# Patient Record
Sex: MALE | Race: WHITE | NOT HISPANIC OR LATINO | Employment: OTHER | URBAN - METROPOLITAN AREA
[De-identification: names, ages, dates, MRNs, and addresses within clinical notes are randomized per-mention and may not be internally consistent; named-entity substitution may affect disease eponyms.]

---

## 2018-01-10 ENCOUNTER — TELEPHONE (OUTPATIENT)
Dept: ORTHOPEDICS | Facility: CLINIC | Age: 63
End: 2018-01-10

## 2018-05-03 ENCOUNTER — TELEPHONE (OUTPATIENT)
Dept: ORTHOPEDICS | Facility: CLINIC | Age: 63
End: 2018-05-03

## 2018-05-03 NOTE — TELEPHONE ENCOUNTER
----- Message from Tanja Su sent at 5/3/2018 11:28 AM CDT -----  Contact: Pt  Name of Who is Calling: Pt      What is the request in detail: Pt states he's going to need an MRI on right shoulder before upcoming appointment.      Can the clinic reply by [facility]: no      What [facility] Back [facility] .[phone] (home) [phone] (work)

## 2018-05-03 NOTE — TELEPHONE ENCOUNTER
Called and spoke with patient and advised him that we can not order an MRI of his shoulder until he is seen an evaluated first. Offered to move his appointment from 5/17 to Monday 5/7/18. Patient agreed. Appointment made at 1pm 5/7/18.

## 2018-05-04 DIAGNOSIS — M25.511 RIGHT SHOULDER PAIN, UNSPECIFIED CHRONICITY: Primary | ICD-10-CM

## 2018-05-07 ENCOUNTER — HOSPITAL ENCOUNTER (OUTPATIENT)
Dept: RADIOLOGY | Facility: HOSPITAL | Age: 63
Discharge: HOME | End: 2018-05-07
Attending: ORTHOPAEDIC SURGERY
Payer: COMMERCIAL

## 2018-05-07 ENCOUNTER — OFFICE VISIT (OUTPATIENT)
Dept: ORTHOPEDICS | Facility: CLINIC | Age: 63
End: 2018-05-07
Payer: COMMERCIAL

## 2018-05-07 VITALS
SYSTOLIC BLOOD PRESSURE: 143 MMHG | BODY MASS INDEX: 36.88 KG/M2 | HEART RATE: 67 BPM | HEIGHT: 67 IN | DIASTOLIC BLOOD PRESSURE: 68 MMHG | WEIGHT: 235 LBS

## 2018-05-07 DIAGNOSIS — M75.121 COMPLETE TEAR OF RIGHT ROTATOR CUFF: Primary | ICD-10-CM

## 2018-05-07 DIAGNOSIS — M25.511 RIGHT SHOULDER PAIN, UNSPECIFIED CHRONICITY: ICD-10-CM

## 2018-05-07 PROCEDURE — 73030 X-RAY EXAM OF SHOULDER: CPT | Mod: TC,PN,RT

## 2018-05-07 PROCEDURE — 99214 OFFICE O/P EST MOD 30 MIN: CPT | Mod: 57,S$GLB,, | Performed by: ORTHOPAEDIC SURGERY

## 2018-05-07 PROCEDURE — 73030 X-RAY EXAM OF SHOULDER: CPT | Mod: 26,RT,, | Performed by: RADIOLOGY

## 2018-05-07 PROCEDURE — 99999 PR PBB SHADOW E&M-EST. PATIENT-LVL III: CPT | Mod: PBBFAC,,, | Performed by: ORTHOPAEDIC SURGERY

## 2018-05-07 NOTE — PROGRESS NOTES
Past Medical History:   Diagnosis Date    Anxiety     Anxiety     Arthritis     Back problem     bulging disc    GERD (gastroesophageal reflux disease)     Hypercholesterolemia 11/2/2015    2009: Began statin.    Hyperlipidemia     Hypertension     Hypertension, benign 11/2/2015 2008: Diagnosed.    Sleep apnea        Past Surgical History:   Procedure Laterality Date    APPENDECTOMY      magdalene      NOSE SURGERY      UVULECTOMY         Current Outpatient Prescriptions   Medication Sig    acetaminophen (TYLENOL) 325 MG tablet Take 2 tablets (650 mg total) by mouth every 6 (six) hours as needed for Temperature greater than (or equal to 101 degree F).    amlodipine (NORVASC) 10 MG tablet TAKE 1 TABLET ONCE DAILY    atorvastatin (LIPITOR) 10 MG tablet TAKE 1 TABLET ONCE DAILY    benazepril (LOTENSIN) 20 MG tablet TAKE 1 TABLET ONCE DAILY    buPROPion (WELLBUTRIN) 100 MG tablet Take 100 mg by mouth 2 (two) times daily.    cyclobenzaprine (FLEXERIL) 10 MG tablet Take 10 mg by mouth every evening.    diclofenac (VOLTAREN) 75 MG EC tablet Take 75 mg by mouth 2 (two) times daily.    fluticasone (FLONASE) 50 mcg/actuation nasal spray 1 spray by Each Nare route once daily.    gabapentin (NEURONTIN) 600 MG tablet Take 600 mg by mouth 2 (two) times daily.    pantoprazole (PROTONIX) 40 MG tablet Take 40 mg by mouth once daily.    paroxetine HCl 37.5 mg 24 hr tablet     tramadol (ULTRAM) 50 mg tablet Take 50 mg by mouth every 6 (six) hours as needed for Pain.    aspirin (ECOTRIN) 81 MG EC tablet Take 1 tablet (81 mg total) by mouth once daily.     No current facility-administered medications for this visit.        Review of patient's allergies indicates:   Allergen Reactions    Adhesive Rash       Family History   Problem Relation Age of Onset    Glaucoma Father        Social History     Social History    Marital status:      Spouse name: N/A    Number of children: N/A    Years of education:  N/A     Occupational History    Not on file.     Social History Main Topics    Smoking status: Never Smoker    Smokeless tobacco: Never Used    Alcohol use Yes      Comment: social    Drug use: No    Sexual activity: Yes     Other Topics Concern    Not on file     Social History Narrative    No narrative on file       Chief Complaint:   Chief Complaint   Patient presents with    Right Shoulder - Pain       History: This is a 62-year-old right-hand-dominant male seen for chronic neck and bilateral shoulder pain.  The right shoulder is the worst.  Patient is seen in consultation for Dr. Oates.  Patient's had worsening symptoms.  Pain is moderate to severe.  Pain when reaching or driving.  He rates the pain as a 4 out of 10.  Patient has occasional numbness and tingling down into the arm.  Patient previously saw another doctor where x-rays were taken of his neck and he was told he had arthritis.The cortisone injection for his right shoulder helped for a few weeks.  He also went to physical therapy.  MRI did show a small rotator cuff tear of the anterior supraspinatus.      Present:   His pain is slowly returning and worsening.  Pain is back up to a 5 out of 10.    Review of Systems:    Constitution: Negative for chills, fever, and sweats.  Negative for unexplained weight loss.    HENT:  Positive for headaches and blurry vision.    Cardiovascular:Negative for chest pain or irregular heart beat. Negative for hypertension.    Respiratory:  Negative for cough and shortness of breath.    Gastrointestinal: Negative for abdominal pain, heartburn, melena, nausea, and vomitting.    Genitourinary:  Negative bladder incontinence and dysuria.    Musculoskeletal:  See HPI    Neurologic: Positive for numbness.    Psychiatric/Behavioral: Negative for depression.  The patient is  nervous/anxious.      Endocrine: Negative for polyuria    Hematologic/Lymphatic: Negative for bleeding problem.  Does not bruise/bleed  easily.    Skin: Negative for poor would healing and rash      Physical Examination:    Vital Signs:    Vitals:    05/07/18 1301   BP: (!) 143/68   Pulse: 67       Body mass index is 36.81 kg/m².    This a well-developed, well nourished patient in no acute distress.  They are alert and oriented and cooperative to examination.  Pt. walks without an antalgic gait.      Examination of the right shoulder shows no rashes or erythema. There are no masses, ecchymosis, or atrophy. The patient has full range of motion in forward flexion, external rotation, and internal rotation to the mid T-spine. The patient has moderately positive impingement signs. 2+ radial pulse. Intact axillary, radial, median and ulnar sensation. 5 out of 5 resisted forward flexion, external rotation, and negative lift off test.    Heart is regular rate without obvious murmurs   Normal respiratory effort without audible wheezing  Abdomen is soft and nontender       X-rays: X-rays of the right shoulder  reviewed which show some mild cystic change of the greater tuberosity    MRI of the right shoulder:Complete tear with short distance retraction of the anterior edge of the supraspinatus tendon.  Tendinitis also noted.  Hypertrophic changes at the a.c. joint with both superior and inferior spur formation.     Assessment:: Right rotator cuff tear      Plan:  I reviewed the MRI of the right shoulder.  We discussed treatment options including continued observation versus arthroscopic rotator cuff repair.  Patient is having worsening night pain.  He would like to go and have this set up.  Plan is for arthroscopic rotator cuff repair. Risks, benefits, and alternatives to the procedure were explained to the patient including but not limited to damage to nerves, arteries, blood vessels, bones, tendons, ligaments, stiffness, instability, infection, DVT, PE, as well as general anesthetic complications including seizure, stroke, heart attack and even death. The  patient understood these risks and wished to proceed and signed the informed consent.

## 2018-05-14 ENCOUNTER — ANESTHESIA EVENT (OUTPATIENT)
Dept: SURGERY | Facility: HOSPITAL | Age: 63
End: 2018-05-14
Payer: COMMERCIAL

## 2018-05-15 ENCOUNTER — HOSPITAL ENCOUNTER (OUTPATIENT)
Dept: RADIOLOGY | Facility: HOSPITAL | Age: 63
Discharge: HOME | End: 2018-05-15
Attending: ORTHOPAEDIC SURGERY
Payer: COMMERCIAL

## 2018-05-15 ENCOUNTER — HOSPITAL ENCOUNTER (OUTPATIENT)
Dept: PREADMISSION TESTING | Facility: HOSPITAL | Age: 63
Discharge: HOME | End: 2018-05-15
Attending: ORTHOPAEDIC SURGERY
Payer: COMMERCIAL

## 2018-05-15 VITALS — BODY MASS INDEX: 31.84 KG/M2 | WEIGHT: 215 LBS | HEIGHT: 69 IN

## 2018-05-15 DIAGNOSIS — M75.121 COMPLETE TEAR OF RIGHT ROTATOR CUFF: ICD-10-CM

## 2018-05-15 LAB
ANION GAP SERPL CALC-SCNC: 10 MMOL/L
BASOPHILS # BLD AUTO: 0.1 K/UL
BASOPHILS NFR BLD: 1.4 %
BUN SERPL-MCNC: 15 MG/DL
CALCIUM SERPL-MCNC: 9.5 MG/DL
CHLORIDE SERPL-SCNC: 108 MMOL/L
CO2 SERPL-SCNC: 25 MMOL/L
CREAT SERPL-MCNC: 0.9 MG/DL
DIFFERENTIAL METHOD: ABNORMAL
EOSINOPHIL # BLD AUTO: 0 K/UL
EOSINOPHIL NFR BLD: 0.8 %
ERYTHROCYTE [DISTWIDTH] IN BLOOD BY AUTOMATED COUNT: 13.2 %
EST. GFR  (AFRICAN AMERICAN): >60 ML/MIN/1.73 M^2
EST. GFR  (NON AFRICAN AMERICAN): >60 ML/MIN/1.73 M^2
GLUCOSE SERPL-MCNC: 100 MG/DL
HCT VFR BLD AUTO: 38.4 %
HGB BLD-MCNC: 13 G/DL
LYMPHOCYTES # BLD AUTO: 1.6 K/UL
LYMPHOCYTES NFR BLD: 33.5 %
MCH RBC QN AUTO: 30.9 PG
MCHC RBC AUTO-ENTMCNC: 34 G/DL
MCV RBC AUTO: 91 FL
MONOCYTES # BLD AUTO: 0.3 K/UL
MONOCYTES NFR BLD: 5.3 %
NEUTROPHILS # BLD AUTO: 2.9 K/UL
NEUTROPHILS NFR BLD: 59 %
PLATELET # BLD AUTO: 204 K/UL
PMV BLD AUTO: 8.8 FL
POTASSIUM SERPL-SCNC: 4 MMOL/L
RBC # BLD AUTO: 4.22 M/UL
SODIUM SERPL-SCNC: 143 MMOL/L
WBC # BLD AUTO: 4.9 K/UL

## 2018-05-15 PROCEDURE — 93010 ELECTROCARDIOGRAM REPORT: CPT | Mod: ,,, | Performed by: INTERNAL MEDICINE

## 2018-05-15 PROCEDURE — 99900103 DSU ONLY-NO CHARGE-INITIAL HR (STAT)

## 2018-05-15 PROCEDURE — 36415 COLL VENOUS BLD VENIPUNCTURE: CPT

## 2018-05-15 PROCEDURE — 71046 X-RAY EXAM CHEST 2 VIEWS: CPT | Mod: TC,FY

## 2018-05-15 PROCEDURE — 71046 X-RAY EXAM CHEST 2 VIEWS: CPT | Mod: 26,,, | Performed by: RADIOLOGY

## 2018-05-15 PROCEDURE — 85025 COMPLETE CBC W/AUTO DIFF WBC: CPT

## 2018-05-15 PROCEDURE — 93005 ELECTROCARDIOGRAM TRACING: CPT

## 2018-05-15 PROCEDURE — 80048 BASIC METABOLIC PNL TOTAL CA: CPT

## 2018-05-15 PROCEDURE — 99900104 DSU ONLY-NO CHARGE-EA ADD'L HR (STAT)

## 2018-05-15 RX ORDER — LORATADINE 10 MG/1
10 TABLET ORAL DAILY
COMMUNITY
End: 2018-12-17

## 2018-05-15 RX ORDER — ESOMEPRAZOLE MAGNESIUM 40 MG/1
40 CAPSULE, DELAYED RELEASE ORAL
COMMUNITY
End: 2018-12-17

## 2018-05-15 RX ORDER — SERTRALINE HYDROCHLORIDE 50 MG/1
50 TABLET, FILM COATED ORAL DAILY
COMMUNITY
End: 2019-10-03 | Stop reason: SDUPTHER

## 2018-05-18 ENCOUNTER — ANESTHESIA (OUTPATIENT)
Dept: SURGERY | Facility: HOSPITAL | Age: 63
End: 2018-05-18
Payer: COMMERCIAL

## 2018-05-18 ENCOUNTER — SURGERY (OUTPATIENT)
Age: 63
End: 2018-05-18

## 2018-05-18 ENCOUNTER — HOSPITAL ENCOUNTER (OUTPATIENT)
Facility: HOSPITAL | Age: 63
Discharge: HOME | End: 2018-05-18
Attending: ORTHOPAEDIC SURGERY | Admitting: ORTHOPAEDIC SURGERY
Payer: COMMERCIAL

## 2018-05-18 DIAGNOSIS — M75.121 COMPLETE TEAR OF RIGHT ROTATOR CUFF: ICD-10-CM

## 2018-05-18 PROCEDURE — 36000711: Performed by: ORTHOPAEDIC SURGERY

## 2018-05-18 PROCEDURE — 99900104 DSU ONLY-NO CHARGE-EA ADD'L HR (STAT): Performed by: ORTHOPAEDIC SURGERY

## 2018-05-18 PROCEDURE — D9220A PRA ANESTHESIA: Mod: CRNA,,, | Performed by: NURSE ANESTHETIST, CERTIFIED REGISTERED

## 2018-05-18 PROCEDURE — 64416 NJX AA&/STRD BRCH PL NFS IMG: CPT | Performed by: ANESTHESIOLOGY

## 2018-05-18 PROCEDURE — 36000710: Performed by: ORTHOPAEDIC SURGERY

## 2018-05-18 PROCEDURE — 63600175 PHARM REV CODE 636 W HCPCS

## 2018-05-18 PROCEDURE — 25000003 PHARM REV CODE 250: Performed by: ANESTHESIOLOGY

## 2018-05-18 PROCEDURE — 29827 SHO ARTHRS SRG RT8TR CUF RPR: CPT | Mod: RT,,, | Performed by: ORTHOPAEDIC SURGERY

## 2018-05-18 PROCEDURE — C1713 ANCHOR/SCREW BN/BN,TIS/BN: HCPCS | Performed by: ORTHOPAEDIC SURGERY

## 2018-05-18 PROCEDURE — 64415 NJX AA&/STRD BRCH PLXS IMG: CPT | Mod: 59,RT,, | Performed by: ANESTHESIOLOGY

## 2018-05-18 PROCEDURE — 29824 SHO ARTHRS SRG DSTL CLAVICLC: CPT | Mod: 51,RT,, | Performed by: ORTHOPAEDIC SURGERY

## 2018-05-18 PROCEDURE — 76942 ECHO GUIDE FOR BIOPSY: CPT | Mod: 26,,, | Performed by: ANESTHESIOLOGY

## 2018-05-18 PROCEDURE — 27201423 OPTIME MED/SURG SUP & DEVICES STERILE SUPPLY: Performed by: ORTHOPAEDIC SURGERY

## 2018-05-18 PROCEDURE — 63600175 PHARM REV CODE 636 W HCPCS: Performed by: ANESTHESIOLOGY

## 2018-05-18 PROCEDURE — 25000003 PHARM REV CODE 250: Performed by: NURSE ANESTHETIST, CERTIFIED REGISTERED

## 2018-05-18 PROCEDURE — D9220A PRA ANESTHESIA: Mod: ANES,,, | Performed by: ANESTHESIOLOGY

## 2018-05-18 PROCEDURE — C2626 INFUSION PUMP, NON-PROG,TEMP: HCPCS | Performed by: ANESTHESIOLOGY

## 2018-05-18 PROCEDURE — 71000015 HC POSTOP RECOV 1ST HR: Performed by: ORTHOPAEDIC SURGERY

## 2018-05-18 PROCEDURE — 71000039 HC RECOVERY, EACH ADD'L HOUR: Performed by: ORTHOPAEDIC SURGERY

## 2018-05-18 PROCEDURE — 76942 ECHO GUIDE FOR BIOPSY: CPT | Performed by: ANESTHESIOLOGY

## 2018-05-18 PROCEDURE — 63600175 PHARM REV CODE 636 W HCPCS: Performed by: ORTHOPAEDIC SURGERY

## 2018-05-18 PROCEDURE — 29826 SHO ARTHRS SRG DECOMPRESSION: CPT | Mod: RT,,, | Performed by: ORTHOPAEDIC SURGERY

## 2018-05-18 PROCEDURE — 64416 NJX AA&/STRD BRCH PL NFS IMG: CPT | Mod: 59,RT,, | Performed by: ANESTHESIOLOGY

## 2018-05-18 PROCEDURE — 63600175 PHARM REV CODE 636 W HCPCS: Performed by: NURSE ANESTHETIST, CERTIFIED REGISTERED

## 2018-05-18 PROCEDURE — 71000033 HC RECOVERY, INTIAL HOUR: Performed by: ORTHOPAEDIC SURGERY

## 2018-05-18 PROCEDURE — 37000009 HC ANESTHESIA EA ADD 15 MINS: Performed by: ORTHOPAEDIC SURGERY

## 2018-05-18 PROCEDURE — 37000008 HC ANESTHESIA 1ST 15 MINUTES: Performed by: ORTHOPAEDIC SURGERY

## 2018-05-18 PROCEDURE — 71000016 HC POSTOP RECOV ADDL HR: Performed by: ORTHOPAEDIC SURGERY

## 2018-05-18 PROCEDURE — 99900103 DSU ONLY-NO CHARGE-INITIAL HR (STAT): Performed by: ORTHOPAEDIC SURGERY

## 2018-05-18 DEVICE — ANCHOR SWIVELOCK C BLU FIBERTP: Type: IMPLANTABLE DEVICE | Site: SHOULDER | Status: FUNCTIONAL

## 2018-05-18 RX ORDER — OXYCODONE HYDROCHLORIDE 5 MG/1
5 TABLET ORAL ONCE AS NEEDED
Status: DISCONTINUED | OUTPATIENT
Start: 2018-05-19 | End: 2018-05-18

## 2018-05-18 RX ORDER — SUCCINYLCHOLINE CHLORIDE 20 MG/ML
INJECTION INTRAMUSCULAR; INTRAVENOUS
Status: DISCONTINUED | OUTPATIENT
Start: 2018-05-18 | End: 2018-05-18

## 2018-05-18 RX ORDER — DEXAMETHASONE SODIUM PHOSPHATE 4 MG/ML
INJECTION, SOLUTION INTRA-ARTICULAR; INTRALESIONAL; INTRAMUSCULAR; INTRAVENOUS; SOFT TISSUE
Status: DISCONTINUED | OUTPATIENT
Start: 2018-05-18 | End: 2018-05-18

## 2018-05-18 RX ORDER — ONDANSETRON 2 MG/ML
4 INJECTION INTRAMUSCULAR; INTRAVENOUS ONCE
Status: DISCONTINUED | OUTPATIENT
Start: 2018-05-18 | End: 2018-05-18 | Stop reason: HOSPADM

## 2018-05-18 RX ORDER — ROCURONIUM BROMIDE 10 MG/ML
INJECTION, SOLUTION INTRAVENOUS
Status: DISCONTINUED | OUTPATIENT
Start: 2018-05-18 | End: 2018-05-18

## 2018-05-18 RX ORDER — DIPHENHYDRAMINE HYDROCHLORIDE 50 MG/ML
25 INJECTION INTRAMUSCULAR; INTRAVENOUS EVERY 6 HOURS PRN
Status: DISCONTINUED | OUTPATIENT
Start: 2018-05-18 | End: 2018-05-18 | Stop reason: HOSPADM

## 2018-05-18 RX ORDER — ACETAMINOPHEN 10 MG/ML
INJECTION, SOLUTION INTRAVENOUS
Status: DISCONTINUED | OUTPATIENT
Start: 2018-05-18 | End: 2018-05-18

## 2018-05-18 RX ORDER — SODIUM CHLORIDE, SODIUM LACTATE, POTASSIUM CHLORIDE, CALCIUM CHLORIDE 600; 310; 30; 20 MG/100ML; MG/100ML; MG/100ML; MG/100ML
75 INJECTION, SOLUTION INTRAVENOUS CONTINUOUS
Status: DISCONTINUED | OUTPATIENT
Start: 2018-05-18 | End: 2018-05-18 | Stop reason: HOSPADM

## 2018-05-18 RX ORDER — FENTANYL CITRATE 50 UG/ML
INJECTION, SOLUTION INTRAMUSCULAR; INTRAVENOUS
Status: DISCONTINUED | OUTPATIENT
Start: 2018-05-18 | End: 2018-05-18

## 2018-05-18 RX ORDER — LIDOCAINE HYDROCHLORIDE 10 MG/ML
INJECTION, SOLUTION EPIDURAL; INFILTRATION; INTRACAUDAL; PERINEURAL
Status: DISCONTINUED
Start: 2018-05-18 | End: 2018-05-18 | Stop reason: WASHOUT

## 2018-05-18 RX ORDER — ONDANSETRON 2 MG/ML
INJECTION INTRAMUSCULAR; INTRAVENOUS
Status: DISCONTINUED | OUTPATIENT
Start: 2018-05-18 | End: 2018-05-18

## 2018-05-18 RX ORDER — MIDAZOLAM HYDROCHLORIDE 1 MG/ML
INJECTION, SOLUTION INTRAMUSCULAR; INTRAVENOUS
Status: DISCONTINUED | OUTPATIENT
Start: 2018-05-18 | End: 2018-05-18

## 2018-05-18 RX ORDER — PROPOFOL 10 MG/ML
VIAL (ML) INTRAVENOUS
Status: DISCONTINUED | OUTPATIENT
Start: 2018-05-18 | End: 2018-05-18

## 2018-05-18 RX ORDER — FENTANYL CITRATE 50 UG/ML
25 INJECTION, SOLUTION INTRAMUSCULAR; INTRAVENOUS EVERY 5 MIN PRN
Status: DISCONTINUED | OUTPATIENT
Start: 2018-05-18 | End: 2018-05-18 | Stop reason: HOSPADM

## 2018-05-18 RX ORDER — GLYCOPYRROLATE 0.2 MG/ML
INJECTION INTRAMUSCULAR; INTRAVENOUS
Status: DISCONTINUED | OUTPATIENT
Start: 2018-05-18 | End: 2018-05-18

## 2018-05-18 RX ORDER — SODIUM CHLORIDE, SODIUM LACTATE, POTASSIUM CHLORIDE, CALCIUM CHLORIDE 600; 310; 30; 20 MG/100ML; MG/100ML; MG/100ML; MG/100ML
INJECTION, SOLUTION INTRAVENOUS CONTINUOUS
Status: DISCONTINUED | OUTPATIENT
Start: 2018-05-18 | End: 2018-05-18 | Stop reason: HOSPADM

## 2018-05-18 RX ORDER — MEPERIDINE HYDROCHLORIDE 25 MG/ML
12.5 INJECTION INTRAMUSCULAR; INTRAVENOUS; SUBCUTANEOUS ONCE AS NEEDED
Status: DISCONTINUED | OUTPATIENT
Start: 2018-05-18 | End: 2018-05-18 | Stop reason: HOSPADM

## 2018-05-18 RX ORDER — LIDOCAINE HYDROCHLORIDE 20 MG/ML
JELLY TOPICAL
Status: DISCONTINUED | OUTPATIENT
Start: 2018-05-18 | End: 2018-05-18

## 2018-05-18 RX ORDER — KETOROLAC TROMETHAMINE 30 MG/ML
INJECTION, SOLUTION INTRAMUSCULAR; INTRAVENOUS
Status: DISCONTINUED | OUTPATIENT
Start: 2018-05-18 | End: 2018-05-18

## 2018-05-18 RX ORDER — CEFAZOLIN SODIUM 2 G/50ML
2 SOLUTION INTRAVENOUS
Status: COMPLETED | OUTPATIENT
Start: 2018-05-18 | End: 2018-05-18

## 2018-05-18 RX ORDER — LIDOCAINE HYDROCHLORIDE 10 MG/ML
0.5 INJECTION, SOLUTION EPIDURAL; INFILTRATION; INTRACAUDAL; PERINEURAL ONCE
Status: DISCONTINUED | OUTPATIENT
Start: 2018-05-18 | End: 2018-05-18 | Stop reason: HOSPADM

## 2018-05-18 RX ORDER — SODIUM CHLORIDE 0.9 % (FLUSH) 0.9 %
5 SYRINGE (ML) INJECTION
Status: DISCONTINUED | OUTPATIENT
Start: 2018-05-18 | End: 2018-05-18 | Stop reason: HOSPADM

## 2018-05-18 RX ORDER — HYDROCODONE BITARTRATE AND ACETAMINOPHEN 5; 325 MG/1; MG/1
1 TABLET ORAL EVERY 4 HOURS PRN
Status: DISCONTINUED | OUTPATIENT
Start: 2018-05-18 | End: 2018-05-18 | Stop reason: HOSPADM

## 2018-05-18 RX ORDER — LIDOCAINE HCL/PF 100 MG/5ML
SYRINGE (ML) INTRAVENOUS
Status: DISCONTINUED | OUTPATIENT
Start: 2018-05-18 | End: 2018-05-18

## 2018-05-18 RX ORDER — PROMETHAZINE HYDROCHLORIDE 25 MG/1
25 TABLET ORAL EVERY 8 HOURS PRN
Qty: 30 TABLET | Refills: 0 | Status: SHIPPED | OUTPATIENT
Start: 2018-05-18 | End: 2019-02-08

## 2018-05-18 RX ORDER — OXYCODONE HYDROCHLORIDE 5 MG/1
5 TABLET ORAL ONCE AS NEEDED
Status: COMPLETED | OUTPATIENT
Start: 2018-05-18 | End: 2018-05-18

## 2018-05-18 RX ORDER — OXYCODONE AND ACETAMINOPHEN 5; 325 MG/1; MG/1
1 TABLET ORAL EVERY 6 HOURS PRN
Qty: 40 TABLET | Refills: 0 | Status: SHIPPED | OUTPATIENT
Start: 2018-05-18 | End: 2018-05-30 | Stop reason: SDUPTHER

## 2018-05-18 RX ORDER — SODIUM CHLORIDE 0.9 % (FLUSH) 0.9 %
3 SYRINGE (ML) INJECTION
Status: DISCONTINUED | OUTPATIENT
Start: 2018-05-18 | End: 2018-05-18 | Stop reason: HOSPADM

## 2018-05-18 RX ADMIN — ACETAMINOPHEN 1000 MG: 10 INJECTION, SOLUTION INTRAVENOUS at 08:05

## 2018-05-18 RX ADMIN — DEXAMETHASONE SODIUM PHOSPHATE 4 MG: 4 INJECTION, SOLUTION INTRAMUSCULAR; INTRAVENOUS at 08:05

## 2018-05-18 RX ADMIN — FENTANYL CITRATE 50 MCG: 50 INJECTION, SOLUTION INTRAMUSCULAR; INTRAVENOUS at 08:05

## 2018-05-18 RX ADMIN — FENTANYL CITRATE 25 MCG: 50 INJECTION, SOLUTION INTRAMUSCULAR; INTRAVENOUS at 10:05

## 2018-05-18 RX ADMIN — FENTANYL CITRATE 100 MCG: 50 INJECTION, SOLUTION INTRAMUSCULAR; INTRAVENOUS at 08:05

## 2018-05-18 RX ADMIN — ROPIVACAINE HYDROCHLORIDE: 2 INJECTION, SOLUTION EPIDURAL; INFILTRATION at 11:05

## 2018-05-18 RX ADMIN — CEFAZOLIN SODIUM 2 G: 2 SOLUTION INTRAVENOUS at 08:05

## 2018-05-18 RX ADMIN — PROPOFOL 200 MG: 10 INJECTION, EMULSION INTRAVENOUS at 08:05

## 2018-05-18 RX ADMIN — ROCURONIUM BROMIDE 5 MG: 10 INJECTION, SOLUTION INTRAVENOUS at 08:05

## 2018-05-18 RX ADMIN — ONDANSETRON 4 MG: 2 INJECTION, SOLUTION INTRAMUSCULAR; INTRAVENOUS at 08:05

## 2018-05-18 RX ADMIN — OXYCODONE HYDROCHLORIDE 5 MG: 5 TABLET ORAL at 10:05

## 2018-05-18 RX ADMIN — LIDOCAINE HYDROCHLORIDE 100 MG: 20 INJECTION, SOLUTION INTRAVENOUS at 08:05

## 2018-05-18 RX ADMIN — KETOROLAC TROMETHAMINE 30 MG: 30 INJECTION, SOLUTION INTRAMUSCULAR; INTRAVENOUS at 09:05

## 2018-05-18 RX ADMIN — SUCCINYLCHOLINE CHLORIDE 160 MG: 20 INJECTION, SOLUTION INTRAMUSCULAR; INTRAVENOUS at 08:05

## 2018-05-18 RX ADMIN — SODIUM CHLORIDE, SODIUM LACTATE, POTASSIUM CHLORIDE, AND CALCIUM CHLORIDE: .6; .31; .03; .02 INJECTION, SOLUTION INTRAVENOUS at 07:05

## 2018-05-18 RX ADMIN — MIDAZOLAM 2 MG: 1 INJECTION INTRAMUSCULAR; INTRAVENOUS at 08:05

## 2018-05-18 RX ADMIN — GLYCOPYRROLATE 0.2 MG: 0.2 INJECTION, SOLUTION INTRAMUSCULAR; INTRAVENOUS at 08:05

## 2018-05-18 RX ADMIN — LIDOCAINE HYDROCHLORIDE 1 EACH: 20 JELLY TOPICAL at 08:05

## 2018-05-18 NOTE — ANESTHESIA PROCEDURE NOTES
Peripheral    Patient location during procedure: pre-op   Block not for primary anesthetic.  Reason for block: at surgeon's request and post-op pain management   Post-op Pain Location: TORN ROTATOR CUFF  Start time: 5/18/2018 8:15 AM  Timeout: 5/18/2018 8:15 AM   End time: 5/18/2018 8:22 AM  Surgery related to: REPAIR OF TORN ROTATOR CUFF  Staffing  Anesthesiologist: SAMUEL ESCOBAR  Performed: anesthesiologist   Preanesthetic Checklist  Completed: patient identified, site marked, surgical consent, pre-op evaluation, timeout performed, IV checked, risks and benefits discussed and monitors and equipment checked  Peripheral Block  Patient position: sitting  Prep: ChloraPrep  Patient monitoring: heart rate, cardiac monitor, continuous pulse ox, continuous capnometry and frequent blood pressure checks  Block type: interscalene  Laterality: right  Injection technique: single shot  Needle  Needle type: Stimuplex   Needle gauge: 22 G  Needle length: 2 in  Needle localization: anatomical landmarks and ultrasound guidance   -ultrasound image captured on disc.  Assessment  Injection assessment: negative aspiration, negative parasthesia and local visualized surrounding nerve  Paresthesia pain: none  Heart rate change: no  Slow fractionated injection: yes  Medications:  Bolus administered: 5 mL of 0.5 ropivacaine and bupivacaine  Epinephrine added: none  Additional Notes  VSS.  [facility] RN monitoring vitals throughout procedure.  Patient tolerated procedure well.    10 ML EXPAREL COMBINED WITH 5 ML BUPIVICAINE AND 4 MG OF DECADRON

## 2018-05-18 NOTE — DISCHARGE INSTRUCTIONS
1.Diet: Ice chips, clear liquids, and then diet as tolerated. Drink plenty of liquids.  2.Ice the area at least three times a day (20 minutes per session).  3.Elevate the extremity above the level of the heart to help reduce swelling.  4.Pain medication can be taken every four to six hours as needed. It is helpful to take pain medication prior to physical therapy.  5.Any activity that requires precise thinking or accuracy should be avoided for a minimum of 72 hours after surgery and while on narcotic pain medication. This includes operating machinery and/or driving a vehicle.  6.All sutures/staples will be removed approximately 14 days from the time of surgery. Leave steri-strips (skin tapes) in place until sutures are removed.  7. If skin glue is used instead of stitches, do not apply ointments or solutions to the incision. Keep the incision dry. The skin glue will peel off in 3-4 weeks.  8. Change dressing on the 2nd post-op day. Use gauze for the first 3 days, then start using Band-Aids over the incision sites.   9. All casts, splints, braces, slings, crutches, abduction pillows, etc... Are to be worn as instructed. Use sling at all times except for showering and exercises.  10. Keep the incision dry for 10-14 days. A waterproof dressing (purchase at [employer], etc) can be used to shower. No bath, pool, hot tub until instructed.  11. Start PT in 14 days. Call office to help with scheduling.  12. Call [phone] with any questions or concerns.      Discharge Instructions for Shoulder Arthroscopy  You had a shoulder arthroscopy. It is a surgical procedure that helps the healthcare provider diagnose and treat shoulder problems. These include instability, arthritis, and rotator cuff problems. Below are instructions to help you care for your shoulder when you are at home.  What to expect  After surgery, your joint may be swollen, painful, and stiff. The joint will heal with time. But, recovery times vary depending  on what was done. For example, with a shaved rotator cuff, you may be told to move your arm soon after surgery to prevent stiffness. But if the rotator cuff is repaired or treatment is for instability or arthritis, your healthcare provider may want you to limit movement of your arm for a period of time. Follow your healthcare providers instructions regarding arm movement.  Activity     You may be told to do daily pendulum swings to improve your joints flexibility. Use your torso to move your arm in a Jena, first in one direction, then the other.   · Dont drive until your healthcare provider says its OK. And never drive while taking opioid pain medicine.  · Ask your healthcare provider when it is safe to do Pendulum exercises:    ¨ Hold on to the back of a chair, or lean on a tabletop with your healthy arm.  ¨ Do not actively move your arm with your shoulder muscles during this process. Instead, allow your arm to sway gently.    ¨ Use your torso to move your affected arm in a Jena. First do 20 circles in one direction. Then do 20 circles in the other direction.  ¨ Repeat the pendulum exercise every 2 hour(s). When you feel ready, increase the number of circles to 50 in each direction, every 2 hour(s).  · Bend your wrist and wiggle your fingers often to help blood flow.  Incision care  · Check your incision daily for redness, tenderness, or drainage.  · Wait 3 day(s) after your surgery to begin showering. Then shower as needed. Carefully wash your incision with soap and water. Gently pat it dry. Dont rub the incision, or apply creams or lotions to it.  · Dont soak in a bathtub, hot tub, or pool until your healthcare provider says its OK.  Other home care  · Take your temperature daily for 7 days after your surgery. Report a fever above 100.4º F (38º C) to your healthcare provider. Fever may be a sign of infection.  · Wear your sling or immobilizer as directed by your healthcare provider.  · Use pain  medicine, as needed and as directed. Don't wait until the pain is severe to start using the medicine.   · Use an ice pack or a bag of frozen peas--or something similar--wrapped in a thin towel on your shoulder to reduce swelling for the first 48 hours after surgery. Hold the ice pack. Leave the ice pack on for 20 minutes; then take it off for 20 minutes. Repeat as needed.  Follow-up  Make a follow-up appointment as directed by your healthcare provider.  When to seek medical attention  Call 911 right away if you have any of the following:  · Chest pain  · Shortness of breath  Otherwise, call your healthcare provider immediately if you have any of the following:  · Increasing shoulder pain or pain not relieved by medicine  · Pain or swelling in the arm on the side of your surgery  · Numbness, tingling, or blue-gray color of your arm or fingers on the side of your surgery  · Drainage or oozing, redness, or warmth at the incision  · Fever above 100.4º F (38º C) or shaking chills  · Nausea or vomiting   Date Last Reviewed: 11/16/2015 © 2000-2017 [employer]. [address] [place] [address]. All rights reserved. This information is not intended as a substitute for professional medical care. Always follow your healthcare professional's instructions.        General Information:    1.  Do not drink alcoholic beverages including beer for 24 hours or as long as you are on pain medication..  2.  Do not drive a motor vehicle, operate machinery or power tools, or signs legal papers for 24 hours or as long as you are on pain medication.   3.  You may experience light-headedness, dizziness, and sleepiness following surgery. Please do not stay alone. A responsible adult should be with you for this 24 hour period.  4.  Go home and rest.    5. Progress slowly to a normal diet unless instructed.  Otherwise, begin with liquids such as soft drinks, then soup and crackers working up to solid foods. Drink plenty of  "nonalcoholic fluids.  6.  Certain anesthetics and pain medications produce nausea and vomiting in certain       individuals. If nausea becomes a problem at home, call you doctor.    7. A nurse will be calling you sometime after surgery. Do not be alarmed. This is our way of finding out how you are doing.    8. Several times every hour while you are awake, take 2-3 deep breaths and cough. If you had stomach surgery hold a pillow or rolled towel firmly against your stomach before you cough. This will help with any pain the cough might cause.  9. Several times every hour while you are awake, pump and flex your feet 5-6 times and do foot circles. This will help prevent blood clots.    10.Call your doctor for severe pain, bleeding, fever, or signs or symptoms of infection (pain, swelling, redness, foul odor, drainage).      Discharge Instructions: After Your Surgery/Procedure  Youve just had surgery. During surgery you were given medicine called anesthesia to keep you relaxed and free of pain. After surgery you may have some pain or nausea. This is common. Here are some tips for feeling better and getting well after surgery.     Stay on schedule with your medication.   Going home  Your doctor or nurse will show you how to take care of yourself when you go home. He or she will also answer your questions. Have an adult family member or friend drive you home.      For your safety we recommend these precaution for the first 24 hours after your procedure:  · Do not drive or use heavy equipment.  · Do not make important decisions or sign legal papers.  · Do not drink alcohol.  · Have someone stay with you, if needed. He or she can watch for problems and help keep you safe.  · Your concentration, balance, coordination, and judgement may be impaired for many hours after anesthesia.  Use caution when ambulating or standing up.     · You may feel weak and "washed out" after anesthesia and surgery.      Subtle residual effects of " general anesthesia or sedation with regional / local anesthesia can last more than 24 hours.  Rest for the remainder of the day or longer if your Doctor/Surgeon has advised you to do so.  Although you may feel normal within the first 24 hours, your reflexes and mental ability may be impaired without you realizing it.  You may feel dizzy, lightheaded or sleepy for 24 hours or longer.      Be sure to go to all follow-up visits with your doctor. And rest after your surgery for as long as your doctor tells you to.  Coping with pain  If you have pain after surgery, pain medicine will help you feel better. Take it as told, before pain becomes severe. Also, ask your doctor or pharmacist about other ways to control pain. This might be with heat, ice, or relaxation. And follow any other instructions your surgeon or nurse gives you.  Tips for taking pain medicine  To get the best relief possible, remember these points:  · Pain medicines can upset your stomach. Taking them with a little food may help.  · Most pain relievers taken by mouth need at least 20 to 30 minutes to start to work.  · Taking medicine on a schedule can help you remember to take it. Try to time your medicine so that you can take it before starting an activity. This might be before you get dressed, go for a walk, or sit down for dinner.  · Constipation is a common side effect of pain medicines. Call your doctor before taking any medicines such as laxatives or stool softeners to help ease constipation. Also ask if you should skip any foods. Drinking lots of fluids and eating foods such as fruits and vegetables that are high in fiber can also help. Remember, do not take laxatives unless your surgeon has prescribed them.  · Drinking alcohol and taking pain medicine can cause dizziness and slow your breathing. It can even be deadly. Do not drink alcohol while taking pain medicine.  · Pain medicine can make you react more slowly to things. Do not drive or run  machinery while taking pain medicine.  Your health care provider may tell you to take acetaminophen to help ease your pain. Ask him or her how much you are supposed to take each day. Acetaminophen or other pain relievers may interact with your prescription medicines or other over-the-counter (OTC) drugs. Some prescription medicines have acetaminophen and other ingredients. Using both prescription and OTC acetaminophen for pain can cause you to overdose. Read the labels on your OTC medicines with care. This will help you to clearly know the list of ingredients, how much to take, and any warnings. It may also help you not take too much acetaminophen. If you have questions or do not understand the information, ask your pharmacist or health care provider to explain it to you before you take the OTC medicine.  Managing nausea  Some people have an upset stomach after surgery. This is often because of anesthesia, pain, or pain medicine, or the stress of surgery. These tips will help you handle nausea and eat healthy foods as you get better. If you were on a special food plan before surgery, ask your doctor if you should follow it while you get better. These tips may help:  · Do not push yourself to eat. Your body will tell you when to eat and how much.  · Start off with clear liquids and soup. They are easier to digest.  · Next try semi-solid foods, such as mashed potatoes, applesauce, and gelatin, as you feel ready.  · Slowly move to solid foods. Dont eat fatty, rich, or spicy foods at first.  · Do not force yourself to have 3 large meals a day. Instead eat smaller amounts more often.  · Take pain medicines with a small amount of solid food, such as crackers or toast, to avoid nausea.     Call your surgeon if  · You still have pain an hour after taking medicine. The medicine may not be strong enough.  · You feel too sleepy, dizzy, or groggy. The medicine may be too strong.  · You have side effects like nausea, vomiting,  or skin changes, such as rash, itching, or hives.       If you have obstructive sleep apnea  You were given anesthesia medicine during surgery to keep you comfortable and free of pain. After surgery, you may have more apnea spells because of this medicine and other medicines you were given. The spells may last longer than usual.   At home:  · Keep using the continuous positive airway pressure (CPAP) device when you sleep. Unless your health care provider tells you not to, use it when you sleep, day or night. CPAP is a common device used to treat obstructive sleep apnea.  · Talk with your provider before taking any pain medicine, muscle relaxants, or sedatives. Your provider will tell you about the possible dangers of taking these medicines.  © 9645-0209 [employer]. [address] [place] [address], [place] [address]. All rights reserved. This information is not intended as a substitute for professional medical care. Always follow your healthcare professional's instructions.      Post op instructions for prevention of DVT  What is deep vein thrombosis?  Deep vein thrombosis (DVT) is the medical term for blood clots in the deep veins of the leg.  These blood clots can be dangerous.  A DVT can block a blood vessel and keep blood from getting where it needs to go.  Another problem is that the clot can travel to other parts of the body such as the lungs.  A clot that travels to the lungs is called a pulmonary embolus (PE) and can cause serious problems with breathing which can lead to death.  Am I at risk for DVT/PE?  If you are not very active, you are at risk of DVT.  Anyone confined to bed, sitting for long periods of time, recovering from surgery, etc. increases the risk of DVT.  Other risk factors are cancer diagnosis, certain medications, estrogen replacement in any form,older age, obesity, pregnancy, smoking, history of clotting disorders, and dehydration.  How will I know if I have a DVT?   Swelling in the  lower leg   Pain   Warmth, redness, hardness or bulging of the vein  If you have any of these symptoms, call your doctors office right away.  Some people will not have any symptoms until the clot moves to the lungs.  What are the symptoms of a PE?   Panting, shortness of breath, or trouble breathing   Sharp, knife-like chest pain when you breathe   Coughing or coughing up blood   Rapid heartbeat  If you have any of these symptoms or get worse quickly, call 911 for emergency treatment.  How can I prevent a DVT?   Avoid long periods of inactivity and dont cross your legs--get up and walk around every hour or so.   Stay active--walking after surgery is highly encouraged.  This means you should get out of the house and walk in the neighborhood.  Going up and down stairs will not impair healing (unless advised against such activity by your doctor).     Drink plenty of noncaffeinated, nonalcoholic fluids each day to prevent dehydration.   Wear special support stockings, if they have been advised by your doctor.   If you travel, stop at least once an hour and walk around.   Avoid smoking (assistance with stopping is available through your healthcare provider)  Always notify your doctor if you are not able to follow the post operative instructions that are given to you at the time of discharge.  It may be necessary to prescribe one of the medications available to prevent DVT.    Using Opioids for Pain Management     Your doctor has given instructions for you to take an opioid.  This is a drug for bad pain.  It helps control pain without causing bleeding and kidney problems.  Common opioid names are morphine, hydromorphone, oxycodone, and methadone. These drugs are called narcotics.    There are several safety concerns you need to know.     · It is against the law to give or sell this drug to another person.  You must keep this medicine safely locked.    · You may have side effects from taking this medication.   These include nausea, itching, sweating, sleepiness, a change in your ability to breathe, and depression.  · Do not take alcohol or sleeping pills opioids.    · Long-term opoid use may no longer giver you relief from pain.  It can cause you stomach pain, mental anxiety, and headaches.  Long-term opoid use can potentially lead to unlawful street drug abuse and reduce your ability to stay employed.    · Your body may become opioid tolerant if you need to take more to get relief.    · You must stop taking opioids if you begin having more pain as a result of the medicine.    · Opioid withdrawal occurs when you have to stop taking the drug.  It can cause you to have nausea, vomiting, diarrhea, stomach pain, anxiety, and dilated pupils in your eyes. This condition means you are opioid dependent.    · Addiction is a drug induced brain disease. It means there are changes in how your brain is working.  Children, teens, and young adults under 25 years old are more likely to get addicted to opioids.      · Addiction can happen with repeated opioid use.  It does not happen with short-term use of two weeks or less.       For more information, please speak with your doctor or pharmacist.        We hope your stay was comfortable as you heal now, mend and rest.    For we have enjoyed taking care of you by giving your our best.    And as you get better, by regaining your health and strength;   We count it as a privilege to have served you and hope your time at [facility] was well spent.      Thank  You!!!

## 2018-05-18 NOTE — TRANSFER OF CARE
"Anesthesia Transfer of Care Note    Patient: Moris Calderón    Procedure(s) Performed: Procedure(s) (LRB):  REPAIR ROTATOR CUFF ARTHROSCOPIC (Right)  ARTHROSCOPY-SHOULDER WITH SUBACROMIAL DECOMPRESSION (Right)  ARTHROSCOPY-SHOULDER EXCISION DISTAL CLAVICLE (Right)    Patient location: PACU    Anesthesia Type: general and regional    Transport from OR: Transported from OR on 2-3 L/min O2 by NC with adequate spontaneous ventilation    Post pain: adequate analgesia    Post assessment: no apparent anesthetic complications and tolerated procedure well    Post vital signs: stable    Level of consciousness: sedated and responds to stimulation    Nausea/Vomiting: no nausea/vomiting    Complications: none    Transfer of care protocol was followed      Last vitals:   Visit Vitals  /66 (BP Location: Left arm, Patient Position: Lying)   Pulse (!) 56   Temp 36.8 °C (98.2 °F) (Temporal)   Resp 16   Ht 5' 9" (1.753 m)   Wt 97.5 kg (215 lb)   SpO2 99%   BMI 31.75 kg/m²     "

## 2018-05-18 NOTE — PLAN OF CARE
Pt calm  Personal belongs in bag and pt stats that his wife wants to hold them for him  IV started IVF infusing

## 2018-05-18 NOTE — ANESTHESIA PREPROCEDURE EVALUATION
05/18/2018  Moris Calderón is a 62 y.o., male.    Anesthesia Evaluation    I have reviewed the Patient Summary Reports.    I have reviewed the Nursing Notes.   I have reviewed the Medications.     Review of Systems  Anesthesia Hx:  No problems with previous Anesthesia    Social:  Non-Smoker Smoking Status: Never Smoker  Smokeless Tobacco Status: Never Used  Alcohol use: Yes, unspecified volume  Drug use: No       Cardiovascular:   Hypertension    Pulmonary:   Sleep Apnea    Hepatic/GI:   GERD, poorly controlled           Anesthesia Plan  Type of Anesthesia, risks & benefits discussed:  Anesthesia Type:  general, regional  Patient's Preference:   Intra-op Monitoring Plan: standard ASA monitors  Intra-op Monitoring Plan Comments:   Post Op Pain Control Plan:   Post Op Pain Control Plan Comments:   Induction:   IV  Beta Blocker:  Patient is not currently on a Beta-Blocker (No further documentation required).       Informed Consent: Patient understands risks and agrees with Anesthesia plan.  Questions answered. Anesthesia consent signed with patient.  ASA Score: 2     Day of Surgery Review of History & Physical: I have interviewed and examined the patient. I have reviewed the patient's H&P dated:  There are no significant changes.  H&P update referred to the surgeon.         Ready For Surgery From Anesthesia Perspective.

## 2018-05-18 NOTE — DISCHARGE SUMMARY
[facility]  Discharge Note  Short Stay    Admit Date: 5/18/2018    Discharge Date and Time: 5/18/2018    Attending Physician: Colby Valenzuela MD     Discharge Provider: Colby Valenzuela    Diagnoses:  Active Hospital Problems    Diagnosis  POA    *Complete tear of right rotator cuff [M75.121]  Yes      Resolved Hospital Problems    Diagnosis Date Resolved POA   No resolved problems to display.       Discharged Condition: good    Hospital Course: Patient was admitted for an outpatient procedure and tolerated the procedure well with no complications.    Final Diagnoses: Same as principal problem.    Disposition: Home or Self Care    Follow up/Patient Instructions:    Medications:  Reconciled Home Medications:      Medication List      START taking these medications    oxyCODONE-acetaminophen 5-325 mg per tablet  Commonly known as:  PERCOCET  Take 1 tablet by mouth every 6 (six) hours as needed for Pain.     promethazine 25 MG tablet  Commonly known as:  PHENERGAN  Take 1 tablet (25 mg total) by mouth every 8 (eight) hours as needed for Nausea.        CONTINUE taking these medications    acetaminophen 325 MG tablet  Commonly known as:  TYLENOL  Take 2 tablets (650 mg total) by mouth every 6 (six) hours as needed for Temperature greater than (or equal to 101 degree F).     amLODIPine 10 MG tablet  Commonly known as:  NORVASC  TAKE 1 TABLET ONCE DAILY     aspirin 81 MG EC tablet  Commonly known as:  ECOTRIN  Take 1 tablet (81 mg total) by mouth once daily.     atorvastatin 10 MG tablet  Commonly known as:  LIPITOR  TAKE 1 TABLET ONCE DAILY     benazepril 20 MG tablet  Commonly known as:  LOTENSIN  TAKE 1 TABLET ONCE DAILY     buPROPion 100 MG tablet  Commonly known as:  WELLBUTRIN  Take 100 mg by mouth 2 (two) times daily.     diclofenac 75 MG EC tablet  Commonly known as:  VOLTAREN  Take 75 mg by mouth 2 (two) times daily.     esomeprazole 40 MG capsule  Commonly known as:  NEXIUM  Take 40  mg by mouth before breakfast.     fluticasone 50 mcg/actuation nasal spray  Commonly known as:  FLONASE  1 spray by Each Nare route once daily.     loratadine 10 mg tablet  Commonly known as:  CLARITIN  Take 10 mg by mouth once daily.     sertraline 50 MG tablet  Commonly known as:  ZOLOFT  Take 50 mg by mouth once daily.            Discharge Procedure Orders  Diet general     Ice to affected area     Lifting restrictions     No driving, operating heavy equipment or signing legal documents while taking pain medication     Call MD for:  temperature >100.4     Call MD for:  persistent nausea and vomiting     Call MD for:  severe uncontrolled pain     Call MD for:  difficulty breathing, headache or visual disturbances     Call MD for:  redness, tenderness, or signs of infection (pain, swelling, redness, odor or green/yellow discharge around incision site)     Call MD for:  hives     Call MD for:  persistent dizziness or light-headedness     Call MD for:  extreme fatigue     Remove dressing in 48 hours     Change dressing (specify)   Order Comments: Dressing change: 1 times per day using waterproof bandaids.       Follow-up Information     Colby Valenzuela MD In 2 weeks.    Specialties:  Sports Medicine, Orthopedic Surgery  Contact information:  [address] [place] [address] DR Kehinde EMANUEL [id number] [phone]                   Discharge Procedure Orders (must include Diet, Follow-up, Activity):    Discharge Procedure Orders (must include Diet, Follow-up, Activity)  Diet general     Ice to affected area     Lifting restrictions     No driving, operating heavy equipment or signing legal documents while taking pain medication     Call MD for:  temperature >100.4     Call MD for:  persistent nausea and vomiting     Call MD for:  severe uncontrolled pain     Call MD for:  difficulty breathing, headache or visual disturbances     Call MD for:  redness, tenderness, or signs of infection (pain, swelling, redness, odor or green/yellow  discharge around incision site)     Call MD for:  hives     Call MD for:  persistent dizziness or light-headedness     Call MD for:  extreme fatigue     Remove dressing in 48 hours     Change dressing (specify)   Order Comments: Dressing change: 1 times per day using waterproof bandaids.

## 2018-05-18 NOTE — ADDENDUM NOTE
Addendum  created 05/18/18 1104 by Abdiel Pollard MD    Anesthesia Intra Blocks edited, Child order released for a procedure order, LDA created via procedure documentation, Sign clinical note

## 2018-05-18 NOTE — ANESTHESIA PROCEDURE NOTES
Peripheral    Patient location during procedure: pre-op   Block not for primary anesthetic.  Reason for block: at surgeon's request and post-op pain management   Post-op Pain Location:  TORN ROTATOR CUFF RIGHT  Start time: 5/18/2018 10:45 AM  Timeout: 5/18/2018 10:45 AM   End time: 5/18/2018 11:01 AM  Surgery related to: REPAIR OF TORN ROTATOR CUFF RIGHT  Staffing  Anesthesiologist: SAMULE ESCOBAR  Performed: anesthesiologist   Preanesthetic Checklist  Completed: patient identified, site marked, surgical consent, pre-op evaluation, timeout performed, IV checked, risks and benefits discussed and monitors and equipment checked  Peripheral Block  Patient position: left lateral decubitus  Prep: ChloraPrep and site prepped and draped  Patient monitoring: heart rate, cardiac monitor, continuous pulse ox, continuous capnometry and frequent blood pressure checks  Block type: interscalene  Laterality: right  Injection technique: continuous  Needle  Needle type: Tuohy   Needle gauge: 18 G  Needle length: 2 in  Needle localization: anatomical landmarks and ultrasound guidance  Needle insertion depth: 2 cm  Catheter type: non-stimulating  Catheter size: 20 G  Catheter at skin depth: 12 cm  Test dose: lidocaine 1.5% with Epi 1-to-200,000 and negative   -ultrasound image captured on disc.  Assessment  Injection assessment: negative aspiration, negative parasthesia and local visualized surrounding nerve  Paresthesia pain: none  Heart rate change: no  Slow fractionated injection: yes  Medications:  Bolus administered: 20 mL of 0.25 ropivacaine  Epinephrine added: none  Additional Notes  VSS.  [facility] RN monitoring vitals throughout procedure.  Patient tolerated procedure well.  Previous block was not working.

## 2018-05-18 NOTE — PLAN OF CARE
Pt lying in bed sleeping/snoring. No signs/symptoms of pain or distress. Family updated per MD upon pt arrival to recovery room. Family updated also per [facility] text system.

## 2018-05-18 NOTE — OP NOTE
[facility]  Orthopedic Surgery  Operative Note    SUMMARY     Date of Procedure: 5/18/2018     Procedure: Procedure(s) (LRB):  REPAIR ROTATOR CUFF ARTHROSCOPIC (Right)  ARTHROSCOPY-SHOULDER WITH SUBACROMIAL DECOMPRESSION (Right)   R ascope DCE    Surgeon(s) and Role:     * Colby Valenzuela MD - Primary    Assistant: Ghulam Santos    Pre-Operative Diagnosis: Complete tear of right rotator cuff [M75.121]    Post-Operative Diagnosis: Post-Op Diagnosis Codes:     * Complete tear of right rotator cuff [M75.121]    Anesthesia: General      Description of the Findings of the Procedure: Diagnostic arthroscopy findings on the patient's Right shoulder showed some degenerative tearing of the superior labrum, healthy biceps tendon and subscapularis. The patient had near full tearing of the supraspinatus with about 20% of bursal tissue remaining. Rest of the  articular appearance of the rotator cuff was normal. Articular surface was normal. In the subacromial space, the patient had bursitis in the subacromial space with a type 2 acromion. Ac joint showed severe arthritic changes with spurring. Bursal surface of the rotator cuff was intact.      Complications: No    Estimated Blood Loss (EBL): 5 mL           Implants:   Implant Name Type Inv. Item Serial No.  Lot No. LRB No. Used   SUT ANCHR 4.75X19.1 FIBERTAPE - [id number]  SUT ANCHR 4.75X19.1 FIBERTAPE  [employer] [id number] Right 1   SUT ANCHR 4.75X19.1 FIBERTAPE - [id number]  SUT ANCHR 4.75X19.1 FIBERTAPE  [employer] [id number] Right 1   SUT ANCHR 4.75X19.1 FIBERTAPE - [id number]   SUT ANCHR 4.75X19.1 FIBERTAPE   ARTHREX [id number] Right 1       Specimens:   Specimen (12h ago through future)    None                  Condition: Good    Disposition: PACU - hemodynamically stable.    Attestation: I was present and scrubbed for the entire procedure.      Indications for the procedure:A 62 year old male with a history ofRight shoulder pain that failed  to resolve with physical therapy, activity modification, injections, and rest.  Patient desired the procedure listed above after MRI was obtained.    PROCEDURE IN DETAIL: Risks, benefits and alternatives of the procedure were   explained to the patient including, but not limited to damage to nerves,   arteries and blood vessels. Also explained risk of re-rupture, nonhealing, infection, DVT,   PE as well as anesthetic complications including seizure, stroke, heart attack   and death. They understood this, signed informed consent. The patient's Right  shoulder was marked prior to coming to the Operating Room. Once there a formal   timeout was done in which correct patient, procedure and operative site were all   correctly identified and confirmed by the entire operating team. Ancef 2 g was   given prior to surgical incision. General anesthesia was induced. The   patient was placed in lateral decubitus position on a bean bag with his Right upper extremity   hanging in traction.The arm was suspended with 10 lbs of weight for balanced traction. The extremity was prepped and draped in normal   sterile fashion.A standard posterior portal was then made. A spinal   needle was used to localize an anterior portal within the rotator interval and   this was made as well. We then performed a diagnostic arthroscopy of the   glenohumeral joint through both the anterior and posterior viewing portals,with the findings listed above.Shaver was used to debride the labrum and cuff tissue. Tear was tagged with a PDS.     After this was done, we then proceeded up in the subacromial space   where a spinal needle was used to localize a lateral portal and then this was   made as well. A 4.0 shaver was used to perform a subtotal bursectomy. We then   used the ablator to remove the soft tissue off the undersurface of the acromion   and then the 4.0 bur was used to turn a type 2 acromion into a type 1 acromion,   smooth off the undersurface.The  coracoacromial ligament was identified and disected free of the anterior acromion with the electrocautery device.We then thoroughly inspected the cuff on the rotator cuff side. We shaved away any additional adhesions in the anterior, lateral on posterior gutters.  We were unable to find any evidence for a bursal sided cuff tear but the tissue where the stitch was passed where was thinned. Anterior portal was redirected toward the AC joint. Emmet was placed through the anterior portal. 5mm of distal clavicle was excised and no further articulation between the clavicle and the acromion was left. Passport cannulas were placed in the lateral portal and an accessory lateral portal was created right off the acromial edge. Cannula placed here also. Plastic shuttling cannula placed anteriorly. Cuff tear was completed using 11 blade and the shaver. Footprint was prepared using the shaver and amelia. 2 x 4.75 Biocomposite Swivellock anchors loaded with Fibertape were punched and placed for the medial row right off the articular margin. The tapes were passed through the cuff tissue using a Fast Pass Scorpion. The tapes were then secured in the lateral row to 2 x 4.75 Biocomposite Swivellock anchors in a SpeedBridge pattern with one blue and one white in each anchor.          All excess fluid was removed from the shoulder. The portals were closed using nylon. Sterile dressing applied.  They were then placed in a cryotherapy cuff with   UltraSling, extubated, awakened and transferred from the Operating Room to   Recovery Room in stable condition.     Postoperative rehab course will be for RCR

## 2018-05-18 NOTE — ANESTHESIA POSTPROCEDURE EVALUATION
"Anesthesia Post Evaluation    Patient: Moris Calderón    Procedure(s) Performed: Procedure(s) (LRB):  REPAIR ROTATOR CUFF ARTHROSCOPIC (Right)  ARTHROSCOPY-SHOULDER WITH SUBACROMIAL DECOMPRESSION (Right)  ARTHROSCOPY-SHOULDER EXCISION DISTAL CLAVICLE (Right)    Final Anesthesia Type: general  Patient location during evaluation: PACU  Patient participation: Yes- Able to Participate  Level of consciousness: awake and alert and oriented  Post-procedure vital signs: reviewed and stable  Pain management: adequate  Airway patency: patent  PONV status at discharge: No PONV  Anesthetic complications: no      Cardiovascular status: blood pressure returned to baseline  Respiratory status: unassisted, spontaneous ventilation and room air  Hydration status: euvolemic  Follow-up not needed.        Visit Vitals  /70   Pulse (!) 58   Temp 36.6 °C (97.9 °F) (Temporal)   Resp 16   Ht 5' 9" (1.753 m)   Wt 97.5 kg (215 lb)   SpO2 95%   BMI 31.75 kg/m²       Pain/Jesus Score: Pain Assessment Performed: Yes (5/18/2018  9:55 AM)  Presence of Pain: complains of pain/discomfort (5/18/2018 10:05 AM)  Pain Rating Prior to Med Admin: 7 (5/18/2018 10:21 AM)  Jesus Score: 10 (5/18/2018 10:05 AM)      "

## 2018-05-19 NOTE — ANESTHESIA POST-OP PAIN MANAGEMENT
Acute Pain Service Progress Note    Moris Calderón is a 62 y.o., male, [id number].    Surgery:  Shoulder arthroscopy    Post Op Day #: 1    Catheter type: perineural  interscalene    Infusion type: Ropivacaine 0.2%  8 basal    Problem List:    Active Hospital Problems    Diagnosis  POA    *Complete tear of right rotator cuff [M75.121]  Yes      Resolved Hospital Problems    Diagnosis Date Resolved POA   No resolved problems to display.       Subjective:     General appearance of alert, oriented, no complaints   Pain with rest: 1    Numbers   Pain with movement: 1    Numbers     Assessment:     Pain control adequate    Plan:     Patient doing well, continue present treatment.    Evaluator Aida Sheffield

## 2018-05-21 VITALS
RESPIRATION RATE: 18 BRPM | DIASTOLIC BLOOD PRESSURE: 63 MMHG | BODY MASS INDEX: 31.84 KG/M2 | OXYGEN SATURATION: 93 % | SYSTOLIC BLOOD PRESSURE: 109 MMHG | HEIGHT: 69 IN | HEART RATE: 56 BPM | TEMPERATURE: 98 F | WEIGHT: 215 LBS

## 2018-05-21 NOTE — ADDENDUM NOTE
Addendum  created 05/21/18 0853 by Abdiel Pollard MD    Anesthesia Event edited, Sign clinical note

## 2018-05-21 NOTE — ANESTHESIA POST-OP PAIN MANAGEMENT
Anesthesia Acute Pain Service Follow up Note    Moris Calderón : 1955 MRN: [id number]      Date of Procedure: 2018    Procedure(s) (LRB):  REPAIR ROTATOR CUFF ARTHROSCOPIC (Right)  ARTHROSCOPY-SHOULDER WITH SUBACROMIAL DECOMPRESSION (Right)  ARTHROSCOPY-SHOULDER EXCISION DISTAL CLAVICLE (Right)    Post-op: 3 Days Post-Op     Catheter type: perineural  interscalene          Pt contacted by phone at number provided during surgical visit.  Pt reports good pain with perineural catheter in place and functional. Pt denies signs and symptoms  of local anesthetic toxicity, denies erythema or induration at catheter insertion site, states that dressing remains intact.    All questions were answered, no complications or issues were identified at this time.  Catheter to be removed today, instructed patient on removal.      AMADO ESCOBAR MD  Department of Anesthesiology   [facility]

## 2018-05-23 ENCOUNTER — NURSE TRIAGE (OUTPATIENT)
Dept: ADMINISTRATIVE | Facility: CLINIC | Age: 63
End: 2018-05-23

## 2018-05-23 NOTE — TELEPHONE ENCOUNTER
Reason for Disposition   Nursing judgment    Protocols used: ST NO PROTOCOL CALL: INFORMATION ONLY-A-[place]    Moris BENTLEY is calling with post operative questions:    1.  Is waterproof bandage to be kept on all the time or removed daily?  2.  Should PT be arranged prior to post op appointment?    Please contact Moris BENTLEY to advise.  He can be reached at [phone].

## 2018-05-30 RX ORDER — OXYCODONE AND ACETAMINOPHEN 5; 325 MG/1; MG/1
1 TABLET ORAL EVERY 6 HOURS PRN
Qty: 40 TABLET | Refills: 0 | Status: SHIPPED | OUTPATIENT
Start: 2018-05-30 | End: 2018-06-08 | Stop reason: SDUPTHER

## 2018-05-30 NOTE — TELEPHONE ENCOUNTER
----- Message from Annie Keys sent at 5/30/2018 11:40 AM CDT -----  Contact: self  Type:  RX Refill Request    Who Called:  self  Refill or New Rx:  Post op refill  RX Name and Strength:  oxyCODONE-acetaminophen (PERCOCET) 5-325 mg per tablet  How is the patient currently taking it? (ex. 1XDay):  Every 6 hours as needed for pain  Is this a 30 day or 90 day RX:  30  Preferred Pharmacy with phone number:  [facility]  Local or Mail Order:  local  Ordering Provider:  Dr Nicolas Hills Call Back Number:  [phone]  Additional Information:  Post op to rotator cuff surgery.     [facility] [address] [place] [id number]  Phone: [phone] Fax: [phone]

## 2018-06-04 ENCOUNTER — OFFICE VISIT (OUTPATIENT)
Dept: ORTHOPEDICS | Facility: CLINIC | Age: 63
End: 2018-06-04
Payer: COMMERCIAL

## 2018-06-04 VITALS
SYSTOLIC BLOOD PRESSURE: 139 MMHG | HEART RATE: 67 BPM | DIASTOLIC BLOOD PRESSURE: 69 MMHG | HEIGHT: 69 IN | WEIGHT: 215 LBS | BODY MASS INDEX: 31.84 KG/M2

## 2018-06-04 DIAGNOSIS — M75.121 COMPLETE TEAR OF RIGHT ROTATOR CUFF: Primary | ICD-10-CM

## 2018-06-04 PROCEDURE — 99999 PR PBB SHADOW E&M-EST. PATIENT-LVL III: CPT | Mod: PBBFAC,,, | Performed by: ORTHOPAEDIC SURGERY

## 2018-06-04 PROCEDURE — 99024 POSTOP FOLLOW-UP VISIT: CPT | Mod: S$GLB,,, | Performed by: ORTHOPAEDIC SURGERY

## 2018-06-04 NOTE — PROGRESS NOTES
Past Medical History:   Diagnosis Date    Anxiety     Anxiety     Arthritis     Back problem     bulging disc    GERD (gastroesophageal reflux disease)     Hypercholesterolemia 11/2/2015    2009: Began statin.    Hyperlipidemia     Hypertension     Hypertension, benign 11/2/2015 2008: Diagnosed.    Sleep apnea     uses c-pap       Past Surgical History:   Procedure Laterality Date    APPENDECTOMY      magdalene      KNEE ARTHROSCOPY W/ MENISCAL REPAIR Left 2015    MMT      Left knee A-scope    NOSE SURGERY      UVULECTOMY         Current Outpatient Prescriptions   Medication Sig    acetaminophen (TYLENOL) 325 MG tablet Take 2 tablets (650 mg total) by mouth every 6 (six) hours as needed for Temperature greater than (or equal to 101 degree F).    amlodipine (NORVASC) 10 MG tablet TAKE 1 TABLET ONCE DAILY    atorvastatin (LIPITOR) 10 MG tablet TAKE 1 TABLET ONCE DAILY    benazepril (LOTENSIN) 20 MG tablet TAKE 1 TABLET ONCE DAILY    buPROPion (WELLBUTRIN) 100 MG tablet Take 100 mg by mouth 2 (two) times daily.    diclofenac (VOLTAREN) 75 MG EC tablet Take 75 mg by mouth 2 (two) times daily.    esomeprazole (NEXIUM) 40 MG capsule Take 40 mg by mouth before breakfast.    fluticasone (FLONASE) 50 mcg/actuation nasal spray 1 spray by Each Nare route once daily.    loratadine (CLARITIN) 10 mg tablet Take 10 mg by mouth once daily.    oxyCODONE-acetaminophen (PERCOCET) 5-325 mg per tablet Take 1 tablet by mouth every 6 (six) hours as needed for Pain.    promethazine (PHENERGAN) 25 MG tablet Take 1 tablet (25 mg total) by mouth every 8 (eight) hours as needed for Nausea.    sertraline (ZOLOFT) 50 MG tablet Take 50 mg by mouth once daily.    aspirin (ECOTRIN) 81 MG EC tablet Take 1 tablet (81 mg total) by mouth once daily.     No current facility-administered medications for this visit.        Review of patient's allergies indicates:   Allergen Reactions    Adhesive Rash       Family History    Problem Relation Age of Onset    Glaucoma Father        Social History     Social History    Marital status:      Spouse name: N/A    Number of children: N/A    Years of education: N/A     Occupational History    Not on file.     Social History Main Topics    Smoking status: Never Smoker    Smokeless tobacco: Never Used    Alcohol use Yes      Comment: social    Drug use: No    Sexual activity: Yes     Other Topics Concern    Not on file     Social History Narrative    No narrative on file       Chief Complaint:   Chief Complaint   Patient presents with    Shoulder Pain     2 week post op rotator cuff repair right shoulder on 5/18/18       Date of surgery:  May 18, 2018    History of present illness:  62-year-old male who underwent right arthroscopic rotator cuff repair.  Patient is doing well. Compliant with the sling. Pain is a 4/10.  No wound issues.      Review of Systems:    Musculoskeletal:  See HPI        Physical Examination:    Vital Signs:    Vitals:    06/04/18 1033   BP: 139/69   Pulse: 67       Body mass index is 31.75 kg/m².    This a well-developed, well nourished patient in no acute distress.  They are alert and oriented and cooperative to examination.  Pt. walks without an antalgic gait.      Examination of the right shoulder shows well-healing surgical portals.  No erythema or drainage. Wearing the sling. Neurovascularly intact.    X-rays:  None     Assessment::  Status post right arthroscopic rotator cuff repair    Plan:  I reviewed the arthroscopic photos with him today.  Took out his stitches.  We will get him set up for physical therapy.  Follow up in 4 weeks.    This note was created using DraftDay voice recognition software that occasionally misinterpreted phrases or words.

## 2018-06-06 ENCOUNTER — TELEPHONE (OUTPATIENT)
Dept: ORTHOPEDICS | Facility: CLINIC | Age: 63
End: 2018-06-06

## 2018-06-06 DIAGNOSIS — M75.101 TEAR OF RIGHT ROTATOR CUFF, UNSPECIFIED TEAR EXTENT: Primary | ICD-10-CM

## 2018-06-06 NOTE — TELEPHONE ENCOUNTER
Called and spoke with patient and he stated that the PT place in [place] is out of network for him and would like to come to [facility] for PT. Orders placed in the system for PT per patient request. Number given to patient to call for an appointment. He verbalized understanding.

## 2018-06-06 NOTE — TELEPHONE ENCOUNTER
----- Message from Laurie Booker sent at 6/6/2018  9:20 AM CDT -----  Contact: Patient  Moris, patient [phone] calling to speak with nurse in office about the rotator cuff surgery he had done, has questions about his stitches and also the physical therapy office that was recommended is not in network with his insurance company. Please advise. Thanks.

## 2018-06-08 RX ORDER — OXYCODONE AND ACETAMINOPHEN 5; 325 MG/1; MG/1
1 TABLET ORAL EVERY 6 HOURS PRN
Qty: 40 TABLET | Refills: 0 | Status: SHIPPED | OUTPATIENT
Start: 2018-06-08 | End: 2018-12-06 | Stop reason: SDUPTHER

## 2018-06-08 NOTE — TELEPHONE ENCOUNTER
----- Message from Sincere Brumfield sent at 6/8/2018  1:05 PM CDT -----  Contact: Patient  Moris, [phone]. Requesting refill for oxyCODONE-acetaminophen (PERCOCET) 5-325 mg per tablet. Only has one left. Please advise. Thanks.    [facility]  [address] [place] [id number]  Phone: [phone] Fax: [phone]

## 2018-06-11 ENCOUNTER — CLINICAL SUPPORT (OUTPATIENT)
Dept: REHABILITATION | Facility: HOSPITAL | Age: 63
End: 2018-06-11
Attending: ORTHOPAEDIC SURGERY
Payer: COMMERCIAL

## 2018-06-11 DIAGNOSIS — M25.611 SHOULDER STIFFNESS, RIGHT: ICD-10-CM

## 2018-06-11 DIAGNOSIS — R29.898 SHOULDER WEAKNESS: ICD-10-CM

## 2018-06-11 DIAGNOSIS — M25.511 RIGHT SHOULDER PAIN, UNSPECIFIED CHRONICITY: Primary | ICD-10-CM

## 2018-06-11 PROCEDURE — 97165 OT EVAL LOW COMPLEX 30 MIN: CPT | Mod: PN

## 2018-06-11 PROCEDURE — G8987 SELF CARE CURRENT STATUS: HCPCS | Mod: CM,PN

## 2018-06-11 PROCEDURE — G8988 SELF CARE GOAL STATUS: HCPCS | Mod: CH,PN

## 2018-06-11 NOTE — PLAN OF CARE
Date: 6-11-18    Time in: 1  Time out: 130    Procedures: eval  Start: 1  Stop: 130    Total untimed minutes: 30  Charges billed # of units: 1    Onset date: about 2 years ago  Primary dx: r almost full thickness supraspinatus repair, subacromial bursectomy, distal clavicle resection, acromioplasty  Tx dx: r shoulder pain, stiffness, weakness    Pmhx relevant to primary or tx dx: n/a    Precautions: universal, protocol  Prior therapy: none  Medications relevant to primary or tx dx: n/a  Nutrition: wnl  Hx of present illness: insidious onset, had above surgery 3 w 3 d ago  PLOF: Decreased rom, use, and strength; pre symptom onset had no deficits with functional use  Social hx: pain meds prn; denies nicotine, drinks 2 cups of coffee daily  Fxnl deficits leading to referral: decreased rom, use, and strength with ADL  Patient therapy goals: full painless use    Subjective    Patient states: understanding of HEP importance and general anticipated progression of therapy    Pain: C5 ache  Rest up to 5/10, use n/a, sleep up to 5/10    Objective    Posture: in sling with abd pillow  Sensation: wnl  ROM: prom er 0 abd r 40 l 80  ADL: not using for ADL currently except for hand use when in sling  Duties:Normal self and home care tasks  Tx: issued proper ice use for pain, told to use pain meds if pain prevents sleep, told to keep r ue in sling except for leaning over and letting arm hand for hygiene under shoulder and therapy exercises; issued pendulums and er 0 abd stretch    Assessment    Initial assessment (pertinent findings, problem list and factors affecting outcome): Needs skilled OT to properly promote rom, use, and strength given type, nature, and extent of diagnosis  Rehab potential: good    Goals:   stg 1. Pt. Will be I with HEP 2. Pt. Will have 2/10 pain with light use 3. Pt. Will have = prom of bilateral shoulders To enhance affected arm use with ADL      ltg 1. Pt. Will be I with d/c HEP 2. Pt. Will have 1/10 pain  with all use 3. Pt. Will have roughly 3+/5 MMT for elevation, er to improve use with ADL                                                                          4. Pt. Will be I with ADL      Plan    Certification period: 6-11-18 to 11-26-18  Recommended tx plan: 3 times a week for 24 weeks; eval and tx  Other recommendations: above visit frequency and duration in above dates may be adjusted based on pt. progress and need for therapy    Therapist: [facility]    FIM  Current g code CM max a % impairment  Goal g code     CH independent 0% impairment    eval code grid  Profile and History Assessment of Occupational Performance Level of Clinical Decision Making Complexity Score   Occupational Profile:   Moris Calderón is a 62 y.o. male who lives with their family and is currently retired . Moris Calderón has difficulty with  feeding, bathing, grooming and dressing  and all other required tasks.  affecting his/her daily functional abilities. His/her main goal for therapy is full painless use.     Comorbidities:   n/a    Medical and Therapy History Review:   Brief               Performance Deficits    Physical:  Joint Mobility  Muscle Power/Strength  Pain    Cognitive:  No Deficits    Psychosocial:    No Deficits     Clinical Decision Making:  low    Assessment Process:  Problem-Focused Assessments    Modification/Need for Assistance:  Not Necessary    Intervention Selection:  Limited Treatment Options       low  Based on PMHX, co morbidities , data from assessments and functional level of assistance required with task and clinical presentation directly impacting function.

## 2018-06-19 ENCOUNTER — CLINICAL SUPPORT (OUTPATIENT)
Dept: REHABILITATION | Facility: HOSPITAL | Age: 63
End: 2018-06-19
Attending: ORTHOPAEDIC SURGERY
Payer: COMMERCIAL

## 2018-06-19 DIAGNOSIS — M25.611 SHOULDER STIFFNESS, RIGHT: ICD-10-CM

## 2018-06-19 DIAGNOSIS — M25.511 RIGHT SHOULDER PAIN, UNSPECIFIED CHRONICITY: Primary | ICD-10-CM

## 2018-06-19 DIAGNOSIS — R29.898 SHOULDER WEAKNESS: ICD-10-CM

## 2018-06-19 PROCEDURE — 97110 THERAPEUTIC EXERCISES: CPT | Mod: PN

## 2018-06-19 NOTE — PROGRESS NOTES
Time in 1  Time out 2      Timed units  4 therex                              Time:1-2      S: no new issues  Pain:continues to decrease in intensity and frequency    O:  r prom er at 0 abd 60   at 45 abd 60     all capsular  l prom er at 0 abd 80   at 45 abd 90        HEP: d/cd abd pillow, added er 45 abd stretch    manual tx: n/a    prom as tolerated-pain free: n/a    stretches as tolerated-pain free: er 0, 45 abd    theraband 2 x 20:  n/a    isometrics 2 x 20: n/a    education: likely tx progression, overview of gh ligaments and coracohumeral ligament    ube: n/a    pendulums: all 4 directions 10 each    s/p 4 w 4 d      A: gh capsular pattern and overall reactivity decreasing, slow progression to er 90 abd stretch indicated at this point            P:d/c sling at 6 weeks and start posterior gh capsular stretches

## 2018-06-21 ENCOUNTER — CLINICAL SUPPORT (OUTPATIENT)
Dept: REHABILITATION | Facility: HOSPITAL | Age: 63
End: 2018-06-21
Attending: ORTHOPAEDIC SURGERY
Payer: COMMERCIAL

## 2018-06-21 DIAGNOSIS — R29.898 SHOULDER WEAKNESS: ICD-10-CM

## 2018-06-21 DIAGNOSIS — M25.611 SHOULDER STIFFNESS, RIGHT: ICD-10-CM

## 2018-06-21 DIAGNOSIS — M25.511 RIGHT SHOULDER PAIN, UNSPECIFIED CHRONICITY: Primary | ICD-10-CM

## 2018-06-21 PROCEDURE — 97110 THERAPEUTIC EXERCISES: CPT | Mod: PN

## 2018-06-21 PROCEDURE — 97140 MANUAL THERAPY 1/> REGIONS: CPT | Mod: PN

## 2018-06-21 NOTE — PROGRESS NOTES
Time in 2  Time out 3      Timed units  2 manual                              Time:2-230  2 therex                              Time:230-3      S:no new issues  Pain:continues to decrease in intensity and frequency    O:  HEP: reviewed    manual tx:     gh resting position traction, prom circumduction, bursal like massage abd 1 and 2, axial traction 90 abd  abd pain relief gh resting position traction, slide; scapulothoracic tx and subscapularis stretch not done; warm up rhythmic inf slides, abd with inf slides    HEP: n/a    manual tx: n/a    prom as tolerated-pain free: n/a    stretches as tolerated-pain free: er 0, 45 abd    theraband 2 x 20:  n/a    isometrics 2 x 20: n/a    education: gh referred pain pattern, glenohumeral ligament anatomy    ube: n/a    pendulums: n/a          A: looks consistent with HEP, proper resolution of shoulder complex tightness essential to promote strong and painless mechanics long term            P: fix gh hypomobility bearing in mind repair type and date of surgery

## 2018-06-26 ENCOUNTER — CLINICAL SUPPORT (OUTPATIENT)
Dept: REHABILITATION | Facility: HOSPITAL | Age: 63
End: 2018-06-26
Attending: ORTHOPAEDIC SURGERY
Payer: COMMERCIAL

## 2018-06-26 DIAGNOSIS — R29.898 SHOULDER WEAKNESS: ICD-10-CM

## 2018-06-26 DIAGNOSIS — M25.611 SHOULDER STIFFNESS, RIGHT: ICD-10-CM

## 2018-06-26 DIAGNOSIS — M25.511 RIGHT SHOULDER PAIN, UNSPECIFIED CHRONICITY: Primary | ICD-10-CM

## 2018-06-26 PROCEDURE — 97140 MANUAL THERAPY 1/> REGIONS: CPT | Mod: PN

## 2018-06-26 PROCEDURE — 97110 THERAPEUTIC EXERCISES: CPT | Mod: PN

## 2018-06-26 NOTE — PROGRESS NOTES
Time in 1  Time out 2      Timed units  2 manual                              Time:1-130  2 therex                              Time:130-2      S:no new issues  Pain:continues to decrease in intensity and frequency    O:  r prom er at 0 abd 70    at 45 abd 80      l prom er at 0 abd 80    at 45 abd 90       HEP: reviewed    manual tx:     gh resting position traction, prom circumduction, bursal like massage abd 1 and 2, axial traction 90 abd  abd pain relief gh resting position traction, slide; scapulothoracic tx and subscapularis stretch not done; warm up rhythmic inf slides, abd with inf slides    prom as tolerated-pain free: supine abd    stretches as tolerated-pain free: er 0, 45 abd    theraband 2 x 20:  n/a    isometrics 2 x 20: n/a    education: importance of resolving all shoulder stiffness for full and painless use long term    ube: n/a      A:gh joint with continued reduction in hypomobility, properly fixing gh stiffness imperative to prevent repair overuse            P:d/c sling at 6 weeks s/p and transition to remaining gh capsular stretches

## 2018-06-28 ENCOUNTER — CLINICAL SUPPORT (OUTPATIENT)
Dept: REHABILITATION | Facility: HOSPITAL | Age: 63
End: 2018-06-28
Attending: ORTHOPAEDIC SURGERY
Payer: COMMERCIAL

## 2018-06-28 DIAGNOSIS — R29.898 SHOULDER WEAKNESS: ICD-10-CM

## 2018-06-28 DIAGNOSIS — M25.611 SHOULDER STIFFNESS, RIGHT: ICD-10-CM

## 2018-06-28 DIAGNOSIS — M25.511 RIGHT SHOULDER PAIN, UNSPECIFIED CHRONICITY: Primary | ICD-10-CM

## 2018-06-28 PROCEDURE — 97140 MANUAL THERAPY 1/> REGIONS: CPT | Mod: PN

## 2018-06-28 PROCEDURE — 97110 THERAPEUTIC EXERCISES: CPT | Mod: PN

## 2018-06-28 NOTE — PROGRESS NOTES
"Time in 1  Time out 2      Timed units  2 manual                              Time:1-130  2 therex                              Time:130-2      S: understands likely tx progression and rationale of current rehab  Pain:continues to decrease in intensity and frequency    O:  r prom er at 0 abd 70   at 45 abd 80    at 90 abd 60;       sidelying ir 50         ir behind back belt    all capsular  l prom er at 0 abd  80  at 45 abd 90    at  90 abd 90;      sidelying ir 80         ir behind back 14" lift off    HEP: added er 90 abd and sleeper stretch, d/c sling, use r ue for very light painless nonrepetitive use only    manual tx:     gh resting position traction, prom circumduction, bursal like massage abd 1 and 2, axial traction 90 abd    prom as tolerated-pain free: n/a    stretches as tolerated-pain free: er 0, 45, 90 abd; sleeper    theraband 2 x 20:  n/a    isometrics 2 x 20: n/a    education: need to fix all shoulder complex stiffness    ube: n/a    arom: n/a    s/p 5 w 6 d    A: correct progression of tx critical to facilitate strong, painless, and full use long term            P: issue functional ir stretch soon, consider manual tx progression to reinforce benefit of home stretches  "

## 2018-07-06 ENCOUNTER — TELEPHONE (OUTPATIENT)
Dept: REHABILITATION | Facility: HOSPITAL | Age: 63
End: 2018-07-06

## 2018-07-10 ENCOUNTER — CLINICAL SUPPORT (OUTPATIENT)
Dept: REHABILITATION | Facility: HOSPITAL | Age: 63
End: 2018-07-10
Attending: ORTHOPAEDIC SURGERY
Payer: COMMERCIAL

## 2018-07-10 DIAGNOSIS — M25.611 SHOULDER STIFFNESS, RIGHT: ICD-10-CM

## 2018-07-10 DIAGNOSIS — M25.511 RIGHT SHOULDER PAIN, UNSPECIFIED CHRONICITY: Primary | ICD-10-CM

## 2018-07-10 DIAGNOSIS — R29.898 SHOULDER WEAKNESS: ICD-10-CM

## 2018-07-10 PROCEDURE — 97110 THERAPEUTIC EXERCISES: CPT | Mod: PN

## 2018-07-10 PROCEDURE — 97140 MANUAL THERAPY 1/> REGIONS: CPT | Mod: PN

## 2018-07-10 NOTE — PROGRESS NOTES
"Time in 1  Time out 2      Timed units  2 manual                              Time:1-130  2 therex                              Time:130-2      S:no new issues  Pain:continues to decrease in intensity and frequency    O:  r prom er at 0 abd 80   at 45 abd 90    at 90 abd 90;       sidelying ir 64         ir behind back 14" lift off    all capsular  l prom er at 0 abd 80   at 45 abd 90   at  90 abd 90;       sidelying ir 80         ir behind back 14" lift off    HEP: decreased er x 3 stretches to 5 reps 1 x day    manual tx:     prom circumduction, bursal like massage abd 1 and 2, coronal traction 90 abd    prom as tolerated-pain free: supine abd    stretches as tolerated-pain free: sleeper    theraband 2 x 20:  n/a    isometrics 2 x 20: n/a    education: likely tx progression, basics of scapula humeral ratio    ube: n/a    arom: n/a            A:  see below            P: fix gh stiffness      OCCUPATIONAL THERAPY PROGRESS UPDATE      Onset Date:  Se eval  SOC Date:  6-11-18  Primary Diagnosis:  r supraspinatus repair (full thickness), subacromial bursectomy, distal clavicle resection, acromiplasty  Treatment Diagnosis:  r shoulder pain, stiffness, weakness  Precautions:  universal, protocol  Visits from SOC:  6  Functional Level Prior to SOC:  Decreased rom, use, and strength; pre symptom onset had no deficits with functional use    Updated Assessment:  progress in rehab is satisfactory considering date of symptom onset, surgery, and first day of OT     Goals: ongoing    Reasons for Continuation of Therapy:  Continued skilled and structured formal rehab imperative to properly restore strong, painless, and balanced mechanics and full use long term    Recommended Treatment Plan: eval and tx  "

## 2018-07-12 ENCOUNTER — OFFICE VISIT (OUTPATIENT)
Dept: ORTHOPEDICS | Facility: CLINIC | Age: 63
End: 2018-07-12
Payer: COMMERCIAL

## 2018-07-12 VITALS
HEART RATE: 59 BPM | WEIGHT: 215 LBS | HEIGHT: 69 IN | BODY MASS INDEX: 31.84 KG/M2 | SYSTOLIC BLOOD PRESSURE: 123 MMHG | DIASTOLIC BLOOD PRESSURE: 70 MMHG

## 2018-07-12 DIAGNOSIS — M75.121 COMPLETE TEAR OF RIGHT ROTATOR CUFF: Primary | ICD-10-CM

## 2018-07-12 DIAGNOSIS — M70.21 OLECRANON BURSITIS, RIGHT ELBOW: ICD-10-CM

## 2018-07-12 PROCEDURE — 20605 DRAIN/INJ JOINT/BURSA W/O US: CPT | Mod: 79,RT,S$GLB, | Performed by: ORTHOPAEDIC SURGERY

## 2018-07-12 PROCEDURE — 99999 PR PBB SHADOW E&M-EST. PATIENT-LVL III: CPT | Mod: PBBFAC,,, | Performed by: ORTHOPAEDIC SURGERY

## 2018-07-12 PROCEDURE — 99213 OFFICE O/P EST LOW 20 MIN: CPT | Mod: 24,25,S$GLB, | Performed by: ORTHOPAEDIC SURGERY

## 2018-07-12 NOTE — PROCEDURES
Intermediate Joint Aspiration/Injection  Date/Time: 7/12/2018 11:10 AM  Performed by: MAIRA AYOUB  Authorized by: MAIRA AYOUB     Consent Done?:  Yes (Verbal)  Indications:  Joint swelling  Site marked: The procedure site was marked    Timeout: Prior to procedure the correct patient, procedure, and site was verified      Location:  Elbow  Site:  R olecranon bursa  Prep: Patient was prepped and draped in usual sterile fashion    Needle size:  18 G  Approach:  Posterior  Aspirate amount (ml):  12  Aspirate:  Blood-tinged and serous  Patient tolerance:  Patient tolerated the procedure well with no immediate complications

## 2018-07-12 NOTE — PROGRESS NOTES
Past Medical History:   Diagnosis Date    Anxiety     Anxiety     Arthritis     Back problem     bulging disc    GERD (gastroesophageal reflux disease)     Hypercholesterolemia 11/2/2015    2009: Began statin.    Hyperlipidemia     Hypertension     Hypertension, benign 11/2/2015 2008: Diagnosed.    Sleep apnea     uses c-pap       Past Surgical History:   Procedure Laterality Date    APPENDECTOMY      magdalene      KNEE ARTHROSCOPY W/ MENISCAL REPAIR Left 2015    MMT      Left knee A-scope    NOSE SURGERY      UVULECTOMY         Current Outpatient Prescriptions   Medication Sig    acetaminophen (TYLENOL) 325 MG tablet Take 2 tablets (650 mg total) by mouth every 6 (six) hours as needed for Temperature greater than (or equal to 101 degree F).    amlodipine (NORVASC) 10 MG tablet TAKE 1 TABLET ONCE DAILY    amLODIPine (NORVASC) 10 MG tablet TAKE 1 TABLET ONCE DAILY    atorvastatin (LIPITOR) 10 MG tablet TAKE 1 TABLET ONCE DAILY    benazepril (LOTENSIN) 20 MG tablet TAKE 1 TABLET ONCE DAILY    buPROPion (WELLBUTRIN) 100 MG tablet Take 100 mg by mouth 2 (two) times daily.    diclofenac (VOLTAREN) 75 MG EC tablet Take 75 mg by mouth 2 (two) times daily.    esomeprazole (NEXIUM) 40 MG capsule Take 40 mg by mouth before breakfast.    fluticasone (FLONASE) 50 mcg/actuation nasal spray 1 spray by Each Nare route once daily.    loratadine (CLARITIN) 10 mg tablet Take 10 mg by mouth once daily.    oxyCODONE-acetaminophen (PERCOCET) 5-325 mg per tablet Take 1 tablet by mouth every 6 (six) hours as needed for Pain.    promethazine (PHENERGAN) 25 MG tablet Take 1 tablet (25 mg total) by mouth every 8 (eight) hours as needed for Nausea.    sertraline (ZOLOFT) 50 MG tablet Take 50 mg by mouth once daily.    aspirin (ECOTRIN) 81 MG EC tablet Take 1 tablet (81 mg total) by mouth once daily.     No current facility-administered medications for this visit.        Review of patient's allergies indicates:    Allergen Reactions    Adhesive Rash       Family History   Problem Relation Age of Onset    Glaucoma Father        Social History     Social History    Marital status:      Spouse name: N/A    Number of children: N/A    Years of education: N/A     Occupational History    Not on file.     Social History Main Topics    Smoking status: Never Smoker    Smokeless tobacco: Never Used    Alcohol use Yes      Comment: social    Drug use: No    Sexual activity: Yes     Other Topics Concern    Not on file     Social History Narrative    No narrative on file       Chief Complaint:   Chief Complaint   Patient presents with    Shoulder Pain     4 week followup status post right shoulder scope RCR 5/18/18       Date of surgery:  May 18, 2018    History of present illness:  62-year-old male who underwent right arthroscopic rotator cuff repair.  Patient is doing well. Has full active and passive range of motion already.  Also complains of right elbow swelling today.  Bumped it about 3 weeks ago.  It filled with fluid and never went down.  Pain is a 2/10.      Review of Systems:    Musculoskeletal:  See HPI        Physical Examination:    Vital Signs:    Vitals:    07/12/18 1045   BP: 123/70   Pulse: (!) 59       Body mass index is 31.75 kg/m².    This a well-developed, well nourished patient in no acute distress.  They are alert and oriented and cooperative to examination.  Pt. walks without an antalgic gait.      Examination of the right shoulder shows healed surgical portals.  No erythema or drainage.  Full active and passive range of motion. Moderate right olecranon bursitis.    X-rays:  None     Assessment::  Status post right arthroscopic rotator cuff repair  Right olecranon bursitis    Plan:  Continue with physical therapy.  I offered aspiration of the right olecranon bursitis and he agreed.  Start working on strengthening.  Follow up in 6 weeks.    This note was created using [employer] voice recognition  software that occasionally misinterpreted phrases or words.

## 2018-07-17 ENCOUNTER — CLINICAL SUPPORT (OUTPATIENT)
Dept: REHABILITATION | Facility: HOSPITAL | Age: 63
End: 2018-07-17
Attending: ORTHOPAEDIC SURGERY
Payer: COMMERCIAL

## 2018-07-17 DIAGNOSIS — M25.611 SHOULDER STIFFNESS, RIGHT: ICD-10-CM

## 2018-07-17 DIAGNOSIS — M25.511 RIGHT SHOULDER PAIN, UNSPECIFIED CHRONICITY: Primary | ICD-10-CM

## 2018-07-17 DIAGNOSIS — R29.898 SHOULDER WEAKNESS: ICD-10-CM

## 2018-07-17 PROCEDURE — 97110 THERAPEUTIC EXERCISES: CPT | Mod: PN

## 2018-07-17 PROCEDURE — 97140 MANUAL THERAPY 1/> REGIONS: CPT | Mod: PN

## 2018-07-17 NOTE — PROGRESS NOTES
"Time in 1  Time out 2      Timed units  1 manual                              Time:1-115  3 therex                              Time:115-2      S:no new issues, understands likely tx progression and rationale of current rehab  Pain:continues to decrease in intensity and frequency    O:  r prom er at 0 abd 80   at 45 abd 90    at 90 abd 90;      sidelying ir 72         ir behind back 14" lift off       all capsular  l prom er at 0 abd 80   at 45 abd 90    at  90 abd 90;     sidelying ir 80         ir behind back 14" lift off    HEP: reviewed    manual tx:     gh resting position traction, prom circumduction, bursal like massage abd 1 and 2, coronal traction 90 abd    prom as tolerated-pain free: n/a    stretches as tolerated-pain free: sleeper    theraband 2 x 20:  gray row, add, ir 0 abd    isometrics 2 x 20: n/a    education: likely tx progression    ube: n/a    arom: n/a        A: mild inferior gh hypomobility noted          P:transition to d2 stretch prn  "

## 2018-07-19 ENCOUNTER — CLINICAL SUPPORT (OUTPATIENT)
Dept: REHABILITATION | Facility: HOSPITAL | Age: 63
End: 2018-07-19
Attending: ORTHOPAEDIC SURGERY
Payer: COMMERCIAL

## 2018-07-19 DIAGNOSIS — M25.611 SHOULDER STIFFNESS, RIGHT: ICD-10-CM

## 2018-07-19 DIAGNOSIS — M25.511 RIGHT SHOULDER PAIN, UNSPECIFIED CHRONICITY: Primary | ICD-10-CM

## 2018-07-19 DIAGNOSIS — R29.898 SHOULDER WEAKNESS: ICD-10-CM

## 2018-07-19 PROCEDURE — 97110 THERAPEUTIC EXERCISES: CPT | Mod: PN

## 2018-07-19 NOTE — PROGRESS NOTES
Time in 1  Time out 2    Timed units  4 therex                              Time:1-2        S: pleased with progress in rehab so far  Pain:mild, intermittent, C5 ache    O:  HEP: n/a    manual tx: n/a    prom as tolerated-pain free: n/a    stretches as tolerated-pain free: n/a    theraband 2 x 20:  gray row, add, ir 0 abd, depression    isometrics 2 x 20: er 0 abd    education: reviewed fixing all shoulder complex stiffness and learning all scapula and cuff PRE imperative to get strong and painless mechanics in future    ube: level 1 x 10'    arom: supine protraction 4 x 10    no inferior posterior gh hypomobility noted          A: good effort in clinic, looks faithful with HEP, light functional use continues to improve            P: test prom er x 3 and ir x 2 and elevation x 3

## 2018-07-24 ENCOUNTER — CLINICAL SUPPORT (OUTPATIENT)
Dept: REHABILITATION | Facility: HOSPITAL | Age: 63
End: 2018-07-24
Attending: ORTHOPAEDIC SURGERY
Payer: COMMERCIAL

## 2018-07-24 DIAGNOSIS — M25.611 SHOULDER STIFFNESS, RIGHT: ICD-10-CM

## 2018-07-24 DIAGNOSIS — R29.898 SHOULDER WEAKNESS: ICD-10-CM

## 2018-07-24 DIAGNOSIS — M25.511 RIGHT SHOULDER PAIN, UNSPECIFIED CHRONICITY: Primary | ICD-10-CM

## 2018-07-24 PROCEDURE — 97110 THERAPEUTIC EXERCISES: CPT | Mod: PN

## 2018-07-24 NOTE — PROGRESS NOTES
"Time in 1  Time out 2      Timed units  4 therex                              Time:1-2      S:no new issues  Pain:continues to decrease in intensity and frequency    O:  full prom er x 3 and ir x 2    prom elevation         r                                  l  flex                         180                              170  d2                     0" olecranon to surface    0"  abd                         180                               180    HEP: told decreasing stretching to 5-10 reps 1 x day fine    manual tx: n/a    prom as tolerated-pain free: n/a    stretches as tolerated-pain free: n/a    theraband 2 x 20:  gray row, add, ir 0 abd, depression    isometrics 2 x 20: er 0 abd    education: n/a    ube: level 1 x 10'    arom: supine protraction 2 x 20            A: very mild flex end range tightness should resolve with time, scapula and cuff PRE indicated to improve functional use and promote correct mechanics            P:transition to d/c HEP  "

## 2018-08-02 ENCOUNTER — HOSPITAL ENCOUNTER (OUTPATIENT)
Dept: RADIOLOGY | Facility: HOSPITAL | Age: 63
Discharge: HOME | End: 2018-08-02
Attending: ORTHOPAEDIC SURGERY
Payer: COMMERCIAL

## 2018-08-02 ENCOUNTER — TELEPHONE (OUTPATIENT)
Dept: ORTHOPEDICS | Facility: CLINIC | Age: 63
End: 2018-08-02

## 2018-08-02 ENCOUNTER — CLINICAL SUPPORT (OUTPATIENT)
Dept: REHABILITATION | Facility: HOSPITAL | Age: 63
End: 2018-08-02
Payer: COMMERCIAL

## 2018-08-02 ENCOUNTER — OFFICE VISIT (OUTPATIENT)
Dept: ORTHOPEDICS | Facility: CLINIC | Age: 63
End: 2018-08-02
Payer: COMMERCIAL

## 2018-08-02 VITALS
WEIGHT: 215 LBS | HEART RATE: 62 BPM | DIASTOLIC BLOOD PRESSURE: 69 MMHG | HEIGHT: 69 IN | BODY MASS INDEX: 31.84 KG/M2 | SYSTOLIC BLOOD PRESSURE: 111 MMHG

## 2018-08-02 DIAGNOSIS — M25.521 RIGHT ELBOW PAIN: Primary | ICD-10-CM

## 2018-08-02 DIAGNOSIS — M25.511 RIGHT SHOULDER PAIN, UNSPECIFIED CHRONICITY: Primary | ICD-10-CM

## 2018-08-02 DIAGNOSIS — M70.21 OLECRANON BURSITIS, RIGHT ELBOW: Primary | ICD-10-CM

## 2018-08-02 DIAGNOSIS — R29.898 SHOULDER WEAKNESS: ICD-10-CM

## 2018-08-02 DIAGNOSIS — M25.611 SHOULDER STIFFNESS, RIGHT: ICD-10-CM

## 2018-08-02 DIAGNOSIS — M25.521 RIGHT ELBOW PAIN: ICD-10-CM

## 2018-08-02 PROCEDURE — 99999 PR PBB SHADOW E&M-EST. PATIENT-LVL III: CPT | Mod: PBBFAC,,, | Performed by: ORTHOPAEDIC SURGERY

## 2018-08-02 PROCEDURE — 73080 X-RAY EXAM OF ELBOW: CPT | Mod: TC,PN,RT

## 2018-08-02 PROCEDURE — 97110 THERAPEUTIC EXERCISES: CPT | Mod: PN

## 2018-08-02 PROCEDURE — G8988 SELF CARE GOAL STATUS: HCPCS | Mod: CH,PN

## 2018-08-02 PROCEDURE — 73080 X-RAY EXAM OF ELBOW: CPT | Mod: 26,RT,, | Performed by: RADIOLOGY

## 2018-08-02 PROCEDURE — G8987 SELF CARE CURRENT STATUS: HCPCS | Mod: CK,PN

## 2018-08-02 PROCEDURE — 99213 OFFICE O/P EST LOW 20 MIN: CPT | Mod: 24,25,S$GLB, | Performed by: ORTHOPAEDIC SURGERY

## 2018-08-02 PROCEDURE — 20605 DRAIN/INJ JOINT/BURSA W/O US: CPT | Mod: 79,RT,S$GLB, | Performed by: ORTHOPAEDIC SURGERY

## 2018-08-02 RX ORDER — TRIAMCINOLONE ACETONIDE 40 MG/ML
40 INJECTION, SUSPENSION INTRA-ARTICULAR; INTRAMUSCULAR
Status: DISCONTINUED | OUTPATIENT
Start: 2018-08-02 | End: 2018-08-02 | Stop reason: HOSPADM

## 2018-08-02 RX ADMIN — TRIAMCINOLONE ACETONIDE 40 MG: 40 INJECTION, SUSPENSION INTRA-ARTICULAR; INTRAMUSCULAR at 02:08

## 2018-08-02 NOTE — PROGRESS NOTES
Past Medical History:   Diagnosis Date    Anxiety     Anxiety     Arthritis     Back problem     bulging disc    GERD (gastroesophageal reflux disease)     Hypercholesterolemia 11/2/2015    2009: Began statin.    Hyperlipidemia     Hypertension     Hypertension, benign 11/2/2015 2008: Diagnosed.    Sleep apnea     uses c-pap       Past Surgical History:   Procedure Laterality Date    APPENDECTOMY      magdalene      KNEE ARTHROSCOPY W/ MENISCAL REPAIR Left 2015    MMT      Left knee A-scope    NOSE SURGERY      UVULECTOMY         Current Outpatient Prescriptions   Medication Sig    acetaminophen (TYLENOL) 325 MG tablet Take 2 tablets (650 mg total) by mouth every 6 (six) hours as needed for Temperature greater than (or equal to 101 degree F).    amlodipine (NORVASC) 10 MG tablet TAKE 1 TABLET ONCE DAILY    amLODIPine (NORVASC) 10 MG tablet TAKE 1 TABLET ONCE DAILY    atorvastatin (LIPITOR) 10 MG tablet TAKE 1 TABLET ONCE DAILY    benazepril (LOTENSIN) 20 MG tablet TAKE 1 TABLET ONCE DAILY    buPROPion (WELLBUTRIN) 100 MG tablet Take 100 mg by mouth 2 (two) times daily.    diclofenac (VOLTAREN) 75 MG EC tablet Take 75 mg by mouth 2 (two) times daily.    esomeprazole (NEXIUM) 40 MG capsule Take 40 mg by mouth before breakfast.    fluticasone (FLONASE) 50 mcg/actuation nasal spray 1 spray by Each Nare route once daily.    loratadine (CLARITIN) 10 mg tablet Take 10 mg by mouth once daily.    oxyCODONE-acetaminophen (PERCOCET) 5-325 mg per tablet Take 1 tablet by mouth every 6 (six) hours as needed for Pain.    promethazine (PHENERGAN) 25 MG tablet Take 1 tablet (25 mg total) by mouth every 8 (eight) hours as needed for Nausea.    sertraline (ZOLOFT) 50 MG tablet Take 50 mg by mouth once daily.    aspirin (ECOTRIN) 81 MG EC tablet Take 1 tablet (81 mg total) by mouth once daily.     No current facility-administered medications for this visit.        Review of patient's allergies indicates:    Allergen Reactions    Adhesive Rash       Family History   Problem Relation Age of Onset    Glaucoma Father        Social History     Social History    Marital status:      Spouse name: N/A    Number of children: N/A    Years of education: N/A     Occupational History    Not on file.     Social History Main Topics    Smoking status: Never Smoker    Smokeless tobacco: Never Used    Alcohol use Yes      Comment: social    Drug use: No    Sexual activity: Yes     Other Topics Concern    Not on file     Social History Narrative    No narrative on file       Chief Complaint:   Chief Complaint   Patient presents with    Right Elbow - Pain       Date of surgery:  May 18, 2018    History of present illness:  62-year-old male who underwent right arthroscopic rotator cuff repair.  Patient is doing well. Has full active and passive range of motion already.  Also complains of right elbow swelling today.  We aspirated it about 3 weeks ago.  It filled up a week later.  Pain is a 2/10 but was as bad as a 6/10 a week ago.      Review of Systems:    Musculoskeletal:  See HPI        Physical Examination:    Vital Signs:    Vitals:    08/02/18 1405   BP: 111/69   Pulse: 62       Body mass index is 31.75 kg/m².    This a well-developed, well nourished patient in no acute distress.  They are alert and oriented and cooperative to examination.  Pt. walks without an antalgic gait.      Examination of the right shoulder shows healed surgical portals.  No erythema or drainage.  Full active and passive range of motion. Moderate right olecranon bursitis.    X-rays:  X-rays of the right elbow were ordered and reviewed which show some posterior soft tissue swelling in a very small olecranon osteophyte     Assessment::  Status post right arthroscopic rotator cuff repair  Right olecranon bursitis    Plan:  I recommended aspiration and injection of his elbow 1 more time.  If it fails back up he might need an elbow bursectomy.   Continue with physical therapy for shoulder.    This note was created using [employer] voice recognition software that occasionally misinterpreted phrases or words.

## 2018-08-02 NOTE — PROGRESS NOTES
Time in 1  Time out 2      Timed units  4 therex                              Time:1-2      S:doing HEP and understands d/c with a stretch weaning and scapula and cuff PRE HEP should happen soon, doing most tasks however overhead use with some decreased use and strength  Pain:continues to decrease in intensity and frequency    O:  HEP: reviewed    manual tx: n/a    prom as tolerated-pain free: n/a    stretches as tolerated-pain free: n/a    theraband 2 x 20:  purple row, add, ir 0 abd, depression, supine protraction    theraband 4 x 10: yellow er 0 abd    isometrics 2 x 20: n/a    education: n/a    ube: level 1 x 10'    arom: n/a            A:continued PRE should promote correct scapula humeral ratio long term as well as encourage strong and painless use with all required tasks          P:test prom er x 2 and ir x 2 and elevation x 3    FIM  Current g code CK min a 40-60% impairment  Goal g code CH independent 0% impairment

## 2018-08-02 NOTE — TELEPHONE ENCOUNTER
----- Message from Venecia Aguilera sent at 8/2/2018  8:48 AM CDT -----  Contact: self  Pt has physical therapy today in the area and wants to know if he can be seen to have his elbow drained today while he is there.    Pt can be reached at [phone]

## 2018-08-02 NOTE — PROCEDURES
Intermediate Joint Aspiration/Injection  Date/Time: 8/2/2018 2:14 PM  Performed by: MAIRA AYOUB  Authorized by: MAIRA AYOUB     Consent Done?:  Yes (Written)  Indications:  Joint swelling  Site marked: The procedure site was marked    Timeout: Prior to procedure the correct patient, procedure, and site was verified      Location:  Elbow  Site:  R olecranon bursa  Prep: Patient was prepped and draped in usual sterile fashion    Needle size:  20 G  Approach:  Posterior  Medications:  40 mg triamcinolone acetonide 40 mg/mL  Aspirate amount (ml):  12  Aspirate:  Serous and blood-tinged  Patient tolerance:  Patient tolerated the procedure well with no immediate complications

## 2018-08-07 ENCOUNTER — CLINICAL SUPPORT (OUTPATIENT)
Dept: REHABILITATION | Facility: HOSPITAL | Age: 63
End: 2018-08-07
Payer: COMMERCIAL

## 2018-08-07 DIAGNOSIS — R29.898 SHOULDER WEAKNESS: ICD-10-CM

## 2018-08-07 DIAGNOSIS — M25.511 RIGHT SHOULDER PAIN, UNSPECIFIED CHRONICITY: Primary | ICD-10-CM

## 2018-08-07 DIAGNOSIS — M25.611 SHOULDER STIFFNESS, RIGHT: ICD-10-CM

## 2018-08-07 PROCEDURE — 97110 THERAPEUTIC EXERCISES: CPT | Mod: PN

## 2018-08-07 NOTE — PROGRESS NOTES
"Time in 1  Time out 2      Timed units  4 therex                              Time:1-2      S:no new issues  Pain:continues to decrease in intensity and frequency    O:  full prom er x 3 and ir x 2 and abd    prom d2 1" olecranon to surface vs 0" on l; flex 180 on l vs 174 on r    HEP: added d2 stretch    manual tx: n/a    prom as tolerated-pain free: n/a    stretches as tolerated-pain free: d2    theraband 2 x 20: purple row, add, ir 0 abd, depression, protraction; yellow er 0 abd    isometrics 2 x 20: n/a    education: pec major clavicular head shortening and d2 stretch    ube: n/a    arom: n/a            A:fixing last bit of terminal elevation stiffness and transitioning to stretch weaning and shoulder complex PRE should be focus of tx            P:d/c next visit likely    OCCUPATIONAL THERAPY PROGRESS UPDATE      Onset Date:  See eval  SOC Date:  6-11-18  Primary Diagnosis:  r full thickness supraspinatus repair, subacromial bursectomy, distal clavicle resection, acromioplasty  Treatment Diagnosis:  r shoulder pain, stiffness, weakness  Precautions:  universal  Visits from SOC:  11  Functional Level Prior to SOC:  Decreased rom, use, and strength; pre symptom onset had no deficits with functional use    Updated Assessment:  progress in rehab is satisfactory considering date of symptom onset, surgery, and first day of OT     Goals: ongoing    Reasons for Continuation of Therapy:  Continued skilled and structured formal rehab imperative to properly restore strong, painless, and balanced mechanics and full use long term    Recommended Treatment Plan: eval and tx  "

## 2018-08-09 ENCOUNTER — CLINICAL SUPPORT (OUTPATIENT)
Dept: REHABILITATION | Facility: HOSPITAL | Age: 63
End: 2018-08-09
Payer: COMMERCIAL

## 2018-08-09 DIAGNOSIS — R29.898 SHOULDER WEAKNESS: ICD-10-CM

## 2018-08-09 DIAGNOSIS — M25.611 SHOULDER STIFFNESS, RIGHT: ICD-10-CM

## 2018-08-09 DIAGNOSIS — M25.511 RIGHT SHOULDER PAIN, UNSPECIFIED CHRONICITY: Primary | ICD-10-CM

## 2018-08-09 PROCEDURE — 97110 THERAPEUTIC EXERCISES: CPT | Mod: PN

## 2018-08-09 NOTE — PROGRESS NOTES
Time in 1  Time out 2      Timed units  4 therex                    Time:1-2      S:understands d/c HEP  Pain:0/10 at rest, with sleep, with light use    O:  HEP: issued theraband HEP per below exercises to be done 2 x week for 3 months at least, told to wean 1st 4 stretches over next 3 weeks or so, told to wean d2 stretch over roughly a 3 week period once elbow touches surface easily on first rep of set, told to slowly progress use as tolerated-pain free per md advice     manual tx: n/a    prom as tolerated-pain free: n/a    stretches as tolerated-pain free: d2    theraband 2 x 20:  purple row, add, ir 0 abd, depression, protraction; yellow er 0 abd    isometrics 2 x 20: n/a    education: n/a    ube: n/a    arom: n/a            A:all goals met            P:d/c    FIM  Current, goal, d/c g code CH independent 0% impairment

## 2018-08-23 ENCOUNTER — OFFICE VISIT (OUTPATIENT)
Dept: ORTHOPEDICS | Facility: CLINIC | Age: 63
End: 2018-08-23
Payer: COMMERCIAL

## 2018-08-23 VITALS
WEIGHT: 215 LBS | HEART RATE: 54 BPM | DIASTOLIC BLOOD PRESSURE: 81 MMHG | HEIGHT: 69 IN | SYSTOLIC BLOOD PRESSURE: 117 MMHG | BODY MASS INDEX: 31.84 KG/M2

## 2018-08-23 DIAGNOSIS — M75.121 COMPLETE TEAR OF RIGHT ROTATOR CUFF: Primary | ICD-10-CM

## 2018-08-23 PROCEDURE — 99999 PR PBB SHADOW E&M-EST. PATIENT-LVL III: CPT | Mod: PBBFAC,,, | Performed by: ORTHOPAEDIC SURGERY

## 2018-08-23 PROCEDURE — 99212 OFFICE O/P EST SF 10 MIN: CPT | Mod: S$GLB,,, | Performed by: ORTHOPAEDIC SURGERY

## 2018-08-23 NOTE — PROGRESS NOTES
Past Medical History:   Diagnosis Date    Anxiety     Anxiety     Arthritis     Back problem     bulging disc    GERD (gastroesophageal reflux disease)     Hypercholesterolemia 11/2/2015    2009: Began statin.    Hyperlipidemia     Hypertension     Hypertension, benign 11/2/2015 2008: Diagnosed.    Sleep apnea     uses c-pap       Past Surgical History:   Procedure Laterality Date    APPENDECTOMY      magdalene      KNEE ARTHROSCOPY W/ MENISCAL REPAIR Left 2015    MMT      Left knee A-scope    NOSE SURGERY      UVULECTOMY         Current Outpatient Medications   Medication Sig    acetaminophen (TYLENOL) 325 MG tablet Take 2 tablets (650 mg total) by mouth every 6 (six) hours as needed for Temperature greater than (or equal to 101 degree F).    amlodipine (NORVASC) 10 MG tablet TAKE 1 TABLET ONCE DAILY    amLODIPine (NORVASC) 10 MG tablet TAKE 1 TABLET ONCE DAILY    atorvastatin (LIPITOR) 10 MG tablet TAKE 1 TABLET ONCE DAILY    benazepril (LOTENSIN) 20 MG tablet TAKE 1 TABLET ONCE DAILY    buPROPion (WELLBUTRIN) 100 MG tablet Take 100 mg by mouth 2 (two) times daily.    diclofenac (VOLTAREN) 75 MG EC tablet Take 75 mg by mouth 2 (two) times daily.    esomeprazole (NEXIUM) 40 MG capsule Take 40 mg by mouth before breakfast.    fluticasone (FLONASE) 50 mcg/actuation nasal spray 1 spray by Each Nare route once daily.    loratadine (CLARITIN) 10 mg tablet Take 10 mg by mouth once daily.    oxyCODONE-acetaminophen (PERCOCET) 5-325 mg per tablet Take 1 tablet by mouth every 6 (six) hours as needed for Pain.    promethazine (PHENERGAN) 25 MG tablet Take 1 tablet (25 mg total) by mouth every 8 (eight) hours as needed for Nausea.    sertraline (ZOLOFT) 50 MG tablet Take 50 mg by mouth once daily.    aspirin (ECOTRIN) 81 MG EC tablet Take 1 tablet (81 mg total) by mouth once daily.     No current facility-administered medications for this visit.        Review of patient's allergies indicates:    Allergen Reactions    Adhesive Rash       Family History   Problem Relation Age of Onset    Glaucoma Father        Social History     Socioeconomic History    Marital status:      Spouse name: Not on file    Number of children: Not on file    Years of education: Not on file    Highest education level: Not on file   Social Needs    Financial resource strain: Not on file    Food insecurity - worry: Not on file    Food insecurity - inability: Not on file    Transportation needs - medical: Not on file    Transportation needs - non-medical: Not on file   Occupational History    Not on file   Tobacco Use    Smoking status: Never Smoker    Smokeless tobacco: Never Used   Substance and Sexual Activity    Alcohol use: Yes     Comment: social    Drug use: No    Sexual activity: Yes   Other Topics Concern    Not on file   Social History Narrative    Not on file       Chief Complaint:   Chief Complaint   Patient presents with    Shoulder Pain     R shoulder 6wk f/u       Date of surgery:  May 18, 2018    History of present illness:  62-year-old male who underwent right arthroscopic rotator cuff repair.  Patient is doing well. Has full active and passive range of motion already.  He is done with physical therapy.  Working on it on his own.  Has no pain.      Review of Systems:    Musculoskeletal:  See HPI        Physical Examination:    Vital Signs:    Vitals:    08/23/18 0902   BP: 117/81   Pulse: (!) 54       Body mass index is 31.75 kg/m².    This a well-developed, well nourished patient in no acute distress.  They are alert and oriented and cooperative to examination.  Pt. walks without an antalgic gait.      Examination of the right shoulder shows healed surgical portals.  No erythema or drainage.  Full active and passive range of motion.  Has 4+ strength already.    X-rays:  None     Assessment::  Status post right arthroscopic rotator cuff repair      Plan:  He is doing very well.  Finish  physical therapy.  Working on his own.  Has good strength the ready.  Follow-up as needed.    This note was created using [employer] voice recognition software that occasionally misinterpreted phrases or words.

## 2018-11-26 ENCOUNTER — TELEPHONE (OUTPATIENT)
Dept: ORTHOPEDICS | Facility: CLINIC | Age: 63
End: 2018-11-26

## 2018-11-26 NOTE — TELEPHONE ENCOUNTER
----- Message from Jamaal Tovar sent at 11/26/2018  8:35 AM CST -----  Type:  Sooner Apoointment Request    Caller is requesting a sooner appointment.  Caller declined first available appointment listed below.  Caller will not accept being placed on the waitlist and is requesting a message be sent to doctor.    Name of Caller:  Patient  When is the first available appointment?  12/06  Symptoms:  Re-injured right shoulder, rotator cuff that Dr. Ko operated on  Best Call Back Number:  [phone]  Additional Information:

## 2018-11-27 DIAGNOSIS — M25.511 RIGHT SHOULDER PAIN, UNSPECIFIED CHRONICITY: Primary | ICD-10-CM

## 2018-11-29 ENCOUNTER — HOSPITAL ENCOUNTER (OUTPATIENT)
Dept: RADIOLOGY | Facility: HOSPITAL | Age: 63
Discharge: HOME | End: 2018-11-29
Attending: ORTHOPAEDIC SURGERY
Payer: COMMERCIAL

## 2018-11-29 ENCOUNTER — OFFICE VISIT (OUTPATIENT)
Dept: ORTHOPEDICS | Facility: CLINIC | Age: 63
End: 2018-11-29
Payer: COMMERCIAL

## 2018-11-29 VITALS
SYSTOLIC BLOOD PRESSURE: 153 MMHG | HEIGHT: 69 IN | WEIGHT: 215 LBS | DIASTOLIC BLOOD PRESSURE: 90 MMHG | BODY MASS INDEX: 31.84 KG/M2 | HEART RATE: 61 BPM

## 2018-11-29 DIAGNOSIS — M25.511 RIGHT SHOULDER PAIN, UNSPECIFIED CHRONICITY: Primary | ICD-10-CM

## 2018-11-29 DIAGNOSIS — M25.511 RIGHT SHOULDER PAIN, UNSPECIFIED CHRONICITY: ICD-10-CM

## 2018-11-29 DIAGNOSIS — M75.121 COMPLETE TEAR OF RIGHT ROTATOR CUFF: Primary | ICD-10-CM

## 2018-11-29 PROCEDURE — 73030 X-RAY EXAM OF SHOULDER: CPT | Mod: 26,RT,, | Performed by: RADIOLOGY

## 2018-11-29 PROCEDURE — 99213 OFFICE O/P EST LOW 20 MIN: CPT | Mod: S$GLB,,, | Performed by: ORTHOPAEDIC SURGERY

## 2018-11-29 PROCEDURE — 99999 PR PBB SHADOW E&M-EST. PATIENT-LVL III: CPT | Mod: PBBFAC,,, | Performed by: ORTHOPAEDIC SURGERY

## 2018-11-29 PROCEDURE — 73030 X-RAY EXAM OF SHOULDER: CPT | Mod: TC,PN,RT

## 2018-11-29 NOTE — PROGRESS NOTES
Past Medical History:   Diagnosis Date    Anxiety     Anxiety     Arthritis     Back problem     bulging disc    GERD (gastroesophageal reflux disease)     Hypercholesterolemia 11/2/2015    2009: Began statin.    Hyperlipidemia     Hypertension     Hypertension, benign 11/2/2015 2008: Diagnosed.    Sleep apnea     uses c-pap       Past Surgical History:   Procedure Laterality Date    APPENDECTOMY      ARTHROSCOPY-SHOULDER EXCISION DISTAL CLAVICLE Right 5/18/2018    Per[facility] OR    ARTHROSCOPY-SHOULDER WITH SUBACROMIAL DECOMPRESSION Right 5/18/2018    [facility] OR    magdalene      HEART CATH-LEFT Left 12/10/2015    Performed by Maine Pozo [facility] at [facility] [place] CATH LAB    KNEE ARTHROSCOPY W/ MENISCAL REPAIR Left 2015    MMT      Left knee A-scope    NOSE SURGERY      REPAIR ROTATOR CUFF ARTHROSCOPIC Right 5/18/2018    [facility] OR    UVULECTOMY         Current Outpatient Medications   Medication Sig    acetaminophen (TYLENOL) 325 MG tablet Take 2 tablets (650 mg total) by mouth every 6 (six) hours as needed for Temperature greater than (or equal to 101 degree F).    amlodipine (NORVASC) 10 MG tablet TAKE 1 TABLET ONCE DAILY    amLODIPine (NORVASC) 10 MG tablet TAKE 1 TABLET ONCE DAILY    atorvastatin (LIPITOR) 10 MG tablet TAKE 1 TABLET ONCE DAILY    benazepril (LOTENSIN) 20 MG tablet TAKE 1 TABLET ONCE DAILY    buPROPion (WELLBUTRIN) 100 MG tablet Take 100 mg by mouth 2 (two) times daily.    diclofenac (VOLTAREN) 75 MG EC tablet Take 75 mg by mouth 2 (two) times daily.    esomeprazole (NEXIUM) 40 MG capsule Take 40 mg by mouth before breakfast.    fluticasone (FLONASE) 50 mcg/actuation nasal spray 1 spray by Each Nare route once daily.    loratadine (CLARITIN) 10 mg tablet Take 10 mg by mouth once daily.    oxyCODONE-acetaminophen (PERCOCET) 5-325 mg per tablet Take 1 tablet by  mouth every 6 (six) hours as needed for Pain.    promethazine (PHENERGAN) 25 MG tablet Take 1 tablet (25 mg total) by mouth every 8 (eight) hours as needed for Nausea.    sertraline (ZOLOFT) 50 MG tablet Take 50 mg by mouth once daily.    aspirin (ECOTRIN) 81 MG EC tablet Take 1 tablet (81 mg total) by mouth once daily.     No current facility-administered medications for this visit.        Review of patient's allergies indicates:   Allergen Reactions    Adhesive Rash       Family History   Problem Relation Age of Onset    Glaucoma Father        Social History     Socioeconomic History    Marital status:      Spouse name: Not on file    Number of children: Not on file    Years of education: Not on file    Highest education level: Not on file   Social Needs    Financial resource strain: Not on file    Food insecurity - worry: Not on file    Food insecurity - inability: Not on file    Transportation needs - medical: Not on file    Transportation needs - non-medical: Not on file   Occupational History    Not on file   Tobacco Use    Smoking status: Never Smoker    Smokeless tobacco: Never Used   Substance and Sexual Activity    Alcohol use: Yes     Comment: social    Drug use: No    Sexual activity: Yes   Other Topics Concern    Not on file   Social History Narrative    Not on file       Chief Complaint:   Chief Complaint   Patient presents with    Right Shoulder - Pain       Date of surgery:  May 18, 2018    History of present illness:  63-year-old male who underwent right arthroscopic rotator cuff repair.  Patient was doing great until he fell on November 24, 2018.  Patient landed onto an outstretched right shoulder.  Suffered a shoulder dislocation that required closed reduction under conscious sedation at the emergency room.  Patient cannot raise his arm up now.  Pain is a 3/10 at rest but up to a 10/10 when trying to move it.      Review of Systems:    Musculoskeletal:  See  HPI        Physical Examination:    Vital Signs:    Vitals:    11/29/18 0911   BP: (!) 153/90   Pulse: 61       Body mass index is 31.75 kg/m².    This a well-developed, well nourished patient in no acute distress.  They are alert and oriented and cooperative to examination.  Pt. walks without an antalgic gait.      Examination of the right shoulder shows healed surgical portals.  No erythema or drainage. Patient has significant weakness in both forward flexion and external rotation. Pain with range of motion. 2+ radial pulse.  Intact median, radial, ulnar sensory distribution.  Intact axillary sensation over the deltoid but difficulty to detect whether there is active deltoid function.    X-rays:  None     Assessment::  Right rotator cuff tear status post dislocation      Plan:  He needs a new MRI to evaluate the integrity of his rotator cuff after his new fall.  Follow up after the MRI is completed.    This note was created using M Modal voice recognition software that occasionally misinterpreted phrases or words.

## 2018-11-30 RX ORDER — LORAZEPAM 1 MG/1
1 TABLET ORAL
Qty: 2 TABLET | Refills: 0 | Status: SHIPPED | OUTPATIENT
Start: 2018-11-30 | End: 2018-12-17

## 2018-12-04 ENCOUNTER — HOSPITAL ENCOUNTER (OUTPATIENT)
Dept: RADIOLOGY | Facility: HOSPITAL | Age: 63
Discharge: HOME | End: 2018-12-04
Attending: ORTHOPAEDIC SURGERY
Payer: COMMERCIAL

## 2018-12-04 DIAGNOSIS — M25.511 RIGHT SHOULDER PAIN, UNSPECIFIED CHRONICITY: ICD-10-CM

## 2018-12-04 PROCEDURE — 73221 MRI JOINT UPR EXTREM W/O DYE: CPT | Mod: TC,RT

## 2018-12-04 PROCEDURE — 73221 MRI JOINT UPR EXTREM W/O DYE: CPT | Mod: 26,RT,, | Performed by: RADIOLOGY

## 2018-12-05 ENCOUNTER — TELEPHONE (OUTPATIENT)
Dept: ORTHOPEDICS | Facility: CLINIC | Age: 63
End: 2018-12-05

## 2018-12-05 NOTE — TELEPHONE ENCOUNTER
----- Message from Pamela Mascorro sent at 12/5/2018 12:01 PM CST -----  Contact: [phone]  Pt is calling to speak with the nurse concerning his MRI results and to see if he needs surgery.      Thank you!

## 2018-12-06 ENCOUNTER — OFFICE VISIT (OUTPATIENT)
Dept: ORTHOPEDICS | Facility: CLINIC | Age: 63
End: 2018-12-06
Payer: COMMERCIAL

## 2018-12-06 VITALS
HEIGHT: 69 IN | WEIGHT: 215 LBS | SYSTOLIC BLOOD PRESSURE: 177 MMHG | HEART RATE: 54 BPM | BODY MASS INDEX: 31.84 KG/M2 | DIASTOLIC BLOOD PRESSURE: 84 MMHG

## 2018-12-06 DIAGNOSIS — M75.121 COMPLETE TEAR OF RIGHT ROTATOR CUFF: Primary | ICD-10-CM

## 2018-12-06 PROCEDURE — 99999 PR PBB SHADOW E&M-EST. PATIENT-LVL III: CPT | Mod: PBBFAC,,, | Performed by: ORTHOPAEDIC SURGERY

## 2018-12-06 PROCEDURE — 99214 OFFICE O/P EST MOD 30 MIN: CPT | Mod: 57,S$GLB,, | Performed by: ORTHOPAEDIC SURGERY

## 2018-12-06 RX ORDER — OXYCODONE AND ACETAMINOPHEN 5; 325 MG/1; MG/1
1 TABLET ORAL EVERY 4 HOURS PRN
Qty: 40 TABLET | Refills: 0 | Status: SHIPPED | OUTPATIENT
Start: 2018-12-06 | End: 2018-12-06 | Stop reason: SDUPTHER

## 2018-12-06 RX ORDER — OXYCODONE AND ACETAMINOPHEN 5; 325 MG/1; MG/1
1 TABLET ORAL EVERY 4 HOURS PRN
Qty: 40 TABLET | Refills: 0 | Status: SHIPPED | OUTPATIENT
Start: 2018-12-06 | End: 2019-10-03

## 2018-12-06 NOTE — TELEPHONE ENCOUNTER
----- Message from Tete Redd sent at 12/6/2018  2:48 PM CST -----  Contact: Self [phone]  Calling to speak with the nurse about his prescription.  It was sent to [facility] on [address] by mistake, The correct pharmacy is [facility] in [place] on [address].  Please call patient.

## 2018-12-06 NOTE — H&P (VIEW-ONLY)
Past Medical History:   Diagnosis Date    Anxiety     Anxiety     Arthritis     Back problem     bulging disc    GERD (gastroesophageal reflux disease)     Hypercholesterolemia 11/2/2015    2009: Began statin.    Hyperlipidemia     Hypertension     Hypertension, benign 11/2/2015 2008: Diagnosed.    Sleep apnea     uses c-pap       Past Surgical History:   Procedure Laterality Date    APPENDECTOMY      ARTHROSCOPY-SHOULDER EXCISION DISTAL CLAVICLE Right 5/18/2018    [facility] OR    ARTHROSCOPY-SHOULDER WITH SUBACROMIAL DECOMPRESSION Right 5/18/2018    [facility] OR    magdalene      HEART CATH-LEFT Left 12/10/2015    Performed by Maine [facility]ozo [facility] at [facility] CATH LAB    KNEE ARTHROSCOPY W/ MENISCAL REPAIR Left 2015    MMT      Left knee A-scope    NOSE SURGERY      REPAIR ROTATOR CUFF ARTHROSCOPIC Right 5/18/2018    [facility] OR    UVULECTOMY         Current Outpatient Medications   Medication Sig    acetaminophen (TYLENOL) 325 MG tablet Take 2 tablets (650 mg total) by mouth every 6 (six) hours as needed for Temperature greater than (or equal to 101 degree F).    amlodipine (NORVASC) 10 MG tablet TAKE 1 TABLET ONCE DAILY    amLODIPine (NORVASC) 10 MG tablet TAKE 1 TABLET ONCE DAILY    atorvastatin (LIPITOR) 10 MG tablet TAKE 1 TABLET ONCE DAILY    benazepril (LOTENSIN) 20 MG tablet TAKE 1 TABLET ONCE DAILY    buPROPion (WELLBUTRIN) 100 MG tablet Take 100 mg by mouth 2 (two) times daily.    diclofenac (VOLTAREN) 75 MG EC tablet Take 75 mg by mouth 2 (two) times daily.    esomeprazole (NEXIUM) 40 MG capsule Take 40 mg by mouth before breakfast.    fluticasone (FLONASE) 50 mcg/actuation nasal spray 1 spray by Each Nare route once daily.    loratadine (CLARITIN) 10 mg tablet Take 10 mg by mouth once daily.    LORazepam (ATIVAN) 1 MG tablet Take 1 tablet (1 mg total) by mouth as needed  for Anxiety (Take 1 tab PO 30 minutes prior to mri and 1 tab upon arrival prn for anxiety.).    promethazine (PHENERGAN) 25 MG tablet Take 1 tablet (25 mg total) by mouth every 8 (eight) hours as needed for Nausea.    sertraline (ZOLOFT) 50 MG tablet Take 50 mg by mouth once daily.    aspirin (ECOTRIN) 81 MG EC tablet Take 1 tablet (81 mg total) by mouth once daily.    oxyCODONE-acetaminophen (PERCOCET) 5-325 mg per tablet Take 1 tablet by mouth every 4 (four) hours as needed for Pain.     No current facility-administered medications for this visit.        Review of patient's allergies indicates:   Allergen Reactions    Adhesive Rash       Family History   Problem Relation Age of Onset    Glaucoma Father        Social History     Socioeconomic History    Marital status:      Spouse name: Not on file    Number of children: Not on file    Years of education: Not on file    Highest education level: Not on file   Social Needs    Financial resource strain: Not on file    Food insecurity - worry: Not on file    Food insecurity - inability: Not on file    Transportation needs - medical: Not on file    Transportation needs - non-medical: Not on file   Occupational History    Not on file   Tobacco Use    Smoking status: Never Smoker    Smokeless tobacco: Never Used   Substance and Sexual Activity    Alcohol use: Yes     Comment: social    Drug use: No    Sexual activity: Yes   Other Topics Concern    Not on file   Social History Narrative    Not on file       Chief Complaint:   Chief Complaint   Patient presents with    Shoulder Pain     right shoulder-MRI Results        Date of surgery:  May 18, 2018    History of present illness:  63-year-old male who underwent right arthroscopic rotator cuff repair.  Patient was doing great until he fell on November 24, 2018.  Patient landed onto an outstretched right shoulder.  Suffered a shoulder dislocation that required closed reduction under conscious  sedation at the emergency room.  Patient cannot raise his arm up now.  Pain is a 3/10 at rest but up to a 10/10 when trying to move it.  MRI showed tearing of the anterior supraspinatus and the subscapularis.      Review of Systems:    Musculoskeletal:  See HPI        Physical Examination:    Vital Signs:    Vitals:    12/06/18 1016   BP: (!) 177/84   Pulse: (!) 54       Body mass index is 31.75 kg/m².    This a well-developed, well nourished patient in no acute distress.  They are alert and oriented and cooperative to examination.  Pt. walks without an antalgic gait.      Examination of the right shoulder shows healed surgical portals.  No erythema or drainage. Patient has significant weakness in both forward flexion and external rotation. Pain with range of motion. 2+ radial pulse.  Intact median, radial, ulnar sensory distribution.  Intact axillary sensation over the deltoid but difficulty to detect whether there is active deltoid function.    Heart is regular rate without obvious murmurs   Normal respiratory effort without audible wheezing  Abdomen is soft and nontender    MRI of the right shoulder:1. Full-thickness near full width tear of supraspinatus from the insertion without retraction.  2. Partial-thickness articular sided tear at the far anterior insertion of infraspinatus with background moderate tendinosis.  3. Full-thickness tear at the superior insertion of subscapularis with retraction.  Medial subluxation of the biceps tendon into the tear.       X-rays:  None     Assessment::  Right rotator cuff tear status post dislocation.  Supraspinatus and subscapularis.  Biceps subluxation  Right axillary nerve palsy      Plan:  I reviewed the findings with him today. I recommended surgical treatment to repair his subscapularis and supraspinatus as well as perform a biceps tenodesis.  We talked about his axillary nerve palsy and how it is likely a traction injury from the shoulder dislocation.  We will  continue to monitor throughout his postop recovery.  Risks, benefits, and alternatives to the procedure were explained to the patient including but not limited to damage to nerves, arteries, blood vessels, bones, tendons, ligaments, stiffness, instability, infection, DVT, PE, as well as general anesthetic complications including seizure, stroke, heart attack and even death. The patient understood these risks and wished to proceed and signed the informed consent.       This note was created using M Modal voice recognition software that occasionally misinterpreted phrases or words.

## 2018-12-06 NOTE — PROGRESS NOTES
Past Medical History:   Diagnosis Date    Anxiety     Anxiety     Arthritis     Back problem     bulging disc    GERD (gastroesophageal reflux disease)     Hypercholesterolemia 11/2/2015    2009: Began statin.    Hyperlipidemia     Hypertension     Hypertension, benign 11/2/2015 2008: Diagnosed.    Sleep apnea     uses c-pap       Past Surgical History:   Procedure Laterality Date    APPENDECTOMY      ARTHROSCOPY-SHOULDER EXCISION DISTAL CLAVICLE Right 5/18/2018    [facility] OR    ARTHROSCOPY-SHOULDER WITH SUBACROMIAL DECOMPRESSION Right 5/18/2018    [facility] OR    magdalene      HEART CATH-LEFT Left 12/10/2015    [facility] Maine [facility] CATH LAB    KNEE ARTHROSCOPY W/ MENISCAL REPAIR Left 2015    MMT      Left knee A-scope    NOSE SURGERY      REPAIR ROTATOR CUFF ARTHROSCOPIC Right 5/18/2018    [facility] OR    UVULECTOMY         Current Outpatient Medications   Medication Sig    acetaminophen (TYLENOL) 325 MG tablet Take 2 tablets (650 mg total) by mouth every 6 (six) hours as needed for Temperature greater than (or equal to 101 degree F).    amlodipine (NORVASC) 10 MG tablet TAKE 1 TABLET ONCE DAILY    amLODIPine (NORVASC) 10 MG tablet TAKE 1 TABLET ONCE DAILY    atorvastatin (LIPITOR) 10 MG tablet TAKE 1 TABLET ONCE DAILY    benazepril (LOTENSIN) 20 MG tablet TAKE 1 TABLET ONCE DAILY    buPROPion (WELLBUTRIN) 100 MG tablet Take 100 mg by mouth 2 (two) times daily.    diclofenac (VOLTAREN) 75 MG EC tablet Take 75 mg by mouth 2 (two) times daily.    esomeprazole (NEXIUM) 40 MG capsule Take 40 mg by mouth before breakfast.    fluticasone (FLONASE) 50 mcg/actuation nasal spray 1 spray by Each Nare route once daily.    loratadine (CLARITIN) 10 mg tablet Take 10 mg by mouth once daily.    LORazepam (ATIVAN) 1 MG tablet Take 1 tablet (1 mg total) by mouth as needed  for Anxiety (Take 1 tab PO 30 minutes prior to mri and 1 tab upon arrival prn for anxiety.).    promethazine (PHENERGAN) 25 MG tablet Take 1 tablet (25 mg total) by mouth every 8 (eight) hours as needed for Nausea.    sertraline (ZOLOFT) 50 MG tablet Take 50 mg by mouth once daily.    aspirin (ECOTRIN) 81 MG EC tablet Take 1 tablet (81 mg total) by mouth once daily.    oxyCODONE-acetaminophen (PERCOCET) 5-325 mg per tablet Take 1 tablet by mouth every 4 (four) hours as needed for Pain.     No current facility-administered medications for this visit.        Review of patient's allergies indicates:   Allergen Reactions    Adhesive Rash       Family History   Problem Relation Age of Onset    Glaucoma Father        Social History     Socioeconomic History    Marital status:      Spouse name: Not on file    Number of children: Not on file    Years of education: Not on file    Highest education level: Not on file   Social Needs    Financial resource strain: Not on file    Food insecurity - worry: Not on file    Food insecurity - inability: Not on file    Transportation needs - medical: Not on file    Transportation needs - non-medical: Not on file   Occupational History    Not on file   Tobacco Use    Smoking status: Never Smoker    Smokeless tobacco: Never Used   Substance and Sexual Activity    Alcohol use: Yes     Comment: social    Drug use: No    Sexual activity: Yes   Other Topics Concern    Not on file   Social History Narrative    Not on file       Chief Complaint:   Chief Complaint   Patient presents with    Shoulder Pain     right shoulder-MRI Results        Date of surgery:  May 18, 2018    History of present illness:  63-year-old male who underwent right arthroscopic rotator cuff repair.  Patient was doing great until he fell on November 24, 2018.  Patient landed onto an outstretched right shoulder.  Suffered a shoulder dislocation that required closed reduction under conscious  sedation at the emergency room.  Patient cannot raise his arm up now.  Pain is a 3/10 at rest but up to a 10/10 when trying to move it.  MRI showed tearing of the anterior supraspinatus and the subscapularis.      Review of Systems:    Musculoskeletal:  See HPI        Physical Examination:    Vital Signs:    Vitals:    12/06/18 1016   BP: (!) 177/84   Pulse: (!) 54       Body mass index is 31.75 kg/m².    This a well-developed, well nourished patient in no acute distress.  They are alert and oriented and cooperative to examination.  Pt. walks without an antalgic gait.      Examination of the right shoulder shows healed surgical portals.  No erythema or drainage. Patient has significant weakness in both forward flexion and external rotation. Pain with range of motion. 2+ radial pulse.  Intact median, radial, ulnar sensory distribution.  Intact axillary sensation over the deltoid but difficulty to detect whether there is active deltoid function.    Heart is regular rate without obvious murmurs   Normal respiratory effort without audible wheezing  Abdomen is soft and nontender    MRI of the right shoulder:1. Full-thickness near full width tear of supraspinatus from the insertion without retraction.  2. Partial-thickness articular sided tear at the far anterior insertion of infraspinatus with background moderate tendinosis.  3. Full-thickness tear at the superior insertion of subscapularis with retraction.  Medial subluxation of the biceps tendon into the tear.       X-rays:  None     Assessment::  Right rotator cuff tear status post dislocation.  Supraspinatus and subscapularis.  Biceps subluxation  Right axillary nerve palsy      Plan:  I reviewed the findings with him today. I recommended surgical treatment to repair his subscapularis and supraspinatus as well as perform a biceps tenodesis.  We talked about his axillary nerve palsy and how it is likely a traction injury from the shoulder dislocation.  We will  continue to monitor throughout his postop recovery.  Risks, benefits, and alternatives to the procedure were explained to the patient including but not limited to damage to nerves, arteries, blood vessels, bones, tendons, ligaments, stiffness, instability, infection, DVT, PE, as well as general anesthetic complications including seizure, stroke, heart attack and even death. The patient understood these risks and wished to proceed and signed the informed consent.       This note was created using M Modal voice recognition software that occasionally misinterpreted phrases or words.

## 2018-12-07 ENCOUNTER — TELEPHONE (OUTPATIENT)
Dept: ORTHOPEDICS | Facility: CLINIC | Age: 63
End: 2018-12-07

## 2018-12-07 RX ORDER — MUPIROCIN 20 MG/G
OINTMENT TOPICAL
Status: CANCELLED | OUTPATIENT
Start: 2018-12-07

## 2018-12-07 NOTE — TELEPHONE ENCOUNTER
----- Message from Maddison Husain MA sent at 12/7/2018 10:54 AM CST -----  Contact: Self  Patient has questions regarding his MRI and how long he needs to wear his sling    Call Back# [phone]

## 2018-12-07 NOTE — TELEPHONE ENCOUNTER
"Pt was reviewing radiology report from right shoulder mri. The report states,     "ADDENDUM: As discussed in the body of the report, a nondisplaced fracture is also suspected along the superior humeral head."    The pt states a possible fracture was not discussed when reviewing mri results in clinic and wanted to make sure you saw this and there is no change in treatment.     Pt is scheduled for right shoulder arthroscopy on 12/21/18.    Please let me know so I can relay to the patient. Thanks!   "

## 2018-12-17 ENCOUNTER — HOSPITAL ENCOUNTER (OUTPATIENT)
Dept: PREADMISSION TESTING | Facility: HOSPITAL | Age: 63
Discharge: HOME | End: 2018-12-17
Attending: ORTHOPAEDIC SURGERY
Payer: COMMERCIAL

## 2018-12-17 VITALS — WEIGHT: 210 LBS | HEIGHT: 69 IN | BODY MASS INDEX: 31.1 KG/M2

## 2018-12-17 DIAGNOSIS — M75.121 COMPLETE TEAR OF RIGHT ROTATOR CUFF: ICD-10-CM

## 2018-12-17 LAB
ANION GAP SERPL CALC-SCNC: 7 MMOL/L
BASOPHILS # BLD AUTO: 0 K/UL
BASOPHILS NFR BLD: 1 %
BILIRUB UR QL STRIP: NEGATIVE
BUN SERPL-MCNC: 23 MG/DL
CALCIUM SERPL-MCNC: 9.5 MG/DL
CHLORIDE SERPL-SCNC: 107 MMOL/L
CLARITY UR: CLEAR
CO2 SERPL-SCNC: 27 MMOL/L
COLOR UR: YELLOW
CREAT SERPL-MCNC: 1 MG/DL
DIFFERENTIAL METHOD: ABNORMAL
EOSINOPHIL # BLD AUTO: 0 K/UL
EOSINOPHIL NFR BLD: 0.9 %
ERYTHROCYTE [DISTWIDTH] IN BLOOD BY AUTOMATED COUNT: 12.9 %
EST. GFR  (AFRICAN AMERICAN): >60 ML/MIN/1.73 M^2
EST. GFR  (NON AFRICAN AMERICAN): >60 ML/MIN/1.73 M^2
GLUCOSE SERPL-MCNC: 92 MG/DL
GLUCOSE UR QL STRIP: NEGATIVE
HCT VFR BLD AUTO: 41.8 %
HGB BLD-MCNC: 13.9 G/DL
HGB UR QL STRIP: NEGATIVE
KETONES UR QL STRIP: NEGATIVE
LEUKOCYTE ESTERASE UR QL STRIP: NEGATIVE
LYMPHOCYTES # BLD AUTO: 1.6 K/UL
LYMPHOCYTES NFR BLD: 35.8 %
MCH RBC QN AUTO: 30.6 PG
MCHC RBC AUTO-ENTMCNC: 33.1 G/DL
MCV RBC AUTO: 92 FL
MONOCYTES # BLD AUTO: 0.3 K/UL
MONOCYTES NFR BLD: 6.5 %
NEUTROPHILS # BLD AUTO: 2.4 K/UL
NEUTROPHILS NFR BLD: 55.8 %
NITRITE UR QL STRIP: NEGATIVE
PH UR STRIP: 6 [PH] (ref 5–8)
PLATELET # BLD AUTO: 232 K/UL
PMV BLD AUTO: 8.9 FL
POTASSIUM SERPL-SCNC: 4.4 MMOL/L
PROT UR QL STRIP: NEGATIVE
RBC # BLD AUTO: 4.52 M/UL
SODIUM SERPL-SCNC: 141 MMOL/L
SP GR UR STRIP: 1.02 (ref 1–1.03)
URN SPEC COLLECT METH UR: NORMAL
UROBILINOGEN UR STRIP-ACNC: NEGATIVE EU/DL
WBC # BLD AUTO: 4.4 K/UL

## 2018-12-17 PROCEDURE — 36415 COLL VENOUS BLD VENIPUNCTURE: CPT

## 2018-12-17 PROCEDURE — 80048 BASIC METABOLIC PNL TOTAL CA: CPT

## 2018-12-17 PROCEDURE — 85025 COMPLETE CBC W/AUTO DIFF WBC: CPT

## 2018-12-17 PROCEDURE — 81003 URINALYSIS AUTO W/O SCOPE: CPT

## 2018-12-17 RX ORDER — IBUPROFEN 200 MG
200 TABLET ORAL EVERY 6 HOURS PRN
COMMUNITY
End: 2019-02-08

## 2018-12-17 NOTE — DISCHARGE INSTRUCTIONS

## 2018-12-20 ENCOUNTER — ANESTHESIA EVENT (OUTPATIENT)
Dept: SURGERY | Facility: HOSPITAL | Age: 63
End: 2018-12-20
Payer: COMMERCIAL

## 2018-12-21 ENCOUNTER — HOSPITAL ENCOUNTER (OUTPATIENT)
Facility: HOSPITAL | Age: 63
Discharge: HOME | End: 2018-12-21
Attending: ORTHOPAEDIC SURGERY | Admitting: ORTHOPAEDIC SURGERY
Payer: COMMERCIAL

## 2018-12-21 ENCOUNTER — ANESTHESIA (OUTPATIENT)
Dept: SURGERY | Facility: HOSPITAL | Age: 63
End: 2018-12-21
Payer: COMMERCIAL

## 2018-12-21 VITALS
BODY MASS INDEX: 31.83 KG/M2 | DIASTOLIC BLOOD PRESSURE: 66 MMHG | WEIGHT: 210 LBS | RESPIRATION RATE: 18 BRPM | TEMPERATURE: 98 F | HEIGHT: 68 IN | SYSTOLIC BLOOD PRESSURE: 163 MMHG | OXYGEN SATURATION: 96 % | HEART RATE: 64 BPM

## 2018-12-21 DIAGNOSIS — M75.121 COMPLETE TEAR OF RIGHT ROTATOR CUFF: ICD-10-CM

## 2018-12-21 PROCEDURE — 99900104 DSU ONLY-NO CHARGE-EA ADD'L HR (STAT): Performed by: ORTHOPAEDIC SURGERY

## 2018-12-21 PROCEDURE — 63600175 PHARM REV CODE 636 W HCPCS: Performed by: NURSE ANESTHETIST, CERTIFIED REGISTERED

## 2018-12-21 PROCEDURE — 36000710: Performed by: ORTHOPAEDIC SURGERY

## 2018-12-21 PROCEDURE — 37000009 HC ANESTHESIA EA ADD 15 MINS: Performed by: ORTHOPAEDIC SURGERY

## 2018-12-21 PROCEDURE — 25000003 PHARM REV CODE 250: Performed by: NURSE ANESTHETIST, CERTIFIED REGISTERED

## 2018-12-21 PROCEDURE — 63600175 PHARM REV CODE 636 W HCPCS: Performed by: ORTHOPAEDIC SURGERY

## 2018-12-21 PROCEDURE — 71000033 HC RECOVERY, INTIAL HOUR: Performed by: ORTHOPAEDIC SURGERY

## 2018-12-21 PROCEDURE — 25000003 PHARM REV CODE 250: Performed by: ORTHOPAEDIC SURGERY

## 2018-12-21 PROCEDURE — 36000711: Performed by: ORTHOPAEDIC SURGERY

## 2018-12-21 PROCEDURE — 27201423 OPTIME MED/SURG SUP & DEVICES STERILE SUPPLY: Performed by: ORTHOPAEDIC SURGERY

## 2018-12-21 PROCEDURE — 63600175 PHARM REV CODE 636 W HCPCS

## 2018-12-21 PROCEDURE — 76942 ECHO GUIDE FOR BIOPSY: CPT | Performed by: ANESTHESIOLOGY

## 2018-12-21 PROCEDURE — S0020 INJECTION, BUPIVICAINE HYDRO: HCPCS | Performed by: ANESTHESIOLOGY

## 2018-12-21 PROCEDURE — 25000003 PHARM REV CODE 250: Performed by: ANESTHESIOLOGY

## 2018-12-21 PROCEDURE — 63600175 PHARM REV CODE 636 W HCPCS: Performed by: ANESTHESIOLOGY

## 2018-12-21 PROCEDURE — 71000015 HC POSTOP RECOV 1ST HR: Performed by: ORTHOPAEDIC SURGERY

## 2018-12-21 PROCEDURE — 99900103 DSU ONLY-NO CHARGE-INITIAL HR (STAT): Performed by: ORTHOPAEDIC SURGERY

## 2018-12-21 PROCEDURE — D9220A PRA ANESTHESIA: Mod: ANES,,, | Performed by: ANESTHESIOLOGY

## 2018-12-21 PROCEDURE — 76942 ECHO GUIDE FOR BIOPSY: CPT | Mod: 26,,, | Performed by: ANESTHESIOLOGY

## 2018-12-21 PROCEDURE — D9220A PRA ANESTHESIA: Mod: CRNA,,, | Performed by: NURSE ANESTHETIST, CERTIFIED REGISTERED

## 2018-12-21 PROCEDURE — C1713 ANCHOR/SCREW BN/BN,TIS/BN: HCPCS | Performed by: ORTHOPAEDIC SURGERY

## 2018-12-21 PROCEDURE — 37000008 HC ANESTHESIA 1ST 15 MINUTES: Performed by: ORTHOPAEDIC SURGERY

## 2018-12-21 PROCEDURE — 29826 SHO ARTHRS SRG DECOMPRESSION: CPT | Mod: RT,,, | Performed by: ORTHOPAEDIC SURGERY

## 2018-12-21 PROCEDURE — 64415 NJX AA&/STRD BRCH PLXS IMG: CPT | Performed by: ANESTHESIOLOGY

## 2018-12-21 PROCEDURE — 29999 UNLISTED PX ARTHROSCOPY: CPT | Mod: ,,, | Performed by: ORTHOPAEDIC SURGERY

## 2018-12-21 PROCEDURE — 25000003 PHARM REV CODE 250

## 2018-12-21 PROCEDURE — 64415 NJX AA&/STRD BRCH PLXS IMG: CPT | Mod: 59,RT,, | Performed by: ANESTHESIOLOGY

## 2018-12-21 PROCEDURE — 29827 SHO ARTHRS SRG RT8TR CUF RPR: CPT | Mod: RT,,, | Performed by: ORTHOPAEDIC SURGERY

## 2018-12-21 PROCEDURE — 71000039 HC RECOVERY, EACH ADD'L HOUR: Performed by: ORTHOPAEDIC SURGERY

## 2018-12-21 PROCEDURE — 71000016 HC POSTOP RECOV ADDL HR: Performed by: ORTHOPAEDIC SURGERY

## 2018-12-21 DEVICE — ANCHOR 4.5 BIO COM TT: Type: IMPLANTABLE DEVICE | Site: SHOULDER | Status: FUNCTIONAL

## 2018-12-21 RX ORDER — SODIUM CHLORIDE 0.9 % (FLUSH) 0.9 %
3 SYRINGE (ML) INJECTION
Status: DISCONTINUED | OUTPATIENT
Start: 2018-12-21 | End: 2018-12-21 | Stop reason: HOSPADM

## 2018-12-21 RX ORDER — OXYCODONE AND ACETAMINOPHEN 5; 325 MG/1; MG/1
1 TABLET ORAL EVERY 6 HOURS PRN
Qty: 40 TABLET | Refills: 0 | Status: SHIPPED | OUTPATIENT
Start: 2018-12-21 | End: 2019-02-08

## 2018-12-21 RX ORDER — ONDANSETRON 2 MG/ML
4 INJECTION INTRAMUSCULAR; INTRAVENOUS DAILY PRN
Status: DISCONTINUED | OUTPATIENT
Start: 2018-12-21 | End: 2018-12-21 | Stop reason: HOSPADM

## 2018-12-21 RX ORDER — SUCCINYLCHOLINE CHLORIDE 20 MG/ML
INJECTION INTRAMUSCULAR; INTRAVENOUS
Status: DISCONTINUED | OUTPATIENT
Start: 2018-12-21 | End: 2018-12-21

## 2018-12-21 RX ORDER — ROCURONIUM BROMIDE 10 MG/ML
INJECTION, SOLUTION INTRAVENOUS
Status: DISCONTINUED | OUTPATIENT
Start: 2018-12-21 | End: 2018-12-21

## 2018-12-21 RX ORDER — PROPOFOL 10 MG/ML
VIAL (ML) INTRAVENOUS
Status: DISCONTINUED | OUTPATIENT
Start: 2018-12-21 | End: 2018-12-21

## 2018-12-21 RX ORDER — SODIUM CHLORIDE, SODIUM LACTATE, POTASSIUM CHLORIDE, CALCIUM CHLORIDE 600; 310; 30; 20 MG/100ML; MG/100ML; MG/100ML; MG/100ML
INJECTION, SOLUTION INTRAVENOUS CONTINUOUS
Status: DISCONTINUED | OUTPATIENT
Start: 2018-12-21 | End: 2018-12-21 | Stop reason: HOSPADM

## 2018-12-21 RX ORDER — CEFAZOLIN SODIUM 2 G/50ML
2 SOLUTION INTRAVENOUS
Status: COMPLETED | OUTPATIENT
Start: 2018-12-21 | End: 2018-12-21

## 2018-12-21 RX ORDER — DEXAMETHASONE SODIUM PHOSPHATE 4 MG/ML
INJECTION, SOLUTION INTRA-ARTICULAR; INTRALESIONAL; INTRAMUSCULAR; INTRAVENOUS; SOFT TISSUE
Status: DISCONTINUED | OUTPATIENT
Start: 2018-12-21 | End: 2018-12-21

## 2018-12-21 RX ORDER — SODIUM CHLORIDE 0.9 % (FLUSH) 0.9 %
5 SYRINGE (ML) INJECTION
Status: DISCONTINUED | OUTPATIENT
Start: 2018-12-21 | End: 2018-12-21 | Stop reason: HOSPADM

## 2018-12-21 RX ORDER — MIDAZOLAM HYDROCHLORIDE 1 MG/ML
INJECTION, SOLUTION INTRAMUSCULAR; INTRAVENOUS
Status: DISCONTINUED | OUTPATIENT
Start: 2018-12-21 | End: 2018-12-21

## 2018-12-21 RX ORDER — FENTANYL CITRATE 50 UG/ML
25 INJECTION, SOLUTION INTRAMUSCULAR; INTRAVENOUS EVERY 5 MIN PRN
Status: COMPLETED | OUTPATIENT
Start: 2018-12-21 | End: 2018-12-21

## 2018-12-21 RX ORDER — MUPIROCIN 20 MG/G
OINTMENT TOPICAL
Status: DISCONTINUED | OUTPATIENT
Start: 2018-12-21 | End: 2018-12-21 | Stop reason: HOSPADM

## 2018-12-21 RX ORDER — HYDROCODONE BITARTRATE AND ACETAMINOPHEN 5; 325 MG/1; MG/1
1 TABLET ORAL EVERY 4 HOURS PRN
Status: DISCONTINUED | OUTPATIENT
Start: 2018-12-21 | End: 2018-12-21 | Stop reason: HOSPADM

## 2018-12-21 RX ORDER — OXYCODONE HYDROCHLORIDE 5 MG/1
5 TABLET ORAL
Status: DISCONTINUED | OUTPATIENT
Start: 2018-12-21 | End: 2018-12-21 | Stop reason: HOSPADM

## 2018-12-21 RX ORDER — LIDOCAINE HCL/PF 100 MG/5ML
SYRINGE (ML) INTRAVENOUS
Status: DISCONTINUED | OUTPATIENT
Start: 2018-12-21 | End: 2018-12-21

## 2018-12-21 RX ORDER — FENTANYL CITRATE 50 UG/ML
INJECTION, SOLUTION INTRAMUSCULAR; INTRAVENOUS
Status: DISCONTINUED | OUTPATIENT
Start: 2018-12-21 | End: 2018-12-21

## 2018-12-21 RX ORDER — LIDOCAINE HYDROCHLORIDE 10 MG/ML
5 INJECTION, SOLUTION EPIDURAL; INFILTRATION; INTRACAUDAL; PERINEURAL ONCE
Status: DISCONTINUED | OUTPATIENT
Start: 2018-12-21 | End: 2018-12-21 | Stop reason: HOSPADM

## 2018-12-21 RX ORDER — ACETAMINOPHEN 10 MG/ML
INJECTION, SOLUTION INTRAVENOUS
Status: DISCONTINUED | OUTPATIENT
Start: 2018-12-21 | End: 2018-12-21

## 2018-12-21 RX ORDER — BUPIVACAINE HYDROCHLORIDE 5 MG/ML
INJECTION, SOLUTION EPIDURAL; INTRACAUDAL
Status: COMPLETED | OUTPATIENT
Start: 2018-12-21 | End: 2018-12-21

## 2018-12-21 RX ORDER — EPINEPHRINE 1 MG/ML
INJECTION, SOLUTION INTRACARDIAC; INTRAMUSCULAR; INTRAVENOUS; SUBCUTANEOUS
Status: DISCONTINUED | OUTPATIENT
Start: 2018-12-21 | End: 2018-12-21 | Stop reason: HOSPADM

## 2018-12-21 RX ADMIN — FENTANYL CITRATE 50 MCG: 50 INJECTION, SOLUTION INTRAMUSCULAR; INTRAVENOUS at 10:12

## 2018-12-21 RX ADMIN — CEFAZOLIN SODIUM 2 G: 2 SOLUTION INTRAVENOUS at 10:12

## 2018-12-21 RX ADMIN — FENTANYL CITRATE 25 MCG: 50 INJECTION INTRAMUSCULAR; INTRAVENOUS at 12:12

## 2018-12-21 RX ADMIN — OXYCODONE HYDROCHLORIDE 5 MG: 5 TABLET ORAL at 12:12

## 2018-12-21 RX ADMIN — MUPIROCIN: 20 OINTMENT TOPICAL at 09:12

## 2018-12-21 RX ADMIN — ROCURONIUM BROMIDE 10 MG: 10 INJECTION, SOLUTION INTRAVENOUS at 10:12

## 2018-12-21 RX ADMIN — BUPIVACAINE HYDROCHLORIDE 20 ML: 5 INJECTION, SOLUTION EPIDURAL; INTRACAUDAL; PERINEURAL at 09:12

## 2018-12-21 RX ADMIN — ACETAMINOPHEN 1000 MG: 10 INJECTION, SOLUTION INTRAVENOUS at 10:12

## 2018-12-21 RX ADMIN — PROPOFOL 150 MG: 10 INJECTION, EMULSION INTRAVENOUS at 10:12

## 2018-12-21 RX ADMIN — LIDOCAINE HYDROCHLORIDE 100 MG: 20 INJECTION, SOLUTION INTRAVENOUS at 10:12

## 2018-12-21 RX ADMIN — SODIUM CHLORIDE, SODIUM LACTATE, POTASSIUM CHLORIDE, AND CALCIUM CHLORIDE 10 ML: .6; .31; .03; .02 INJECTION, SOLUTION INTRAVENOUS at 09:12

## 2018-12-21 RX ADMIN — DEXAMETHASONE SODIUM PHOSPHATE 4 MG: 4 INJECTION, SOLUTION INTRAMUSCULAR; INTRAVENOUS at 10:12

## 2018-12-21 RX ADMIN — MIDAZOLAM 2 MG: 1 INJECTION INTRAMUSCULAR; INTRAVENOUS at 09:12

## 2018-12-21 RX ADMIN — FENTANYL CITRATE 100 MCG: 50 INJECTION, SOLUTION INTRAMUSCULAR; INTRAVENOUS at 09:12

## 2018-12-21 RX ADMIN — SUCCINYLCHOLINE CHLORIDE 120 MG: 20 INJECTION, SOLUTION INTRAMUSCULAR; INTRAVENOUS at 10:12

## 2018-12-21 NOTE — OP NOTE
[facility]  Orthopedic Surgery  Operative Note    SUMMARY     Date of Procedure: 12/21/2018     Procedure: Procedure(s) (LRB):  ARTHROSCOPY, SHOULDER, WITH SUBACROMIAL SPACE DECOMPRESSION (Right)  TENOTOMY BICEPS (Right)  REVISION REPAIR, ROTATOR CUFF, ARTHROSCOPIC (Right)       Surgeon(s) and Role:     * Colby Valenzuela MD - Primary    Assistant: Ghulam Mccord    Pre-Operative Diagnosis: Complete tear of right rotator cuff [M75.121]    Post-Operative Diagnosis: Post-Op Diagnosis Codes:     * Complete tear of right rotator cuff [M75.121]    Anesthesia: General      Description of the Findings of the Procedure: Diagnostic arthroscopy findings on the patient's Right shoulder showed well   maintained labrum. biceps tendon had some medialization the and partial superior fraying of the subscapularis without complete detachment retraction.. The patient had near full-thickness retearing of the articular portion of the supraspinatus. Articular surface was normal. In the subacromial space, the patient had bursitis and a lot of scarring.  Ac joint showed minimal arthritic changes. Bursal surface of the rotator cuff was intact but was very thinned in the area of the supraspinatus attachment..      Complications: No    Estimated Blood Loss (EBL):10ml           Implants:   Implant Name Type Inv. Item Serial No.  Lot No. LRB No. Used   ANCHOR 4.5 BIO COM TT - [id number]  ANCHOR 4.5 BIO COM TT  [employer] [id number] Right 3       Specimens:   Specimen (12h ago, onward)    None                  Condition: Good    Disposition: PACU - hemodynamically stable.    Attestation: I was present and scrubbed for the entire procedure.      Indications for the procedure:A 63 year old male with a history ofRight shoulder pain after a fall and dislocation that failed to resolve with  activity modification, and rest.  Patient desired the procedure listed above after MRI was obtained.    PROCEDURE IN DETAIL:  Risks, benefits and alternatives of the procedure were   explained to the patient including, but not limited to damage to nerves,   arteries and blood vessels. Also explained risk of re-rupture, nonhealing, infection, DVT,   PE as well as anesthetic complications including seizure, stroke, heart attack   and death. They understood this, signed informed consent. The patient's Right  shoulder was marked prior to coming to the Operating Room. Once there a formal   timeout was done in which correct patient, procedure and operative site were all   correctly identified and confirmed by the entire operating team. Ancef 2 g was   given prior to surgical incision. General anesthesia was induced. The   patient was placed in lateral decubitus position on a bean bag with his Right upper extremity   hanging in balanced suspension.The arm was suspended with 10 lbs of weight for balanced traction. The extremity was prepped and draped in normal   sterile fashion.A standard posterior portal was then made. A spinal   needle was used to localize an anterior portal within the rotator interval and   this was made as well. We then performed a diagnostic arthroscopy of the   glenohumeral joint through both the anterior and posterior viewing portals,with the findings listed above. In ablator was used to perform a biceps tenotomy for further visualization of the subscapularis.  Some of the rotator interval tissue was cleared out using the shaver and the subscapularis had some moderate fraying of the superior portion but no detachment off the lesser tuberosity in no root retraction.  The undersurface of the rotator cuff actually looked to be intact but just had more exposed footprint the normal particularly in the supraspinatus.  Infraspinatus and teres minor looked normal.    After this was done, we then proceeded up in the subacromial space   where a spinal needle was used to localize a lateral portal and then this was   made as well. A  4.0 shaver was used to perform a subtotal bursectomy. We then thoroughly inspected the cuff on the rotator cuff side. We shaved away any additional adhesions in the anterior, lateral on posterior gutters.  We were unable to find any evidence for a bursal sided cuff tear.  There was significant thinning in the area of the supraspinatus attachment.  We then released the rest of the intact fibers using an 11 blade and debrided the cuff with a shaver for plan for repair.Passport cannulas were placed in the lateral portal and an accessory lateral portal was created right off the acromial edge. Cannula placed here also. Plastic shuttling cannula placed anteriorly. Footprint was prepared using the shaver and amelia. 1 x 5.5 Biocomposite corkscrew double loaded with FiberWire was punched and placed for the medial row right off the articular margin. The FiberWire were passed through the cuff tissue using a Fast Pass Scorpion in a horizontal mattress fashion.  A free FiberWire was also passed through the most posterior portion in a horizontal mattress fashion.  The sutures were then secured in the lateral row to 2 x 4.75 Biocomposite Swivellock anchors in a SpeedBridge pattern with one blue and one white in each anchor.  The free FiberWire stitch was secured in the anterior anchor as well.        All excess fluid was removed from the shoulder. The portals were closed using nylon. Sterile dressing applied.  They were then placed in a cryotherapy cuff with   UltraSling, extubated, awakened and transferred from the Operating Room to   Recovery Room in stable condition.     Postoperative rehab course will be for revision rotator cuff repair and biceps tenotomy

## 2018-12-21 NOTE — INTERVAL H&P NOTE
The patient has been examined and the H&P has been reviewed:    I concur with the findings and no changes have occurred since H&P was written.    Anesthesia/Surgery risks, benefits and alternative options discussed and understood by patient/family.          Active Hospital Problems    Diagnosis  POA    Complete tear of right rotator cuff [M75.121]  Yes      Resolved Hospital Problems   No resolved problems to display.

## 2018-12-21 NOTE — ANESTHESIA POSTPROCEDURE EVALUATION
"Anesthesia Post Evaluation    Patient: Moris Calderón    Procedure(s) Performed: Procedure(s) (LRB):  ARTHROSCOPY, SHOULDER, WITH SUBACROMIAL SPACE DECOMPRESSION (Right)  REPAIR, TENDON, BICEPS (Right)  REPAIR, ROTATOR CUFF, ARTHROSCOPIC (Right)    Final Anesthesia Type: general  Patient location during evaluation: PACU  Patient participation: Yes- Able to Participate  Level of consciousness: awake and alert  Post-procedure vital signs: reviewed and stable  Pain management: adequate  Airway patency: patent  PONV status at discharge: No PONV  Anesthetic complications: no      Cardiovascular status: blood pressure returned to baseline and hemodynamically stable  Respiratory status: unassisted  Hydration status: euvolemic  Follow-up not needed.        Visit Vitals  BP (!) 168/90   Pulse 68   Temp 36.7 °C (98.1 °F) (Skin)   Resp 18   Ht 5' 8" (1.727 m)   Wt 95.3 kg (210 lb)   SpO2 96%   BMI 31.93 kg/m²       Pain/Jesus Score: Pain Rating Prior to Med Admin: 4 (12/21/2018 12:51 PM)  Pain Rating Post Med Admin: 0 (12/21/2018 12:51 PM)  Jesus Score: 10 (12/21/2018 12:51 PM)        "

## 2018-12-21 NOTE — ANESTHESIA PROCEDURE NOTES
Peripheral Block    Patient location during procedure: pre-op   Block not for primary anesthetic.  Reason for block: at surgeon's request and post-op pain management   Post-op Pain Location: Right shoulder pain  Start time: 12/21/2018 9:31 AM  Timeout: 12/21/2018 9:30 AM   End time: 12/21/2018 9:40 AM  Staffing  Anesthesiologist: Sami Zepeda MD  Performed: anesthesiologist   Preanesthetic Checklist  Completed: patient identified, site marked, surgical consent, pre-op evaluation, timeout performed, IV checked, risks and benefits discussed and monitors and equipment checked  Peripheral Block  Patient position: supine  Prep: ChloraPrep  Patient monitoring: cardiac monitor and heart rate  Block type: interscalene  Laterality: right  Injection technique: single shot  Needle  Needle type: Short-bevel   Needle gauge: 22 G  Needle length: 2 in  Needle localization: ultrasound guidance   -ultrasound image captured on disc.  Assessment  Injection assessment: negative aspiration, negative parasthesia and local visualized surrounding nerve  Paresthesia pain: none  Heart rate change: no  Slow fractionated injection: no  Additional Notes  Right single shot interscalene block placed with 10 cc Exparel plus 10 cc 0.5% bupivicaine.  Pt tolerated well.

## 2018-12-21 NOTE — PLAN OF CARE
Pt aaox4, denies pain and nausea at this time, tolerating clear liquids, vss, dressing cdi, spouse updated, pt released by anesthesia to transfer to phase II, report given to KARTHIK Pritchard

## 2018-12-21 NOTE — PLAN OF CARE
Discharge instructions given to the pt and the pt wife,  Polar ice discussed and verbalized understanding    All discharge instructions were given and verbalized understanding

## 2018-12-21 NOTE — TRANSFER OF CARE
"Anesthesia Transfer of Care Note    Patient: Moris Calderón    Procedure(s) Performed: Procedure(s) (LRB):  ARTHROSCOPY, SHOULDER, WITH SUBACROMIAL SPACE DECOMPRESSION (Right)  REPAIR, TENDON, BICEPS (Right)  REPAIR, ROTATOR CUFF, ARTHROSCOPIC (Right)    Patient location: PACU    Anesthesia Type: general    Transport from OR: Transported from OR on 2-3 L/min O2 by NC with adequate spontaneous ventilation    Post pain: adequate analgesia    Post assessment: no apparent anesthetic complications and tolerated procedure well    Post vital signs: stable    Level of consciousness: sedated    Nausea/Vomiting: no nausea/vomiting    Complications: none    Transfer of care protocol was followed      Last vitals:   Visit Vitals  BP (!) 158/81   Pulse 60   Temp 36.6 °C (97.9 °F) (Temporal)   Resp 16   Ht 5' 8" (1.727 m)   Wt 95.3 kg (210 lb)   SpO2 98%   BMI 31.93 kg/m²     "

## 2018-12-21 NOTE — DISCHARGE SUMMARY
[facility]  Discharge Note  Short Stay    Admit Date: 12/21/2018    Discharge Date and Time: 12/21/2018    Attending Physician: Colby Valenzuela MD     Discharge Provider: Colby Valenzuela    Diagnoses:  Active Hospital Problems    Diagnosis  POA    *Complete tear of right rotator cuff [M75.121]  Yes      Resolved Hospital Problems   No resolved problems to display.       Discharged Condition: good    Hospital Course: Patient was admitted for an outpatient procedure and tolerated the procedure well with no complications.    Final Diagnoses: Same as principal problem.    Disposition: Home or Self Care    Follow up/Patient Instructions:    Medications:  Reconciled Home Medications:      Medication List      CHANGE how you take these medications    * oxyCODONE-acetaminophen 5-325 mg per tablet  Commonly known as:  PERCOCET  Take 1 tablet by mouth every 4 (four) hours as needed for Pain.  What changed:  Another medication with the same name was added. Make sure you understand how and when to take each.     * oxyCODONE-acetaminophen 5-325 mg per tablet  Commonly known as:  PERCOCET  Take 1 tablet by mouth every 6 (six) hours as needed for Pain.  What changed:  You were already taking a medication with the same name, and this prescription was added. Make sure you understand how and when to take each.         * This list has 2 medication(s) that are the same as other medications prescribed for you. Read the directions carefully, and ask your doctor or other care provider to review them with you.            CONTINUE taking these medications    acetaminophen 325 MG tablet  Commonly known as:  TYLENOL  Take 2 tablets (650 mg total) by mouth every 6 (six) hours as needed for Temperature greater than (or equal to 101 degree F).     atorvastatin 10 MG tablet  Commonly known as:  LIPITOR  TAKE 1 TABLET ONCE DAILY     benazepril 20 MG tablet  Commonly known as:  LOTENSIN  TAKE 1 TABLET ONCE DAILY      buPROPion 100 MG tablet  Commonly known as:  WELLBUTRIN  Take 100 mg by mouth once daily.     diclofenac 75 MG EC tablet  Commonly known as:  VOLTAREN  Take 75 mg by mouth 2 (two) times daily.     fluticasone 50 mcg/actuation nasal spray  Commonly known as:  FLONASE  1 spray by Each Nare route once daily.     ibuprofen 200 MG tablet  Commonly known as:  ADVIL,MOTRIN  Take 200 mg by mouth every 6 (six) hours as needed for Pain.     multivitamin capsule  Take 1 capsule by mouth once daily.     promethazine 25 MG tablet  Commonly known as:  PHENERGAN  Take 1 tablet (25 mg total) by mouth every 8 (eight) hours as needed for Nausea.     sertraline 50 MG tablet  Commonly known as:  ZOLOFT  Take 50 mg by mouth once daily.          Discharge Procedure Orders   Diet general     Ice to affected area     Lifting restrictions     No driving, operating heavy equipment or signing legal documents while taking pain medication     Call MD for:  temperature >100.4     Call MD for:  persistent nausea and vomiting     Call MD for:  severe uncontrolled pain     Call MD for:  difficulty breathing, headache or visual disturbances     Call MD for:  redness, tenderness, or signs of infection (pain, swelling, redness, odor or green/yellow discharge around incision site)     Call MD for:  hives     Call MD for:  persistent dizziness or light-headedness     Call MD for:  extreme fatigue     Remove dressing in 48 hours     Change dressing (specify)   Order Comments: Dressing change: 1 times per day using waterproof bandaids.     Follow-up Information     Colby Valenzuela MD In 2 weeks.    Specialties:  Sports Medicine, Orthopedic Surgery  Contact information:  [address] [place] [address] [address] [place] [address]  [phone]                   Discharge Procedure Orders (must include Diet, Follow-up, Activity):   Discharge Procedure Orders (must include Diet, Follow-up, Activity)   Diet general     Ice to affected area     Lifting restrictions      No driving, operating heavy equipment or signing legal documents while taking pain medication     Call MD for:  temperature >100.4     Call MD for:  persistent nausea and vomiting     Call MD for:  severe uncontrolled pain     Call MD for:  difficulty breathing, headache or visual disturbances     Call MD for:  redness, tenderness, or signs of infection (pain, swelling, redness, odor or green/yellow discharge around incision site)     Call MD for:  hives     Call MD for:  persistent dizziness or light-headedness     Call MD for:  extreme fatigue     Remove dressing in 48 hours     Change dressing (specify)   Order Comments: Dressing change: 1 times per day using waterproof bandaids.

## 2018-12-21 NOTE — DISCHARGE INSTRUCTIONS
1.Diet: Ice chips, clear liquids, and then diet as tolerated. Drink plenty of liquids.  2.Ice the area at least three times a day (20 minutes per session).  3.Elevate the extremity above the level of the heart to help reduce swelling.  4.Pain medication can be taken every four to six hours as needed. It is helpful to take pain medication prior to physical therapy.  5.Any activity that requires precise thinking or accuracy should be avoided for a minimum of 72 hours after surgery and while on narcotic pain medication. This includes operating machinery and/or driving a vehicle.  6.All sutures/staples will be removed approximately 14 days from the time of surgery. Leave steri-strips (skin tapes) in place until sutures are removed.  7. If skin glue is used instead of stitches, do not apply ointments or solutions to the incision. Keep the incision dry. The skin glue will peel off in 3-4 weeks.  8. Change dressing on the 2nd post-op day. Use gauze for the first 3 days, then start using Band-Aids over the incision sites.   9. All casts, splints, braces, slings, crutches, abduction pillows, etc... Are to be worn as instructed. Use sling at all times except for showering and exercises.  10. Keep the incision dry for 10-14 days. A waterproof dressing (purchase at [employer], [employer], etc) can be used to shower. No bath, pool, hot tub until instructed.  11. Start PT in 14 days. Call office to help with scheduling.  12. Call [phone] with any questions or concerns.      General Information:    1.  Do not drink alcoholic beverages including beer for 24 hours or as long as you are on pain medication..  2.  Do not drive a motor vehicle, operate machinery or power tools, or signs legal papers for 24 hours or as long as you are on pain medication.   3.  You may experience light-headedness, dizziness, and sleepiness following surgery. Please do not stay alone. A responsible adult should be with you for this 24 hour period.  4.  Go home and  rest.    5. Progress slowly to a normal diet unless instructed.  Otherwise, begin with liquids such as soft drinks, then soup and crackers working up to solid foods. Drink plenty of nonalcoholic fluids.  6.  Certain anesthetics and pain medications produce nausea and vomiting in certain       individuals. If nausea becomes a problem at home, call you doctor.    7. A nurse will be calling you sometime after surgery. Do not be alarmed. This is our way of finding out how you are doing.    8. Several times every hour while you are awake, take 2-3 deep breaths and cough. If you had stomach surgery hold a pillow or rolled towel firmly against your stomach before you cough. This will help with any pain the cough might cause.  9. Several times every hour while you are awake, pump and flex your feet 5-6 times and do foot circles. This will help prevent blood clots.    10.Call your doctor for severe pain, bleeding, fever, or signs or symptoms of infection (pain, swelling, redness, foul odor, drainage).    Discharge Instructions: After Your Surgery/Procedure  Youve just had surgery. During surgery you were given medicine called anesthesia to keep you relaxed and free of pain. After surgery you may have some pain or nausea. This is common. Here are some tips for feeling better and getting well after surgery.     Stay on schedule with your medication.   Going home  Your doctor or nurse will show you how to take care of yourself when you go home. He or she will also answer your questions. Have an adult family member or friend drive you home.      For your safety we recommend these precaution for the first 24 hours after your procedure:  · Do not drive or use heavy equipment.  · Do not make important decisions or sign legal papers.  · Do not drink alcohol.  · Have someone stay with you, if needed. He or she can watch for problems and help keep you safe.  · Your concentration, balance, coordination, and judgement may be impaired  "for many hours after anesthesia.  Use caution when ambulating or standing up.     · You may feel weak and "washed out" after anesthesia and surgery.      Subtle residual effects of general anesthesia or sedation with regional / local anesthesia can last more than 24 hours.  Rest for the remainder of the day or longer if your Doctor/Surgeon has advised you to do so.  Although you may feel normal within the first 24 hours, your reflexes and mental ability may be impaired without you realizing it.  You may feel dizzy, lightheaded or sleepy for 24 hours or longer.      Be sure to go to all follow-up visits with your doctor. And rest after your surgery for as long as your doctor tells you to.  Coping with pain  If you have pain after surgery, pain medicine will help you feel better. Take it as told, before pain becomes severe. Also, ask your doctor or pharmacist about other ways to control pain. This might be with heat, ice, or relaxation. And follow any other instructions your surgeon or nurse gives you.  Tips for taking pain medicine  To get the best relief possible, remember these points:  · Pain medicines can upset your stomach. Taking them with a little food may help.  · Most pain relievers taken by mouth need at least 20 to 30 minutes to start to work.  · Taking medicine on a schedule can help you remember to take it. Try to time your medicine so that you can take it before starting an activity. This might be before you get dressed, go for a walk, or sit down for dinner.  · Constipation is a common side effect of pain medicines. Call your doctor before taking any medicines such as laxatives or stool softeners to help ease constipation. Also ask if you should skip any foods. Drinking lots of fluids and eating foods such as fruits and vegetables that are high in fiber can also help. Remember, do not take laxatives unless your surgeon has prescribed them.  · Drinking alcohol and taking pain medicine can cause " dizziness and slow your breathing. It can even be deadly. Do not drink alcohol while taking pain medicine.  · Pain medicine can make you react more slowly to things. Do not drive or run machinery while taking pain medicine.  Your health care provider may tell you to take acetaminophen to help ease your pain. Ask him or her how much you are supposed to take each day. Acetaminophen or other pain relievers may interact with your prescription medicines or other over-the-counter (OTC) drugs. Some prescription medicines have acetaminophen and other ingredients. Using both prescription and OTC acetaminophen for pain can cause you to overdose. Read the labels on your OTC medicines with care. This will help you to clearly know the list of ingredients, how much to take, and any warnings. It may also help you not take too much acetaminophen. If you have questions or do not understand the information, ask your pharmacist or health care provider to explain it to you before you take the OTC medicine.  Managing nausea  Some people have an upset stomach after surgery. This is often because of anesthesia, pain, or pain medicine, or the stress of surgery. These tips will help you handle nausea and eat healthy foods as you get better. If you were on a special food plan before surgery, ask your doctor if you should follow it while you get better. These tips may help:  · Do not push yourself to eat. Your body will tell you when to eat and how much.  · Start off with clear liquids and soup. They are easier to digest.  · Next try semi-solid foods, such as mashed potatoes, applesauce, and gelatin, as you feel ready.  · Slowly move to solid foods. Dont eat fatty, rich, or spicy foods at first.  · Do not force yourself to have 3 large meals a day. Instead eat smaller amounts more often.  · Take pain medicines with a small amount of solid food, such as crackers or toast, to avoid nausea.     Call your surgeon if  · You still have pain an  hour after taking medicine. The medicine may not be strong enough.  · You feel too sleepy, dizzy, or groggy. The medicine may be too strong.  · You have side effects like nausea, vomiting, or skin changes, such as rash, itching, or hives.       If you have obstructive sleep apnea  You were given anesthesia medicine during surgery to keep you comfortable and free of pain. After surgery, you may have more apnea spells because of this medicine and other medicines you were given. The spells may last longer than usual.   At home:  · Keep using the continuous positive airway pressure (CPAP) device when you sleep. Unless your health care provider tells you not to, use it when you sleep, day or night. CPAP is a common device used to treat obstructive sleep apnea.  · Talk with your provider before taking any pain medicine, muscle relaxants, or sedatives. Your provider will tell you about the possible dangers of taking these medicines.  © 2890-2884 The [employer]. [address] [place] [address], [place] [address]. All rights reserved. This information is not intended as a substitute for professional medical care. Always follow your healthcare professional's instructions.    Post op instructions for prevention of DVT  What is deep vein thrombosis?  Deep vein thrombosis (DVT) is the medical term for blood clots in the deep veins of the leg.  These blood clots can be dangerous.  A DVT can block a blood vessel and keep blood from getting where it needs to go.  Another problem is that the clot can travel to other parts of the body such as the lungs.  A clot that travels to the lungs is called a pulmonary embolus (PE) and can cause serious problems with breathing which can lead to death.  Am I at risk for DVT/PE?  If you are not very active, you are at risk of DVT.  Anyone confined to bed, sitting for long periods of time, recovering from surgery, etc. increases the risk of DVT.  Other risk factors are cancer diagnosis, certain  medications, estrogen replacement in any form,older age, obesity, pregnancy, smoking, history of clotting disorders, and dehydration.  How will I know if I have a DVT?   Swelling in the lower leg   Pain   Warmth, redness, hardness or bulging of the vein  If you have any of these symptoms, call your doctors office right away.  Some people will not have any symptoms until the clot moves to the lungs.  What are the symptoms of a PE?   Panting, shortness of breath, or trouble breathing   Sharp, knife-like chest pain when you breathe   Coughing or coughing up blood   Rapid heartbeat  If you have any of these symptoms or get worse quickly, call 911 for emergency treatment.  How can I prevent a DVT?   Avoid long periods of inactivity and dont cross your legs--get up and walk around every hour or so.   Stay active--walking after surgery is highly encouraged.  This means you should get out of the house and walk in the neighborhood.  Going up and down stairs will not impair healing (unless advised against such activity by your doctor).     Drink plenty of noncaffeinated, nonalcoholic fluids each day to prevent dehydration.   Wear special support stockings, if they have been advised by your doctor.   If you travel, stop at least once an hour and walk around.   Avoid smoking (assistance with stopping is available through your healthcare provider)  Always notify your doctor if you are not able to follow the post operative instructions that are given to you at the time of discharge.  It may be necessary to prescribe one of the medications available to prevent DVT.    We hope your stay was comfortable as you heal now, mend and rest.    For we have enjoyed taking care of you by giving your our best.    And as you get better, by regaining your health and strength;   We count it as a privilege to have served you and hope your time at [facility] was well spent.

## 2018-12-21 NOTE — PLAN OF CARE
Pt in pre op assessment complete, shave and prepped rifght shoulder for procedure. States pain 6/10 in right shoulder  Will contimue to monitor  Chlorhexidine wipes and mupirocin nasally completed before surgery

## 2019-01-03 ENCOUNTER — OFFICE VISIT (OUTPATIENT)
Dept: ORTHOPEDICS | Facility: CLINIC | Age: 64
End: 2019-01-03
Payer: COMMERCIAL

## 2019-01-03 VITALS
SYSTOLIC BLOOD PRESSURE: 191 MMHG | WEIGHT: 210 LBS | BODY MASS INDEX: 31.83 KG/M2 | HEART RATE: 61 BPM | DIASTOLIC BLOOD PRESSURE: 87 MMHG | HEIGHT: 68 IN

## 2019-01-03 DIAGNOSIS — M75.121 COMPLETE TEAR OF RIGHT ROTATOR CUFF: Primary | ICD-10-CM

## 2019-01-03 PROCEDURE — 99999 PR PBB SHADOW E&M-EST. PATIENT-LVL III: CPT | Mod: PBBFAC,,, | Performed by: ORTHOPAEDIC SURGERY

## 2019-01-03 PROCEDURE — 99999 PR PBB SHADOW E&M-EST. PATIENT-LVL III: ICD-10-PCS | Mod: PBBFAC,,, | Performed by: ORTHOPAEDIC SURGERY

## 2019-01-03 PROCEDURE — 99024 POSTOP FOLLOW-UP VISIT: CPT | Mod: S$GLB,,, | Performed by: ORTHOPAEDIC SURGERY

## 2019-01-03 PROCEDURE — 99024 PR POST-OP FOLLOW-UP VISIT: ICD-10-PCS | Mod: S$GLB,,, | Performed by: ORTHOPAEDIC SURGERY

## 2019-01-03 RX ORDER — IBUPROFEN 800 MG/1
800 TABLET ORAL 3 TIMES DAILY
Qty: 90 TABLET | Refills: 2 | Status: SHIPPED | OUTPATIENT
Start: 2019-01-03 | End: 2020-12-29

## 2019-01-04 NOTE — PROGRESS NOTES
Past Medical History:   Diagnosis Date    Anxiety     Anxiety     Arthritis     Back problem     bulging disc    GERD (gastroesophageal reflux disease)     Hypercholesterolemia 11/2/2015    2009: Began statin.    Hyperlipidemia     Hypertension     Hypertension, benign 11/2/2015    2008: Diagnosed.    Sleep apnea     uses c-pap       Past Surgical History:   Procedure Laterality Date    APPENDECTOMY      ARTHROSCOPY, SHOULDER, WITH SUBACROMIAL SPACE DECOMPRESSION Right 12/21/2018    [facility]    ARTHROSCOPY-SHOULDER EXCISION DISTAL CLAVICLE Right 5/18/2018    [facility]    ARTHROSCOPY-SHOULDER WITH SUBACROMIAL DECOMPRESSION Right 5/18/2018    [facility]    magdalene      HEART CATH-LEFT Left 12/10/2015    [facility]    KNEE ARTHROSCOPY W/ MENISCAL REPAIR Left 2015    MMT      Left knee A-scope    NOSE SURGERY      REPAIR ROTATOR CUFF ARTHROSCOPIC Right 5/18/2018    [facility]    REPAIR, ROTATOR CUFF, ARTHROSCOPIC Right 12/21/2018    [facility]    REPAIR, TENDON, BICEPS Right 12/21/2018    [facility] OR    UVULECTOMY         Current Outpatient Medications   Medication Sig    acetaminophen (TYLENOL) 325 MG tablet Take 2 tablets (650 mg total) by mouth every 6 (six) hours as needed for Temperature greater than (or equal to 101 degree F).    atorvastatin (LIPITOR) 10 MG tablet TAKE 1 TABLET ONCE DAILY    benazepril (LOTENSIN) 20 MG tablet TAKE 1 TABLET ONCE DAILY    buPROPion (WELLBUTRIN) 100 MG tablet Take 100 mg by mouth once daily.     fluticasone (FLONASE) 50 mcg/actuation nasal spray 1 spray by Each Nare route once daily.    ibuprofen (ADVIL,MOTRIN) 200 MG tablet Take 200 mg by mouth every 6 (six) hours as needed for Pain.     multivitamin capsule Take 1 capsule by mouth once daily.    oxyCODONE-acetaminophen (PERCOCET) 5-325 mg per tablet Take 1 tablet by mouth every 4 (four) hours as needed for Pain.    oxyCODONE-acetaminophen (PERCOCET) 5-325 mg per tablet Take 1 tablet by mouth every 6 (six) hours as needed for Pain.    promethazine (PHENERGAN) 25 MG tablet Take 1 tablet (25 mg total) by mouth every 8 (eight) hours as needed for Nausea.    sertraline (ZOLOFT) 50 MG tablet Take 50 mg by mouth once daily.    ibuprofen (ADVIL,MOTRIN) 800 MG tablet Take 1 tablet (800 mg total) by mouth 3 (three) times daily.     No current facility-administered medications for this visit.        Review of patient's allergies indicates:   Allergen Reactions    Adhesive Rash       Family History   Problem Relation Age of Onset    Glaucoma Father        Social History     Socioeconomic History    Marital status:      Spouse name: Not on file    Number of children: Not on file    Years of education: Not on file    Highest education level: Not on file   Social Needs    Financial resource strain: Not on file    Food insecurity - worry: Not on file    Food insecurity - inability: Not on file    Transportation needs - medical: Not on file    Transportation needs - non-medical: Not on file   Occupational History    Not on file   Tobacco Use    Smoking status: Never Smoker    Smokeless tobacco: Never Used   Substance and Sexual Activity    Alcohol use: Yes     Comment: social    Drug use: No    Sexual activity: Yes   Other Topics Concern    Not on file   Social History Narrative    Not on file       Chief Complaint:   Chief Complaint   Patient presents with    Post-op Evaluation     s/p right shoulder RC Repair 12/21/18        Date of surgery:  December 21, 2018    History of present illness:  63-year-old male who underwent right rotator cuff repair after a shoulder dislocation.  Patient has pain that comes in waves occasionally.   Still has some numbness over the axillary nerve distribution and weakness with abduction that existed after his injury.  Pain is 6/10.  Was doing well and then started to have worsening pain over the last 2 days but denies any new incident or trauma.      Review of Systems:    Musculoskeletal:  See HPI        Physical Examination:    Vital Signs:    Vitals:    01/03/19 0947   BP: (!) 191/87   Pulse: 61       Body mass index is 31.93 kg/m².    This a well-developed, well nourished patient in no acute distress.  They are alert and oriented and cooperative to examination.  Pt. walks without an antalgic gait.      Examination of the right shoulder shows well-healing surgical portals.  No erythema or drainage. Does have some paresthesias in the axillary distribution and a lot of weakness with abduction.  This was present since before surgery.    X-rays:  None     Assessment::  Status post right arthroscopic rotator cuff repair  Possible post shoulder dislocation axillary nerve palsy    Plan:  I reviewed the findings with him today. I would like him to start some table slides and pendulums.  I will see him back in 4 weeks.  Gave her prescription for ibuprofen.  We will get a nerve conduction study at that time if still having axillary nerve issues.    This note was created using [facility] voice recognition software that occasionally misinterpreted phrases or words.

## 2019-01-31 ENCOUNTER — OFFICE VISIT (OUTPATIENT)
Dept: ORTHOPEDICS | Facility: CLINIC | Age: 64
End: 2019-01-31
Payer: COMMERCIAL

## 2019-01-31 VITALS
BODY MASS INDEX: 31.83 KG/M2 | HEIGHT: 68 IN | DIASTOLIC BLOOD PRESSURE: 88 MMHG | HEART RATE: 62 BPM | SYSTOLIC BLOOD PRESSURE: 182 MMHG | WEIGHT: 210 LBS

## 2019-01-31 DIAGNOSIS — M25.511 RIGHT SHOULDER PAIN, UNSPECIFIED CHRONICITY: Primary | ICD-10-CM

## 2019-01-31 DIAGNOSIS — M75.121 COMPLETE TEAR OF RIGHT ROTATOR CUFF: Primary | ICD-10-CM

## 2019-01-31 PROCEDURE — 99024 POSTOP FOLLOW-UP VISIT: CPT | Mod: S$GLB,,, | Performed by: ORTHOPAEDIC SURGERY

## 2019-01-31 PROCEDURE — 99999 PR PBB SHADOW E&M-EST. PATIENT-LVL III: CPT | Mod: PBBFAC,,, | Performed by: ORTHOPAEDIC SURGERY

## 2019-01-31 PROCEDURE — 99999 PR PBB SHADOW E&M-EST. PATIENT-LVL III: ICD-10-PCS | Mod: PBBFAC,,, | Performed by: ORTHOPAEDIC SURGERY

## 2019-01-31 PROCEDURE — 99024 PR POST-OP FOLLOW-UP VISIT: ICD-10-PCS | Mod: S$GLB,,, | Performed by: ORTHOPAEDIC SURGERY

## 2019-01-31 NOTE — PROGRESS NOTES
Past Medical History:   Diagnosis Date    Anxiety     Anxiety     Arthritis     Back problem     bulging disc    GERD (gastroesophageal reflux disease)     Hypercholesterolemia 11/2/2015    2009: Began statin.    Hyperlipidemia     Hypertension     Hypertension, benign 11/2/2015    2008: Diagnosed.    Sleep apnea     uses c-pap       Past Surgical History:   Procedure Laterality Date    APPENDECTOMY      ARTHROSCOPY, SHOULDER, WITH SUBACROMIAL SPACE DECOMPRESSION Right 12/21/2018    [facility]    ARTHROSCOPY-SHOULDER EXCISION DISTAL CLAVICLE Right 5/18/2018    [facility]    ARTHROSCOPY-SHOULDER WITH SUBACROMIAL DECOMPRESSION Right 5/18/2018    [facility]    magdalene      HEART CATH-LEFT Left 12/10/2015    [facility]    KNEE ARTHROSCOPY W/ MENISCAL REPAIR Left 2015    MMT      Left knee A-scope    NOSE SURGERY      REPAIR ROTATOR CUFF ARTHROSCOPIC Right 5/18/2018    [facility]    REPAIR, ROTATOR CUFF, ARTHROSCOPIC Right 12/21/2018    [facility]    REPAIR, TENDON, BICEPS Right 12/21/2018    [facility]    UVULECTOMY         Current Outpatient Medications   Medication Sig    atorvastatin (LIPITOR) 10 MG tablet TAKE 1 TABLET ONCE DAILY    benazepril (LOTENSIN) 20 MG tablet TAKE 1 TABLET ONCE DAILY    buPROPion (WELLBUTRIN) 100 MG tablet Take 100 mg by mouth once daily.     fluticasone (FLONASE) 50 mcg/actuation nasal spray 1 spray by Each Nare route once daily.    ibuprofen (ADVIL,MOTRIN) 800 MG tablet Take 1 tablet (800 mg total) by mouth 3 (three) times daily.    sertraline (ZOLOFT) 50 MG tablet Take 50 mg by mouth once daily.    acetaminophen (TYLENOL) 325 MG tablet Take 2 tablets (650 mg total) by mouth every 6 (six) hours  as needed for Temperature greater than (or equal to 101 degree F).    ibuprofen (ADVIL,MOTRIN) 200 MG tablet Take 200 mg by mouth every 6 (six) hours as needed for Pain.    multivitamin capsule Take 1 capsule by mouth once daily.    oxyCODONE-acetaminophen (PERCOCET) 5-325 mg per tablet Take 1 tablet by mouth every 4 (four) hours as needed for Pain.    oxyCODONE-acetaminophen (PERCOCET) 5-325 mg per tablet Take 1 tablet by mouth every 6 (six) hours as needed for Pain.    promethazine (PHENERGAN) 25 MG tablet Take 1 tablet (25 mg total) by mouth every 8 (eight) hours as needed for Nausea.     No current facility-administered medications for this visit.        Review of patient's allergies indicates:   Allergen Reactions    Adhesive Rash       Family History   Problem Relation Age of Onset    Glaucoma Father        Social History     Socioeconomic History    Marital status:      Spouse name: Not on file    Number of children: Not on file    Years of education: Not on file    Highest education level: Not on file   Social Needs    Financial resource strain: Not on file    Food insecurity - worry: Not on file    Food insecurity - inability: Not on file    Transportation needs - medical: Not on file    Transportation needs - non-medical: Not on file   Occupational History    Not on file   Tobacco Use    Smoking status: Never Smoker    Smokeless tobacco: Never Used   Substance and Sexual Activity    Alcohol use: Yes     Comment: social    Drug use: No    Sexual activity: Yes   Other Topics Concern    Not on file   Social History Narrative    Not on file       Chief Complaint:   Chief Complaint   Patient presents with    Shoulder Pain     R shoulder 4wk f/u       Date of surgery:  December 21, 2018    History of present illness:  63-year-old male who underwent right rotator cuff repair after a shoulder dislocation.  Patient has pain that comes in waves occasionally.  Still has some numbness  over the axillary nerve distribution and weakness with abduction that existed after his injury.  Pain is a 2/10 today.    Review of Systems:    Musculoskeletal:  See HPI        Physical Examination:    Vital Signs:    Vitals:    01/31/19 1049   BP: (!) 182/88   Pulse: 62       Body mass index is 31.93 kg/m².    This a well-developed, well nourished patient in no acute distress.  They are alert and oriented and cooperative to examination.  Pt. walks without an antalgic gait.      Examination of the right shoulder shows healed surgical portals.  No erythema or drainage. Does have some paresthesias in the axillary distribution and a lot of weakness with abduction.  This was present since before surgery.    X-rays:  None     Assessment::  Status post right arthroscopic rotator cuff repair  Possible post shoulder dislocation axillary nerve palsy    Plan:  I reviewed the findings with him today. I would like him to start formal physical therapy.  I will see him back in 6 weeks.  Gave her prescription for ibuprofen.  We will get a nerve conduction study at that time if still having axillary nerve issues.    This note was created using [employer] voice recognition software that occasionally misinterpreted phrases or words.

## 2019-02-06 ENCOUNTER — CLINICAL SUPPORT (OUTPATIENT)
Dept: REHABILITATION | Facility: HOSPITAL | Age: 64
End: 2019-02-06
Attending: ORTHOPAEDIC SURGERY
Payer: COMMERCIAL

## 2019-02-06 DIAGNOSIS — M25.611 DECREASED RANGE OF MOTION OF RIGHT SHOULDER: ICD-10-CM

## 2019-02-06 DIAGNOSIS — Z98.890 S/P RIGHT ROTATOR CUFF REPAIR: Primary | ICD-10-CM

## 2019-02-06 PROCEDURE — 97161 PT EVAL LOW COMPLEX 20 MIN: CPT | Mod: PN

## 2019-02-06 PROCEDURE — 97110 THERAPEUTIC EXERCISES: CPT | Mod: PN

## 2019-02-06 NOTE — PLAN OF CARE
TIME RECORD    Date: 02/06/2019    Start Time:  1010  Stop Time:  1100    PROCEDURES:    TIMED  Procedure Time Min.   There ex Start:1040  Stop:1100     Start:  Stop:     Start:  Stop:     Start:  Stop:          UNTIMED  Procedure Time Min.    Start:  Stop:     Start:  Stop:      Total Timed Minutes:  20  Total Timed Units:  1  Total Untimed Units:  1  Charges Billed/# of units:  2    OUTPATIENT PHYSICAL THERAPY   PATIENT EVALUATION  Onset Date: 12/21/18  Primary Diagnosis:   1. S/P right rotator cuff repair     2. Decreased range of motion of right shoulder       Treatment Diagnosis: debility due to recent rt RCR  Past Medical History:   Diagnosis Date    Anxiety     Anxiety     Arthritis     Back problem     bulging disc    GERD (gastroesophageal reflux disease)     Hypercholesterolemia 11/2/2015 2009: Began statin.    Hyperlipidemia     Hypertension     Hypertension, benign 11/2/2015 2008: Diagnosed.    Sleep apnea     uses c-pap     Precautions: see protocol  Prior Therapy: none  Medications: Moris Calderón has a current medication list which includes the following prescription(s): acetaminophen, atorvastatin, benazepril, bupropion, fluticasone, ibuprofen, ibuprofen, multivitamin, oxycodone-acetaminophen, oxycodone-acetaminophen, promethazine, and sertraline.  Nutrition:  Normal  History of Present Illness: underwent rt RCR on 05/18/18; took a fall recently and sustained a dislocation to that same repair; underwent revision of rt RCR on 12/21/18  Prior Level of Function: Independent  Social History: retired desk job  Place of Residence (Steps/Adaptations): lives w/ spouse in [facility]-story [facility] home  Functional Deficits Leading to Referral/Nature of Injury: difficulty performing everyday activities  Patient Therapy Goals: improve ROM/strength; decrease c/o pain    Subjective     Moris Calderón states that his recent revision of his rt RCR limits his ability to perform his everyday  activities.    Pain:  Location: rt shoulder  Description: Aching and Dull  Activities Which Increase Pain: ROM  Activities Which Decrease Pain: pain medication and rest  Pain Scale: 2/10 at best 5/10 now  7/10 at worst    Objective     Posture: guarded due to pain  Palpation: pain w/ palpation to affected area  Sensation: intermittent numbness down lateral aspect rt UE into fingers 4-5  DTRs:  Range of Motion/Strength: MMT = rt shoulder NT, 4-/5 rest of rt UE, 4/5 lt UE througout; AROM = rt shoulder NT, WFL rest of opal. UE's    Flexibility: mild limitation rt shoulder  Gait: Without AD  Analysis: independent  Bed Mobility:Assistance - supervision  Transfers: Independent  Special Tests: UEFI = 34/80  Other:   Treatment: x 15 PROM rt shoulder flexion up to 120 degrees (supine); x 15 ea pendulum there ex rt shoulder = flex/ext, cw, ccw; x 15 rt UE there ex = elbow flex/ext, forearm pro/sup, wrist flex/ext; x 15 ea finger there ex = ball sq, digiflex; provided pt. w/ pendulum HEP = instructed to perform BID    Assessment       Initial Assessment (Pertinent finding, problem list and factors affecting outcome): presents w/ ROM/strength deficits due to recent revision of rt RCR; pt. would benefit from PT to address these areas and to reinforce the HEP  Rehab Potiential: good    Short Term Goals (3 Weeks):   1.  Improve rt shoulder passive flexion to about 150 degrees  2.  Improve rt shoulder active flexion to about 120 degrees  3.  obtain 45 degrees of passive rt shoulder ER    Long Term Goals (6 Weeks):   1.  Improve AAROM rt shoulder to WFL in all planes  2.  Tolerate t-band there ex w/ IR/ER  3.  Improve UEFI score to 40/80    Plan     Certification Period: 02/06/19 to 03/23/19  Recommended Treatment Plan: eval, plus 2 times per week for 6 weeks (starting wk of 02/10/19): Electrical Stimulation for pain, Manual Therapy, Moist Heat/ Ice, Patient Education, Therapeutic Activites, Therapeutic Exercise and Ultrasound  Other  Recommendations: NA      Therapist: TRAMAINE Peterson THE NEED FOR THESE SERVICES FURNISHED UNDER THIS PLAN OF TREATMENT AND WHILE UNDER MY CARE    Physician's comments: ________________________________________________________________________________________________________________________________________________      Physician's Name: ___________________________________

## 2019-02-08 ENCOUNTER — OFFICE VISIT (OUTPATIENT)
Dept: DERMATOLOGY | Facility: CLINIC | Age: 64
End: 2019-02-08
Payer: COMMERCIAL

## 2019-02-08 DIAGNOSIS — D22.9 MULTIPLE BENIGN NEVI: ICD-10-CM

## 2019-02-08 DIAGNOSIS — L81.4 SOLAR LENTIGO: ICD-10-CM

## 2019-02-08 DIAGNOSIS — L82.1 SEBORRHEIC KERATOSES: Primary | ICD-10-CM

## 2019-02-08 DIAGNOSIS — Z12.83 SKIN CANCER SCREENING: ICD-10-CM

## 2019-02-08 DIAGNOSIS — D18.01 CHERRY ANGIOMA: ICD-10-CM

## 2019-02-08 DIAGNOSIS — L73.8 SEBACEOUS HYPERPLASIA OF FACE: ICD-10-CM

## 2019-02-08 PROCEDURE — 99203 PR OFFICE/OUTPT VISIT, NEW, LEVL III, 30-44 MIN: ICD-10-PCS | Mod: S$GLB,,, | Performed by: DERMATOLOGY

## 2019-02-08 PROCEDURE — 99999 PR PBB SHADOW E&M-EST. PATIENT-LVL III: CPT | Mod: PBBFAC,,, | Performed by: DERMATOLOGY

## 2019-02-08 PROCEDURE — 99999 PR PBB SHADOW E&M-EST. PATIENT-LVL III: ICD-10-PCS | Mod: PBBFAC,,, | Performed by: DERMATOLOGY

## 2019-02-08 PROCEDURE — 99203 OFFICE O/P NEW LOW 30 MIN: CPT | Mod: S$GLB,,, | Performed by: DERMATOLOGY

## 2019-02-08 NOTE — PROGRESS NOTES
Subjective:       Patient ID:  Moris Calderón is a 63 y.o. 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 , Disp: , Rfl:     fluticasone (FLONASE) 50 mcg/actuation nasal spray, 1 spray by Each Nare route once daily., Disp: , Rfl:     ibuprofen (ADVIL,MOTRIN) 800 MG tablet, Take 1 tablet (800 mg total) by mouth 3 (three) times daily., Disp: 90 tablet, Rfl: 2    oxyCODONE-acetaminophen (PERCOCET) 5-325 mg per tablet, Take 1 tablet by mouth every 4 (four) hours as needed for Pain., Disp: 40 tablet, Rfl: 0    sertraline (ZOLOFT) 50 MG tablet, Take 50 mg by mouth once daily., Disp: , Rfl:         Spot  - Initial  Affected locations: face  Duration: 8 months  Signs / symptoms: asymptomatic  Severity: mild  Timing: constant  Aggravated by: nothing  Relieving factors/Treatments tried: nothing  Improvement on treatment: no relief        Review of Systems   Constitutional: Negative for fever, chills and fatigue.   Skin: Positive for wears hat. Negative for daily sunscreen use and activity-related sunscreen use.   Hematologic/Lymphatic: Does not bruise/bleed easily.        Objective:    Physical Exam   Constitutional: He appears well-developed and well-nourished. No distress.   Neurological: He is alert and oriented to person, place, and time. He is not disoriented.   Psychiatric: He has a normal mood and affect.   Skin:    Areas Examined (abnormalities noted in diagram):   Scalp / Hair Palpated and Inspected  Head / Face Inspection Performed  Neck Inspection Performed  Chest / Axilla Inspection Performed  Abdomen Inspection Performed  Genitals / Buttocks / Groin Inspection Performed  Back Inspection Performed  RUE Inspected  LUE Inspection Performed  RLE Inspected  LLE Inspection Performed  Nails and Digits Inspection Performed                   Diagram Legend     Erythematous scaling macule/papule c/w actinic keratosis       Vascular papule c/w angioma      Pigmented verrucoid papule/plaque c/w seborrheic keratosis      Yellow umbilicated papule c/w sebaceous hyperplasia      Irregularly shaped tan macule c/w lentigo     1-2 mm smooth white papules consistent with Milia      Movable subcutaneous cyst with punctum c/w epidermal inclusion cyst      Subcutaneous movable cyst c/w pilar cyst      Firm pink to brown papule c/w dermatofibroma      Pedunculated fleshy papule(s) c/w skin tag(s)      Evenly pigmented macule c/w junctional nevus     Mildly variegated pigmented, slightly irregular-bordered macule c/w mildly atypical nevus      Flesh colored to evenly pigmented papule c/w intradermal nevus       Pink pearly papule/plaque c/w basal cell carcinoma      Erythematous hyperkeratotic cursted plaque c/w SCC      Surgical scar with no sign of skin cancer recurrence      Open and closed comedones      Inflammatory papules and pustules      Verrucoid papule consistent consistent with wart     Erythematous eczematous patches and plaques     Dystrophic onycholytic nail with subungual debris c/w onychomycosis     Umbilicated papule    Erythematous-base heme-crusted tan verrucoid plaque consistent with inflamed seborrheic keratosis     Erythematous Silvery Scaling Plaque c/w Psoriasis     See annotation      Assessment / Plan:        Seborrheic keratoses  These are benign inherited growths without a malignant potential. Reassurance given to  patient. No treatment is necessary.     Cherry angioma  This is a benign vascular lesion. Reassurance given. No treatment required.     Solar lentigo  This is a benign hyperpigmented sun induced lesion. Daily sun protection will reduce the number of new lesions. Treatment of these benign lesions are considered cosmetic.    Multiple benign nevi  Monitor for new mole or moles that are becoming bigger, darker, irritated, or developing irregular borders.     Skin cancer screening  Total body skin examination performed today including at least 12 points as noted in physical examination. No lesions suspicious for malignancy noted.    Sebaceous hyperplasia  This is a common condition representing benign enlargement of the sebaceous lobule. It typically occurs in adulthood. Reassurance given to patient.     Patient instructed in importance in daily sun protection of at least spf 30. Mineral sunscreen ingredients preferred (Zinc +/- Titanium).   Recommend Elta MD for daily use on face and neck.  Patient encouraged to wear hat for all outdoor exposure.   Also discussed sun avoidance and use of protective clothing.               No Follow-up on file.

## 2019-02-12 ENCOUNTER — CLINICAL SUPPORT (OUTPATIENT)
Dept: REHABILITATION | Facility: HOSPITAL | Age: 64
End: 2019-02-12
Attending: ORTHOPAEDIC SURGERY
Payer: COMMERCIAL

## 2019-02-12 DIAGNOSIS — M25.611 DECREASED RANGE OF MOTION OF RIGHT SHOULDER: ICD-10-CM

## 2019-02-12 DIAGNOSIS — R29.898 SHOULDER WEAKNESS: ICD-10-CM

## 2019-02-12 DIAGNOSIS — M25.511 RIGHT SHOULDER PAIN, UNSPECIFIED CHRONICITY: ICD-10-CM

## 2019-02-12 PROCEDURE — 97010 HOT OR COLD PACKS THERAPY: CPT | Mod: PN

## 2019-02-12 PROCEDURE — 97110 THERAPEUTIC EXERCISES: CPT | Mod: PN

## 2019-02-12 NOTE — PROGRESS NOTES
Name: Moris Lamasoie  [facility] Number: [id number]  Date of Treatment: 02/12/2019   Diagnosis:   Encounter Diagnoses   Name Primary?    Decreased range of motion of right shoulder     Shoulder weakness     Right shoulder pain, unspecified chronicity        Time in: 1217  Time Out: 1310  Total Treatment Time: 53    Subjective:    Moris BENTLEY reports improvement of symptoms.  Patient reports their pain to be 3/10 on a 0-10 scale with 0 being no pain and 10 being the worst pain imaginable.    Objective:    Patient received individual therapy to increase strength, endurance, ROM, flexibility, posture and core stabilization with activities as follows:     Moris BENTLEY received therapeutic exercises to develop strength, endurance, ROM, flexibility, posture and core stabilization for 43 minutes including:     Supine R shoulder aBd 15 with cane  Supine R shoulder ER with cane 15  Seated ex's 15 R:     Biceps AROM 15     Wrist extn     Wrist flex     Forearm pronation     Forearm supination     Digiflex 3# gripping 30  Standing isometrics with pillow x 15: flex, extn, aBd, IR, ER  Standing pendulums R shoulder 15 each: flex, cw/ccw  PROM R shoulder per protocol supine.     CP applied to the R shoulder x 10 minutes in seated position after being cleared for contraindications.    Continue HEP daily. Educated pt of DOMS effect.     Pt demo good understanding of the education provided. Patient demonstrated good return demonstration of activities.     Assessment:     Progressing well with increased ex's tolerated per protocol.     Pt will continue to benefit from skilled PT intervention. Medical Necessity is demonstrated by:  Requires skilled supervision to complete and progress HEP and Weakness.    Patient is making good progress towards established goals.    Plan:    Continue with established Plan of Care towards PT goals.

## 2019-02-12 NOTE — PROGRESS NOTES
Name: Moris Calderón  Clinic Number: [id number]  Date of Treatment: 02/12/2019   Diagnosis:   Encounter Diagnoses   Name Primary?    Decreased range of motion of right shoulder     Shoulder weakness     Right shoulder pain, unspecified chronicity        Time in: 1207  Time Out: 1217  Total Treatment Time: 10  Group Time: NA      Subjective:    Moris BENTLEY reports mild pain levels in rt shoulder.  Patient reports their pain to be 3/10 on a 0-10 scale with 0 being no pain and 10 being the worst pain imaginable.    Objective    Moris BENTLEY received therapeutic exercises to develop ROM for 10 minutes including:     X 15 ea supine AAROM rt shoulder = flex/ER    * remainder of treatment performed by Cyndi Webster PTA    Assessment:     Mr. Calderón was able to kamaljit. AAROM w/out increase in symptoms.    Pt will continue to benefit from skilled PT intervention. Medical Necessity is demonstrated by:  Pain limits function of effected part for some activities, Unable to participate fully in daily activities and Weakness.    Patient is making good progress towards established goals.    New/Revised goals: NA      Plan:  Continue with established Plan of Care towards PT goals.

## 2019-02-14 ENCOUNTER — CLINICAL SUPPORT (OUTPATIENT)
Dept: REHABILITATION | Facility: HOSPITAL | Age: 64
End: 2019-02-14
Attending: ORTHOPAEDIC SURGERY
Payer: COMMERCIAL

## 2019-02-14 DIAGNOSIS — M25.511 RIGHT SHOULDER PAIN, UNSPECIFIED CHRONICITY: ICD-10-CM

## 2019-02-14 DIAGNOSIS — R29.898 SHOULDER WEAKNESS: ICD-10-CM

## 2019-02-14 DIAGNOSIS — M25.611 DECREASED RANGE OF MOTION OF RIGHT SHOULDER: ICD-10-CM

## 2019-02-14 PROCEDURE — 97110 THERAPEUTIC EXERCISES: CPT | Mod: PN

## 2019-02-14 PROCEDURE — 97010 HOT OR COLD PACKS THERAPY: CPT | Mod: PN

## 2019-02-14 NOTE — PROGRESS NOTES
Name: Moris Calderón  Clinic Number: [id number]  Date of Treatment: 02/14/2019   Diagnosis:   Encounter Diagnoses   Name Primary?    Decreased range of motion of right shoulder     Shoulder weakness     Right shoulder pain, unspecified chronicity        Time in: 1105  Time Out: 1117  Total Treatment Time: 12  Group Time: NA      Subjective:    Moris BENTLEY reports mild pain levels in rt shoulder.  Patient reports their pain to be 3/10 on a 0-10 scale with 0 being no pain and 10 being the worst pain imaginable.    Objective    Moris BENTLEY received therapeutic exercises to develop ROM for 12 minutes including:     X 15 ea supine AAROM rt shoulder = flex/ER     * remainder of treatment performed by Cyndi Webster PTA    Assessment:     Mr. Calderón was able to kamaljit. tx session w/out increase in symptoms.    Pt will continue to benefit from skilled PT intervention. Medical Necessity is demonstrated by:  Pain limits function of effected part for some activities, Unable to participate fully in daily activities and Weakness.    Patient is making fair progress towards established goals.    New/Revised goals: NA      Plan:  Continue with established Plan of Care towards PT goals.

## 2019-02-14 NOTE — PROGRESS NOTES
Name: Moris Lamasoie  [facility] Number: [id number]  Date of Treatment: 02/14/2019   Diagnosis:   Encounter Diagnoses   Name Primary?    Decreased range of motion of right shoulder     Shoulder weakness     Right shoulder pain, unspecified chronicity        Time in: 1117  Time Out: 1200  Total Treatment Time: 18    Subjective:    Moris BENTLEY reports R shoulder discomfort.  Patient reports their pain to be 3/10 on a 0-10 scale with 0 being no pain and 10 being the worst pain imaginable.    Objective:    Patient received individual therapy to increase strength, endurance, ROM, flexibility, posture and core stabilization with activities as follows:     Moris BENTLEY received therapeutic exercises to develop strength, endurance, ROM, flexibility, posture and core stabilization for 18 minutes including:     Supine R shoulder aBd 15 with cane  Supine R shoulder ER with cane 15  Seated ex's 15 R: (no charge)     Biceps AROM 15     Wrist extn     Wrist flex     Forearm pronation     Forearm supination     Digiflex 3# gripping 30  Standing isometrics with pillow x 15: flex, extn, aBd, IR, ER    CP applied to the R shoulder x 10 minutes in seated position after being cleared for contraindications.     Continue HEP daily.    Pt demo good understanding of the education provided. Patient demonstrated good return demonstration of activities.     Assessment:     Improving techniques and scapular stabilization with ex's today.     Pt will continue to benefit from skilled PT intervention. Medical Necessity is demonstrated by:  Requires skilled supervision to complete and progress HEP and Weakness.    Patient is making good progress towards established goals.    Plan:    Continue with established Plan of Care towards PT goals.

## 2019-02-19 ENCOUNTER — CLINICAL SUPPORT (OUTPATIENT)
Dept: REHABILITATION | Facility: HOSPITAL | Age: 64
End: 2019-02-19
Attending: ORTHOPAEDIC SURGERY
Payer: COMMERCIAL

## 2019-02-19 DIAGNOSIS — R29.898 SHOULDER WEAKNESS: ICD-10-CM

## 2019-02-19 DIAGNOSIS — M25.611 DECREASED RANGE OF MOTION OF RIGHT SHOULDER: ICD-10-CM

## 2019-02-19 DIAGNOSIS — M25.511 RIGHT SHOULDER PAIN, UNSPECIFIED CHRONICITY: ICD-10-CM

## 2019-02-19 PROCEDURE — 97010 HOT OR COLD PACKS THERAPY: CPT | Mod: PN

## 2019-02-19 PROCEDURE — 97140 MANUAL THERAPY 1/> REGIONS: CPT | Mod: PN

## 2019-02-19 PROCEDURE — 97110 THERAPEUTIC EXERCISES: CPT | Mod: PN

## 2019-02-22 ENCOUNTER — CLINICAL SUPPORT (OUTPATIENT)
Dept: REHABILITATION | Facility: HOSPITAL | Age: 64
End: 2019-02-22
Attending: ORTHOPAEDIC SURGERY
Payer: COMMERCIAL

## 2019-02-22 DIAGNOSIS — M25.611 DECREASED RANGE OF MOTION OF RIGHT SHOULDER: ICD-10-CM

## 2019-02-22 DIAGNOSIS — M25.511 RIGHT SHOULDER PAIN, UNSPECIFIED CHRONICITY: ICD-10-CM

## 2019-02-22 DIAGNOSIS — R29.898 SHOULDER WEAKNESS: ICD-10-CM

## 2019-02-22 PROCEDURE — 97110 THERAPEUTIC EXERCISES: CPT | Mod: PN

## 2019-02-22 NOTE — PROGRESS NOTES
Name: Moris BENTLEY [facility] Number: [id number]  Date of Treatment: 02/22/2019   Diagnosis:   Encounter Diagnoses   Name Primary?    Decreased range of motion of right shoulder     Shoulder weakness     Right shoulder pain, unspecified chronicity        Time in: 1200  Time Out: 1255  Total Treatment Time: 55  Group Time: 55      Subjective:    Moris BENTLEY reports minimal  pain.  Patient reports their pain to be 3/10 on a 0-10 scale with 0 being no pain and 10 being the worst pain imaginable.    Objective    Patient received group therapy to increase strength, endurance, ROM, flexibility and posture with 0 patients with activities as follows:     Moris BENTLEY received therapeutic exercises to develop strength, endurance, ROM, flexibility and posture for 55 minutes including:     Ball squeezes x 20 reps  Digiflex x 20 reps  Isometrics flex, ext, IR, ER, abd x 20 reps  Wrist flex/ext x 20 reps each  Pronation/supination x 20 reps each  Elbow flex/ext x 20 reps  pendulums x 20 CW, CCW, flex/ext, side/side x 20 reps   Wand exercises in supine x 20 reps flex, abd, ER, serratus punches  OH pulleys 5' flex    Pt demo good understanding of the education provided. Moris BENTLEY demonstrated good return demonstration of activities.     Assessment:       Pt will continue to benefit from skilled PT intervention. Medical Necessity is demonstrated by:  Pain limits function of effected part for some activities, Unable to participate fully in daily activities, Requires skilled supervision to complete and progress HEP and Weakness.    Patient is making good progress towards established goals.        Plan:  Continue with established Plan of Care towards PT goals.

## 2019-02-27 ENCOUNTER — CLINICAL SUPPORT (OUTPATIENT)
Dept: REHABILITATION | Facility: HOSPITAL | Age: 64
End: 2019-02-27
Attending: ORTHOPAEDIC SURGERY
Payer: COMMERCIAL

## 2019-02-27 DIAGNOSIS — R29.898 SHOULDER WEAKNESS: ICD-10-CM

## 2019-02-27 DIAGNOSIS — M25.611 DECREASED RANGE OF MOTION OF RIGHT SHOULDER: ICD-10-CM

## 2019-02-27 DIAGNOSIS — Z98.890 S/P RIGHT ROTATOR CUFF REPAIR: Primary | ICD-10-CM

## 2019-02-27 PROCEDURE — 97110 THERAPEUTIC EXERCISES: CPT | Mod: PN

## 2019-02-27 NOTE — PROGRESS NOTES
Name: Moris Calderón  Clinic Number: [id number]  Date of Treatment: 02/27/2019   Diagnosis:   Encounter Diagnoses   Name Primary?    S/P right rotator cuff repair Yes    Decreased range of motion of right shoulder     Shoulder weakness        Time in: 1200  Time Out: 1230  Total Treatment Time: 30  Group Time: NA      Subjective:    Moris BENTLEY reports improvement of symptoms.  Patient reports their pain to be 2/10 on a 0-10 scale with 0 being no pain and 10 being the worst pain imaginable.    Objective    Moris BENTLEY received therapeutic exercises to develop strength, endurance and ROM for 30 minutes including:     X 15 ea supine AAROM rt shoulder = flex/ER  X 20 ea cane-assisted AAROM rt shoulder (supine) = flex/ABD/ER  X 20 ea pendulum there ex rt shoulder = flex/ext, cw, ccw  X 4 min OHP (flex)\  X 20 ea rt UE there ex w/ 1# wt = forearm pro/sup, wrist flex/ext, elbow flex  X 20 ea ball sq and digiflex  X 20 prone rows  X 20 ea standing rt shoulder isometrics in door frame = ABD, ext, IR, ER    Assessment:     Mr. Calderón was able to kamaljit. tx session w/out increase in symptoms.    Pt will continue to benefit from skilled PT intervention. Medical Necessity is demonstrated by:  Pain limits function of effected part for some activities, Unable to participate fully in daily activities and Weakness.    Patient is making good progress towards established goals.    New/Revised goals: NA      Plan:  Continue with established Plan of Care towards PT goals.

## 2019-03-01 ENCOUNTER — CLINICAL SUPPORT (OUTPATIENT)
Dept: REHABILITATION | Facility: HOSPITAL | Age: 64
End: 2019-03-01
Attending: ORTHOPAEDIC SURGERY
Payer: COMMERCIAL

## 2019-03-01 DIAGNOSIS — R29.898 SHOULDER WEAKNESS: ICD-10-CM

## 2019-03-01 DIAGNOSIS — M25.511 RIGHT SHOULDER PAIN, UNSPECIFIED CHRONICITY: ICD-10-CM

## 2019-03-01 DIAGNOSIS — M25.611 DECREASED RANGE OF MOTION OF RIGHT SHOULDER: ICD-10-CM

## 2019-03-01 PROCEDURE — 97110 THERAPEUTIC EXERCISES: CPT | Mod: PN

## 2019-03-01 PROCEDURE — 97010 HOT OR COLD PACKS THERAPY: CPT | Mod: PN

## 2019-03-06 ENCOUNTER — CLINICAL SUPPORT (OUTPATIENT)
Dept: REHABILITATION | Facility: HOSPITAL | Age: 64
End: 2019-03-06
Attending: ORTHOPAEDIC SURGERY
Payer: COMMERCIAL

## 2019-03-06 DIAGNOSIS — M25.511 RIGHT SHOULDER PAIN, UNSPECIFIED CHRONICITY: ICD-10-CM

## 2019-03-06 DIAGNOSIS — M25.611 DECREASED RANGE OF MOTION OF RIGHT SHOULDER: ICD-10-CM

## 2019-03-06 DIAGNOSIS — R29.898 SHOULDER WEAKNESS: ICD-10-CM

## 2019-03-06 PROCEDURE — 97010 HOT OR COLD PACKS THERAPY: CPT | Mod: PN

## 2019-03-06 PROCEDURE — 97110 THERAPEUTIC EXERCISES: CPT | Mod: PN

## 2019-03-06 NOTE — PROGRESS NOTES
Name: Moris BENTLEY Stephane  [facility] Number: [id number]  Date of Treatment: 03/06/2019   Diagnosis:   Encounter Diagnoses   Name Primary?    Decreased range of motion of right shoulder     Shoulder weakness     Right shoulder pain, unspecified chronicity        Time in: 1154  Time Out: 1251  Total Treatment Time: 57      Subjective:    Moris BENTLEY reports improvement of symptoms and decreased pain.  Patient reports their pain to be 3/10 on a 0-10 scale with 0 being no pain and 10 being the worst pain imaginable.    Objective  Moris BENTLEY received therapeutic exercises to develop strength, endurance, ROM, flexibility and posture for 47 minutes including:   OHP 5'  In flexion, 2' in scaption, 2' in abd  Prone 2 x 10:   flexion   rows   extension   horizontal abd   ext with ER  S/L ER 2 x 10  Serratus punches 2 x 10  Push up plus 2 x 10  Bicep curls 2# 2 x 10  Digiflex 3'     CP x 10'     Written Home Exercises Provided: Cont with HEP  Pt demo good understanding of the education provided. Moris BENTLEY demonstrated good return demonstration of activities.     Assessment:   Good tolerance with added therex.     Pt will continue to benefit from skilled PT intervention. Medical Necessity is demonstrated by:  Continued inability to participate in vocational pursuits, Pain limits function of effected part for some activities, Unable to participate fully in daily activities, Requires skilled supervision to complete and progress HEP and Weakness.    Patient is making good progress towards established goals.    Plan:  Continue with established Plan of Care towards PT goals.

## 2019-03-08 ENCOUNTER — CLINICAL SUPPORT (OUTPATIENT)
Dept: REHABILITATION | Facility: HOSPITAL | Age: 64
End: 2019-03-08
Attending: ORTHOPAEDIC SURGERY
Payer: COMMERCIAL

## 2019-03-08 DIAGNOSIS — M25.511 RIGHT SHOULDER PAIN, UNSPECIFIED CHRONICITY: ICD-10-CM

## 2019-03-08 DIAGNOSIS — R29.898 SHOULDER WEAKNESS: ICD-10-CM

## 2019-03-08 DIAGNOSIS — M25.611 DECREASED RANGE OF MOTION OF RIGHT SHOULDER: ICD-10-CM

## 2019-03-08 PROCEDURE — 97110 THERAPEUTIC EXERCISES: CPT | Mod: PN

## 2019-03-08 PROCEDURE — 97010 HOT OR COLD PACKS THERAPY: CPT | Mod: PN

## 2019-03-08 NOTE — PROGRESS NOTES
Name: Moris Lamasoie  [facility] Number: [id number]  Date of Treatment: 03/08/2019   Diagnosis:   Encounter Diagnoses   Name Primary?    Decreased range of motion of right shoulder     Shoulder weakness     Right shoulder pain, unspecified chronicity        Time in: 1205  Time Out: 1320  Total Treatment Time: 65    Subjective:    Moris BENTLEY reports improvement of symptoms.  Patient reports their pain to be 2/10 on a 0-10 scale with 0 being no pain and 10 being the worst pain imaginable.    Objective:    Patient received individual therapy to increase strength, endurance, ROM, flexibility, posture and core stabilization with activities as follows:     Moris BENTLEY received therapeutic exercises to develop strength, endurance, ROM, flexibility, posture and core stabilization for 55 minutes including:     Supine shoulder flexion 15 with cane  Supine R shoulder ER with cane 15  Seated ex's 15 R: (no charge)     Biceps curls 15 2#     1# anode pronation/supination R forearm     Digiflex 3# gripping 30  Standing isometrics with pillow x 15: flex, extn, aBd, IR, ER  Standing finger ladder 10 x 10s R to L20  Seated OH pulley flexion 5'  Seated pulley scaption 3'  Seated scap retraction B 10/3    PROM R shoulder all planes per protocol.      CP applied to the R shoulder x 10 minutes in seated position after being cleared for contraindications.    Continue HEP daily. Written and pictorial HEP of ex's in EPIC reviewed and issued to pt. Pt instructed to perform HEP daily and stop if symptoms increase.     Pt demo good understanding of the education provided. Patient demonstrated good return demonstration of activities.     Assessment:     Increasing ROM R shoulder and progressing with increased challenges without increase in discomfort reported.     Pt will continue to benefit from skilled PT intervention. Medical Necessity is demonstrated by:  Requires skilled supervision to complete and progress HEP and Weakness.    Patient is making good  progress towards established goals.    Plan:    Continue with established Plan of Care towards PT goals.

## 2019-03-08 NOTE — PATIENT INSTRUCTIONS
ROM: Flexion (Alternate)        Slide right arm up wall, with palm out, by leaning toward wall. Hold _10___ seconds.  Repeat _10___ times per set. Do _1___ sets per session. Do __1-2__ sessions per day.     https://iCents.net/758     Copyright © [employer]. All rights reserved.   Strengthening: Isometric Flexion        Using wall for resistance, press right fist into ball using light pressure. Hold ____ seconds.  Repeat ____ times per set. Do ____ sets per session. Do ____ sessions per day.     https://K[employer].Ifeelgoods/800     Copyright © [employer]. All rights reserved.   Strengthening: Isometric Extension        Using wall for resistance, press back of left arm into ball using light pressure. Hold ____ seconds.  Repeat ____ times per set. Do ____ sets per session. Do ____ sessions per day.     https://iCents.net/804     Copyright © [employer]. All rights reserved.   Strengthening: Isometric Abduction        Using wall for resistance, press left arm into ball using light pressure. Hold ____ seconds.  Repeat ____ times per set. Do ____ sets per session. Do ____ sessions per day.     https://iCents.net/806     Copyright © [employer]. All rights reserved.   Strengthening: Isometric External Rotation        Using wall to provide resistance, and keeping right arm at side, press back of hand into ball using light pressure. Hold ____ seconds.  Repeat ____ times per set. Do ____ sets per session. Do ____ sessions per day.     https://iCents.net/814     Copyright © [employer]. All rights reserved.   Strengthening: Isometric Internal Rotation        Using door frame for resistance, press palm of right hand into ball using light pressure. Keep elbow in at side. Hold ____ seconds.  Repeat ____ times per set. Do ____ sets per session. Do ____ sessions per day.     https://iCents.net/816     Copyright © [employer]. All rights reserved.   Scapular Retraction (Standing)        With arms at sides, pinch shoulder blades together.  Repeat ____ times per set. Do ____  sets per session. Do ____ sessions per day.     https://[employer]/944     Copyright © I. All rights reserved.

## 2019-03-12 ENCOUNTER — CLINICAL SUPPORT (OUTPATIENT)
Dept: REHABILITATION | Facility: HOSPITAL | Age: 64
End: 2019-03-12
Attending: ORTHOPAEDIC SURGERY
Payer: COMMERCIAL

## 2019-03-12 DIAGNOSIS — R29.898 SHOULDER WEAKNESS: ICD-10-CM

## 2019-03-12 DIAGNOSIS — M25.511 RIGHT SHOULDER PAIN, UNSPECIFIED CHRONICITY: ICD-10-CM

## 2019-03-12 DIAGNOSIS — M25.611 DECREASED RANGE OF MOTION OF RIGHT SHOULDER: ICD-10-CM

## 2019-03-12 PROCEDURE — 97110 THERAPEUTIC EXERCISES: CPT | Mod: PN

## 2019-03-12 NOTE — PROGRESS NOTES
"Name: Moris BENTLEY [facility] Number: [id number]  Date of Treatment: 03/12/2019   Diagnosis:   Encounter Diagnoses   Name Primary?    Decreased range of motion of right shoulder     Shoulder weakness     Right shoulder pain, unspecified chronicity        Time in: 1300  Time Out: 1355  Total Treatment Time: 55  Group Time: 0      Subjective:    Moris BENTLEY reports "not bad".  Patient reports their pain to be 2-3/10 on a 0-10 scale with 0 being no pain and 10 being the worst pain imaginable.    Objective    Moris BENTLEY received therapeutic exercises to R UE to develop ROM, flexibility and posture for 55 minutes including:     OHP 5' flexion and 3' scaption  Pendulum x20: A/P, lateral, cw, ccw  Cane supine x20: flexion, serratus punch, horiz abd/add, press ups  S/L ER x20  Sit abd x20 with cane  Prone rows and ext x20  Biceps curls 2# x20  Wall pushup x20    Pt demo good understanding of the education provided. Moris BENTLEY demonstrated good return demonstration of activities.     Assessment:     Pt will continue to benefit from skilled PT intervention. Medical Necessity is demonstrated by:  Unable to participate fully in daily activities, Requires skilled supervision to complete and progress HEP and Weakness.    Patient is making good progress towards established goals.    Plan:    Continue with established Plan of Care towards PT goals.   "

## 2019-03-14 ENCOUNTER — CLINICAL SUPPORT (OUTPATIENT)
Dept: REHABILITATION | Facility: HOSPITAL | Age: 64
End: 2019-03-14
Attending: ORTHOPAEDIC SURGERY
Payer: COMMERCIAL

## 2019-03-14 DIAGNOSIS — R29.898 SHOULDER WEAKNESS: ICD-10-CM

## 2019-03-14 DIAGNOSIS — M25.511 RIGHT SHOULDER PAIN, UNSPECIFIED CHRONICITY: ICD-10-CM

## 2019-03-14 DIAGNOSIS — M25.611 DECREASED RANGE OF MOTION OF RIGHT SHOULDER: ICD-10-CM

## 2019-03-14 PROCEDURE — 97110 THERAPEUTIC EXERCISES: CPT | Mod: PN

## 2019-03-14 PROCEDURE — 97010 HOT OR COLD PACKS THERAPY: CPT | Mod: PN

## 2019-03-14 NOTE — PROGRESS NOTES
Name: Moris Lamasoie  [facility] Number: [id number]  Date of Treatment: 03/14/2019   Diagnosis:   Encounter Diagnoses   Name Primary?    Decreased range of motion of right shoulder     Shoulder weakness     Right shoulder pain, unspecified chronicity        Time in: 1302  Time Out: 1410  Total Treatment Time: 68    Subjective:    Moris BENTLEY reports superior R shoulder discomfort.  Patient reports their pain to be 2/10 on a 0-10 scale with 0 being no pain and 10 being the worst pain imaginable.    Objective:    Patient received individual therapy to increase strength, endurance, ROM, flexibility, posture and core stabilization with activities as follows:     Moris BENTLEY received therapeutic exercises to develop strength, endurance, ROM, flexibility, posture and core stabilization for 58 minutes including:     Supine shoulder flexion 15 with cane  Supine R shoulder ER with cane 15  Seated scap setting B 10/2  Standing wall wipe flexion 10 x 10s R with rag  Standing finger ladder 10 x 10s R to L2L28  Seated OH pulley flexion 5'  Seated pulley scaption 3'  Seated scap retraction B 10/3  Prone rows R 10/2  Prone extn with ER R 10/2  Prone horiz aBd with ER R 10/2  No scaption prone 2* limited ROM     PROM R shoulder all planes per protocol.      CP applied to the R shoulder x 10 minutes in seated position after being cleared for contraindications.    Continue HEP daily. Written and pictorial HEP of ex's in EPIC reviewed and issued to pt. Pt instructed to perform HEP daily and stop if symptoms increase.    Pt demo good understanding of the education provided. Patient demonstrated good return demonstration of activities.     Assessment:     Good tolerance for ex's without increase in discomfort. Progressing with increased ROM R shoulder but is still limited in prone scaption and is compensating, so stopped.     Pt will continue to benefit from skilled PT intervention. Medical Necessity is demonstrated by:  Requires skilled supervision  to complete and progress HEP and Weakness.    Patient is making good progress towards established goals.    Plan:    Continue with established Plan of Care towards PT goals.

## 2019-03-19 ENCOUNTER — CLINICAL SUPPORT (OUTPATIENT)
Dept: REHABILITATION | Facility: HOSPITAL | Age: 64
End: 2019-03-19
Attending: ORTHOPAEDIC SURGERY
Payer: COMMERCIAL

## 2019-03-19 DIAGNOSIS — R29.898 SHOULDER WEAKNESS: ICD-10-CM

## 2019-03-19 DIAGNOSIS — M25.611 DECREASED RANGE OF MOTION OF RIGHT SHOULDER: ICD-10-CM

## 2019-03-19 DIAGNOSIS — Z98.890 S/P RIGHT ROTATOR CUFF REPAIR: Primary | ICD-10-CM

## 2019-03-19 PROCEDURE — 97110 THERAPEUTIC EXERCISES: CPT | Mod: PN

## 2019-03-19 NOTE — PLAN OF CARE
TIME RECORD    Date: 03/19/2019    Start Time:  1105  Stop Time:  1200    PROCEDURES:    TIMED  Procedure Time Min.   There ex Start:1105  Stop:1150     Start:  Stop:     Start:  Stop:     Start:  Stop:          UNTIMED  Procedure Time Min.    Start:  Stop:     Start:  Stop:      Total Timed Minutes:  45  Total Timed Units:  3  Total Untimed Units:  0  Charges Billed/# of units:  3    PHYSICAL THERAPY UPDATED PLAN OF TREATMENT    Patient name: Moris Calderón  Onset Date:  12/21/18  SOC Date:  02/06/19  Primary Diagnosis:    1. S/P right rotator cuff repair     2. Decreased range of motion of right shoulder     3. Shoulder weakness       Treatment Diagnosis:  debility due to recent rt RCR  Certification Period:  02/06/19 to 03/23/19  Precautions:  see protocol  Visits from SOC:  12  Functional Level Prior to SOC:  Independent    Treatment:    X 15 ea supine AAROM rt shoulder = flex/ER  X 20 ea seated cane-assisted AAROM rt shoulder (supine) = flex/ER/protraction  X 10 ladder crawls  X 5'/5' OHP (flex/scap)  X 20 ea wall ball rt scapular stab there ex = sup/inf, med/lat, cw/ccw  X 20 standing push up plus against wall     Updated Assessment:  PROM rt sh (degrees): Flex=147, ER=66; AROM rt sh (degrees): Flex=140; UEFI score = 60/80    Previous Short Term Goals Status:     1.  Improve rt shoulder passive flexion to about 150 degrees (NOT MET)  2.  Improve rt shoulder active flexion to about 120 degrees (MET)  3.  obtain 45 degrees of passive rt shoulder ER (MET)    New Short Term Goals:     1.  Improve rt shoulder active flexion to about 145 degrees  2.  Improve rt shoulder active ABD to about 120 degrees  3.  obtain 60 degrees ea of active rt shoulder IR/ER     Previous Long Term Goals Status:     1.  Improve AAROM rt shoulder to WFL in all planes (NOT MET)  2.  Tolerate t-band there ex w/ IR/ER (NOT MET)  3.  Improve UEFI score to 40/80 (MET)    New Long Term Goals:  1.  Improve AROM rt shoulder to WFL in all  planes  2.  Tolerate t-band there ex w/ IR/ER  3.  Improve UEFI score to 65/80  4.  Demo comp w/ HEP    Reasons for Recertification of Therapy:   Patient would benefit from further PT visits to cont. Addressing ROM/strength deficits.    Certification Period: 03/19/19 to 04/27/18  Recommended Treatment Plan: 2 times per week for 6 weeks: Manual Therapy, Moist Heat/ Ice, Patient Education, Therapeutic Activites, Therapeutic Exercise, Ultrasound and HEP  Other Recommendations: NA        Therapist's Name: Geo Mane, PT   Date: 03/19/2019    I CERTIFY THE NEED FOR THESE SERVICES FURNISHED UNDER THIS PLAN OF TREATMENT AND WHILE UNDER MY CARE    Physician's comments: ________________________________________________________________________________________________________________________________________________      Physician's Name: ___________________________________

## 2019-03-21 ENCOUNTER — CLINICAL SUPPORT (OUTPATIENT)
Dept: REHABILITATION | Facility: HOSPITAL | Age: 64
End: 2019-03-21
Attending: ORTHOPAEDIC SURGERY
Payer: COMMERCIAL

## 2019-03-21 DIAGNOSIS — R29.898 SHOULDER WEAKNESS: ICD-10-CM

## 2019-03-21 DIAGNOSIS — M25.611 DECREASED RANGE OF MOTION OF RIGHT SHOULDER: ICD-10-CM

## 2019-03-21 PROCEDURE — 97110 THERAPEUTIC EXERCISES: CPT | Mod: PN

## 2019-03-22 NOTE — PROGRESS NOTES
Name: Moris Lamasoie  [facility] Number: [id number]  Date of Treatment: 03/22/2019   Diagnosis:   Encounter Diagnoses   Name Primary?    Decreased range of motion of right shoulder     Shoulder weakness        Time in: 1005  Time Out: 1100  Total Treatment Time: 55    Subjective:    Moris BENTLEY reports improvement of symptoms.  Patient reports their R shoulder discomfort pain to be 2/10 on a 0-10 scale with 0 being no pain and 10 being the worst pain imaginable.    Objective:  Patient received individual therapy to increase strength, endurance, ROM, flexibility, posture and core stabilization with activities as follows:     Moris BENTLEY received therapeutic exercises to develop strength, endurance, ROM, flexibility, posture and core stabilization for 55 minutes including:     Supine shoulder flexion 15 with cane  Supine R shoulder ER with cane 15  Seated scap setting B 10/2  Standing finger ladder 10 x 10s R to L28  Seated OH pulley flexion 5'  Seated pulley scaption 5'  Seated scap retraction B 15  Seated shoulder shrugs B 15  Standing push up plus on wall 15 B  Ball on wall scap stabilization 15 each: flex, extn, M-L, cw/ccw    Continue HEP daily.    Pt demo good understanding of the education provided. Patient demonstrated good return demonstration of activities.     Assessment:     Improving PROM and pain per pt report.     Pt will continue to benefit from skilled PT intervention. Medical Necessity is demonstrated by:  Requires skilled supervision to complete and progress HEP and Weakness.    Patient is making good progress towards established goals.    Plan:    Continue with established Plan of Care towards PT goals.

## 2019-03-26 ENCOUNTER — CLINICAL SUPPORT (OUTPATIENT)
Dept: REHABILITATION | Facility: HOSPITAL | Age: 64
End: 2019-03-26
Attending: ORTHOPAEDIC SURGERY
Payer: COMMERCIAL

## 2019-03-26 DIAGNOSIS — R29.898 SHOULDER WEAKNESS: ICD-10-CM

## 2019-03-26 DIAGNOSIS — M25.611 DECREASED RANGE OF MOTION OF RIGHT SHOULDER: ICD-10-CM

## 2019-03-26 DIAGNOSIS — Z98.890 S/P RIGHT ROTATOR CUFF REPAIR: Primary | ICD-10-CM

## 2019-03-26 PROCEDURE — 97110 THERAPEUTIC EXERCISES: CPT | Mod: PN

## 2019-03-26 NOTE — PROGRESS NOTES
Name: Moris Calderón  Clinic Number: [id number]  Date of Treatment: 03/26/2019   Diagnosis:   Encounter Diagnoses   Name Primary?    S/P right rotator cuff repair Yes    Decreased range of motion of right shoulder     Shoulder weakness        Time in: 1205  Time Out: 1300  Total Treatment Time: 55  Group Time: NA      Subjective:    Moris BENTLEY reports mild pain levels in rt shoulder.  Patient reports their pain to be 2/10 on a 0-10 scale with 0 being no pain and 10 being the worst pain imaginable.    Objective    Moris BENTLEY received therapeutic exercises to develop strength, endurance and flexibility for 55 minutes including:     X 20 ea supine AAROM rt shoulder = flex/ABD/ER/IR  X 20 ea seated cane-assisted AAROM rt shoulder = flex/ABD/ER  X 10 ladder crawls rt shoulder  X 5'/5' OHP (flex/scap)   X 20 towel IR stretch  X 20 ea rt shoulder towel wall circles (small) = cw/ccw  X 20 ea standing rt shoulder IR/ER (RTB)  X 20 standing push up plus against // bars    Assessment:     Mr. Calderón was able to kamaljit. tx session w/out increase in symptoms.    Pt will continue to benefit from skilled PT intervention. Medical Necessity is demonstrated by:  Pain limits function of effected part for some activities, Unable to participate fully in daily activities and Weakness.    Patient is making good progress towards established goals.    New/Revised goals: NA      Plan:  Continue with established Plan of Care towards PT goals.

## 2019-03-28 ENCOUNTER — CLINICAL SUPPORT (OUTPATIENT)
Dept: REHABILITATION | Facility: HOSPITAL | Age: 64
End: 2019-03-28
Attending: ORTHOPAEDIC SURGERY
Payer: COMMERCIAL

## 2019-03-28 DIAGNOSIS — R29.898 SHOULDER WEAKNESS: ICD-10-CM

## 2019-03-28 DIAGNOSIS — M25.611 DECREASED RANGE OF MOTION OF RIGHT SHOULDER: ICD-10-CM

## 2019-03-28 PROCEDURE — 97110 THERAPEUTIC EXERCISES: CPT | Mod: PN

## 2019-03-28 NOTE — PROGRESS NOTES
Name: Moris Lamasoie  [facility] Number: [id number]  Date of Treatment: 03/28/2019   Diagnosis:   Encounter Diagnoses   Name Primary?    Decreased range of motion of right shoulder     Shoulder weakness        Time in: 1002  Time Out: 1057  Total Treatment Time: 23    Subjective:    Moris BENTLEY reports improvement of symptoms.  Patient reports their R shoulder pain to be 2/10 on a 0-10 scale with 0 being no pain and 10 being the worst pain imaginable.    Objective:    Patient received individual therapy to increase strength, endurance, ROM, flexibility, posture and core stabilization with activities as follows:     Moris BENTLEY received therapeutic exercises to develop strength, endurance, ROM, flexibility, posture and core stabilization for 23 minutes including:     Supine shoulder flexion 20 with cane  Supine R shoulder ER with cane 20  Seated scap setting B 10/2  Standing finger ladder 10 x 10s R to L31  Seated OH pulley flexion 5'  Seated pulley scaption 5'  Seated scap retraction B 15  Seated flexion with dowel 10/2  Seated ER with dowel R 10/2  Wall wipes cw/ccw small circles 10/2 R  Standing push up plus on // bars 15 B  Ball on wall scap stabilization 20 each: flex, extn, M-L, cw/ccw    Continue HEP daily.    Pt demo good understanding of the education provided. Patient demonstrated good return demonstration of activities.     Assessment:     Progressing well with improving fluidity of movement R UE in all planes with improving ROM.     Pt will continue to benefit from skilled PT intervention. Medical Necessity is demonstrated by:  Requires skilled supervision to complete and progress HEP and Weakness.    Patient is making good progress towards established goals.    Plan:    Continue with established Plan of Care towards PT goals.

## 2019-04-01 ENCOUNTER — OFFICE VISIT (OUTPATIENT)
Dept: ORTHOPEDICS | Facility: CLINIC | Age: 64
End: 2019-04-01
Payer: COMMERCIAL

## 2019-04-01 VITALS
HEART RATE: 54 BPM | SYSTOLIC BLOOD PRESSURE: 151 MMHG | WEIGHT: 210 LBS | HEIGHT: 68 IN | DIASTOLIC BLOOD PRESSURE: 77 MMHG | BODY MASS INDEX: 31.83 KG/M2

## 2019-04-01 DIAGNOSIS — M75.121 COMPLETE TEAR OF RIGHT ROTATOR CUFF: Primary | ICD-10-CM

## 2019-04-01 PROCEDURE — 99213 OFFICE O/P EST LOW 20 MIN: CPT | Mod: S$GLB,,, | Performed by: ORTHOPAEDIC SURGERY

## 2019-04-01 PROCEDURE — 3078F PR MOST RECENT DIASTOLIC BLOOD PRESSURE < 80 MM HG: ICD-10-PCS | Mod: CPTII,S$GLB,, | Performed by: ORTHOPAEDIC SURGERY

## 2019-04-01 PROCEDURE — 3078F DIAST BP <80 MM HG: CPT | Mod: CPTII,S$GLB,, | Performed by: ORTHOPAEDIC SURGERY

## 2019-04-01 PROCEDURE — 3077F PR MOST RECENT SYSTOLIC BLOOD PRESSURE >= 140 MM HG: ICD-10-PCS | Mod: CPTII,S$GLB,, | Performed by: ORTHOPAEDIC SURGERY

## 2019-04-01 PROCEDURE — 99999 PR PBB SHADOW E&M-EST. PATIENT-LVL III: ICD-10-PCS | Mod: PBBFAC,,, | Performed by: ORTHOPAEDIC SURGERY

## 2019-04-01 PROCEDURE — 99213 PR OFFICE/OUTPT VISIT, EST, LEVL III, 20-29 MIN: ICD-10-PCS | Mod: S$GLB,,, | Performed by: ORTHOPAEDIC SURGERY

## 2019-04-01 PROCEDURE — 99999 PR PBB SHADOW E&M-EST. PATIENT-LVL III: CPT | Mod: PBBFAC,,, | Performed by: ORTHOPAEDIC SURGERY

## 2019-04-01 PROCEDURE — 3008F PR BODY MASS INDEX (BMI) DOCUMENTED: ICD-10-PCS | Mod: CPTII,S$GLB,, | Performed by: ORTHOPAEDIC SURGERY

## 2019-04-01 PROCEDURE — 3008F BODY MASS INDEX DOCD: CPT | Mod: CPTII,S$GLB,, | Performed by: ORTHOPAEDIC SURGERY

## 2019-04-01 PROCEDURE — 3077F SYST BP >= 140 MM HG: CPT | Mod: CPTII,S$GLB,, | Performed by: ORTHOPAEDIC SURGERY

## 2019-04-01 NOTE — PROGRESS NOTES
Past Medical History:   Diagnosis Date    Anxiety     Anxiety     Arthritis     Back problem     bulging disc    GERD (gastroesophageal reflux disease)     Hypercholesterolemia 11/2/2015    2009: Began statin.    Hyperlipidemia     Hypertension     Hypertension, benign 11/2/2015    2008: Diagnosed.    Sleep apnea     uses c-pap       Past Surgical History:   Procedure Laterality Date    APPENDECTOMY      ARTHROSCOPY, SHOULDER, WITH SUBACROMIAL SPACE DECOMPRESSION Right 12/21/2018    [facility]    ARTHROSCOPY-SHOULDER EXCISION DISTAL CLAVICLE Right 5/18/2018    [facility]    ARTHROSCOPY-SHOULDER WITH SUBACROMIAL DECOMPRESSION Right 5/18/2018    [facility]    magdalene      HEART CATH-LEFT Left 12/10/2015    Performed by Maine Pozo [facility] at [facility] [place], operated [facility] CATH LAB    KNEE ARTHROSCOPY W/ MENISCAL REPAIR Left 2015    MMT      Left knee A-scope    NOSE SURGERY      REPAIR ROTATOR CUFF ARTHROSCOPIC Right 5/18/2018    [facility] OR    REPAIR, ROTATOR CUFF, ARTHROSCOPIC Right 12/21/2018    [facility] OR    REPAIR, TENDON, BICEPS Right 12/21/2018    [facility] OR    UVULECTOMY         Current Outpatient Medications   Medication Sig    atorvastatin (LIPITOR) 10 MG tablet TAKE 1 TABLET ONCE DAILY    benazepril (LOTENSIN) 20 MG tablet TAKE 1 TABLET ONCE DAILY    buPROPion (WELLBUTRIN) 100 MG tablet Take 100 mg by mouth once daily.     fluticasone (FLONASE) 50 mcg/actuation nasal spray 1 spray by Each Nare route once daily.    ibuprofen (ADVIL,MOTRIN) 800 MG tablet Take 1 tablet (800 mg total) by mouth 3 (three) times daily.    oxyCODONE-acetaminophen (PERCOCET) 5-325 mg per tablet Take 1 tablet by mouth every 4 (four) hours as needed for Pain.    sertraline (ZOLOFT) 50 MG tablet Take 50 mg  by mouth once daily.     No current facility-administered medications for this visit.        Review of patient's allergies indicates:   Allergen Reactions    Adhesive Rash       Family History   Problem Relation Age of Onset    Glaucoma Father     Melanoma Neg Hx     Psoriasis Neg Hx     Lupus Neg Hx     Eczema Neg Hx        Social History     Socioeconomic History    Marital status:      Spouse name: Not on file    Number of children: Not on file    Years of education: Not on file    Highest education level: Not on file   Occupational History    Not on file   Social Needs    Financial resource strain: Not on file    Food insecurity:     Worry: Not on file     Inability: Not on file    Transportation needs:     Medical: Not on file     Non-medical: Not on file   Tobacco Use    Smoking status: Never Smoker    Smokeless tobacco: Never Used   Substance and Sexual Activity    Alcohol use: Yes     Comment: social    Drug use: No    Sexual activity: Yes   Lifestyle    Physical activity:     Days per week: Not on file     Minutes per session: Not on file    Stress: Not on file   Relationships    Social connections:     Talks on phone: Not on file     Gets together: Not on file     Attends Yazidi service: Not on file     Active member of club or organization: Not on file     Attends meetings of clubs or organizations: Not on file     Relationship status: Not on file    Intimate partner violence:     Fear of current or ex partner: Not on file     Emotionally abused: Not on file     Physically abused: Not on file     Forced sexual activity: Not on file   Other Topics Concern    Not on file   Social History Narrative    Not on file       Chief Complaint:   Chief Complaint   Patient presents with    Right Shoulder - Pain       Date of surgery:  December 21, 2018    History of present illness:  63-year-old male who underwent right rotator cuff repair after a shoulder dislocation.  Patient has  pain that comes in waves occasionally.  Axillary nerve has resolved.  Working on strengthening with PT.  Pain of 2/10.    Review of Systems:    Musculoskeletal:  See HPI        Physical Examination:    Vital Signs:    Vitals:    04/01/19 1253   BP: (!) 151/77   Pulse: (!) 54       Body mass index is 31.93 kg/m².    This a well-developed, well nourished patient in no acute distress.  They are alert and oriented and cooperative to examination.  Pt. walks without an antalgic gait.      Examination of the right shoulder shows healed surgical portals.  No erythema or drainage.  Nerve issue seems to have resolved.  Patient has full active and passive range of motion. Working on strengthening.  4/5 at this point.    X-rays:  None     Assessment::  Status post right arthroscopic rotator cuff repair  Possible post shoulder dislocation     Plan:  I reviewed the findings with him today.  He is doing great with physical therapy.  Nerve has recovered.  He is working on strengthening at this point.  Follow up in 2 months.    This note was created using [employer] voice recognition software that occasionally misinterpreted phrases or words.

## 2019-04-02 ENCOUNTER — CLINICAL SUPPORT (OUTPATIENT)
Dept: REHABILITATION | Facility: HOSPITAL | Age: 64
End: 2019-04-02
Attending: ORTHOPAEDIC SURGERY
Payer: COMMERCIAL

## 2019-04-02 DIAGNOSIS — R29.898 SHOULDER WEAKNESS: ICD-10-CM

## 2019-04-02 DIAGNOSIS — M25.611 DECREASED RANGE OF MOTION OF RIGHT SHOULDER: ICD-10-CM

## 2019-04-02 PROCEDURE — 97110 THERAPEUTIC EXERCISES: CPT | Mod: PN

## 2019-04-02 PROCEDURE — 97010 HOT OR COLD PACKS THERAPY: CPT | Mod: PN

## 2019-04-02 NOTE — PROGRESS NOTES
"Name: Moris BENTLEY [facility] Number: [id number]  Date of Treatment: 04/02/2019   Diagnosis:   Encounter Diagnoses   Name Primary?    Decreased range of motion of right shoulder     Shoulder weakness        Time in: 0950  Time Out: 1055  Total Treatment Time: 65  Group Time: 0      Subjective:    Moris BENTLEY reports improvement of symptoms.  Patient reports their pain to be 1/10 on a 0-10 scale with 0 being no pain and 10 being the worst pain imaginable.    Objective    Moris BENTLEY received therapeutic exercises to R UE to develop strength, endurance, ROM, flexibility and posture for 55 minutes including:     OHP 5' flexion and 5' abduction  // bar pushup x20   Ladder crawls 10" x10 to L 31  Ball on wall x20 flexion,cw and ccw (small)  Wall wipes x20: cw and ccw (large), abd  Stand cane x20: flexion, extension and IR behind back, IR/ER 90 abd  PNF D1&2 flexion and extension 2x10 with Yellow Tband    Cold pack to R shoulder 10' to decrease delayed onset muscle soreness    Pt demo good understanding of the education provided. Moris BENTLEY demonstrated good return demonstration of activities.     Assessment:     Pt will continue to benefit from skilled PT intervention. Medical Necessity is demonstrated by:  Continued inability to participate in vocational pursuits, Pain limits function of effected part for some activities, Unable to participate fully in daily activities, Requires skilled supervision to complete and progress HEP and Weakness.    Patient is making good progress towards established goals.    Plan:    Continue with established Plan of Care towards PT goals.   "

## 2019-04-04 ENCOUNTER — CLINICAL SUPPORT (OUTPATIENT)
Dept: REHABILITATION | Facility: HOSPITAL | Age: 64
End: 2019-04-04
Attending: ORTHOPAEDIC SURGERY
Payer: COMMERCIAL

## 2019-04-04 DIAGNOSIS — Z98.890 S/P RIGHT ROTATOR CUFF REPAIR: Primary | ICD-10-CM

## 2019-04-04 DIAGNOSIS — M25.611 DECREASED RANGE OF MOTION OF RIGHT SHOULDER: ICD-10-CM

## 2019-04-04 DIAGNOSIS — R29.898 SHOULDER WEAKNESS: ICD-10-CM

## 2019-04-04 PROCEDURE — 97110 THERAPEUTIC EXERCISES: CPT | Mod: PN

## 2019-04-04 NOTE — PROGRESS NOTES
Name: Morsi Calderón  Clinic Number: [id number]  Date of Treatment: 04/04/2019   Diagnosis:   Encounter Diagnoses   Name Primary?    S/P right rotator cuff repair Yes    Decreased range of motion of right shoulder     Shoulder weakness        Time in: 1005  Time Out: 1100  Total Treatment Time: 55  Group Time: NA      Subjective:    Moris BENTLEY reports no real pain at rest.  Patient reports their pain to be n/a/10 on a 0-10 scale with 0 being no pain and 10 being the worst pain imaginable.    Objective    Moris BENTLEY received therapeutic exercises to develop strength, endurance and flexibility for 55 minutes including:     X 15 ea supine AAROM rt shoulder = flex/ABD/ER/IR  X 20 ea standing t-bar there ex rt shoulder (3#) = flex/ABD/IR/ext  X 10 ladder crawls rt shoulder  X 5'/5' OHP (flex/scap)   X 20 ea rt scapular stab there ex w/ ball on wall = med/lat, sup/inf, cw/ccw  X 20 ea rt shoulder towel wall circles (big) = cw/ccw  X 20 supine rt shoulder serratus punches (2#)  X 20 supine conc/ecc rt shoulder flex (2#)  X 20 supine conc/ecc rt shoulder ext (2#)  X 20 ea prone rt shoulder hor ABD and rows (2#)  X 20 standing push up plus against wall  X 20 ea pulley there ex (25#) = rows, lat pull downs, high rows    Assessment:     Mr. Calderón was able to kamaljit. tx session w/out increase in symptoms.    Pt will continue to benefit from skilled PT intervention. Medical Necessity is demonstrated by:  Unable to participate fully in daily activities and Weakness.    Patient is making good progress towards established goals.    New/Revised goals: NA      Plan:  Continue with established Plan of Care towards PT goals.

## 2019-04-04 NOTE — PROGRESS NOTES
PT/PTA met face to face to discuss patient's treatment plan and progress towards established goals.  Treatment will be continued as described in initial report/eval and progress notes.  Patient will be seen by physical therapist every sixth visit and minimally once per month.    Additional information: NA

## 2019-04-09 ENCOUNTER — CLINICAL SUPPORT (OUTPATIENT)
Dept: REHABILITATION | Facility: HOSPITAL | Age: 64
End: 2019-04-09
Attending: ORTHOPAEDIC SURGERY
Payer: COMMERCIAL

## 2019-04-09 DIAGNOSIS — M25.611 DECREASED RANGE OF MOTION OF RIGHT SHOULDER: ICD-10-CM

## 2019-04-09 DIAGNOSIS — M25.511 RIGHT SHOULDER PAIN, UNSPECIFIED CHRONICITY: ICD-10-CM

## 2019-04-09 DIAGNOSIS — R29.898 SHOULDER WEAKNESS: ICD-10-CM

## 2019-04-09 PROCEDURE — 97110 THERAPEUTIC EXERCISES: CPT | Mod: PN

## 2019-04-09 PROCEDURE — 97010 HOT OR COLD PACKS THERAPY: CPT | Mod: PN

## 2019-04-09 NOTE — PROGRESS NOTES
"Name: Moris BENTLEY [facility] Number: [id number]  Date of Treatment: 04/09/2019   Diagnosis:   Encounter Diagnoses   Name Primary?    Decreased range of motion of right shoulder     Shoulder weakness     Right shoulder pain, unspecified chronicity        Time in: 1100  Time Out: 1200  Total Treatment Time: 60  Group Time: 0      Subjective:    Moris BENTLEY reports improvement of symptoms.  Patient reports their pain to be 2/10 on a 0-10 scale with 0 being no pain and 10 being the worst pain imaginable.    Objective    Moris BENTLEY received therapeutic exercises to develop strength, endurance, ROM, flexibility and posture for 50 minutes including:     OHP 5' flexion and 5' abduction   Ladder crawls 10" x10 to L 31  Ball on wall x20 up/dwn, lateral,cw and ccw (small)  Wall wipes x20: cw and ccw (large), abd  Stand 3# cane x20: flexion, extension and IR behind back, IR/ER 90 abd  PNF D1&2 flexion and extension x10 with Yellow Tband  Shoulder flexion and horiz abd 2# with lean on chair x20     Cold pack to R shoulder 10' to decrease delayed onset muscle soreness    Pt demo good understanding of the education provided. Moris BENTLEY demonstrated good return demonstration of activities.     Assessment:     Pt will continue to benefit from skilled PT intervention. Medical Necessity is demonstrated by:  Pain limits function of effected part for some activities, Unable to participate fully in daily activities, Requires skilled supervision to complete and progress HEP and Weakness.    Plan:    Continue with established Plan of Care towards PT goals.   "

## 2019-04-11 ENCOUNTER — CLINICAL SUPPORT (OUTPATIENT)
Dept: REHABILITATION | Facility: HOSPITAL | Age: 64
End: 2019-04-11
Attending: ORTHOPAEDIC SURGERY
Payer: COMMERCIAL

## 2019-04-11 DIAGNOSIS — M25.511 RIGHT SHOULDER PAIN, UNSPECIFIED CHRONICITY: ICD-10-CM

## 2019-04-11 DIAGNOSIS — R29.898 SHOULDER WEAKNESS: ICD-10-CM

## 2019-04-11 DIAGNOSIS — M25.611 DECREASED RANGE OF MOTION OF RIGHT SHOULDER: ICD-10-CM

## 2019-04-11 PROCEDURE — 97010 HOT OR COLD PACKS THERAPY: CPT | Mod: PN

## 2019-04-11 PROCEDURE — 97110 THERAPEUTIC EXERCISES: CPT | Mod: PN

## 2019-04-11 NOTE — PROGRESS NOTES
"Name: Moris BENTLEY [facility] Number: [id number]  Date of Treatment: 04/11/2019   Diagnosis:   Encounter Diagnoses   Name Primary?    Decreased range of motion of right shoulder     Shoulder weakness     Right shoulder pain, unspecified chronicity        Time in: 1100  Time Out: 1200  Total Treatment Time: 60  Group Time: 0      Subjective:    Moris BENTLEY reports improvement of symptoms.  Patient reports their pain to be 0/10 on a 0-10 scale with 0 being no pain and 10 being the worst pain imaginable.    Objective    Moris BENTLEY received therapeutic exercises to develop strength, endurance, ROM, flexibility and posture for 50 minutes including:     OHP 5' flexion and 5' abduction  Ladder crawls 10" x10 to L 31  Ball on wall x20 up/dwn, lateral,cw and ccw (small)  Wall wipes x20: cw and ccw (large)  Stand 3# cane x20: flexion, extension and IR behind back, IR/ER abd 90  PNF D1&2 flexion and extension x20 with 5# on pulley   Push up plus off wall x20 B    Cold pack to R shoulder 10' to decrease delayed onset muscle soreness    Pt demo good understanding of the education provided. Moris BENTLEY demonstrated good return demonstration of activities.     Assessment:     Pt will continue to benefit from skilled PT intervention. Medical Necessity is demonstrated by:  Continued inability to participate in vocational pursuits, Unable to participate fully in daily activities, Requires skilled supervision to complete and progress HEP and Weakness.    Patient is making good progress towards established goals.    Plan:    Continue with established Plan of Care towards PT goals.   "

## 2019-04-16 ENCOUNTER — CLINICAL SUPPORT (OUTPATIENT)
Dept: REHABILITATION | Facility: HOSPITAL | Age: 64
End: 2019-04-16
Attending: ORTHOPAEDIC SURGERY
Payer: COMMERCIAL

## 2019-04-16 DIAGNOSIS — R29.898 SHOULDER WEAKNESS: ICD-10-CM

## 2019-04-16 DIAGNOSIS — M25.511 RIGHT SHOULDER PAIN, UNSPECIFIED CHRONICITY: ICD-10-CM

## 2019-04-16 DIAGNOSIS — M25.611 DECREASED RANGE OF MOTION OF RIGHT SHOULDER: ICD-10-CM

## 2019-04-16 PROCEDURE — 97110 THERAPEUTIC EXERCISES: CPT | Mod: PN

## 2019-04-16 NOTE — PATIENT INSTRUCTIONS
Strengthening: Shoulder Shrug (Phase 1)        Shrug shoulders up and down, forward and backward.  Repeat ____ times per set. Do ____ sets per session. Do ____ sessions per day.     https://[employer] Cub[employer]/336     Copyright © [employer]. All rights reserved.   ROM: Flexion - Wand        Bring wand directly over head, leading with right side. Reach back until stretch is felt. Hold ____ seconds.  Repeat ____ times per set. Do ____ sets per session. Do ____ sessions per day.     https://[employer] Cub[employer]/744     Copyright © [employer]. All rights reserved.   ROM: Abduction - Wand        Holding wand with left hand palm up, push wand directly out to side, leading with other hand palm down, until stretch is felt. Hold ____ seconds.  Repeat ____ times per set. Do ____ sets per session. Do ____ sessions per day.     https://[employer] Cub[employer]/746     Copyright © [employer]. All rights reserved.   ROM: External / Internal Rotation - Wand        Holding wand with left hand palm up, push out from body with other hand, palm down. Keep both elbows bent. When stretch is felt, hold ____ seconds. Repeat to other side, leading with same hand. Keep elbows bent.  Repeat ____ times per set. Do ____ sets per session. Do ____ sessions per day.     https://[employer] Cub[employer]/748     Copyright © [employer]. All rights reserved.   ROM: Flexion (Alternate)        Slide right arm up wall, with palm out, by leaning toward wall. Hold ____ seconds.  Repeat ____ times per set. Do ____ sets per session. Do ____ sessions per day.     https://[employer] Cub[employer]/758     Copyright © [employer]. All rights reserved.   ROM: Extension - Wand        Lift up from buttocks until stretch is felt. Hold ____ seconds.  Repeat ____ times per set. Do ____ sets per session. Do ____ sessions per day.     https://[employer] Cub[employer]/754     Copyright © [employer]. All rights reserved.   ROM: Flexion - Wand (Supine)        Lie on back holding wand. Raise arms over head.   Repeat ____ times per set. Do ____ sets per session. Do ____  sessions per day.     https://[email or web address] (V3D)/928     Copyright © [employer]. All rights reserved.   Scapular Retraction: Elbow Flexion (Standing)        With elbows bent to 90°, pinch shoulder blades together and rotate arms out, keeping elbows bent.  Repeat ____ times per set. Do ____ sets per session. Do ____ sessions per day.     https://[email or web address] (V3D)/948     Copyright © [employer]. All rights reserved.

## 2019-04-16 NOTE — PROGRESS NOTES
Name: Moris BENTLEY [facility] Number: [id number]  Date of Treatment: 04/16/2019   Diagnosis:   Encounter Diagnoses   Name Primary?    Decreased range of motion of right shoulder     Shoulder weakness     Right shoulder pain, unspecified chronicity        Time in: 1000  Time Out: 1055  Total Treatment Time: 55    Subjective:    Moris BENTLEY reports improvement of symptoms.  Patient reports no pain.     Objective:    Patient received individual therapy to increase strength, endurance, ROM, flexibility, posture and core stabilization with activities as follows:     Moris BENTLEY received therapeutic exercises to develop strength, endurance, ROM, flexibility, posture and core stabilization for 55 minutes including:     Supine shoulder flexion 20 with 2# wand  Supine concentric flexion 2# wand 10/2  Supine eccentric extn with 2# wand 10/2  Supine serratus punches 2# wand 10/2  Seated retro shoulder rolls B B 10/2  Standing finger ladder 10 x 10s R to L31  Seated OH pulley flexion 5'  Seated OH pulley scaption 5'  Standing flexion with 2# wand 10/2  Standing extn with 2# wand 10/2  Standing IR with extn 2# wand 10/2  Standing R aBd with 2# wand 10/2  Wall pushup plus 10/2 B  Prone 2# rows 10/2 R  Prone 2# horiz aBd 10/2 R  Seated ER with dowel R 10/2  Wall wipes cw/ccw large circles 10/2 R  Standing push up plus on // bars 15 B  Ball on wall scap stabilization 20 each: flex, extn, M-L, cw/ccw     Continue HEP daily. Written and pictorial HEP of ex's in EPIC reviewed and issued to pt. Pt instructed to perform HEP daily and stop if symptoms increase.     Pt demo good understanding of the education provided. Patient demonstrated good return demonstration of activities.     Assessment:     Progressing well with increasing ROM and scapular stabilization.     Pt will continue to benefit from skilled PT intervention. Medical Necessity is demonstrated by:  Requires skilled supervision to complete and progress HEP and Weakness.    Patient is  making good progress towards established goals.    Plan:    Continue with established Plan of Care towards PT goals.

## 2019-04-18 ENCOUNTER — CLINICAL SUPPORT (OUTPATIENT)
Dept: REHABILITATION | Facility: HOSPITAL | Age: 64
End: 2019-04-18
Attending: ORTHOPAEDIC SURGERY
Payer: COMMERCIAL

## 2019-04-18 DIAGNOSIS — R29.898 SHOULDER WEAKNESS: ICD-10-CM

## 2019-04-18 DIAGNOSIS — M25.611 DECREASED RANGE OF MOTION OF RIGHT SHOULDER: ICD-10-CM

## 2019-04-18 DIAGNOSIS — M25.511 RIGHT SHOULDER PAIN, UNSPECIFIED CHRONICITY: ICD-10-CM

## 2019-04-18 PROCEDURE — 97110 THERAPEUTIC EXERCISES: CPT | Mod: PN

## 2019-04-18 NOTE — PROGRESS NOTES
Name: Moris BENTLEY [facility] Number: [id number]  Date of Treatment: 04/18/2019   Diagnosis:   Encounter Diagnoses   Name Primary?    Decreased range of motion of right shoulder     Shoulder weakness     Right shoulder pain, unspecified chronicity        Time in: 1001  Time Out: 1100  Total Treatment Time: 55    Subjective:    Moris BENTLEY reports improvement of symptoms.  Patient reports no pain.     Objective:    Patient received individual therapy to increase strength, endurance, ROM, flexibility, posture and core stabilization with activities as follows:     Moris BENTLEY received therapeutic exercises to develop strength, endurance, ROM, flexibility, posture and core stabilization for 55 minutes including:     Supine shoulder flexion 20 with 2# wand  Supine concentric flexion 2# wand 10/2  Supine eccentric extn with 2# wand 10/2  Supine serratus punches 2# wand 10/2  Seated retro shoulder rolls B B 10/2  Standing finger ladder 10 x 10s R to L31  Seated OH pulley flexion 5'  Seated OH pulley scaption 5'  Standing flexion with 2# wand 10/2  Standing extn with 2# wand 10/2  Standing IR with extn 2# wand 10/2  Standing R aBd with 2# wand 10/2  Standing rows 25# B 10/2  Standing B shoulder extn 25# 10/2   Standing B high rows 25# 10/2  Wall pushup plus 10/2 B  Prone 2# rows 10/2 R  Prone 2# horiz aBd 10/2 R  Wall wipes cw/ccw large circles 10/2 R  Standing push up plus on // bars 20 B  Ball on wall scap stabilization 20 each: flex, extn, M-L, cw/ccw  Standing R shoulder IR with extn stretch with pulleys 10 x 10s    Continue HEP daily.    Pt demo good understanding of the education provided. Patient demonstrated good return demonstration of activities.     Assessment:     Improving scapular mobility with movements.     Pt will continue to benefit from skilled PT intervention. Medical Necessity is demonstrated by:  Requires skilled supervision to complete and progress HEP and Weakness.    Patient is making good progress towards  established goals.    Plan:    Continue with established Plan of Care towards PT goals.

## 2019-04-23 ENCOUNTER — CLINICAL SUPPORT (OUTPATIENT)
Dept: REHABILITATION | Facility: HOSPITAL | Age: 64
End: 2019-04-23
Attending: ORTHOPAEDIC SURGERY
Payer: COMMERCIAL

## 2019-04-23 DIAGNOSIS — R29.898 SHOULDER WEAKNESS: ICD-10-CM

## 2019-04-23 DIAGNOSIS — M25.611 DECREASED RANGE OF MOTION OF RIGHT SHOULDER: ICD-10-CM

## 2019-04-23 DIAGNOSIS — M25.511 RIGHT SHOULDER PAIN, UNSPECIFIED CHRONICITY: ICD-10-CM

## 2019-04-23 PROCEDURE — 97110 THERAPEUTIC EXERCISES: CPT | Mod: PN

## 2019-04-23 PROCEDURE — 97010 HOT OR COLD PACKS THERAPY: CPT | Mod: PN

## 2019-04-23 NOTE — PROGRESS NOTES
Name: Moris BENTLEY [facility] Number: [id number]  Date of Treatment: 04/23/2019   Diagnosis:   Encounter Diagnoses   Name Primary?    Decreased range of motion of right shoulder     Shoulder weakness     Right shoulder pain, unspecified chronicity        Time in: 1001  Time Out: 1100  Total Treatment Time: 55    Subjective:    Moris BENTLEY reports no pain but later reports discomfort lateral upper R arm with AAROM overhead (reported afterward OH pulley AAROM). Discussed with Geo, PT and cleared to resume as tolerated.     Objective:    Patient received individual therapy to increase strength, endurance, ROM, flexibility, posture and core stabilization with activities as follows:     Moris BENTLEY received therapeutic exercises to develop strength, endurance, ROM, flexibility, posture and core stabilization for 45 minutes including:     Supine shoulder flexion with 2# wand 10/2  Supine concentric flexion 2# wand 10/2  Supine eccentric extn with 2# wand 10/2  Standing shoulder flexion on wall with rag 10 x 10s  Seated OH pulley flexion 3'  Seated OH pulley scaption 3'  Standing flexion with 2# wand-attempted but stopped 2* discomfort  Standing extn with 2# wand 10/2  Standing IR with extn 2# wand 10/2  Standing rows 25# B 10/2  Standing B shoulder extn 25# 10/2   Wall pushup plus 10/2 B  Wall wipes cw/ccw large circles 10/2 R  Standing push up plus on // bars 20 B  Ball on wall scap stabilization 20 each: flex, extn, M-L, cw/ccw  Standing R shoulder IR with extn stretch with pulleys 10 x 10s    CP applied to the R shoulder x 10 minutes in seated position after being cleared for contraindications.    Continue HEP daily.    Pt demo good understanding of the education provided. Patient demonstrated good return demonstration of activities.     Assessment:     Limited AROM R shoulder 2* discomfort, PROM WFL.     Pt will continue to benefit from skilled PT intervention. Medical Necessity is demonstrated by:  Requires skilled supervision  to complete and progress HEP and Weakness.    Patient is making good progress towards established goals.    Plan:    Continue with established Plan of Care towards PT goals.

## 2019-04-26 ENCOUNTER — CLINICAL SUPPORT (OUTPATIENT)
Dept: REHABILITATION | Facility: HOSPITAL | Age: 64
End: 2019-04-26
Attending: ORTHOPAEDIC SURGERY
Payer: COMMERCIAL

## 2019-04-26 DIAGNOSIS — M25.611 DECREASED RANGE OF MOTION OF RIGHT SHOULDER: ICD-10-CM

## 2019-04-26 DIAGNOSIS — R29.898 SHOULDER WEAKNESS: ICD-10-CM

## 2019-04-26 DIAGNOSIS — Z98.890 S/P RIGHT ROTATOR CUFF REPAIR: Primary | ICD-10-CM

## 2019-04-26 PROCEDURE — 97110 THERAPEUTIC EXERCISES: CPT | Mod: PN

## 2019-04-26 NOTE — PLAN OF CARE
TIME RECORD    Date: 04/26/2019    Start Time:  1100  Stop Time:  1150    PROCEDURES:    TIMED  Procedure Time Min.   There ex Start:1100  Stop:1145     Start:  Stop:     Start:  Stop:     Start:  Stop:          UNTIMED  Procedure Time Min.    Start:  Stop:     Start:  Stop:      Total Timed Minutes:  45  Total Timed Units:  3  Total Untimed Units:  0  Charges Billed/# of units:  3    PHYSICAL THERAPY UPDATED PLAN OF TREATMENT    Patient name: Moris Calderón  Onset Date:  12/21/18  SOC Date:  02/06/19  Primary Diagnosis:    1. S/P right rotator cuff repair     2. Decreased range of motion of right shoulder     3. Shoulder weakness       Treatment Diagnosis:  debility due to recent rt RCR  Certification Period:  03/19/19 to 04/27/19  Precautions:  see protocol  Visits from SOC:  22  Functional Level Prior to SOC:  Independent    Treatment:    X 15 ea supine AAROM rt shoulder = flex/ABD/ER/IR  X 10 ladder crawls rt shoulder  X 5'/5' OHP (flex/scap)   X 20 ea rt scapular stab there ex w/ ball on wall = med/lat, sup/inf, cw/ccw    Updated Assessment:  AROM rt sh from supine (degrees): Flex=155, ABD=147, ER=67, IR=73; UEFI score = 60/80    Previous Short Term Goals Status:     1.  Improve rt shoulder active flexion to about 145 degrees (MET)  2.  Improve rt shoulder active ABD to about 120 degrees (MET)  3.  obtain 60 degrees ea of active rt shoulder IR/ER (MET)    New Short Term Goals:     1.  Kamaljit. use of 3# dumbbell w/ rt shoulder there ex  2.  Improve rt shoulder active ER to 70 degrees (supine)  3.  kamaljit. use of GTB w/ rt shoulder IR/ER t-band there ex    Previous Long Term Goals Status:     1.  Improve AROM rt shoulder to WFL in all planes (PART MET)  2.  Tolerate t-band there ex w/ IR/ER (MET)  3.  Improve UEFI score to 65/80 (NOT MET)  4.  Demo comp w/ HEP (NOT MET)    New Long Term Goals:  1.  Improve AROM rt shoulder to WFL in all planes  2.  Improve UEFI score to 63/80  3.  Demo comp w/ HEP    Reasons for  Recertification of Therapy:   Patient would benefit from further PT visits to cont. addressing ROM/strength deficits.    Certification Period: 04/26/19 to 06/08/19  Recommended Treatment Plan: 2 times per week for 6 weeks (starting wk of 04/28/19):  Electrical Stimulation for pain, Manual Therapy, Moist Heat/ Ice, Patient Education, Therapeutic Activites, Therapeutic Exercise, Ultrasound and HEP   Other Recommendations: NA        Therapist's Name: Geo Mane, PT   Date: 04/26/2019    I CERTIFY THE NEED FOR THESE SERVICES FURNISHED UNDER THIS PLAN OF TREATMENT AND WHILE UNDER MY CARE    Physician's comments: ________________________________________________________________________________________________________________________________________________      Physician's Name: ___________________________________

## 2019-04-26 NOTE — PROGRESS NOTES
Patient would benefit from further PT visits to cont. addressing ROM/strength deficits.  Pls see updated POC.

## 2019-04-30 ENCOUNTER — CLINICAL SUPPORT (OUTPATIENT)
Dept: REHABILITATION | Facility: HOSPITAL | Age: 64
End: 2019-04-30
Attending: ORTHOPAEDIC SURGERY
Payer: COMMERCIAL

## 2019-04-30 DIAGNOSIS — Z98.890 S/P RIGHT ROTATOR CUFF REPAIR: Primary | ICD-10-CM

## 2019-04-30 DIAGNOSIS — M25.611 DECREASED RANGE OF MOTION OF RIGHT SHOULDER: ICD-10-CM

## 2019-04-30 DIAGNOSIS — R29.898 SHOULDER WEAKNESS: ICD-10-CM

## 2019-04-30 PROCEDURE — 97110 THERAPEUTIC EXERCISES: CPT | Mod: PN

## 2019-04-30 NOTE — PROGRESS NOTES
Name: Moris Calderón  Clinic Number: [id number]  Date of Treatment: 04/30/2019   Diagnosis:   Encounter Diagnoses   Name Primary?    S/P right rotator cuff repair Yes    Decreased range of motion of right shoulder     Shoulder weakness        Time in: 1400  Time Out: 1500  Total Treatment Time: 60  Group Time: NA      Subjective:    Moris BENTLEY reports mild pain levels in lateral aspect of rt shoulder.  Patient reports their pain to be 2/10 on a 0-10 scale with 0 being no pain and 10 being the worst pain imaginable.    Objective    Moris BENTLEY received therapeutic exercises to develop strength, ROM and flexibility for 60 minutes including:     X 15 ea supine AAROM rt shoulder = flex/ABD/ER/IR  X 20 ea standing t-bar there ex rt shoulder (5#) = flex/ABD/IR/ext  X 10 ladder crawls rt shoulder  X 5'/5' UBE (L1)  X 20 ea rt scapular stab there ex w/ ball on wall = med/lat, sup/inf, cw/ccw  X 20 ea rt shoulder towel wall circles (big) = cw/ccw  X 20 supine rt shoulder serratus punches (3#)  X 20 supine conc/ecc rt shoulder flex (3#)  X 20 supine conc/ecc rt shoulder ext (3#)  X 20 ea prone rt shoulder hor ABD and rows (3#)  X 20 standing push up plus against // bars  X 20 ea pulley there ex (30#) = rows, lat pull downs, high rows    Assessment:     Mr. Calderón was able to kamaljit. tx session w/out increase in symptoms.    Pt will continue to benefit from skilled PT intervention. Medical Necessity is demonstrated by:  Pain limits function of effected part for some activities, Unable to participate fully in daily activities and Weakness.    Patient is making good progress towards established goals.    New/Revised goals: NA      Plan:  Continue with established Plan of Care towards PT goals.

## 2019-05-02 ENCOUNTER — CLINICAL SUPPORT (OUTPATIENT)
Dept: REHABILITATION | Facility: HOSPITAL | Age: 64
End: 2019-05-02
Attending: ORTHOPAEDIC SURGERY
Payer: COMMERCIAL

## 2019-05-02 DIAGNOSIS — M25.611 DECREASED RANGE OF MOTION OF RIGHT SHOULDER: ICD-10-CM

## 2019-05-02 DIAGNOSIS — Z98.890 S/P RIGHT ROTATOR CUFF REPAIR: Primary | ICD-10-CM

## 2019-05-02 DIAGNOSIS — R29.898 SHOULDER WEAKNESS: ICD-10-CM

## 2019-05-02 PROCEDURE — 97110 THERAPEUTIC EXERCISES: CPT | Mod: PN

## 2019-05-02 NOTE — PROGRESS NOTES
Name: Moris Calderón  Clinic Number: [id number]  Date of Treatment: 05/02/2019   Diagnosis:   Encounter Diagnoses   Name Primary?    S/P right rotator cuff repair Yes    Decreased range of motion of right shoulder     Shoulder weakness        Time in: 1600  Time Out: 1650  Total Treatment Time: 50  Group Time: NA      Subjective:    Moris BENTLEY reports no pain in rt shoulder.  Patient reports their pain to be n/a/10 on a 0-10 scale with 0 being no pain and 10 being the worst pain imaginable.    Objective    Moris BENTLEY received therapeutic exercises to develop strength, ROM and flexibility for 50 minutes including:     X 20 ball throws off trampoline (1 kg)  X 20 ea standing t-bar there ex rt shoulder (5#) = flex/ABD/IR/ext  X 10 ladder crawls rt shoulder  X 5'/5' UBE (L1)  X 20 ea rt scapular stab there ex w/ ball on wall = med/lat, sup/inf, cw/ccw  X 20 ea rt shoulder towel wall circles (big) = cw/ccw  X 20 supine rt shoulder serratus punches (3#)  X 20 supine conc/ecc rt shoulder flex (3#)  X 20 supine conc/ecc rt shoulder ext (3#)  X 20 ea prone rt shoulder hor ABD and rows (3#)  X 20 standing push up plus against // bars  X 20 ea pulley there ex (30#) = rows, lat pull downs, high rows    Assessment:     Mr. Calderón was able to kamaljit. tx session w/out increase in symptoms.    Pt will continue to benefit from skilled PT intervention. Medical Necessity is demonstrated by:  Unable to participate fully in daily activities and Weakness.    Patient is making good progress towards established goals.    New/Revised goals: NA      Plan:  Continue with established Plan of Care towards PT goals.

## 2019-05-07 ENCOUNTER — CLINICAL SUPPORT (OUTPATIENT)
Dept: REHABILITATION | Facility: HOSPITAL | Age: 64
End: 2019-05-07
Attending: ORTHOPAEDIC SURGERY
Payer: COMMERCIAL

## 2019-05-07 DIAGNOSIS — M25.611 DECREASED RANGE OF MOTION OF RIGHT SHOULDER: ICD-10-CM

## 2019-05-07 DIAGNOSIS — R29.898 SHOULDER WEAKNESS: ICD-10-CM

## 2019-05-07 DIAGNOSIS — Z98.890 S/P RIGHT ROTATOR CUFF REPAIR: Primary | ICD-10-CM

## 2019-05-07 PROCEDURE — 97110 THERAPEUTIC EXERCISES: CPT | Mod: PN

## 2019-05-07 NOTE — PROGRESS NOTES
Name: Moris Calderón  Clinic Number: [id number]  Date of Treatment: 05/07/2019   Diagnosis:   Encounter Diagnoses   Name Primary?    S/P right rotator cuff repair Yes    Decreased range of motion of right shoulder     Shoulder weakness        Time in: 1300  Time Out: 1355  Total Treatment Time: 55  Group Time: NA      Subjective:    Moris BENTLEY reports no c/o pain at rest.  Patient reports their pain to be n/a/10 on a 0-10 scale with 0 being no pain and 10 being the worst pain imaginable.    Objective    Moris BENTLEY received therapeutic exercises to develop strength, endurance and flexibility for 55 minutes including:     X 20 ball throws off trampoline (1 kg)  X 20 ea standing t-bar there ex rt shoulder (5#) = flex/ABD/IR/ext  X 10 ladder crawls rt shoulder  X 20 ea t-band IR/ER (GTB)  Amb. 100 ft w/ 3# wt overhead  X 15 AAROM rt shoulder (supine) = flex, ABD, IR/ER  X 5'/5' UBE (L1)  X 20 ball throws off trampoline (2 kg)  X 20 ea rt shoulder towel wall circles (big) = cw/ccw  X 20 supine rt shoulder serratus punches (3#)  X 20 supine conc/ecc rt shoulder flex (3#)  X 20 supine conc/ecc rt shoulder ext (3#)  X 20 ea prone rt shoulder hor ABD and rows (3#)  X 20 standing push up plus against plinthe  X 20 ea pulley scapular there ex (30#) = rows, lat pull downs, high rows    Assessment:     Mr. Calderón was able to kamaljit. tx session w/out increase in symptoms.    Pt will continue to benefit from skilled PT intervention. Medical Necessity is demonstrated by:  Unable to participate fully in daily activities and Weakness.    Patient is making good progress towards established goals.    New/Revised goals: NA      Plan:  Continue with established Plan of Care towards PT goals.

## 2019-05-10 ENCOUNTER — CLINICAL SUPPORT (OUTPATIENT)
Dept: REHABILITATION | Facility: HOSPITAL | Age: 64
End: 2019-05-10
Attending: ORTHOPAEDIC SURGERY
Payer: COMMERCIAL

## 2019-05-10 DIAGNOSIS — M25.611 DECREASED RANGE OF MOTION OF RIGHT SHOULDER: ICD-10-CM

## 2019-05-10 DIAGNOSIS — R29.898 SHOULDER WEAKNESS: ICD-10-CM

## 2019-05-10 DIAGNOSIS — M25.511 RIGHT SHOULDER PAIN, UNSPECIFIED CHRONICITY: ICD-10-CM

## 2019-05-10 PROCEDURE — 97110 THERAPEUTIC EXERCISES: CPT | Mod: PN

## 2019-05-10 NOTE — PROGRESS NOTES
"Name: Moris BENTLEY [facility] Number: [id number]  Date of Treatment: 05/10/2019   Diagnosis:   Encounter Diagnoses   Name Primary?    Decreased range of motion of right shoulder     Shoulder weakness     Right shoulder pain, unspecified chronicity        Time in: 1005  Time Out: 1100  Total Treatment Time: 55  Group Time: 0      Subjective:    Moris BENTLEY reports improvement of symptoms.  Patient reports their pain to be n/a/10 on a 0-10 scale with 0 being no pain and 10 being the worst pain imaginable.    Objective    Moris BENTLEY received therapeutic exercises to R UE to develop strength, endurance, ROM and flexibility for 55 minutes including:     UBE 5'/5'  Ladder crawls 10" x10 to L 31  Ball on wall x20 up/dwn, lateral,cw and ccw (small)  Wall wipes x20: cw and ccw (large)  Stand 5# cane x20: flexion, extension and IR behind back, abd  PNF D1&2 flexion and extension x20 with yellow Tband  Push up plus off mat x20 B  Ball toss on trampoline x20  Plinth ex x20 3#: rows, flexion, scaption, horiz abd B  Pulleys 30# 2x20: rows, high rows, ext to neutral    Pt demo good understanding of the education provided. Moris BENTLEY demonstrated good return demonstration of activities.     Assessment:     Pt will continue to benefit from skilled PT intervention. Medical Necessity is demonstrated by:  Requires skilled supervision to complete and progress HEP and Weakness.    Patient is making good progress towards established goals.    Plan:    Continue with established Plan of Care towards PT goals.   "

## 2019-05-14 ENCOUNTER — CLINICAL SUPPORT (OUTPATIENT)
Dept: REHABILITATION | Facility: HOSPITAL | Age: 64
End: 2019-05-14
Attending: ORTHOPAEDIC SURGERY
Payer: COMMERCIAL

## 2019-05-14 DIAGNOSIS — M25.611 DECREASED RANGE OF MOTION OF RIGHT SHOULDER: ICD-10-CM

## 2019-05-14 DIAGNOSIS — M25.511 RIGHT SHOULDER PAIN, UNSPECIFIED CHRONICITY: ICD-10-CM

## 2019-05-14 DIAGNOSIS — R29.898 SHOULDER WEAKNESS: ICD-10-CM

## 2019-05-14 PROCEDURE — 97010 HOT OR COLD PACKS THERAPY: CPT | Mod: PN

## 2019-05-14 PROCEDURE — 97110 THERAPEUTIC EXERCISES: CPT | Mod: PN

## 2019-05-14 NOTE — PROGRESS NOTES
"Name: Moris BENTLEY [facility] Number: [id number]  Date of Treatment: 05/14/2019   Diagnosis:   Encounter Diagnoses   Name Primary?    Decreased range of motion of right shoulder     Shoulder weakness     Right shoulder pain, unspecified chronicity        Time in: 1055  Time Out: 1200  Total Treatment Time: 65  Group Time: 0      Subjective:    Moris BENTLEY reports improvement of symptoms.  Patient reports their pain to be 0/10 on a 0-10 scale with 0 being no pain and 10 being the worst pain imaginable.    Objective    Moris BENTLEY received therapeutic exercises to R UE to develop strength, endurance and flexibility for 55 minutes including:     UBE 5'/5'  Ladder crawls 10" x10 to L 31  Ball on wall x20 up/dwn, lateral,cw and ccw (small)  Wall wipes x20: cw and ccw (large)  Stand 5# cane x20: flexion, extension and IR behind back, abd  PNF D1&2 flexion and extension x20 with yellow Tband  Push up plus off mat x20 B  Ball toss on trampoline x20  Plinth ex x20 3#: rows, flexion, scaption, horiz abd B  Pulleys 35# 2x20: rows, high rows, ext to neutral  Amb with overhead weight 3# 150'    Cold pack 10' to R shoulder to decrease soreness    Pt demo good understanding of the education provided. Moris BENTLEY demonstrated good return demonstration of activities.     Assessment:     Pt will continue to benefit from skilled PT intervention. Medical Necessity is demonstrated by:  Unable to participate fully in daily activities and Requires skilled supervision to complete and progress HEP.    Patient is making good progress towards established goals.    Plan:    Continue with established Plan of Care towards PT goals.   "

## 2019-05-16 ENCOUNTER — CLINICAL SUPPORT (OUTPATIENT)
Dept: REHABILITATION | Facility: HOSPITAL | Age: 64
End: 2019-05-16
Attending: ORTHOPAEDIC SURGERY
Payer: COMMERCIAL

## 2019-05-16 DIAGNOSIS — R29.898 SHOULDER WEAKNESS: ICD-10-CM

## 2019-05-16 DIAGNOSIS — Z98.890 S/P RIGHT ROTATOR CUFF REPAIR: Primary | ICD-10-CM

## 2019-05-16 DIAGNOSIS — M25.611 DECREASED RANGE OF MOTION OF RIGHT SHOULDER: ICD-10-CM

## 2019-05-16 PROCEDURE — 97110 THERAPEUTIC EXERCISES: CPT | Mod: PN

## 2019-05-16 PROCEDURE — 97010 HOT OR COLD PACKS THERAPY: CPT | Mod: PN

## 2019-05-16 NOTE — PROGRESS NOTES
Name: Moris Calderón  Clinic Number: [id number]  Date of Treatment: 05/16/2019   Diagnosis:   Encounter Diagnoses   Name Primary?    S/P right rotator cuff repair Yes    Decreased range of motion of right shoulder     Shoulder weakness        Time in: 1000  Time Out: 1100  Total Treatment Time: 60  Group Time: NA      Subjective:    Mrois BENTLEY reports no real c/o pain at rest.  Patient reports their pain to be n/a/10 on a 0-10 scale with 0 being no pain and 10 being the worst pain imaginable.    Objective    Moris BENTLEY received therapeutic exercises to develop strength, endurance and flexibility for 60 minutes including:     X 5'/5' OHP (flex/scap)  X 20 ea PNF D1/D2  X 20 ball throws off trampoline (3 kg)  X 20 ea standing t-bar there ex rt shoulder (8#) = flex/ABD/IR/ext  X 20 ea towel wall slides (1#) = flex/ABD  X 20 ea t-band IR/ER (GTB)  X 20 ea rt shoulder towel wall circles (1#) = cw/ccw  X 20 ea ball on wall rt scapular stab. there ex (2# ball) = med/lat, sup/inf, cw/ccw  X 20 standing push up plus against plinthe  X 20 ea pulley scapular there ex (35#) = rows, lat pull downs, high rows   X 12 min ice    Assessment:     Mr. Calderón was able to kamaljit. tx session w/out increase in symptoms.    Pt will continue to benefit from skilled PT intervention. Medical Necessity is demonstrated by:  Pain limits function of effected part for some activities, Unable to participate fully in daily activities and Weakness.    Patient is making good progress towards established goals.    New/Revised goals: NA      Plan:  Continue with established Plan of Care towards PT goals.

## 2019-05-23 ENCOUNTER — CLINICAL SUPPORT (OUTPATIENT)
Dept: REHABILITATION | Facility: HOSPITAL | Age: 64
End: 2019-05-23
Attending: ORTHOPAEDIC SURGERY
Payer: COMMERCIAL

## 2019-05-23 DIAGNOSIS — M25.611 DECREASED RANGE OF MOTION OF RIGHT SHOULDER: ICD-10-CM

## 2019-05-23 DIAGNOSIS — M25.511 RIGHT SHOULDER PAIN, UNSPECIFIED CHRONICITY: ICD-10-CM

## 2019-05-23 DIAGNOSIS — R29.898 SHOULDER WEAKNESS: ICD-10-CM

## 2019-05-23 PROCEDURE — 97110 THERAPEUTIC EXERCISES: CPT | Mod: PN

## 2019-05-23 PROCEDURE — 97010 HOT OR COLD PACKS THERAPY: CPT | Mod: PN

## 2019-05-23 NOTE — PROGRESS NOTES
[id number]  Date of Treatment: 05/23/2019   Diagnosis:   Encounter Diagnoses   Name Primary?    Decreased range of motion of right shoulder     Shoulder weakness     Right shoulder pain, unspecified chronicity        Time in: 1055  Time Out: 1155  Total Treatment Time: 60  Group Time: 0      Subjective:    Moris BENTLEY reports improvement of symptoms.  Patient reports their pain to be n/a/10 on a 0-10 scale with 0 being no pain and 10 being the worst pain imaginable.    Objective    Moris BENTLEY received therapeutic exercises to R UE to develop strength, endurance, ROM and flexibility for 50 minutes including:     UBE 5'/5'  OHP 5' flexion  Wall wipes x20 1#: cw and ccw (large), flexion, abd/add  Stand 8# cane x20: flexion, abd,  extension and IR behind back  PNF D1&2 flexion and extension x20 with Red Tband  Push up plus off mat x20 B  Pulleys 35# x20: rows, high rows, ext to neutral     Cold pack 10' to R shoulder to decrease soreness     Pt demo good understanding of the education provided. Moris BENTLEY demonstrated good return demonstration of activities with cues for proper form.     Assessment:     Pt will continue to benefit from skilled PT intervention. Medical Necessity is demonstrated by:  Unable to participate fully in daily activities, Requires skilled supervision to complete and progress HEP and Weakness.    Patient is making good progress towards established goals.    Plan:    Continue with established Plan of Care towards PT goals.    Reason for Call: Request for an order or referral:    Order or referral being requested: for home care, Kaycee needs order for:skilled nursing, twice a month for 1 month, and 1 PRN visit, continue wound care to right foot and continue home health aid     Date needed: as soon as possible    Has the patient been seen by the PCP for this problem? YES    Additional comments: Kaycee calling from home care needs orders for home care, Kaycee went to see her today, and patient was not is her hotel room, Kaycee did not know where she was, but Kaycee did get a voice mail from patient letting her know that she is in [place] staying with her daughter for the night. So Kaycee was not able to make a visit today, visit was missed    Phone number Patient can be reached at:  Other phone number:  [phone]    Best Time:  Any     Can we leave a detailed message on this number?  YES    Call taken on 8/11/2017 at 4:09 PM by Audrey Carbajal

## 2019-05-29 ENCOUNTER — CLINICAL SUPPORT (OUTPATIENT)
Dept: REHABILITATION | Facility: HOSPITAL | Age: 64
End: 2019-05-29
Attending: ORTHOPAEDIC SURGERY
Payer: COMMERCIAL

## 2019-05-29 DIAGNOSIS — M25.511 RIGHT SHOULDER PAIN, UNSPECIFIED CHRONICITY: ICD-10-CM

## 2019-05-29 DIAGNOSIS — R29.898 SHOULDER WEAKNESS: ICD-10-CM

## 2019-05-29 DIAGNOSIS — M25.611 DECREASED RANGE OF MOTION OF RIGHT SHOULDER: ICD-10-CM

## 2019-05-29 PROCEDURE — 97010 HOT OR COLD PACKS THERAPY: CPT | Mod: PN

## 2019-05-29 PROCEDURE — 97110 THERAPEUTIC EXERCISES: CPT | Mod: PN

## 2019-05-29 NOTE — PROGRESS NOTES
[id number]  Date of Treatment: 05/29/2019   Diagnosis:   Encounter Diagnoses   Name Primary?    Decreased range of motion of right shoulder     Shoulder weakness     Right shoulder pain, unspecified chronicity        Time in: 1107  Time Out: 1212  Total Treatment Time: 63    Subjective:    Moris BENTLEY reports improvement of symptoms.  Patient reports no pain. Had upper back and L LB discomfort during his trip to [place].     Objective:    Patient received individual therapy to increase strength, endurance, ROM, flexibility, posture and core stabilization with activities as follows:     Moris BENTLEY received therapeutic exercises to develop strength, endurance, ROM, flexibility, posture and core stabilization for 53 minutes including:     Seated UBE 5/5 L1 10'  Seated OH pulley flexion 5'  Standing GTB IR 10/2 R  Standing GTB ER 10/2 R  Standing pulley ex's 10/2 30#     Rows     extn     High rows  Overhead walking 3# R 80'  Wall wipes 10/2: flex, extn, cw/ccw  Standing 8# wand ex's 10/2:     Flex     extn     IR with extn     aBd  Standing pushup plus on plinthe x 20  Supine serratus punches 3# 10/2 R  Supine R concentric/eccentric flexion 3# 10/2  Supine R concentric/eccentric extn 3# 10/2     CP applied to the R shoulder x 10 minutes in seated position after being cleared for contraindications.    Continue HEP daily.    Pt demo good understanding of the education provided. Patient demonstrated good return demonstration of activities.     Assessment:     Initial difficulty relaxing mm for flexion PROM which improved with mobility.     Pt will continue to benefit from skilled PT intervention. Medical Necessity is demonstrated by:  Requires skilled supervision to complete and progress HEP and Weakness.    Patient is making good progress towards established goals.    Plan:    Continue with established Plan of Care towards PT goals.

## 2019-05-31 ENCOUNTER — CLINICAL SUPPORT (OUTPATIENT)
Dept: REHABILITATION | Facility: HOSPITAL | Age: 64
End: 2019-05-31
Payer: COMMERCIAL

## 2019-05-31 DIAGNOSIS — Z98.890 S/P RIGHT ROTATOR CUFF REPAIR: Primary | ICD-10-CM

## 2019-05-31 DIAGNOSIS — M25.611 DECREASED RANGE OF MOTION OF RIGHT SHOULDER: ICD-10-CM

## 2019-05-31 DIAGNOSIS — R29.898 SHOULDER WEAKNESS: ICD-10-CM

## 2019-05-31 PROCEDURE — 97110 THERAPEUTIC EXERCISES: CPT | Mod: PN

## 2019-06-03 ENCOUNTER — CLINICAL SUPPORT (OUTPATIENT)
Dept: REHABILITATION | Facility: HOSPITAL | Age: 64
End: 2019-06-03
Attending: ORTHOPAEDIC SURGERY
Payer: COMMERCIAL

## 2019-06-03 ENCOUNTER — OFFICE VISIT (OUTPATIENT)
Dept: ORTHOPEDICS | Facility: CLINIC | Age: 64
End: 2019-06-03
Payer: COMMERCIAL

## 2019-06-03 VITALS
HEART RATE: 57 BPM | BODY MASS INDEX: 31.83 KG/M2 | DIASTOLIC BLOOD PRESSURE: 79 MMHG | HEIGHT: 68 IN | WEIGHT: 210 LBS | SYSTOLIC BLOOD PRESSURE: 152 MMHG

## 2019-06-03 DIAGNOSIS — R29.898 SHOULDER WEAKNESS: ICD-10-CM

## 2019-06-03 DIAGNOSIS — M25.611 DECREASED RANGE OF MOTION OF RIGHT SHOULDER: ICD-10-CM

## 2019-06-03 DIAGNOSIS — M75.121 COMPLETE TEAR OF RIGHT ROTATOR CUFF: Primary | ICD-10-CM

## 2019-06-03 DIAGNOSIS — M25.511 RIGHT SHOULDER PAIN, UNSPECIFIED CHRONICITY: ICD-10-CM

## 2019-06-03 PROCEDURE — 3077F PR MOST RECENT SYSTOLIC BLOOD PRESSURE >= 140 MM HG: ICD-10-PCS | Mod: CPTII,S$GLB,, | Performed by: ORTHOPAEDIC SURGERY

## 2019-06-03 PROCEDURE — 3078F PR MOST RECENT DIASTOLIC BLOOD PRESSURE < 80 MM HG: ICD-10-PCS | Mod: CPTII,S$GLB,, | Performed by: ORTHOPAEDIC SURGERY

## 2019-06-03 PROCEDURE — 97110 THERAPEUTIC EXERCISES: CPT | Mod: PN

## 2019-06-03 PROCEDURE — 99999 PR PBB SHADOW E&M-EST. PATIENT-LVL III: CPT | Mod: PBBFAC,,, | Performed by: ORTHOPAEDIC SURGERY

## 2019-06-03 PROCEDURE — 3008F BODY MASS INDEX DOCD: CPT | Mod: CPTII,S$GLB,, | Performed by: ORTHOPAEDIC SURGERY

## 2019-06-03 PROCEDURE — 99999 PR PBB SHADOW E&M-EST. PATIENT-LVL III: ICD-10-PCS | Mod: PBBFAC,,, | Performed by: ORTHOPAEDIC SURGERY

## 2019-06-03 PROCEDURE — 97010 HOT OR COLD PACKS THERAPY: CPT | Mod: PN

## 2019-06-03 PROCEDURE — 3008F PR BODY MASS INDEX (BMI) DOCUMENTED: ICD-10-PCS | Mod: CPTII,S$GLB,, | Performed by: ORTHOPAEDIC SURGERY

## 2019-06-03 PROCEDURE — 3078F DIAST BP <80 MM HG: CPT | Mod: CPTII,S$GLB,, | Performed by: ORTHOPAEDIC SURGERY

## 2019-06-03 PROCEDURE — 99213 OFFICE O/P EST LOW 20 MIN: CPT | Mod: S$GLB,,, | Performed by: ORTHOPAEDIC SURGERY

## 2019-06-03 PROCEDURE — 3077F SYST BP >= 140 MM HG: CPT | Mod: CPTII,S$GLB,, | Performed by: ORTHOPAEDIC SURGERY

## 2019-06-03 PROCEDURE — 99213 PR OFFICE/OUTPT VISIT, EST, LEVL III, 20-29 MIN: ICD-10-PCS | Mod: S$GLB,,, | Performed by: ORTHOPAEDIC SURGERY

## 2019-06-03 NOTE — PROGRESS NOTES
Past Medical History:   Diagnosis Date    Anxiety     Anxiety     Arthritis     Back problem     bulging disc    GERD (gastroesophageal reflux disease)     Hypercholesterolemia 11/2/2015    2009: Began statin.    Hyperlipidemia     Hypertension     Hypertension, benign 11/2/2015    2008: Diagnosed.    Sleep apnea     uses c-pap       Past Surgical History:   Procedure Laterality Date    APPENDECTOMY      ARTHROSCOPY, SHOULDER, WITH SUBACROMIAL SPACE DECOMPRESSION Right 12/21/2018    [facility]    ARTHROSCOPY-SHOULDER EXCISION DISTAL CLAVICLE Right 5/18/2018    [facility]    ARTHROSCOPY-SHOULDER WITH SUBACROMIAL DECOMPRESSION Right 5/18/2018    [facility]    magdalene      HEART CATH-LEFT Left 12/10/2015    [facility] Maine Pozo [facility]    KNEE ARTHROSCOPY W/ MENISCAL REPAIR Left 2015    MMT      Left knee A-scope    NOSE SURGERY      REPAIR ROTATOR CUFF ARTHROSCOPIC Right 5/18/2018    [facility]    REPAIR, ROTATOR CUFF, ARTHROSCOPIC Right 12/21/2018    [facility]    REPAIR, TENDON, BICEPS Right 12/21/2018    [facility]    UVULECTOMY         Current Outpatient Medications   Medication Sig    atorvastatin (LIPITOR) 10 MG tablet TAKE 1 TABLET ONCE DAILY    benazepril (LOTENSIN) 20 MG tablet TAKE 1 TABLET ONCE DAILY    buPROPion (WELLBUTRIN) 100 MG tablet Take 100 mg by mouth once daily.     fluticasone (FLONASE) 50 mcg/actuation nasal spray 1 spray by Each Nare route once daily.    ibuprofen (ADVIL,MOTRIN) 800 MG tablet Take 1 tablet (800 mg total) by mouth 3 (three) times daily.    oxyCODONE-acetaminophen (PERCOCET) 5-325 mg per tablet Take 1 tablet by mouth every 4 (four) hours as needed for Pain.    sertraline (ZOLOFT) 50 MG tablet Take 50 mg  by mouth once daily.     No current facility-administered medications for this visit.        Review of patient's allergies indicates:   Allergen Reactions    Adhesive Rash       Family History   Problem Relation Age of Onset    Glaucoma Father     Melanoma Neg Hx     Psoriasis Neg Hx     Lupus Neg Hx     Eczema Neg Hx        Social History     Socioeconomic History    Marital status:      Spouse name: Not on file    Number of children: Not on file    Years of education: Not on file    Highest education level: Not on file   Occupational History    Not on file   Social Needs    Financial resource strain: Not on file    Food insecurity:     Worry: Not on file     Inability: Not on file    Transportation needs:     Medical: Not on file     Non-medical: Not on file   Tobacco Use    Smoking status: Never Smoker    Smokeless tobacco: Never Used   Substance and Sexual Activity    Alcohol use: Yes     Comment: social    Drug use: No    Sexual activity: Yes   Lifestyle    Physical activity:     Days per week: Not on file     Minutes per session: Not on file    Stress: Not on file   Relationships    Social connections:     Talks on phone: Not on file     Gets together: Not on file     Attends Synagogue service: Not on file     Active member of club or organization: Not on file     Attends meetings of clubs or organizations: Not on file     Relationship status: Not on file   Other Topics Concern    Not on file   Social History Narrative    Not on file       Chief Complaint:   Chief Complaint   Patient presents with    Post-op Evaluation     s/p right shoulder scope 12/21/18        Date of surgery:  December 21, 2018    History of present illness:  63-year-old male who underwent right rotator cuff repair after a shoulder dislocation.  Patient has very little pain.  Axillary nerve issue has resolved.  Working on strengthening with PT.  Pain of 2/10.    Review of Systems:    Musculoskeletal:  See  HPI        Physical Examination:    Vital Signs:    Vitals:    06/03/19 1056   BP: (!) 152/79   Pulse: (!) 57       Body mass index is 31.93 kg/m².    This a well-developed, well nourished patient in no acute distress.  They are alert and oriented and cooperative to examination.  Pt. walks without an antalgic gait.      Examination of the right shoulder shows healed surgical portals.  No erythema or drainage.    Patient has full active and passive range of motion. Working on strengthening.  4/5 at this point.    X-rays:  None     Assessment::  Status post right arthroscopic rotator cuff repair  post shoulder dislocation     Plan:  I reviewed the findings with him today.  He is doing great with physical therapy.    He is working on strengthening at this point.  Follow up in 3 months.    This note was created using M Mod[employer] voice recognition software that occasionally misinterpreted phrases or words.

## 2019-06-03 NOTE — PROGRESS NOTES
Name: Moris Lamasoie  [facility] Number: [id number]  Date of Treatment: 06/03/2019   Diagnosis:   Encounter Diagnoses   Name Primary?    Decreased range of motion of right shoulder     Shoulder weakness     Right shoulder pain, unspecified chronicity        Time in: 1000  Time Out: 1050  Total Treatment Time: 50  Group Time: 0      Subjective:    Moris BENTLEY reports improvement of symptoms.  Patient reports their pain to be n/a/10 on a 0-10 scale with 0 being no pain and 10 being the worst pain imaginable.    Objective    Moris BENTLEY received therapeutic exercises to R UE to develop strength, endurance, flexibility and posture for 40 minutes including:     UBE Lv2 5'/5'  Wall wipes x20 3#: cw and ccw (large)  Stand 10# cane x20: flexion, extension and IR behind back B  Push up plus off mat x20 B  Pulleys 40# x20: rows, high rows, ext to neutral  Prone on plinth x20: rows, flexion, Y, T B  Ball on trampoline x20     Cold pack 10' to R shoulder to decrease soreness     Pt demo good understanding of the education provided. Moris BENTLEY demonstrated good return demonstration of activities.     Assessment:     Pt will continue to benefit from skilled PT intervention. Medical Necessity is demonstrated by:  Continued inability to participate in vocational pursuits, Requires skilled supervision to complete and progress HEP and Weakness.    Plan:    Continue with established Plan of Care towards PT goals.

## 2019-06-06 ENCOUNTER — CLINICAL SUPPORT (OUTPATIENT)
Dept: REHABILITATION | Facility: HOSPITAL | Age: 64
End: 2019-06-06
Attending: ORTHOPAEDIC SURGERY
Payer: COMMERCIAL

## 2019-06-06 DIAGNOSIS — R29.898 SHOULDER WEAKNESS: ICD-10-CM

## 2019-06-06 DIAGNOSIS — Z98.890 S/P RIGHT ROTATOR CUFF REPAIR: Primary | ICD-10-CM

## 2019-06-06 DIAGNOSIS — M25.611 DECREASED RANGE OF MOTION OF RIGHT SHOULDER: ICD-10-CM

## 2019-06-06 PROCEDURE — 97110 THERAPEUTIC EXERCISES: CPT | Mod: PN

## 2019-06-06 NOTE — PROGRESS NOTES
"OUTPATIENT PHYSICAL THERAPY         DISCHARGE ASSESSMENT        TIME RECORD     Moris Calderón  06/06/2019     Start Time:  1100  Stop Time:  1200    Discharge Note Period: 02/06/19 to 06/06/19      Problem List Items Addressed This Visit        Orthopedic    Shoulder weakness    Decreased range of motion of right shoulder      Other Visit Diagnoses     S/P right rotator cuff repair    -  Primary           Current Level of Function: Patient now has a little bit of difficulty with certain ADL's like lifting a bag of groceries, opening a jar, and throwing a ball.   However, his level of disability has improved from 57.5% at his initial assessment to 3.75% disability at this time.      Physician: Colby Valenzuela MD   Original Orders : PT eval and treat  Initial visit: 02/06/19  Total Visits Received: 33    Subjective:  "My MD is giving me the green light to be discharged to a HEP.  I'd like a HEP that I can do at the gym."        --------------------------------------------------------------------------------------------------------------------------     Updated Pain Assessment:  No c/o pain.     ROM/MMT:     Shoulder   Right     Left   Pain/Dysfunction with Movement     AROM PROM MMT AROM PROM MMT     Flexion   153*     NT  4-/5   WFL     NT  4+/5    ABD   140*     NT  4-/5   WFL     NT  4+/5    IR    88*     NT    NT   WFL     NT    NT    ER    78*     NT    NT   WFL     NT    NT         Gait: Without AD  Analysis: [facility]    Bed Mobility:Independent  Transfers: Independent    Special Tests: UEFI = 77/80      --------------------------------------------------------------------------------------------------------------------------    Treatment:     Patient received therapeutic exercises to develop strength and ROM for 60 minutes including:      X 5'/5' UBE (L2)   X 15 ea supine AAROM rt shoulder = flex, ABD, ER, IR   X 20 ea pulley there ex rt shoulder (30#) = triceps ext, elbow curls,  shoulder   press, " shoulder ABD, horizontal ADD   Reviewed pulley there ex for rt shoulder IR/ER, rows, lat pull downs, and high rows   Written Home Exercises Provided: pulley rt shoulder there ex;  Pt demo good    understanding of the education provided. Patient demonstrated good return   demonstration of activities.       --------------------------------------------------------------------------------------------------------------------------     Current Short Term Goals:     1.  Kamaljit. use of 3# dumbbell w/ rt shoulder there ex (MET)  2.  Improve rt shoulder active ER to 70 degrees (supine) (MET)  3.  kamaljit. use of GTB w/ rt shoulder IR/ER t-band there ex (MET)     Current Long Term Goals:  1.  Improve AROM rt shoulder to WFL in all planes (MET)  2.  Improve UEFI score to 63/80 (MET)  3.  Demo comp w/ HEP (MET)     Changes in Status Relative to Current Goals:  Patient's pain levels have ranged between 0/10 and 7/10 (5/10 at initial assessment).  Outcome tools reveal an overall decrease in his pain intensity and an improved ability to complete his personal care.  Patient is compliant with his Home Exercise Program.       ---------------------------------------------------------------------------------------------------------------------------     Discharge reason: Met goals    Discharge plan: Continue HEP and Pt to follow-up with MD as planned    Plan:  This patient is discharged from Physical Therapy Services.         Therapist's Name: Geo Mane, PT  06/06/2019

## 2019-10-03 ENCOUNTER — OFFICE VISIT (OUTPATIENT)
Dept: FAMILY MEDICINE | Facility: CLINIC | Age: 64
End: 2019-10-03
Payer: COMMERCIAL

## 2019-10-03 VITALS
DIASTOLIC BLOOD PRESSURE: 72 MMHG | TEMPERATURE: 99 F | RESPIRATION RATE: 16 BRPM | WEIGHT: 219.69 LBS | SYSTOLIC BLOOD PRESSURE: 138 MMHG | HEART RATE: 76 BPM | HEIGHT: 69 IN | BODY MASS INDEX: 32.54 KG/M2

## 2019-10-03 DIAGNOSIS — E78.00 HYPERCHOLESTEROLEMIA: ICD-10-CM

## 2019-10-03 DIAGNOSIS — I10 ESSENTIAL HYPERTENSION: Primary | ICD-10-CM

## 2019-10-03 DIAGNOSIS — F41.9 ANXIETY: ICD-10-CM

## 2019-10-03 PROCEDURE — 3075F PR MOST RECENT SYSTOLIC BLOOD PRESS GE 130-139MM HG: ICD-10-PCS | Mod: CPTII,S$GLB,, | Performed by: FAMILY MEDICINE

## 2019-10-03 PROCEDURE — 1100F PTFALLS ASSESS-DOCD GE2>/YR: CPT | Mod: CPTII,S$GLB,, | Performed by: FAMILY MEDICINE

## 2019-10-03 PROCEDURE — 3288F FALL RISK ASSESSMENT DOCD: CPT | Mod: CPTII,S$GLB,, | Performed by: FAMILY MEDICINE

## 2019-10-03 PROCEDURE — 1100F PR PT FALLS ASSESS DOC 2+ FALLS/FALL W/INJURY/YR: ICD-10-PCS | Mod: CPTII,S$GLB,, | Performed by: FAMILY MEDICINE

## 2019-10-03 PROCEDURE — 90662 FLU VACCINE - HIGH DOSE (65+) PRESERVATIVE FREE IM: ICD-10-PCS | Mod: S$GLB,,, | Performed by: FAMILY MEDICINE

## 2019-10-03 PROCEDURE — 90662 IIV NO PRSV INCREASED AG IM: CPT | Mod: S$GLB,,, | Performed by: FAMILY MEDICINE

## 2019-10-03 PROCEDURE — 3078F PR MOST RECENT DIASTOLIC BLOOD PRESSURE < 80 MM HG: ICD-10-PCS | Mod: CPTII,S$GLB,, | Performed by: FAMILY MEDICINE

## 2019-10-03 PROCEDURE — 3008F PR BODY MASS INDEX (BMI) DOCUMENTED: ICD-10-PCS | Mod: CPTII,S$GLB,, | Performed by: FAMILY MEDICINE

## 2019-10-03 PROCEDURE — 99999 PR PBB SHADOW E&M-EST. PATIENT-LVL IV: CPT | Mod: PBBFAC,,, | Performed by: FAMILY MEDICINE

## 2019-10-03 PROCEDURE — 3075F SYST BP GE 130 - 139MM HG: CPT | Mod: CPTII,S$GLB,, | Performed by: FAMILY MEDICINE

## 2019-10-03 PROCEDURE — 3288F PR FALLS RISK ASSESSMENT DOCUMENTED: ICD-10-PCS | Mod: CPTII,S$GLB,, | Performed by: FAMILY MEDICINE

## 2019-10-03 PROCEDURE — 90471 FLU VACCINE - HIGH DOSE (65+) PRESERVATIVE FREE IM: ICD-10-PCS | Mod: S$GLB,,, | Performed by: FAMILY MEDICINE

## 2019-10-03 PROCEDURE — 99203 PR OFFICE/OUTPT VISIT, NEW, LEVL III, 30-44 MIN: ICD-10-PCS | Mod: 25,S$GLB,, | Performed by: FAMILY MEDICINE

## 2019-10-03 PROCEDURE — 99999 PR PBB SHADOW E&M-EST. PATIENT-LVL IV: ICD-10-PCS | Mod: PBBFAC,,, | Performed by: FAMILY MEDICINE

## 2019-10-03 PROCEDURE — 99203 OFFICE O/P NEW LOW 30 MIN: CPT | Mod: 25,S$GLB,, | Performed by: FAMILY MEDICINE

## 2019-10-03 PROCEDURE — 90471 IMMUNIZATION ADMIN: CPT | Mod: S$GLB,,, | Performed by: FAMILY MEDICINE

## 2019-10-03 PROCEDURE — 3078F DIAST BP <80 MM HG: CPT | Mod: CPTII,S$GLB,, | Performed by: FAMILY MEDICINE

## 2019-10-03 PROCEDURE — 3008F BODY MASS INDEX DOCD: CPT | Mod: CPTII,S$GLB,, | Performed by: FAMILY MEDICINE

## 2019-10-03 RX ORDER — DICLOFENAC POTASSIUM 50 MG/1
50 TABLET, FILM COATED ORAL 3 TIMES DAILY PRN
COMMUNITY
End: 2020-12-29

## 2019-10-03 RX ORDER — ATORVASTATIN CALCIUM 10 MG/1
10 TABLET, FILM COATED ORAL DAILY
Qty: 90 TABLET | Refills: 1 | Status: SHIPPED | OUTPATIENT
Start: 2019-10-03 | End: 2020-04-24

## 2019-10-03 RX ORDER — FLUTICASONE PROPIONATE 50 MCG
1 SPRAY, SUSPENSION (ML) NASAL DAILY
Qty: 48 G | Refills: 1 | Status: SHIPPED | OUTPATIENT
Start: 2019-10-03 | End: 2020-05-20

## 2019-10-03 RX ORDER — BENAZEPRIL HYDROCHLORIDE 20 MG/1
20 TABLET ORAL DAILY
Qty: 90 TABLET | Refills: 1 | Status: SHIPPED | OUTPATIENT
Start: 2019-10-03 | End: 2020-03-25

## 2019-10-03 RX ORDER — TIZANIDINE 4 MG/1
TABLET ORAL DAILY PRN
Refills: 0 | COMMUNITY
Start: 2019-08-23 | End: 2021-02-01

## 2019-10-03 RX ORDER — SERTRALINE HYDROCHLORIDE 100 MG/1
100 TABLET, FILM COATED ORAL DAILY
Qty: 90 TABLET | Refills: 1 | Status: SHIPPED | OUTPATIENT
Start: 2019-10-03 | End: 2020-01-17

## 2019-10-03 NOTE — PROGRESS NOTES
THIS DOCUMENT WAS MADE IN PART WITH VOICE RECOGNITION SOFTWARE.  OCCASIONALLY THIS SOFTWARE WILL MISINTERPRET WORDS OR PHRASES.      Moris BENTLEY Stephane  1955    Moris BENTLEY was seen today for [facility] care.    Diagnoses and all orders for this visit:    Essential hypertension  -     benazepril (LOTENSIN) 20 MG tablet; Take 1 tablet (20 mg total) by mouth once daily.  -     Comprehensive metabolic panel; Future  -     TSH; Future  -     Lipid panel; Future  -     CBC auto differential; Future  -     Comprehensive metabolic panel  -     TSH  -     Lipid panel  -     CBC auto differential   Borderline. Continue current medication. Monitor at home. Work on weight loss. Reevaluate in six months    Hypercholesterolemia  -     atorvastatin (LIPITOR) 10 MG tablet; Take 1 tablet (10 mg total) by mouth once daily.  -     Lipid panel; Future  -     Lipid panel    Anxiety  -     sertraline (ZOLOFT) 100 MG tablet; Take 1 tablet (100 mg total) by mouth once daily.   increase sertraline. If this does not help consider alternative SSRI or SNRI. Avoid Wellbutrin based on possible increase in anxiety or at least it would try that start very low. Definitely avoid BuSpar based on his report of side effects.    Other orders  -     fluticasone propionate (FLONASE) 50 mcg/actuation nasal spray; 1 spray (50 mcg total) by Each Nostril route once daily.  -     Influenza - High Dose (65+) (PF) (IM)     Also he has some back and orthopedic problems. He does use diclofenac on occasion. I did advise of stomach precautions and increase risk of G.I. Bleeding with SSRIs. If he uses regularly he should be mindful. He did not need a refill on the diclofenac nor ask for one. He did not ask for a refill on the tizanidine, but it needed I would be willing to prescribe those in the future. It sounds like he does see a pain management physician and will follow for possible injections.    Subjective     Chief Complaint   Patient presents with    Establish  "Care     hx of controlled HTN, hx of bulging disc       HPI   New patient, establish care.    Did have labs in last 6 months, he will send  HTN, states had been on lotension and amlodipine, off amlodipine with weight loss   pressure may be slightly higher recently as he has gained some weight back. But generally averaging less than 140 systolic. I did advised him to monitor and get back to his ideal weight. Although he does report that he has been "more sensitive to salt" lately. Suggesting some mild swelling in hands and feet. He also describes a trigger finger that he thinks is related to salt but I think this is likely coincidental.    HL, on lipitor, labs requested    Anxiety, zoloft not helping  inadvertantly started wellbutrin, anxiety worsening  Now only on zoloft, not satisfied with anxiety control, denies depression    In past, did not do well with buspar    Lumbar DDD,  Has seen pain management, injections in past        Active Ambulatory Problems     Diagnosis Date Noted    Hypertension 11/02/2015    Hypercholesterolemia 11/02/2015    Complete tear of right rotator cuff 05/07/2018    Right shoulder pain 06/11/2018    Shoulder stiffness, right 06/11/2018    Shoulder weakness 06/11/2018    Decreased range of motion of right shoulder 02/06/2019     Resolved Ambulatory Problems     Diagnosis Date Noted    Chest pain 09/21/2015    Angina pectoris 12/10/2015     Past Medical History:   Diagnosis Date    Anxiety     Anxiety     Arthritis     Back problem     GERD (gastroesophageal reflux disease)     Hyperlipidemia     Hypertension, benign 11/2/2015    Sleep apnea      Current Outpatient Medications on File Prior to Visit   Medication Sig Dispense Refill    cetirizine HCl (CETIRIZINE ORAL) Take 1 Dose by mouth.      diclofenac (CATAFLAM) 50 MG tablet Take 50 mg by mouth 3 (three) times daily.      [DISCONTINUED] atorvastatin (LIPITOR) 10 MG tablet TAKE 1 TABLET ONCE DAILY 90 tablet 3    " [DISCONTINUED] benazepril (LOTENSIN) 20 MG tablet TAKE 1 TABLET ONCE DAILY 90 tablet 3    [DISCONTINUED] fluticasone (FLONASE) 50 mcg/actuation nasal spray 1 spray by Each Nare route once daily.      [DISCONTINUED] sertraline (ZOLOFT) 50 MG tablet Take 50 mg by mouth once daily.      ibuprofen (ADVIL,MOTRIN) 800 MG tablet Take 1 tablet (800 mg total) by mouth 3 (three) times daily. 90 tablet 2    tiZANidine (ZANAFLEX) 4 MG tablet TK 1 T PO NECK Q 8 H PRF SHOULDER PAIN  0    [DISCONTINUED] buPROPion (WELLBUTRIN) 100 MG tablet Take 100 mg by mouth once daily.       [DISCONTINUED] oxyCODONE-acetaminophen (PERCOCET) 5-325 mg per tablet Take 1 tablet by mouth every 4 (four) hours as needed for Pain. 40 tablet 0     No current facility-administered medications on file prior to visit.      Review of patient's allergies indicates:   Allergen Reactions    Buspar [buspirone]      Face flushed, possible elevated BP    Latex     Adhesive Rash     Social History     Tobacco Use    Smoking status: Never Smoker    Smokeless tobacco: Never Used   Substance Use Topics    Alcohol use: Yes     Comment: social    Drug use: No     Family History   Problem Relation Age of Onset    Glaucoma Father     Melanoma Neg Hx     Psoriasis Neg Hx     Lupus Neg Hx     Eczema Neg Hx          Review of Systems   Constitutional: Negative for fatigue, fever and unexpected weight change.   HENT: Negative for congestion, sinus pressure, trouble swallowing and voice change.    Eyes: Negative for visual disturbance.   Respiratory: Negative for cough, chest tightness and shortness of breath.    Cardiovascular: Negative for chest pain, palpitations and leg swelling.   Gastrointestinal: Negative for abdominal pain, blood in stool, constipation, diarrhea, nausea and vomiting.   Genitourinary: Negative for dysuria, frequency and hematuria.   Musculoskeletal: Positive for arthralgias and back pain.   Skin: Negative for rash and wound.  "  Neurological: Negative for dizziness, syncope and light-headedness.   Psychiatric/Behavioral: Negative.        Objective     Physical Exam   Constitutional: He is oriented to person, place, and time. He appears well-developed and well-nourished. No distress.   HENT:   Head: Normocephalic and atraumatic.   Right Ear: External ear normal.   Left Ear: External ear normal.   Mouth/Throat: Oropharynx is clear and moist. No oropharyngeal exudate.   Eyes: Conjunctivae are normal. Right eye exhibits no discharge. Left eye exhibits no discharge. No scleral icterus.   Neck: Normal range of motion. Neck supple.   Cardiovascular: Normal rate, regular rhythm and normal heart sounds. Exam reveals no gallop and no friction rub.   No murmur heard.  Trace ankle edema. There are some very varicosities no wounds or ulcerations.   Pulmonary/Chest: Effort normal and breath sounds normal. No stridor. No respiratory distress. He has no wheezes.   Neurological: He is alert and oriented to person, place, and time. He has normal reflexes.   Skin: Skin is warm and dry. He is not diaphoretic.   Psychiatric: He has a normal mood and affect. His behavior is normal.   Vitals reviewed.    Vitals:    10/03/19 1535 10/03/19 1635 10/03/19 1935   BP: (!) 142/62 (!) 144/70 138/72   BP Location: Left arm Left arm    Patient Position: Sitting Sitting    BP Method: Large (Manual) Large (Manual)    Pulse: 76     Resp: 16     Temp: 98.6 °F (37 °C)     TempSrc: Oral     Weight: 99.6 kg (219 lb 11 oz)     Height: 5' 9" (1.753 m)         MOST RECENT LABS IN OUR ELECTRONIC MEDICAL RECORD:     Results for orders placed or performed during the hospital encounter of 09/01/19   CBC auto differential   Result Value Ref Range    WBC 4.25 3.90 - 12.70 K/uL    RBC 4.33 (L) 4.60 - 6.20 M/uL    Hemoglobin 13.9 (L) 14.0 - 18.0 g/dL    Hematocrit 40.4 40.0 - 54.0 %    Mean Corpuscular Volume 93 82 - 98 fL    Mean Corpuscular Hemoglobin 32.1 (H) 27.0 - 31.0 pg    Mean " Corpuscular Hemoglobin Conc 34.4 32.0 - 36.0 g/dL    RDW 12.7 11.5 - 14.5 %    Platelets 190 150 - 350 K/uL    MPV 11.4 9.2 - 12.9 fL    Immature Granulocytes 0.2 0.0 - 0.5 %    Gran # (ANC) 2.3 1.8 - 7.7 K/uL    Immature Grans (Abs) 0.01 0.00 - 0.04 K/uL    Lymph # 1.6 1.0 - 4.8 K/uL    Mono # 0.3 0.3 - 1.0 K/uL    Eos # 0.1 0.0 - 0.5 K/uL    Baso # 0.03 0.00 - 0.20 K/uL    nRBC 0 0 /100 WBC    Gran% 53.9 38.0 - 73.0 %    Lymph% 37.2 18.0 - 48.0 %    Mono% 6.8 4.0 - 15.0 %    Eosinophil% 1.2 0.0 - 8.0 %    Basophil% 0.7 0.0 - 1.9 %    Differential Method Automated    Comprehensive metabolic panel (CMP)   Result Value Ref Range    Sodium 142 136 - 145 mmol/L    Potassium 3.9 3.5 - 5.1 mmol/L    Chloride 106 95 - 110 mmol/L    CO2 29 22 - 31 mmol/L    Glucose 87 70 - 110 mg/dL    BUN, Bld 17 9 - 21 mg/dL    Creatinine 0.85 0.50 - 1.40 mg/dL    Calcium 9.0 8.4 - 10.2 mg/dL    Total Protein 6.8 6.0 - 8.4 g/dL    Albumin 4.2 3.5 - 5.2 g/dL    Total Bilirubin 0.5 0.2 - 1.3 mg/dL    Alkaline Phosphatase 77 38 - 145 U/L    AST 23 17 - 59 U/L    ALT 27 10 - 44 U/L    Anion Gap 7 (L) 8 - 16 mmol/L    eGFR if African American >60 >60 mL/min/1.73 m^2    eGFR if non African American >60 >60 mL/min/1.73 m^2   Troponin   Result Value Ref Range    Troponin I <0.012 0.012 - 0.034 ng/mL

## 2019-10-07 DIAGNOSIS — Z12.11 COLON CANCER SCREENING: ICD-10-CM

## 2019-10-09 LAB
ALBUMIN SERPL-MCNC: 4.4 G/DL (ref 3.6–5.1)
ALBUMIN/GLOB SERPL: 1.9 (CALC) (ref 1–2.5)
ALP SERPL-CCNC: 70 U/L (ref 40–115)
ALT SERPL-CCNC: 17 U/L (ref 9–46)
AST SERPL-CCNC: 15 U/L (ref 10–35)
BASOPHILS # BLD AUTO: 38 CELLS/UL (ref 0–200)
BASOPHILS NFR BLD AUTO: 0.7 %
BILIRUB SERPL-MCNC: 0.7 MG/DL (ref 0.2–1.2)
BUN SERPL-MCNC: 17 MG/DL (ref 7–25)
BUN/CREAT SERPL: NORMAL (CALC) (ref 6–22)
CALCIUM SERPL-MCNC: 9.4 MG/DL (ref 8.6–10.3)
CHLORIDE SERPL-SCNC: 104 MMOL/L (ref 98–110)
CHOLEST SERPL-MCNC: 169 MG/DL
CHOLEST/HDLC SERPL: 3.7 (CALC)
CO2 SERPL-SCNC: 29 MMOL/L (ref 20–32)
CREAT SERPL-MCNC: 0.95 MG/DL (ref 0.7–1.25)
EOSINOPHIL # BLD AUTO: 49 CELLS/UL (ref 15–500)
EOSINOPHIL NFR BLD AUTO: 0.9 %
ERYTHROCYTE [DISTWIDTH] IN BLOOD BY AUTOMATED COUNT: 12.6 % (ref 11–15)
GFRSERPLBLD MDRD-ARVRAT: 84 ML/MIN/1.73M2
GLOBULIN SER CALC-MCNC: 2.3 G/DL (CALC) (ref 1.9–3.7)
GLUCOSE SERPL-MCNC: 99 MG/DL (ref 65–99)
HCT VFR BLD AUTO: 39.9 % (ref 38.5–50)
HDLC SERPL-MCNC: 46 MG/DL
HGB BLD-MCNC: 13.6 G/DL (ref 13.2–17.1)
LDLC SERPL CALC-MCNC: 102 MG/DL (CALC)
LYMPHOCYTES # BLD AUTO: 1604 CELLS/UL (ref 850–3900)
LYMPHOCYTES NFR BLD AUTO: 29.7 %
MCH RBC QN AUTO: 31.5 PG (ref 27–33)
MCHC RBC AUTO-ENTMCNC: 34.1 G/DL (ref 32–36)
MCV RBC AUTO: 92.4 FL (ref 80–100)
MONOCYTES # BLD AUTO: 400 CELLS/UL (ref 200–950)
MONOCYTES NFR BLD AUTO: 7.4 %
NEUTROPHILS # BLD AUTO: 3310 CELLS/UL (ref 1500–7800)
NEUTROPHILS NFR BLD AUTO: 61.3 %
NONHDLC SERPL-MCNC: 123 MG/DL (CALC)
PLATELET # BLD AUTO: 195 THOUSAND/UL (ref 140–400)
PMV BLD REES-ECKER: 11.5 FL (ref 7.5–12.5)
POTASSIUM SERPL-SCNC: 4.3 MMOL/L (ref 3.5–5.3)
PROT SERPL-MCNC: 6.7 G/DL (ref 6.1–8.1)
RBC # BLD AUTO: 4.32 MILLION/UL (ref 4.2–5.8)
SODIUM SERPL-SCNC: 140 MMOL/L (ref 135–146)
TRIGL SERPL-MCNC: 117 MG/DL
TSH SERPL-ACNC: 1.05 MIU/L (ref 0.4–4.5)
WBC # BLD AUTO: 5.4 THOUSAND/UL (ref 3.8–10.8)

## 2019-10-10 ENCOUNTER — PATIENT MESSAGE (OUTPATIENT)
Dept: FAMILY MEDICINE | Facility: CLINIC | Age: 64
End: 2019-10-10

## 2019-10-17 ENCOUNTER — PATIENT OUTREACH (OUTPATIENT)
Dept: ADMINISTRATIVE | Facility: HOSPITAL | Age: 64
End: 2019-10-17

## 2019-10-17 ENCOUNTER — TELEPHONE (OUTPATIENT)
Dept: PRIMARY CARE CLINIC | Facility: CLINIC | Age: 64
End: 2019-10-17

## 2019-10-17 NOTE — TELEPHONE ENCOUNTER
Patient came by today for a BP check, I dont have a Thursday nurse visit schedule to document him on. His BP today was 138/86.

## 2019-10-17 NOTE — TELEPHONE ENCOUNTER
Patient states that he is trying to lose some weight; about 10 pounds. States that he will see you in 3 months. Appointment scheduled.

## 2019-10-17 NOTE — TELEPHONE ENCOUNTER
Thank him for checking this. His pressure is borderline, I would like to see it lower. If he feels he is starting to make some changes with regard to diet exercise and weight we can continue and repeat in about three months or so, So follow in about three months if that's the case. If it is staying at this level or higher then I need to adjust medications.

## 2020-01-17 ENCOUNTER — OFFICE VISIT (OUTPATIENT)
Dept: FAMILY MEDICINE | Facility: CLINIC | Age: 65
End: 2020-01-17
Payer: MEDICARE

## 2020-01-17 VITALS
WEIGHT: 225.31 LBS | RESPIRATION RATE: 18 BRPM | BODY MASS INDEX: 33.37 KG/M2 | SYSTOLIC BLOOD PRESSURE: 136 MMHG | DIASTOLIC BLOOD PRESSURE: 88 MMHG | HEIGHT: 69 IN | HEART RATE: 58 BPM | TEMPERATURE: 99 F

## 2020-01-17 DIAGNOSIS — R19.8 IRREGULAR BOWEL HABITS: ICD-10-CM

## 2020-01-17 DIAGNOSIS — I10 ESSENTIAL HYPERTENSION: Primary | ICD-10-CM

## 2020-01-17 DIAGNOSIS — K21.9 GASTROESOPHAGEAL REFLUX DISEASE, ESOPHAGITIS PRESENCE NOT SPECIFIED: ICD-10-CM

## 2020-01-17 DIAGNOSIS — F41.9 ANXIETY: ICD-10-CM

## 2020-01-17 DIAGNOSIS — R35.0 URINARY FREQUENCY: ICD-10-CM

## 2020-01-17 DIAGNOSIS — E78.00 HYPERCHOLESTEROLEMIA: ICD-10-CM

## 2020-01-17 PROCEDURE — 3075F PR MOST RECENT SYSTOLIC BLOOD PRESS GE 130-139MM HG: ICD-10-PCS | Mod: HCNC,CPTII,S$GLB, | Performed by: FAMILY MEDICINE

## 2020-01-17 PROCEDURE — 3079F PR MOST RECENT DIASTOLIC BLOOD PRESSURE 80-89 MM HG: ICD-10-PCS | Mod: HCNC,CPTII,S$GLB, | Performed by: FAMILY MEDICINE

## 2020-01-17 PROCEDURE — 99999 PR PBB SHADOW E&M-EST. PATIENT-LVL V: ICD-10-PCS | Mod: PBBFAC,HCNC,, | Performed by: FAMILY MEDICINE

## 2020-01-17 PROCEDURE — 3079F DIAST BP 80-89 MM HG: CPT | Mod: HCNC,CPTII,S$GLB, | Performed by: FAMILY MEDICINE

## 2020-01-17 PROCEDURE — 3008F BODY MASS INDEX DOCD: CPT | Mod: HCNC,CPTII,S$GLB, | Performed by: FAMILY MEDICINE

## 2020-01-17 PROCEDURE — 1101F PR PT FALLS ASSESS DOC 0-1 FALLS W/OUT INJ PAST YR: ICD-10-PCS | Mod: HCNC,CPTII,S$GLB, | Performed by: FAMILY MEDICINE

## 2020-01-17 PROCEDURE — 1101F PT FALLS ASSESS-DOCD LE1/YR: CPT | Mod: HCNC,CPTII,S$GLB, | Performed by: FAMILY MEDICINE

## 2020-01-17 PROCEDURE — 3008F PR BODY MASS INDEX (BMI) DOCUMENTED: ICD-10-PCS | Mod: HCNC,CPTII,S$GLB, | Performed by: FAMILY MEDICINE

## 2020-01-17 PROCEDURE — 3075F SYST BP GE 130 - 139MM HG: CPT | Mod: HCNC,CPTII,S$GLB, | Performed by: FAMILY MEDICINE

## 2020-01-17 PROCEDURE — 99999 PR PBB SHADOW E&M-EST. PATIENT-LVL V: CPT | Mod: PBBFAC,HCNC,, | Performed by: FAMILY MEDICINE

## 2020-01-17 PROCEDURE — 99214 OFFICE O/P EST MOD 30 MIN: CPT | Mod: HCNC,S$GLB,, | Performed by: FAMILY MEDICINE

## 2020-01-17 PROCEDURE — 99214 PR OFFICE/OUTPT VISIT, EST, LEVL IV, 30-39 MIN: ICD-10-PCS | Mod: HCNC,S$GLB,, | Performed by: FAMILY MEDICINE

## 2020-01-17 RX ORDER — SERTRALINE HYDROCHLORIDE 100 MG/1
200 TABLET, FILM COATED ORAL DAILY
Qty: 180 TABLET | Refills: 1 | Status: SHIPPED | OUTPATIENT
Start: 2020-01-17 | End: 2020-03-03

## 2020-01-17 NOTE — PROGRESS NOTES
THIS DOCUMENT WAS MADE IN PART WITH VOICE RECOGNITION SOFTWARE.  OCCASIONALLY THIS SOFTWARE WILL MISINTERPRET WORDS OR PHRASES.      Moris BENTLEY Stephane  1955    Moris BENTLEY was seen today for follow-up.    Diagnoses and all orders for this visit:    Essential hypertension  -     Comprehensive metabolic panel; Future    Hypercholesterolemia  -     Lipid panel; Future    Anxiety  -     sertraline (ZOLOFT) 100 MG tablet; Take 2 tablets (200 mg total) by mouth once daily.  He will take 1-1/2 tablets (150 mg) for 2 weeks then if tolerated up to 200 mg a day.  Any side effects or concerns he will let me know.  I will see him back as scheduled in about 6 weeks for this.    Gastroesophageal reflux disease, esophagitis presence not specified  -     Ambulatory referral to Gastroenterology    Irregular bowel habits  -     Ambulatory referral to Gastroenterology  I recommend that he follow back with his gastroenterologist.  Urinary frequency  -     Ambulatory referral to Urology  -     Urinalysis, Reflex to Urine Culture Urine, Clean Catch; Future    he will go back to Dr. Martinez for GI    Primary concern anxiety,l will see him specifically for this in march    Subjective     Chief Complaint   Patient presents with    Follow-up     hypertension        HPI    Did tell me having low back flare, seeing pain specialist, injections in lumbar, no help, will go for MRI  May need injections in neck    Copy of EDG and colonoscopy from 12/16  Esophagitis, told to repeat EGD in 3 years  Colon polyp told to repeat Cscope in 5 years    Off PPI x one year, occasional tums    Intermittent diarrhea, recent return, may have had 'irritable bowel when younger', describing urgency symptoms now    Anxiety, maybe mild temporary improvement with increase in zoloft, but back to baseline    HPI elements addressed above in the assessment and plan including problems, diagnosis, stability/instability,  improving/worsening, and chronicity will not be  "duplicated in this section. Any important additional HPI topics will be discussed here if needed.    Active Ambulatory Problems     Diagnosis Date Noted    Hypertension 11/02/2015    Hypercholesterolemia 11/02/2015    Complete tear of right rotator cuff 05/07/2018    Right shoulder pain 06/11/2018    Shoulder stiffness, right 06/11/2018    Shoulder weakness 06/11/2018    Decreased range of motion of right shoulder 02/06/2019     Resolved Ambulatory Problems     Diagnosis Date Noted    Chest pain 09/21/2015    Angina pectoris 12/10/2015     Past Medical History:   Diagnosis Date    Anxiety     Anxiety     Arthritis     Back problem     GERD (gastroesophageal reflux disease)     Hyperlipidemia     Hypertension, benign 11/2/2015    Sleep apnea          Review of Systems   Constitutional: Negative for activity change and unexpected weight change.   HENT: Positive for rhinorrhea. Negative for hearing loss and trouble swallowing.    Eyes: Negative for discharge and visual disturbance.   Respiratory: Negative for chest tightness and wheezing.    Cardiovascular: Negative for chest pain and palpitations.   Gastrointestinal: Positive for diarrhea. Negative for blood in stool, constipation and vomiting.   Endocrine: Negative for polydipsia and polyuria.   Genitourinary: Positive for urgency. Negative for difficulty urinating and hematuria.   Musculoskeletal: Positive for back pain and neck pain. Negative for arthralgias and joint swelling.   Neurological: Positive for headaches. Negative for weakness.   Psychiatric/Behavioral: Negative for confusion and dysphoric mood.       Objective     Physical Exam  Vitals:    01/17/20 1116   BP: 136/88   BP Location: Left leg   Patient Position: Sitting   BP Method: Medium (Manual)   Pulse: (!) 58   Resp: 18   Temp: 98.8 °F (37.1 °C)   TempSrc: Oral   Weight: 102.2 kg (225 lb 5 oz)   Height: 5' 9" (1.753 m)       MOST RECENT LABS IN OUR ELECTRONIC MEDICAL RECORD: "     Results for orders placed or performed in visit on 10/03/19   Comprehensive metabolic panel   Result Value Ref Range    Glucose 99 65 - 99 mg/dL    BUN, Bld 17 7 - 25 mg/dL    Creatinine 0.95 0.70 - 1.25 mg/dL    eGFR if non  84 > OR = 60 mL/min/1.73m2    eGFR if African American 97 > OR = 60 mL/min/1.73m2    BUN/Creatinine Ratio NOT APPLICABLE 6 - 22 (calc)    Sodium 140 135 - 146 mmol/L    Potassium 4.3 3.5 - 5.3 mmol/L    Chloride 104 98 - 110 mmol/L    CO2 29 20 - 32 mmol/L    Calcium 9.4 8.6 - 10.3 mg/dL    Total Protein 6.7 6.1 - 8.1 g/dL    Albumin 4.4 3.6 - 5.1 g/dL    Globulin, Total 2.3 1.9 - 3.7 g/dL (calc)    Albumin/Globulin Ratio 1.9 1.0 - 2.5 (calc)    Total Bilirubin 0.7 0.2 - 1.2 mg/dL    Alkaline Phosphatase 70 40 - 115 U/L    AST 15 10 - 35 U/L    ALT 17 9 - 46 U/L   TSH   Result Value Ref Range    TSH 1.05 0.40 - 4.50 mIU/L   Lipid panel   Result Value Ref Range    Cholesterol 169 <200 mg/dL    HDL 46 >40 mg/dL    Triglycerides 117 <150 mg/dL    LDL Cholesterol 102 (H) mg/dL (calc)    Hdl/Cholesterol Ratio 3.7 <5.0 (calc)    Non HDL Chol. (LDL+VLDL) 123 <130 mg/dL (calc)   CBC auto differential   Result Value Ref Range    WBC 5.4 3.8 - 10.8 Thousand/uL    RBC 4.32 4.20 - 5.80 Million/uL    Hemoglobin 13.6 13.2 - 17.1 g/dL    Hematocrit 39.9 38.5 - 50.0 %    Mean Corpuscular Volume 92.4 80.0 - 100.0 fL    Mean Corpuscular Hemoglobin 31.5 27.0 - 33.0 pg    Mean Corpuscular Hemoglobin Conc 34.1 32.0 - 36.0 g/dL    RDW 12.6 11.0 - 15.0 %    Platelets 195 140 - 400 Thousand/uL    MPV 11.5 7.5 - 12.5 fL    Neutrophils Absolute 3,310 1,500 - 7,800 cells/uL    Lymph # 1,604 850 - 3,900 cells/uL    Mono # 400 200 - 950 cells/uL    Eos # 49 15 - 500 cells/uL    Baso # 38 0 - 200 cells/uL    Neutrophils Relative 61.3 %    Lymph% 29.7 %    Mono% 7.4 %    Eosinophil% 0.9 %    Basophil% 0.7 %

## 2020-01-27 ENCOUNTER — PATIENT OUTREACH (OUTPATIENT)
Dept: ADMINISTRATIVE | Facility: HOSPITAL | Age: 65
End: 2020-01-27

## 2020-01-31 ENCOUNTER — PATIENT OUTREACH (OUTPATIENT)
Dept: ADMINISTRATIVE | Facility: OTHER | Age: 65
End: 2020-01-31

## 2020-02-03 ENCOUNTER — OFFICE VISIT (OUTPATIENT)
Dept: UROLOGY | Facility: CLINIC | Age: 65
End: 2020-02-03
Payer: MEDICARE

## 2020-02-03 ENCOUNTER — LAB VISIT (OUTPATIENT)
Dept: LAB | Facility: HOSPITAL | Age: 65
End: 2020-02-03
Attending: UROLOGY
Payer: MEDICARE

## 2020-02-03 VITALS
HEIGHT: 69 IN | WEIGHT: 226.63 LBS | BODY MASS INDEX: 33.57 KG/M2 | HEART RATE: 56 BPM | SYSTOLIC BLOOD PRESSURE: 166 MMHG | DIASTOLIC BLOOD PRESSURE: 81 MMHG

## 2020-02-03 DIAGNOSIS — N40.1 ENLARGED PROSTATE WITH URINARY OBSTRUCTION: Primary | ICD-10-CM

## 2020-02-03 DIAGNOSIS — R39.15 URGENCY OF URINATION: ICD-10-CM

## 2020-02-03 DIAGNOSIS — N13.8 ENLARGED PROSTATE WITH URINARY OBSTRUCTION: Primary | ICD-10-CM

## 2020-02-03 DIAGNOSIS — Z12.5 SCREENING FOR PROSTATE CANCER: ICD-10-CM

## 2020-02-03 LAB
BILIRUB SERPL-MCNC: NORMAL MG/DL
BLOOD URINE, POC: NORMAL
COLOR, POC UA: YELLOW
GLUCOSE UR QL STRIP: NORMAL
KETONES UR QL STRIP: NORMAL
LEUKOCYTE ESTERASE URINE, POC: NORMAL
NITRITE, POC UA: NORMAL
PH, POC UA: 6.5
PROTEIN, POC: NORMAL
SPECIFIC GRAVITY, POC UA: 1.02
UROBILINOGEN, POC UA: NORMAL

## 2020-02-03 PROCEDURE — 3008F BODY MASS INDEX DOCD: CPT | Mod: HCNC,CPTII,S$GLB, | Performed by: UROLOGY

## 2020-02-03 PROCEDURE — 81002 POCT URINE DIPSTICK WITHOUT MICROSCOPE: ICD-10-PCS | Mod: HCNC,S$GLB,, | Performed by: UROLOGY

## 2020-02-03 PROCEDURE — 3079F DIAST BP 80-89 MM HG: CPT | Mod: HCNC,CPTII,S$GLB, | Performed by: UROLOGY

## 2020-02-03 PROCEDURE — 3077F PR MOST RECENT SYSTOLIC BLOOD PRESSURE >= 140 MM HG: ICD-10-PCS | Mod: HCNC,CPTII,S$GLB, | Performed by: UROLOGY

## 2020-02-03 PROCEDURE — 99204 PR OFFICE/OUTPT VISIT, NEW, LEVL IV, 45-59 MIN: ICD-10-PCS | Mod: HCNC,25,S$GLB, | Performed by: UROLOGY

## 2020-02-03 PROCEDURE — 99999 PR PBB SHADOW E&M-EST. PATIENT-LVL III: ICD-10-PCS | Mod: PBBFAC,HCNC,, | Performed by: UROLOGY

## 2020-02-03 PROCEDURE — 99204 OFFICE O/P NEW MOD 45 MIN: CPT | Mod: HCNC,25,S$GLB, | Performed by: UROLOGY

## 2020-02-03 PROCEDURE — 3079F PR MOST RECENT DIASTOLIC BLOOD PRESSURE 80-89 MM HG: ICD-10-PCS | Mod: HCNC,CPTII,S$GLB, | Performed by: UROLOGY

## 2020-02-03 PROCEDURE — 3077F SYST BP >= 140 MM HG: CPT | Mod: HCNC,CPTII,S$GLB, | Performed by: UROLOGY

## 2020-02-03 PROCEDURE — 84153 ASSAY OF PSA TOTAL: CPT | Mod: HCNC

## 2020-02-03 PROCEDURE — 99999 PR PBB SHADOW E&M-EST. PATIENT-LVL III: CPT | Mod: PBBFAC,HCNC,, | Performed by: UROLOGY

## 2020-02-03 PROCEDURE — 36415 COLL VENOUS BLD VENIPUNCTURE: CPT | Mod: HCNC,PO

## 2020-02-03 PROCEDURE — 3008F PR BODY MASS INDEX (BMI) DOCUMENTED: ICD-10-PCS | Mod: HCNC,CPTII,S$GLB, | Performed by: UROLOGY

## 2020-02-03 PROCEDURE — 1101F PT FALLS ASSESS-DOCD LE1/YR: CPT | Mod: HCNC,CPTII,S$GLB, | Performed by: UROLOGY

## 2020-02-03 PROCEDURE — 81002 URINALYSIS NONAUTO W/O SCOPE: CPT | Mod: HCNC,S$GLB,, | Performed by: UROLOGY

## 2020-02-03 PROCEDURE — 1101F PR PT FALLS ASSESS DOC 0-1 FALLS W/OUT INJ PAST YR: ICD-10-PCS | Mod: HCNC,CPTII,S$GLB, | Performed by: UROLOGY

## 2020-02-03 RX ORDER — TAMSULOSIN HYDROCHLORIDE 0.4 MG/1
0.4 CAPSULE ORAL DAILY
Qty: 30 CAPSULE | Refills: 11 | Status: SHIPPED | OUTPATIENT
Start: 2020-02-03 | End: 2021-02-02

## 2020-02-03 RX ORDER — CROMOLYN SODIUM 40 MG/ML
SOLUTION/ DROPS OPHTHALMIC DAILY PRN
COMMUNITY
Start: 2019-11-14 | End: 2020-12-29

## 2020-02-03 NOTE — LETTER
February 3, 2020      Tello Nelson MD  [address]  [place] [address]           [facility]  [address] [place] [address]  Phone: [phone]  Fax: [phone]          Patient: Moris Calderón   MR Number: [id number]   YOB: 1955   Date of Visit: 2/3/2020       Dear Dr. Tello Nelson:    Thank you for referring Moris Calderón to me for evaluation. Attached you will find relevant portions of my assessment and plan of care.    If you have questions, please do not hesitate to call me. I look forward to following Moris Calderón along with you.    Sincerely,    EVER Maldonado MD    Enclosure  CC:  No Recipients    If you would like to receive this communication electronically, please contact [email or web address] or [phone] to request more information on tidy Link access.    For providers and/or their staff who would like to refer a patient to [facility], please contact us through our one-stop-shop provider referral line, [facility], at [phone].    If you feel you have received this communication in error or would no longer like to receive these types of communications, please e-mail [email or web address]

## 2020-02-03 NOTE — PROGRESS NOTES
Subjective:       Patient ID: Moris Calderón is a 64 y.o. male.    Chief Complaint: Urinary Frequency and Urinary Urgency    HPI    64-year-old with worsening urinary urgency.  His IPSS is 12.  He says this has been a chronic problem but it is getting worse.  He denies gross hematuria and dysuria.  He he rarely gets urinary tract infections if ever.  He has no family history of prostate cancer.  He has no recent PSA.  He is not taking any previous medications for BPH.  He denies erectile dysfunction.  Urine dipstick shows negative for all components.    IPSS    Incomplete emptying 3  Fequency  3   Intermittency  0  Urgency  4  Weak stream  1  Straining  0  Sleeping  1  Total:   12      Past Medical History:   Diagnosis Date    Anxiety     Anxiety     Arthritis     Back problem     bulging disc    GERD (gastroesophageal reflux disease)     Hypercholesterolemia 11/2/2015 2009: Began statin.    Hyperlipidemia     Hypertension     Hypertension, benign 11/2/2015 2008: Diagnosed.    Sleep apnea     uses c-pap     Past Surgical History:   Procedure Laterality Date    APPENDECTOMY      ARTHROSCOPIC REPAIR OF ROTATOR CUFF OF SHOULDER Right 12/21/2018    Procedure: REPAIR, ROTATOR CUFF, ARTHROSCOPIC;  Surgeon: Colby Valenzuela MD;  Location: [facility] OR;  Service: Orthopedics;  Laterality: Right;    ARTHROSCOPY OF SHOULDER WITH DECOMPRESSION OF SUBACROMIAL SPACE Right 12/21/2018    Procedure: ARTHROSCOPY, SHOULDER, WITH SUBACROMIAL SPACE DECOMPRESSION;  Surgeon: Colby Valenzuela MD;  Location: [facility] OR;  Service: Orthopedics;  Laterality: Right;    BICEPS TENDON REPAIR Right 12/21/2018    Procedure: REPAIR, TENDON, BICEPS;  Surgeon: Colby Valenzuela MD;  Location: [facility] OR;  Service: Orthopedics;  Laterality: Right;    magdalene      KNEE ARTHROSCOPY W/ MENISCAL REPAIR Left 2015    MMT      Left knee A-scope    NOSE SURGERY      UVULECTOMY         Current Outpatient Medications:     atorvastatin  (LIPITOR) 10 MG tablet, Take 1 tablet (10 mg total) by mouth once daily., Disp: 90 tablet, Rfl: 1    benazepril (LOTENSIN) 20 MG tablet, Take 1 tablet (20 mg total) by mouth once daily., Disp: 90 tablet, Rfl: 1    cetirizine HCl (CETIRIZINE ORAL), Take 1 Dose by mouth daily as needed. , Disp: , Rfl:     cromolyn (OPTICROM) 4 % ophthalmic solution, daily as needed. , Disp: , Rfl:     diclofenac (CATAFLAM) 50 MG tablet, Take 50 mg by mouth 3 (three) times daily as needed. , Disp: , Rfl:     fluticasone propionate (FLONASE) 50 mcg/actuation nasal spray, 1 spray (50 mcg total) by Each Nostril route once daily., Disp: 48 g, Rfl: 1    ibuprofen (ADVIL,MOTRIN) 800 MG tablet, Take 1 tablet (800 mg total) by mouth 3 (three) times daily. (Patient taking differently: Take 800 mg by mouth 3 (three) times daily as needed. ), Disp: 90 tablet, Rfl: 2    sertraline (ZOLOFT) 100 MG tablet, Take 2 tablets (200 mg total) by mouth once daily., Disp: 180 tablet, Rfl: 1    tiZANidine (ZANAFLEX) 4 MG tablet, daily as needed. , Disp: , Rfl: 0    tamsulosin (FLOMAX) 0.4 mg Cap, Take 1 capsule (0.4 mg total) by mouth once daily., Disp: 30 capsule, Rfl: 11        Review of Systems   Constitutional: Negative for fever.   Eyes: Negative for visual disturbance.   Respiratory: Negative for shortness of breath.    Cardiovascular: Negative for chest pain.   Gastrointestinal: Negative for nausea.   Genitourinary: Positive for urgency. Negative for dysuria and hematuria.   Musculoskeletal: Negative for gait problem.   Skin: Negative for rash.   Neurological: Negative for seizures.   Psychiatric/Behavioral: Negative for confusion.       Objective:      Physical Exam   Constitutional: He is oriented to person, place, and time. He appears well-developed and well-nourished.   HENT:   Head: Normocephalic and atraumatic.   Eyes: Conjunctivae are normal.   Cardiovascular: Normal rate.   Pulmonary/Chest: Effort normal.   Abdominal: Hernia confirmed  negative in the right inguinal area and confirmed negative in the left inguinal area.   Genitourinary: Testes normal and penis normal. Rectal exam shows no mass and anal tone normal. Prostate is enlarged (50g, s/s/a). Prostate is not tender.   Genitourinary Comments: bilateral epididymal cyst.   Musculoskeletal: Normal range of motion. He exhibits no edema.   Neurological: He is alert and oriented to person, place, and time.   Skin: Skin is warm and dry. No rash noted.   Psychiatric: He has a normal mood and affect.   Vitals reviewed.      Assessment:       1. Enlarged prostate with urinary obstruction    2. Urgency of urination    3. Screening for prostate cancer        Plan:       Enlarged prostate with urinary obstruction    Urgency of urination  -     POCT URINE DIPSTICK WITHOUT MICROSCOPE    Screening for prostate cancer  -     PSA, Screening; Future; Expected date: 02/03/2020    Other orders  -     tamsulosin (FLOMAX) 0.4 mg Cap; Take 1 capsule (0.4 mg total) by mouth once daily.  Dispense: 30 capsule; Refill: 11      I recommended trial of Flomax.  Follow-up 4 months.  Update PSA.

## 2020-02-04 LAB — COMPLEXED PSA SERPL-MCNC: 0.46 NG/ML (ref 0–4)

## 2020-03-03 ENCOUNTER — OFFICE VISIT (OUTPATIENT)
Dept: FAMILY MEDICINE | Facility: CLINIC | Age: 65
End: 2020-03-03
Payer: MEDICARE

## 2020-03-03 VITALS
HEART RATE: 63 BPM | TEMPERATURE: 99 F | DIASTOLIC BLOOD PRESSURE: 70 MMHG | BODY MASS INDEX: 32.67 KG/M2 | SYSTOLIC BLOOD PRESSURE: 112 MMHG | OXYGEN SATURATION: 96 % | WEIGHT: 220.56 LBS | HEIGHT: 69 IN

## 2020-03-03 DIAGNOSIS — M48.061 SPINAL STENOSIS OF LUMBAR REGION WITHOUT NEUROGENIC CLAUDICATION: ICD-10-CM

## 2020-03-03 DIAGNOSIS — F41.9 ANXIETY: Primary | ICD-10-CM

## 2020-03-03 PROCEDURE — 3074F PR MOST RECENT SYSTOLIC BLOOD PRESSURE < 130 MM HG: ICD-10-PCS | Mod: HCNC,CPTII,S$GLB, | Performed by: FAMILY MEDICINE

## 2020-03-03 PROCEDURE — 3008F BODY MASS INDEX DOCD: CPT | Mod: HCNC,CPTII,S$GLB, | Performed by: FAMILY MEDICINE

## 2020-03-03 PROCEDURE — 99999 PR PBB SHADOW E&M-EST. PATIENT-LVL III: ICD-10-PCS | Mod: PBBFAC,HCNC,, | Performed by: FAMILY MEDICINE

## 2020-03-03 PROCEDURE — 99214 PR OFFICE/OUTPT VISIT, EST, LEVL IV, 30-39 MIN: ICD-10-PCS | Mod: HCNC,S$GLB,, | Performed by: FAMILY MEDICINE

## 2020-03-03 PROCEDURE — 3078F PR MOST RECENT DIASTOLIC BLOOD PRESSURE < 80 MM HG: ICD-10-PCS | Mod: HCNC,CPTII,S$GLB, | Performed by: FAMILY MEDICINE

## 2020-03-03 PROCEDURE — 3074F SYST BP LT 130 MM HG: CPT | Mod: HCNC,CPTII,S$GLB, | Performed by: FAMILY MEDICINE

## 2020-03-03 PROCEDURE — 99214 OFFICE O/P EST MOD 30 MIN: CPT | Mod: HCNC,S$GLB,, | Performed by: FAMILY MEDICINE

## 2020-03-03 PROCEDURE — 1101F PT FALLS ASSESS-DOCD LE1/YR: CPT | Mod: HCNC,CPTII,S$GLB, | Performed by: FAMILY MEDICINE

## 2020-03-03 PROCEDURE — 3078F DIAST BP <80 MM HG: CPT | Mod: HCNC,CPTII,S$GLB, | Performed by: FAMILY MEDICINE

## 2020-03-03 PROCEDURE — 99999 PR PBB SHADOW E&M-EST. PATIENT-LVL III: CPT | Mod: PBBFAC,HCNC,, | Performed by: FAMILY MEDICINE

## 2020-03-03 PROCEDURE — 3008F PR BODY MASS INDEX (BMI) DOCUMENTED: ICD-10-PCS | Mod: HCNC,CPTII,S$GLB, | Performed by: FAMILY MEDICINE

## 2020-03-03 PROCEDURE — 1101F PR PT FALLS ASSESS DOC 0-1 FALLS W/OUT INJ PAST YR: ICD-10-PCS | Mod: HCNC,CPTII,S$GLB, | Performed by: FAMILY MEDICINE

## 2020-03-03 RX ORDER — PAROXETINE HYDROCHLORIDE 20 MG/1
TABLET, FILM COATED ORAL
Qty: 60 TABLET | Refills: 3 | Status: SHIPPED | OUTPATIENT
Start: 2020-03-03 | End: 2020-04-15

## 2020-03-03 RX ORDER — GUAIFENESIN/PSEUDOEPHEDRNE HCL 600MG-60MG
TABLET, EXTENDED RELEASE 12 HR ORAL
COMMUNITY
Start: 2020-02-23 | End: 2020-12-29

## 2020-03-03 RX ORDER — AZELASTINE 1 MG/ML
SPRAY, METERED NASAL
COMMUNITY
Start: 2020-02-23 | End: 2020-12-29

## 2020-03-03 NOTE — PROGRESS NOTES
" THIS DOCUMENT WAS MADE IN PART WITH VOICE RECOGNITION SOFTWARE.  OCCASIONALLY THIS SOFTWARE WILL MISINTERPRET WORDS OR PHRASES.      Moris BENTLEY Stephane  1955    Moris was seen today for follow-up.    Diagnoses and all orders for this visit:    Anxiety    Essential hypertension    Hypercholesterolemia    Gastroesophageal reflux disease, esophagitis presence not specified    Appointment duration greater than 25 min., more than 50% face-to-face counseling  Also note that he mention he is having ongoing back problems.  Reviewed a report of his most recent MRI which shows lumbar spinal stenosis and foraminal stenosis, his pain physician has sent him to a neurosurgeon.  He has daily pain.  We could consider an SNRI if the Paxil does not help with the anxiety, as this might have some benefit with chronic pain control.  Although from a pure anxiety standpoint I prefer the Paxil before switching.    Subjective     Chief Complaint   Patient presents with    Follow-up     routine follow up and review of health since anxiety medication change, pt stated "I feel like anxiety is worse."        HPI    Anxiety  Perhaps mild improvement with increase in zoloft, but now less effective    Past : buspar, felt worse  Paxil, helped for awhile, years, then stopped  Wellbutrin not sure if helped, felt worse when stopped ssri and went to this    Active Ambulatory Problems     Diagnosis Date Noted    Hypertension 11/02/2015    Hypercholesterolemia 11/02/2015    Complete tear of right rotator cuff 05/07/2018    Right shoulder pain 06/11/2018    Shoulder stiffness, right 06/11/2018    Shoulder weakness 06/11/2018    Decreased range of motion of right shoulder 02/06/2019     Resolved Ambulatory Problems     Diagnosis Date Noted    Chest pain 09/21/2015    Angina pectoris 12/10/2015     Past Medical History:   Diagnosis Date    Anxiety     Anxiety     Arthritis     Back problem     GERD (gastroesophageal reflux disease)     " "Hyperlipidemia     Hypertension, benign 11/2/2015    Sleep apnea          Review of Systems    Objective     Physical Exam   Constitutional: He is oriented to person, place, and time. He appears well-developed and well-nourished. No distress.   HENT:   Head: Normocephalic and atraumatic.   Eyes: Conjunctivae are normal. No scleral icterus.   Pulmonary/Chest: Effort normal. No respiratory distress.   Neurological: He is alert and oriented to person, place, and time. Coordination normal.   Skin: He is not diaphoretic.   Psychiatric:   Mildly anxious and fidgety but stable     Vitals:    03/03/20 1034   BP: 112/70   Pulse: 63   Temp: 98.5 °F (36.9 °C)   TempSrc: Oral   SpO2: 96%   Weight: 100 kg (220 lb 9.1 oz)   Height: 5' 9" (1.753 m)       MOST RECENT LABS IN OUR ELECTRONIC MEDICAL RECORD:     Results for orders placed or performed in visit on 02/03/20   PSA, Screening   Result Value Ref Range    PSA, SCREEN 0.46 0.00 - 4.00 ng/mL         "

## 2020-03-11 ENCOUNTER — TELEPHONE (OUTPATIENT)
Dept: FAMILY MEDICINE | Facility: CLINIC | Age: 65
End: 2020-03-11

## 2020-03-11 NOTE — TELEPHONE ENCOUNTER
----- Message from Disha Jimenez sent at 3/11/2020 11:22 AM CDT -----  Contact: pt [phone]  Patient called for Advice   Pt called he received a call from  His Insurance company they informed him they will not approve the medication  Paroxetine 20 mg   Call back [phone]

## 2020-03-25 DIAGNOSIS — I10 ESSENTIAL HYPERTENSION: ICD-10-CM

## 2020-03-25 RX ORDER — BENAZEPRIL HYDROCHLORIDE 20 MG/1
TABLET ORAL
Qty: 90 TABLET | Refills: 1 | Status: SHIPPED | OUTPATIENT
Start: 2020-03-25 | End: 2020-09-21

## 2020-04-14 ENCOUNTER — TELEPHONE (OUTPATIENT)
Dept: FAMILY MEDICINE | Facility: CLINIC | Age: 65
End: 2020-04-14

## 2020-04-14 NOTE — TELEPHONE ENCOUNTER
Rec'd fax from insurance requesting reason pt is taking 2 20mg tabs instead of 1 40mg tab. Sig in Epic indicates 1-2 tab daily and this is not an accepted sig. Please review and advise.

## 2020-04-14 NOTE — TELEPHONE ENCOUNTER
Rec'd PA request from [employer] for paroxetine 20mg.   I  Initiated PA via [email or web address].  KEY: [id number]   Dx code: f41.9    Tried wellbutrin, sertraline and buspar in past.      Awaiting determination response.

## 2020-04-14 NOTE — TELEPHONE ENCOUNTER
----- Message from Isa Elaine MA sent at 4/14/2020  3:10 PM CDT -----  Contact: [employer]  Type: Needs Medical Advice  Who Called:  Nadir peters  Best Call Back Number:  ref [phone]  Additional Information: patient needs prior auth for piroxtine ncl 20mg tablets

## 2020-04-14 NOTE — TELEPHONE ENCOUNTER
If his insurance company will only cover one famotidine 40 mg tablet then I will switch it.  However, as a prescribing medical professional, it is my opinion that he does not need the full 40 mg every day and by writing it as 20 mg take 1 or 2 daily he is able to self adjust and take the lowest effective dosage.  But if his insurance company will not honor my prescription I will be happy to change it.

## 2020-04-15 RX ORDER — PAROXETINE HYDROCHLORIDE 40 MG/1
40 TABLET, FILM COATED ORAL EVERY MORNING
Qty: 90 TABLET | Refills: 3 | Status: SHIPPED | OUTPATIENT
Start: 2020-04-15 | End: 2021-04-27

## 2020-04-15 NOTE — TELEPHONE ENCOUNTER
Let him know why the change was made.  If he does not need the full 40 mg daily he can take 1/2 tablet.

## 2020-04-24 DIAGNOSIS — F41.9 ANXIETY: ICD-10-CM

## 2020-04-24 DIAGNOSIS — E78.00 HYPERCHOLESTEROLEMIA: ICD-10-CM

## 2020-04-24 RX ORDER — SERTRALINE HYDROCHLORIDE 100 MG/1
TABLET, FILM COATED ORAL
Qty: 180 TABLET | Refills: 1 | Status: SHIPPED | OUTPATIENT
Start: 2020-04-24 | End: 2020-12-29

## 2020-04-24 RX ORDER — ATORVASTATIN CALCIUM 10 MG/1
TABLET, FILM COATED ORAL
Qty: 90 TABLET | Refills: 1 | Status: SHIPPED | OUTPATIENT
Start: 2020-04-24 | End: 2020-10-21

## 2020-05-05 ENCOUNTER — TELEPHONE (OUTPATIENT)
Dept: FAMILY MEDICINE | Facility: CLINIC | Age: 65
End: 2020-05-05

## 2020-05-05 ENCOUNTER — OFFICE VISIT (OUTPATIENT)
Dept: FAMILY MEDICINE | Facility: CLINIC | Age: 65
End: 2020-05-05
Payer: MEDICARE

## 2020-05-05 VITALS — RESPIRATION RATE: 16 BRPM | SYSTOLIC BLOOD PRESSURE: 120 MMHG | HEART RATE: 60 BPM | DIASTOLIC BLOOD PRESSURE: 70 MMHG

## 2020-05-05 DIAGNOSIS — F41.9 ANXIETY: Primary | ICD-10-CM

## 2020-05-05 DIAGNOSIS — I10 ESSENTIAL HYPERTENSION: ICD-10-CM

## 2020-05-05 DIAGNOSIS — E78.00 HYPERCHOLESTEROLEMIA: ICD-10-CM

## 2020-05-05 DIAGNOSIS — M48.061 SPINAL STENOSIS OF LUMBAR REGION WITHOUT NEUROGENIC CLAUDICATION: ICD-10-CM

## 2020-05-05 PROCEDURE — 3074F PR MOST RECENT SYSTOLIC BLOOD PRESSURE < 130 MM HG: ICD-10-PCS | Mod: HCNC,CPTII,95, | Performed by: FAMILY MEDICINE

## 2020-05-05 PROCEDURE — 1101F PT FALLS ASSESS-DOCD LE1/YR: CPT | Mod: HCNC,CPTII,95, | Performed by: FAMILY MEDICINE

## 2020-05-05 PROCEDURE — 99214 OFFICE O/P EST MOD 30 MIN: CPT | Mod: HCNC,95,, | Performed by: FAMILY MEDICINE

## 2020-05-05 PROCEDURE — 3074F SYST BP LT 130 MM HG: CPT | Mod: HCNC,CPTII,95, | Performed by: FAMILY MEDICINE

## 2020-05-05 PROCEDURE — 3078F PR MOST RECENT DIASTOLIC BLOOD PRESSURE < 80 MM HG: ICD-10-PCS | Mod: HCNC,CPTII,95, | Performed by: FAMILY MEDICINE

## 2020-05-05 PROCEDURE — 3078F DIAST BP <80 MM HG: CPT | Mod: HCNC,CPTII,95, | Performed by: FAMILY MEDICINE

## 2020-05-05 PROCEDURE — 1101F PR PT FALLS ASSESS DOC 0-1 FALLS W/OUT INJ PAST YR: ICD-10-PCS | Mod: HCNC,CPTII,95, | Performed by: FAMILY MEDICINE

## 2020-05-05 PROCEDURE — 99214 PR OFFICE/OUTPT VISIT, EST, LEVL IV, 30-39 MIN: ICD-10-PCS | Mod: HCNC,95,, | Performed by: FAMILY MEDICINE

## 2020-05-05 PROCEDURE — 99499 UNLISTED E&M SERVICE: CPT | Mod: 95,,, | Performed by: FAMILY MEDICINE

## 2020-05-05 PROCEDURE — 99499 RISK ADDL DX/OHS AUDIT: ICD-10-PCS | Mod: 95,,, | Performed by: FAMILY MEDICINE

## 2020-05-05 NOTE — PROGRESS NOTES
THIS DOCUMENT WAS MADE IN PART WITH VOICE RECOGNITION SOFTWARE.  OCCASIONALLY THIS SOFTWARE WILL MISINTERPRET WORDS OR PHRASES.      Moris Calderón  1955    Diagnoses and all orders for this visit:    Anxiety   Interestingly he reports that he felt better after reducing the dosage of sertraline from 200-100 during the transition to Paxil.  However because the original plan was to switch to Paxil he did go ahead and stop the sertraline and titrate up on the Paxil.  Now he is not certain as to whether not he should go back to the sertraline.  Although he is doing reasonably well so I advised might be worthwhile to continue what he is doing now (Paxil 40 mg) for another month or so to re-evaluate.  Switching back and forth may make it more difficult to evaluate.  But after a month or so if he is not where he wants to be he could transition back to the lower dosage of Zoloft.    Essential hypertension  He reports this has been fairly stable and will continue to monitor    Hypercholesterolemia  This is a chronic condition.  This condition has been reviewed and evaluated and it is currently stable.    Spinal stenosis of lumbar region without neurogenic claudication  He is being followed by neurosurgery and therapy but will keep me informed    Will go another month with the paroxetine      Subjective     The patient is being seen by virtual visit because of the COVID19 pandemic and necessity to reduce risk of exposure.  The patient location is:  Patient Home   The chief complaint leading to consultation is: anxiety and other    Visit type: Virtual visit with synchronous audio and video  Total time spent with patient:  12 min  Each patient to whom he or she provides medical services by telemedicine is:  (1) informed of the relationship between the physician and patient and the respective role of any other health care provider with respect to management of the patient; and (2) notified that he or she may decline to  receive medical services by telemedicine and may withdraw from such care at any time.      HPI  Anxiety, he is doing a little better on the paroxetine.  Although he states he actually felt better when he reduced the dosage of Zoloft from 200-100 but still proceed with the original plan.  He questions whether not he should return to the Zoloft.  No adverse effects.  He has a history of hypertension, this has been stable.  His lipids have been stable as well.    LBP , in PT,   Seeing Dr. Bocanegra, he is just keeping me informed  States injections in January did not really help.    Active Ambulatory Problems     Diagnosis Date Noted    Hypertension 11/02/2015    Hypercholesterolemia 11/02/2015    Complete tear of right rotator cuff 05/07/2018    Right shoulder pain 06/11/2018    Shoulder stiffness, right 06/11/2018    Shoulder weakness 06/11/2018    Decreased range of motion of right shoulder 02/06/2019    Spinal stenosis of lumbar region without neurogenic claudication 03/03/2020     Resolved Ambulatory Problems     Diagnosis Date Noted    Chest pain 09/21/2015    Angina pectoris 12/10/2015     Past Medical History:   Diagnosis Date    Anxiety     Anxiety     Arthritis     Back problem     GERD (gastroesophageal reflux disease)     Hyperlipidemia     Hypertension, benign 11/2/2015    Sleep apnea          Review of Systems   Constitutional: Positive for activity change.   Respiratory: Negative.    Cardiovascular: Negative.    Musculoskeletal: Positive for arthralgias and back pain.   Psychiatric/Behavioral: Negative for dysphoric mood and suicidal ideas. The patient is nervous/anxious.        Objective     Physical Exam   Constitutional: He is oriented to person, place, and time. He appears well-developed and well-nourished.  Non-toxic appearance. He does not have a sickly appearance. He does not appear ill. No distress.   HENT:   Head: Normocephalic and atraumatic.   Eyes: No scleral icterus.  "  Pulmonary/Chest: Effort normal. No accessory muscle usage. No tachypnea and no bradypnea. No respiratory distress.   Neurological: He is alert and oriented to person, place, and time.   Psychiatric: He has a normal mood and affect. His behavior is normal. Judgment and thought content normal. His mood appears not anxious. His affect is not inappropriate. His speech is not rapid and/or pressured, not delayed, not tangential and not slurred. He is not agitated, not hyperactive and not combative. Thought content is not delusional. He is attentive.     Physical exam limited as this was a virtual visit.  But it is apparent that the individual is in no acute distress, well groomed, well kept.  Speech was normal.  Patient is alert and oriented x3.  Mood and affect appear normal.      Vitals:    05/05/20 1134   BP: 120/70   Pulse: 60   Resp: 16     Last 3 sets of Vitals    Vitals - 1 value per visit 2/3/2020 3/3/2020 5/5/2020   SYSTOLIC 166 112 120   DIASTOLIC 81 70 70   PULSE 56 63 60   TEMPERATURE - 98.5 -   RESPIRATIONS - - 16   SPO2 - 96 -   Weight (lb) 226.63 220.57 -   Weight (kg) 102.8 100.05 -   HEIGHT 5' 9" 5' 9" -   BODY MASS INDEX 33.47 32.57 -   VISIT REPORT - - -   Pain Score  0 6 -         "

## 2020-05-06 ENCOUNTER — PATIENT MESSAGE (OUTPATIENT)
Dept: ADMINISTRATIVE | Facility: HOSPITAL | Age: 65
End: 2020-05-06

## 2020-05-20 RX ORDER — FLUTICASONE PROPIONATE 50 MCG
SPRAY, SUSPENSION (ML) NASAL
Qty: 48 G | Refills: 1 | Status: SHIPPED | OUTPATIENT
Start: 2020-05-20 | End: 2021-02-17

## 2020-05-31 ENCOUNTER — PATIENT OUTREACH (OUTPATIENT)
Dept: ADMINISTRATIVE | Facility: OTHER | Age: 65
End: 2020-05-31

## 2020-07-24 ENCOUNTER — PATIENT OUTREACH (OUTPATIENT)
Dept: ADMINISTRATIVE | Facility: HOSPITAL | Age: 65
End: 2020-07-24

## 2020-09-29 ENCOUNTER — PATIENT MESSAGE (OUTPATIENT)
Dept: OTHER | Facility: OTHER | Age: 65
End: 2020-09-29

## 2020-10-02 ENCOUNTER — TELEPHONE (OUTPATIENT)
Dept: FAMILY MEDICINE | Facility: CLINIC | Age: 65
End: 2020-10-02

## 2020-10-02 NOTE — TELEPHONE ENCOUNTER
----- Message from Lupe Grajeda sent at 10/2/2020  2:47 PM CDT -----  Type: Needs Medical Advice  Who Called:  Patient  Best Call Back Number:   Additional Information: Patient calling to speak with the nurse, he says he got a flu shot yesterday and shortly after started getting a high fever and chills. He is also concerned he may need to get a covid test but is not sure if the may be a reaction from the flu shot.

## 2020-10-02 NOTE — TELEPHONE ENCOUNTER
Patient states that he had a flu shot around 12 yesterday, states that last night he had a temp of 100.9, chills, body aches. States that he has been having a headache for several days prior to receiving the vaccine. Concerned for Covid. Advised patient he can go to  this evening for eval and testing as he has 2 family events coming up. Advised to return call to clinic if symptoms do not improve.

## 2020-12-07 ENCOUNTER — TELEPHONE (OUTPATIENT)
Dept: PAIN MEDICINE | Facility: CLINIC | Age: 65
End: 2020-12-07

## 2020-12-07 NOTE — TELEPHONE ENCOUNTER
----- Message from Bertha Heller sent at 12/7/2020 10:51 AM CST -----  Regarding: new pt appt  Type: Needs Medical Advice  Who Called:  pt  Symptoms (please be specific):  back and neck pain  How long has patient had these symptoms:  for years 2010  Pharmacy name and phone #:   Best Call Back Number: [phone] (home) [phone] (work)    Additional Information: na

## 2020-12-11 ENCOUNTER — PATIENT MESSAGE (OUTPATIENT)
Dept: OTHER | Facility: OTHER | Age: 65
End: 2020-12-11

## 2020-12-28 ENCOUNTER — PATIENT OUTREACH (OUTPATIENT)
Dept: ADMINISTRATIVE | Facility: OTHER | Age: 65
End: 2020-12-28

## 2020-12-28 NOTE — PROGRESS NOTES
Health Maintenance Due   Topic Date Due    Shingles Vaccine (1 of 2) 08/05/2004    Pneumococcal Vaccine (65+ Low/Medium Risk) (1 of 2 - PCV13) 08/05/2019    Influenza Vaccine (1) 08/01/2020     Updates were requested from care everywhere.  Chart was reviewed for overdue Proactive [facility] Encounters ([facility]) topics (CRS, Breast Cancer Screening, Eye exam)  Health Maintenance has been updated.  LINKS immunization registry triggered.  Immunizations were reconciled.

## 2020-12-29 ENCOUNTER — OFFICE VISIT (OUTPATIENT)
Dept: PAIN MEDICINE | Facility: CLINIC | Age: 65
End: 2020-12-29
Payer: MEDICARE

## 2020-12-29 VITALS
DIASTOLIC BLOOD PRESSURE: 73 MMHG | BODY MASS INDEX: 33.72 KG/M2 | HEIGHT: 70 IN | SYSTOLIC BLOOD PRESSURE: 147 MMHG | TEMPERATURE: 97 F | HEART RATE: 74 BPM | RESPIRATION RATE: 18 BRPM | OXYGEN SATURATION: 99 % | WEIGHT: 235.56 LBS

## 2020-12-29 DIAGNOSIS — M54.16 LEFT LUMBAR RADICULOPATHY: Primary | ICD-10-CM

## 2020-12-29 DIAGNOSIS — M51.36 DDD (DEGENERATIVE DISC DISEASE), LUMBAR: ICD-10-CM

## 2020-12-29 DIAGNOSIS — M54.12 RADICULOPATHY, CERVICAL REGION: ICD-10-CM

## 2020-12-29 DIAGNOSIS — M47.812 CERVICAL SPONDYLOSIS: ICD-10-CM

## 2020-12-29 DIAGNOSIS — M47.816 LUMBAR SPONDYLOSIS: ICD-10-CM

## 2020-12-29 PROCEDURE — 3078F DIAST BP <80 MM HG: CPT | Mod: HCNC,CPTII,S$GLB, | Performed by: ANESTHESIOLOGY

## 2020-12-29 PROCEDURE — 1101F PR PT FALLS ASSESS DOC 0-1 FALLS W/OUT INJ PAST YR: ICD-10-PCS | Mod: HCNC,CPTII,S$GLB, | Performed by: ANESTHESIOLOGY

## 2020-12-29 PROCEDURE — 1159F PR MEDICATION LIST DOCUMENTED IN MEDICAL RECORD: ICD-10-PCS | Mod: HCNC,S$GLB,, | Performed by: ANESTHESIOLOGY

## 2020-12-29 PROCEDURE — 99205 OFFICE O/P NEW HI 60 MIN: CPT | Mod: HCNC,S$GLB,, | Performed by: ANESTHESIOLOGY

## 2020-12-29 PROCEDURE — 1159F MED LIST DOCD IN RCRD: CPT | Mod: HCNC,S$GLB,, | Performed by: ANESTHESIOLOGY

## 2020-12-29 PROCEDURE — 3078F PR MOST RECENT DIASTOLIC BLOOD PRESSURE < 80 MM HG: ICD-10-PCS | Mod: HCNC,CPTII,S$GLB, | Performed by: ANESTHESIOLOGY

## 2020-12-29 PROCEDURE — 3077F PR MOST RECENT SYSTOLIC BLOOD PRESSURE >= 140 MM HG: ICD-10-PCS | Mod: HCNC,CPTII,S$GLB, | Performed by: ANESTHESIOLOGY

## 2020-12-29 PROCEDURE — 3008F PR BODY MASS INDEX (BMI) DOCUMENTED: ICD-10-PCS | Mod: HCNC,CPTII,S$GLB, | Performed by: ANESTHESIOLOGY

## 2020-12-29 PROCEDURE — 99999 PR PBB SHADOW E&M-EST. PATIENT-LVL IV: CPT | Mod: PBBFAC,HCNC,, | Performed by: ANESTHESIOLOGY

## 2020-12-29 PROCEDURE — 1125F AMNT PAIN NOTED PAIN PRSNT: CPT | Mod: HCNC,S$GLB,, | Performed by: ANESTHESIOLOGY

## 2020-12-29 PROCEDURE — 1101F PT FALLS ASSESS-DOCD LE1/YR: CPT | Mod: HCNC,CPTII,S$GLB, | Performed by: ANESTHESIOLOGY

## 2020-12-29 PROCEDURE — 1125F PR PAIN SEVERITY QUANTIFIED, PAIN PRESENT: ICD-10-PCS | Mod: HCNC,S$GLB,, | Performed by: ANESTHESIOLOGY

## 2020-12-29 PROCEDURE — 99999 PR PBB SHADOW E&M-EST. PATIENT-LVL IV: ICD-10-PCS | Mod: PBBFAC,HCNC,, | Performed by: ANESTHESIOLOGY

## 2020-12-29 PROCEDURE — 3008F BODY MASS INDEX DOCD: CPT | Mod: HCNC,CPTII,S$GLB, | Performed by: ANESTHESIOLOGY

## 2020-12-29 PROCEDURE — 3077F SYST BP >= 140 MM HG: CPT | Mod: HCNC,CPTII,S$GLB, | Performed by: ANESTHESIOLOGY

## 2020-12-29 PROCEDURE — 99205 PR OFFICE/OUTPT VISIT, NEW, LEVL V, 60-74 MIN: ICD-10-PCS | Mod: HCNC,S$GLB,, | Performed by: ANESTHESIOLOGY

## 2020-12-29 PROCEDURE — 3288F PR FALLS RISK ASSESSMENT DOCUMENTED: ICD-10-PCS | Mod: HCNC,CPTII,S$GLB, | Performed by: ANESTHESIOLOGY

## 2020-12-29 PROCEDURE — 3288F FALL RISK ASSESSMENT DOCD: CPT | Mod: HCNC,CPTII,S$GLB, | Performed by: ANESTHESIOLOGY

## 2020-12-29 RX ORDER — TIZANIDINE 4 MG/1
4 TABLET ORAL NIGHTLY PRN
Qty: 30 TABLET | Refills: 1 | Status: SHIPPED | OUTPATIENT
Start: 2020-12-29 | End: 2022-09-23 | Stop reason: SDUPTHER

## 2020-12-29 NOTE — PROGRESS NOTES
This note was completed with dictation software and grammatical errors may exist.    CC:  Back pain, neck pain    HPI:  Patient is a 65-year-old man with a history of hypertension, GERD, arthritis, anxiety who presents in self-referral for low back pain radiating into the leg and neck pain.  He reports having low back pain for many years, greater than 15.  Since 2012 he has been seeing Dr. Raffy Vásquez, pain management on the [place] and had been undergoing injections for his back.  He states that the injections would usually help 1-2 years at a time to get rid of back pain and leg pain.  In the last year he had several injections that did not provide any relief and so they obtained a new MRI which showed significant degeneration at the level above where he had his previous injections, now showing issues at L3/4.  He was referred to Neurosurgery who recommended physical therapy.  He tried physical therapy for several months and it did help but he did not continue the exercises on his own.      He states that his pain is currently located across the bilateral low back, worse with working in the yard, bending over, sitting or standing too long.  It radiates along his right anterior thigh to about his knee.  He describes it as aching, dull, tight, deep, sharp and shooting.  Is worse with prolonged sitting or standing, lying down, bending or walking, doing any lifting or getting out of a bed or a chair.  He gets some relief with rest, activity, lying down, medications, injections and physical therapy.  He also takes ibuprofen and Tylenol some relief.    His other complaint is neck pain located in the bilateral neck, worse with turning his head from side to side, it radiates into the shoulders and into his upper back at times.  He denies juan weakness in his arms.  He states that he had undergone what sounds like a medial branch block bilaterally which did help but he had not proceeded with the radiofrequency part  because the viral pandemic began.  He denies any balance issues, no weakness in the arms or legs.    Pain intervention history:  He reports undergoing multiple injections over the years with very good relief, the last 2 did not seem to help.    Antineuropathics:  NSAIDs: he takes ibuprofen and Tylenol with some relief  Physical therapy:  Antidepressants: Paxil  Muscle relaxers:   Zanaflex4 milligrams at night helps  Opioids:  Antiplatelets/Anticoagulants:        ROS: He reports back pain, joint stiffness, anxiety.  Balance of review of systems is negative.    Lab Results   Component Value Date    HGBA1C 5.9 09/22/2015       Lab Results   Component Value Date    WBC 5.4 10/08/2019    HGB 13.6 10/08/2019    HCT 39.9 10/08/2019    MCV 92.4 10/08/2019     10/08/2019             Past Medical History:   Diagnosis Date    Anxiety     Anxiety     Arthritis     Back problem     bulging disc    GERD (gastroesophageal reflux disease)     Hypercholesterolemia 11/2/2015 2009: Began statin.    Hyperlipidemia     Hypertension     Hypertension, benign 11/2/2015 2008: Diagnosed.    Sleep apnea     uses c-pap       Past Surgical History:   Procedure Laterality Date    APPENDECTOMY      ARTHROSCOPIC REPAIR OF ROTATOR CUFF OF SHOULDER Right 12/21/2018    Procedure: REPAIR, ROTATOR CUFF, ARTHROSCOPIC;  Surgeon: Colby Valenzuela MD;  Location: [facility] OR;  Service: Orthopedics;  Laterality: Right;    ARTHROSCOPY OF SHOULDER WITH DECOMPRESSION OF SUBACROMIAL SPACE Right 12/21/2018    Procedure: ARTHROSCOPY, SHOULDER, WITH SUBACROMIAL SPACE DECOMPRESSION;  Surgeon: Colby Valenzuela MD;  Location: [facility] OR;  Service: Orthopedics;  Laterality: Right;    BICEPS TENDON REPAIR Right 12/21/2018    Procedure: REPAIR, TENDON, BICEPS;  Surgeon: Colby Valenzuela MD;  Location: [facility] OR;  Service: Orthopedics;  Laterality: Right;    magdalene      KNEE ARTHROSCOPY W/ MENISCAL REPAIR Left 2015    MMT      Left knee  "A-scope    NOSE SURGERY      UVULECTOMY         Social History     Socioeconomic History    Marital status:      Spouse name: Not on file    Number of children: Not on file    Years of education: Not on file    Highest education level: Not on file   Occupational History    Not on file   Social Needs    Financial resource strain: Not hard at all    Food insecurity     Worry: Never true     Inability: Never true    Transportation needs     Medical: No     Non-medical: No   Tobacco Use    Smoking status: Never Smoker    Smokeless tobacco: Never Used   Substance and Sexual Activity    Alcohol use: Yes     Frequency: 2-3 times a week     Drinks per session: 1 or 2     Binge frequency: Less than monthly     Comment: social    Drug use: No    Sexual activity: Yes   Lifestyle    Physical activity     Days per week: 0 days     Minutes per session: 0 min    Stress: Rather much   Relationships    Social connections     Talks on phone: More than three times a week     Gets together: Three times a week     Attends Taoism service: Not on file     Active member of club or organization: No     Attends meetings of clubs or organizations: Never     Relationship status:    Other Topics Concern    Not on file   Social History Narrative    Not on file         Medications/Allergies: See med card    Vitals:    12/29/20 0928   BP: (!) 147/73   Pulse: 74   Resp: 18   Temp: 97.4 °F (36.3 °C)   TempSrc: Temporal   SpO2: 99%   Weight: 106.8 kg (235 lb 9 oz)   Height: 5' 10" (1.778 m)   PainSc:   4   PainLoc: Back         Physical exam:  Gen: A and O x3, pleasant, well-groomed  Skin: No rashes or obvious lesions  HEENT: PERRLA, no obvious deformities on ears or in canals. Trachea midline.  CVS: Regular rate and rhythm, normal palpable pulses.  Resp:No increased work of breathing, symmetrical chest rise.  Abdomen: Soft, NT/ND.  Musculoskeletal: Able to heel walk, toe walk. No antalgic gait.     Neuro:  Lower " extremities: 5/5 strength bilaterally  Reflexes: Patellar 1+  Right side, 0+ left side, Achilles 1+ bilaterally.  Sensory:  Intact and symmetrical to light touch and pinprick in L2-S1 dermatomes bilaterally.    Lumbar spine:  Lumbar spine:   Range of motion is moderately reduced with both flexion and extension with increased pain on flexion greater than extension.  Jorge's test causes no increased pain on either side.    Supine straight leg raise is Positive for left leg pain at 60°.  Internal and external rotation of the hip causes no increased pain on either side.  Myofascial exam: No tenderness to palpation across lumbar paraspinous muscles.    Neuro:  Upper extremities: 5/5 strength bilaterally   Reflexes: Brachioradialis 2+, Bicep 2+, Tricep 2+.   Sensory: Intact and symmetrical to light touch and pinprick in C2-T1 dermatomes bilaterally.    Cervical Spine:  Cervical spine: ROM is full in flexion, extension and lateral rotation   With increased pain on extension and especially oblique extension to either side.  Spurling's maneuver causes neck pain to either side.  Myofascial exam: No Tenderness to palpation across cervical paraspinous region bilaterally.      Imaging:    MRI 01/21/2020:  I reviewed this image with the patient, outside imaging.  This is most significant for moderate disc degeneration at L4/5 with some bilateral foraminal narrowing to moderate degree, L3/4 shows severe disc degeneration with Modic endplate changes and some increased signal within the disc at L3/4.  There is moderate to severe foraminal narrowing to the left and moderate out to the right side.    Assessment:   Patient is a 65-year-old man with a history of hypertension, GERD, arthritis, anxiety who presents in self-referral for low back pain radiating into the leg and neck pain.    1. Left lumbar radiculopathy     2. Radiculopathy, cervical region  MRI Cervical Spine Without Contrast    MRI Cervical Spine Without Contrast   3.  DDD (degenerative disc disease), lumbar     4. Lumbar spondylosis     5. Cervical spondylosis         Plan:   1.  We discussed his symptoms, reviewed his lumbar spine MRI which shows significant disc degeneration and foraminal narrowing at L3/4 in addition to L4/5.  I am going to get records from his previous pain management physician to see what injections had been tried, we may want to try at the L3/4 level since he has not had this in the past.  We discussed that he needs to continue the physical therapy exercises that he learned in the past, he states that he has difficulty with continuing any exercise program.  2.  In terms of his neck pain, he showed me some x-ray pictures from an unknown date that showed significant disc height loss and anterior spurring but he has not had a recent MRI so I will get an MRI of the cervical spine, he requests open MRI.  I am going to have him follow up in several weeks so we can review this and get records from his previous physician.  I have also given him a prescription for tizanidine since he has been taking this with some relief.    Greater than 50% of this 60min visit was spent educating and counseling this patient.

## 2021-01-06 ENCOUNTER — TELEPHONE (OUTPATIENT)
Dept: PAIN MEDICINE | Facility: CLINIC | Age: 66
End: 2021-01-06

## 2021-01-21 ENCOUNTER — OFFICE VISIT (OUTPATIENT)
Dept: PAIN MEDICINE | Facility: CLINIC | Age: 66
End: 2021-01-21
Payer: MEDICARE

## 2021-01-21 VITALS
BODY MASS INDEX: 33.53 KG/M2 | WEIGHT: 233.69 LBS | HEART RATE: 60 BPM | DIASTOLIC BLOOD PRESSURE: 87 MMHG | RESPIRATION RATE: 20 BRPM | SYSTOLIC BLOOD PRESSURE: 168 MMHG | OXYGEN SATURATION: 100 % | TEMPERATURE: 98 F

## 2021-01-21 DIAGNOSIS — M51.36 DDD (DEGENERATIVE DISC DISEASE), LUMBAR: Primary | ICD-10-CM

## 2021-01-21 DIAGNOSIS — M54.16 BILATERAL LUMBAR RADICULOPATHY: ICD-10-CM

## 2021-01-21 DIAGNOSIS — M54.12 RADICULOPATHY, CERVICAL REGION: ICD-10-CM

## 2021-01-21 DIAGNOSIS — M54.9 DORSALGIA, UNSPECIFIED: ICD-10-CM

## 2021-01-21 DIAGNOSIS — M47.816 LUMBAR SPONDYLOSIS: ICD-10-CM

## 2021-01-21 DIAGNOSIS — M47.812 CERVICAL SPONDYLOSIS: ICD-10-CM

## 2021-01-21 PROCEDURE — 3079F PR MOST RECENT DIASTOLIC BLOOD PRESSURE 80-89 MM HG: ICD-10-PCS | Mod: CPTII,S$GLB,, | Performed by: ANESTHESIOLOGY

## 2021-01-21 PROCEDURE — 3077F PR MOST RECENT SYSTOLIC BLOOD PRESSURE >= 140 MM HG: ICD-10-PCS | Mod: CPTII,S$GLB,, | Performed by: ANESTHESIOLOGY

## 2021-01-21 PROCEDURE — 99999 PR PBB SHADOW E&M-EST. PATIENT-LVL IV: ICD-10-PCS | Mod: PBBFAC,,, | Performed by: ANESTHESIOLOGY

## 2021-01-21 PROCEDURE — 3008F BODY MASS INDEX DOCD: CPT | Mod: CPTII,S$GLB,, | Performed by: ANESTHESIOLOGY

## 2021-01-21 PROCEDURE — 99999 PR PBB SHADOW E&M-EST. PATIENT-LVL IV: CPT | Mod: PBBFAC,,, | Performed by: ANESTHESIOLOGY

## 2021-01-21 PROCEDURE — 1159F PR MEDICATION LIST DOCUMENTED IN MEDICAL RECORD: ICD-10-PCS | Mod: S$GLB,,, | Performed by: ANESTHESIOLOGY

## 2021-01-21 PROCEDURE — 3288F PR FALLS RISK ASSESSMENT DOCUMENTED: ICD-10-PCS | Mod: CPTII,S$GLB,, | Performed by: ANESTHESIOLOGY

## 2021-01-21 PROCEDURE — 3077F SYST BP >= 140 MM HG: CPT | Mod: CPTII,S$GLB,, | Performed by: ANESTHESIOLOGY

## 2021-01-21 PROCEDURE — 1101F PT FALLS ASSESS-DOCD LE1/YR: CPT | Mod: CPTII,S$GLB,, | Performed by: ANESTHESIOLOGY

## 2021-01-21 PROCEDURE — 1125F AMNT PAIN NOTED PAIN PRSNT: CPT | Mod: S$GLB,,, | Performed by: ANESTHESIOLOGY

## 2021-01-21 PROCEDURE — 1101F PR PT FALLS ASSESS DOC 0-1 FALLS W/OUT INJ PAST YR: ICD-10-PCS | Mod: CPTII,S$GLB,, | Performed by: ANESTHESIOLOGY

## 2021-01-21 PROCEDURE — 3079F DIAST BP 80-89 MM HG: CPT | Mod: CPTII,S$GLB,, | Performed by: ANESTHESIOLOGY

## 2021-01-21 PROCEDURE — 3008F PR BODY MASS INDEX (BMI) DOCUMENTED: ICD-10-PCS | Mod: CPTII,S$GLB,, | Performed by: ANESTHESIOLOGY

## 2021-01-21 PROCEDURE — 99214 PR OFFICE/OUTPT VISIT, EST, LEVL IV, 30-39 MIN: ICD-10-PCS | Mod: S$GLB,,, | Performed by: ANESTHESIOLOGY

## 2021-01-21 PROCEDURE — 3288F FALL RISK ASSESSMENT DOCD: CPT | Mod: CPTII,S$GLB,, | Performed by: ANESTHESIOLOGY

## 2021-01-21 PROCEDURE — 1159F MED LIST DOCD IN RCRD: CPT | Mod: S$GLB,,, | Performed by: ANESTHESIOLOGY

## 2021-01-21 PROCEDURE — 1125F PR PAIN SEVERITY QUANTIFIED, PAIN PRESENT: ICD-10-PCS | Mod: S$GLB,,, | Performed by: ANESTHESIOLOGY

## 2021-01-21 PROCEDURE — 99214 OFFICE O/P EST MOD 30 MIN: CPT | Mod: S$GLB,,, | Performed by: ANESTHESIOLOGY

## 2021-01-21 RX ORDER — GABAPENTIN 300 MG/1
300 CAPSULE ORAL NIGHTLY
Qty: 30 CAPSULE | Refills: 2 | Status: SHIPPED | OUTPATIENT
Start: 2021-01-21 | End: 2021-04-15

## 2021-02-03 ENCOUNTER — TELEPHONE (OUTPATIENT)
Dept: PAIN MEDICINE | Facility: CLINIC | Age: 66
End: 2021-02-03

## 2021-02-08 ENCOUNTER — LAB VISIT (OUTPATIENT)
Dept: FAMILY MEDICINE | Facility: CLINIC | Age: 66
End: 2021-02-08
Payer: MEDICARE

## 2021-02-08 ENCOUNTER — TELEPHONE (OUTPATIENT)
Dept: PAIN MEDICINE | Facility: CLINIC | Age: 66
End: 2021-02-08

## 2021-02-08 DIAGNOSIS — Z01.818 PRE-OP TESTING: ICD-10-CM

## 2021-02-08 DIAGNOSIS — M54.16 LUMBAR RADICULOPATHY: Primary | ICD-10-CM

## 2021-02-08 PROCEDURE — U0003 INFECTIOUS AGENT DETECTION BY NUCLEIC ACID (DNA OR RNA); SEVERE ACUTE RESPIRATORY SYNDROME CORONAVIRUS 2 (SARS-COV-2) (CORONAVIRUS DISEASE [COVID-19]), AMPLIFIED PROBE TECHNIQUE, MAKING USE OF HIGH THROUGHPUT TECHNOLOGIES AS DESCRIBED BY CMS-2020-01-R: HCPCS

## 2021-02-08 PROCEDURE — U0005 INFEC AGEN DETEC AMPLI PROBE: HCPCS

## 2021-02-08 RX ORDER — ALPRAZOLAM 0.5 MG/1
1 TABLET, ORALLY DISINTEGRATING ORAL ONCE AS NEEDED
Status: CANCELLED | OUTPATIENT
Start: 2021-02-11 | End: 2032-07-09

## 2021-02-08 RX ORDER — TRAMADOL HYDROCHLORIDE 50 MG/1
50 TABLET ORAL 3 TIMES DAILY PRN
Qty: 20 TABLET | Refills: 1 | Status: SHIPPED | OUTPATIENT
Start: 2021-02-08 | End: 2021-05-20

## 2021-02-10 LAB — SARS-COV-2 RNA RESP QL NAA+PROBE: NOT DETECTED

## 2021-02-11 ENCOUNTER — HOSPITAL ENCOUNTER (OUTPATIENT)
Dept: RADIOLOGY | Facility: HOSPITAL | Age: 66
Discharge: HOME | End: 2021-02-11
Attending: ANESTHESIOLOGY | Admitting: ANESTHESIOLOGY
Payer: MEDICARE

## 2021-02-11 ENCOUNTER — HOSPITAL ENCOUNTER (OUTPATIENT)
Facility: HOSPITAL | Age: 66
Discharge: HOME | End: 2021-02-11
Attending: ANESTHESIOLOGY | Admitting: ANESTHESIOLOGY
Payer: MEDICARE

## 2021-02-11 DIAGNOSIS — M51.36 DDD (DEGENERATIVE DISC DISEASE), LUMBAR: ICD-10-CM

## 2021-02-11 DIAGNOSIS — M54.16 LUMBAR RADICULOPATHY: ICD-10-CM

## 2021-02-11 PROCEDURE — 63600175 PHARM REV CODE 636 W HCPCS: Mod: PO | Performed by: ANESTHESIOLOGY

## 2021-02-11 PROCEDURE — 64483 NJX AA&/STRD TFRM EPI L/S 1: CPT | Mod: 50,,, | Performed by: ANESTHESIOLOGY

## 2021-02-11 PROCEDURE — 64483 NJX AA&/STRD TFRM EPI L/S 1: CPT | Mod: 50,PO | Performed by: ANESTHESIOLOGY

## 2021-02-11 PROCEDURE — 25500020 PHARM REV CODE 255: Mod: PO | Performed by: ANESTHESIOLOGY

## 2021-02-11 PROCEDURE — 64483 PR EPIDURAL INJ, ANES/STEROID, TRANSFORAMINAL, LUMB/SACR, SNGL LEVL: ICD-10-PCS | Mod: 50,,, | Performed by: ANESTHESIOLOGY

## 2021-02-11 PROCEDURE — 25000003 PHARM REV CODE 250: Mod: PO | Performed by: ANESTHESIOLOGY

## 2021-02-11 PROCEDURE — 76000 FLUOROSCOPY <1 HR PHYS/QHP: CPT | Mod: TC,PO

## 2021-02-11 RX ORDER — LIDOCAINE HYDROCHLORIDE 10 MG/ML
INJECTION, SOLUTION EPIDURAL; INFILTRATION; INTRACAUDAL; PERINEURAL
Status: DISCONTINUED | OUTPATIENT
Start: 2021-02-11 | End: 2021-02-11 | Stop reason: HOSPADM

## 2021-02-11 RX ORDER — METHYLPREDNISOLONE ACETATE 80 MG/ML
INJECTION, SUSPENSION INTRA-ARTICULAR; INTRALESIONAL; INTRAMUSCULAR; SOFT TISSUE
Status: DISCONTINUED | OUTPATIENT
Start: 2021-02-11 | End: 2021-02-11 | Stop reason: HOSPADM

## 2021-02-11 RX ORDER — ALPRAZOLAM 0.5 MG/1
1 TABLET, ORALLY DISINTEGRATING ORAL ONCE AS NEEDED
Status: COMPLETED | OUTPATIENT
Start: 2021-02-11 | End: 2021-02-11

## 2021-02-11 RX ORDER — BUPIVACAINE HYDROCHLORIDE 2.5 MG/ML
INJECTION, SOLUTION EPIDURAL; INFILTRATION; INTRACAUDAL
Status: DISCONTINUED | OUTPATIENT
Start: 2021-02-11 | End: 2021-02-11 | Stop reason: HOSPADM

## 2021-02-11 RX ADMIN — ALPRAZOLAM 1 MG: 0.5 TABLET, ORALLY DISINTEGRATING ORAL at 03:02

## 2021-02-12 VITALS
WEIGHT: 225 LBS | DIASTOLIC BLOOD PRESSURE: 89 MMHG | TEMPERATURE: 97 F | OXYGEN SATURATION: 97 % | HEART RATE: 61 BPM | HEIGHT: 69 IN | RESPIRATION RATE: 16 BRPM | BODY MASS INDEX: 33.33 KG/M2 | SYSTOLIC BLOOD PRESSURE: 176 MMHG

## 2021-02-25 ENCOUNTER — PATIENT OUTREACH (OUTPATIENT)
Dept: ADMINISTRATIVE | Facility: OTHER | Age: 66
End: 2021-02-25

## 2021-02-26 ENCOUNTER — OFFICE VISIT (OUTPATIENT)
Dept: PAIN MEDICINE | Facility: CLINIC | Age: 66
End: 2021-02-26
Payer: MEDICARE

## 2021-02-26 VITALS
DIASTOLIC BLOOD PRESSURE: 76 MMHG | TEMPERATURE: 97 F | RESPIRATION RATE: 18 BRPM | SYSTOLIC BLOOD PRESSURE: 135 MMHG | HEART RATE: 70 BPM | OXYGEN SATURATION: 98 % | BODY MASS INDEX: 34.33 KG/M2 | WEIGHT: 232.5 LBS

## 2021-02-26 DIAGNOSIS — M51.36 DDD (DEGENERATIVE DISC DISEASE), LUMBAR: ICD-10-CM

## 2021-02-26 DIAGNOSIS — Z01.818 PRE-OP TESTING: ICD-10-CM

## 2021-02-26 DIAGNOSIS — M47.816 LUMBAR SPONDYLOSIS: Primary | ICD-10-CM

## 2021-02-26 PROCEDURE — 1125F AMNT PAIN NOTED PAIN PRSNT: CPT | Mod: S$GLB,,, | Performed by: PHYSICIAN ASSISTANT

## 2021-02-26 PROCEDURE — 1101F PT FALLS ASSESS-DOCD LE1/YR: CPT | Mod: CPTII,S$GLB,, | Performed by: PHYSICIAN ASSISTANT

## 2021-02-26 PROCEDURE — 3078F PR MOST RECENT DIASTOLIC BLOOD PRESSURE < 80 MM HG: ICD-10-PCS | Mod: CPTII,S$GLB,, | Performed by: PHYSICIAN ASSISTANT

## 2021-02-26 PROCEDURE — 3008F BODY MASS INDEX DOCD: CPT | Mod: CPTII,S$GLB,, | Performed by: PHYSICIAN ASSISTANT

## 2021-02-26 PROCEDURE — 1159F PR MEDICATION LIST DOCUMENTED IN MEDICAL RECORD: ICD-10-PCS | Mod: S$GLB,,, | Performed by: PHYSICIAN ASSISTANT

## 2021-02-26 PROCEDURE — 3075F SYST BP GE 130 - 139MM HG: CPT | Mod: CPTII,S$GLB,, | Performed by: PHYSICIAN ASSISTANT

## 2021-02-26 PROCEDURE — 3075F PR MOST RECENT SYSTOLIC BLOOD PRESS GE 130-139MM HG: ICD-10-PCS | Mod: CPTII,S$GLB,, | Performed by: PHYSICIAN ASSISTANT

## 2021-02-26 PROCEDURE — 3008F PR BODY MASS INDEX (BMI) DOCUMENTED: ICD-10-PCS | Mod: CPTII,S$GLB,, | Performed by: PHYSICIAN ASSISTANT

## 2021-02-26 PROCEDURE — 3078F DIAST BP <80 MM HG: CPT | Mod: CPTII,S$GLB,, | Performed by: PHYSICIAN ASSISTANT

## 2021-02-26 PROCEDURE — 1101F PR PT FALLS ASSESS DOC 0-1 FALLS W/OUT INJ PAST YR: ICD-10-PCS | Mod: CPTII,S$GLB,, | Performed by: PHYSICIAN ASSISTANT

## 2021-02-26 PROCEDURE — 99214 PR OFFICE/OUTPT VISIT, EST, LEVL IV, 30-39 MIN: ICD-10-PCS | Mod: S$GLB,,, | Performed by: PHYSICIAN ASSISTANT

## 2021-02-26 PROCEDURE — 99999 PR PBB SHADOW E&M-EST. PATIENT-LVL V: ICD-10-PCS | Mod: PBBFAC,,, | Performed by: PHYSICIAN ASSISTANT

## 2021-02-26 PROCEDURE — 1159F MED LIST DOCD IN RCRD: CPT | Mod: S$GLB,,, | Performed by: PHYSICIAN ASSISTANT

## 2021-02-26 PROCEDURE — 1125F PR PAIN SEVERITY QUANTIFIED, PAIN PRESENT: ICD-10-PCS | Mod: S$GLB,,, | Performed by: PHYSICIAN ASSISTANT

## 2021-02-26 PROCEDURE — 3288F PR FALLS RISK ASSESSMENT DOCUMENTED: ICD-10-PCS | Mod: CPTII,S$GLB,, | Performed by: PHYSICIAN ASSISTANT

## 2021-02-26 PROCEDURE — 99214 OFFICE O/P EST MOD 30 MIN: CPT | Mod: S$GLB,,, | Performed by: PHYSICIAN ASSISTANT

## 2021-02-26 PROCEDURE — 99999 PR PBB SHADOW E&M-EST. PATIENT-LVL V: CPT | Mod: PBBFAC,,, | Performed by: PHYSICIAN ASSISTANT

## 2021-02-26 PROCEDURE — 3288F FALL RISK ASSESSMENT DOCD: CPT | Mod: CPTII,S$GLB,, | Performed by: PHYSICIAN ASSISTANT

## 2021-02-26 RX ORDER — SODIUM CHLORIDE, SODIUM LACTATE, POTASSIUM CHLORIDE, CALCIUM CHLORIDE 600; 310; 30; 20 MG/100ML; MG/100ML; MG/100ML; MG/100ML
INJECTION, SOLUTION INTRAVENOUS CONTINUOUS
Status: CANCELLED | OUTPATIENT
Start: 2021-03-10

## 2021-03-07 ENCOUNTER — LAB VISIT (OUTPATIENT)
Dept: FAMILY MEDICINE | Facility: CLINIC | Age: 66
End: 2021-03-07
Payer: MEDICARE

## 2021-03-07 DIAGNOSIS — Z01.818 PRE-OP TESTING: ICD-10-CM

## 2021-03-07 LAB — SARS-COV-2 RNA RESP QL NAA+PROBE: NOT DETECTED

## 2021-03-07 PROCEDURE — U0005 INFEC AGEN DETEC AMPLI PROBE: HCPCS | Performed by: ANESTHESIOLOGY

## 2021-03-07 PROCEDURE — U0003 INFECTIOUS AGENT DETECTION BY NUCLEIC ACID (DNA OR RNA); SEVERE ACUTE RESPIRATORY SYNDROME CORONAVIRUS 2 (SARS-COV-2) (CORONAVIRUS DISEASE [COVID-19]), AMPLIFIED PROBE TECHNIQUE, MAKING USE OF HIGH THROUGHPUT TECHNOLOGIES AS DESCRIBED BY CMS-2020-01-R: HCPCS | Performed by: ANESTHESIOLOGY

## 2021-03-10 ENCOUNTER — HOSPITAL ENCOUNTER (OUTPATIENT)
Facility: HOSPITAL | Age: 66
Discharge: HOME | End: 2021-03-10
Attending: ANESTHESIOLOGY | Admitting: ANESTHESIOLOGY
Payer: MEDICARE

## 2021-03-10 ENCOUNTER — HOSPITAL ENCOUNTER (OUTPATIENT)
Dept: RADIOLOGY | Facility: HOSPITAL | Age: 66
Discharge: HOME | End: 2021-03-10
Attending: ANESTHESIOLOGY | Admitting: ANESTHESIOLOGY
Payer: MEDICARE

## 2021-03-10 VITALS
TEMPERATURE: 98 F | BODY MASS INDEX: 33.33 KG/M2 | SYSTOLIC BLOOD PRESSURE: 164 MMHG | WEIGHT: 225 LBS | RESPIRATION RATE: 16 BRPM | HEART RATE: 53 BPM | OXYGEN SATURATION: 100 % | HEIGHT: 69 IN | DIASTOLIC BLOOD PRESSURE: 81 MMHG

## 2021-03-10 DIAGNOSIS — M51.36 DDD (DEGENERATIVE DISC DISEASE), LUMBAR: ICD-10-CM

## 2021-03-10 DIAGNOSIS — M47.816 LUMBAR SPONDYLOSIS: ICD-10-CM

## 2021-03-10 DIAGNOSIS — M54.16 LUMBAR RADICULOPATHY: Primary | ICD-10-CM

## 2021-03-10 PROCEDURE — 76000 FLUOROSCOPY <1 HR PHYS/QHP: CPT | Mod: TC,PO

## 2021-03-10 PROCEDURE — 64495 INJ PARAVERT F JNT L/S 3 LEV: CPT | Mod: 50,PO | Performed by: ANESTHESIOLOGY

## 2021-03-10 PROCEDURE — 64493 PR INJ DX/THER AGNT PARAVERT FACET JOINT,IMG GUIDE,LUMBAR/SAC,1ST LVL: ICD-10-PCS | Mod: 50,,, | Performed by: ANESTHESIOLOGY

## 2021-03-10 PROCEDURE — 64493 INJ PARAVERT F JNT L/S 1 LEV: CPT | Mod: 50,PO | Performed by: ANESTHESIOLOGY

## 2021-03-10 PROCEDURE — 64493 INJ PARAVERT F JNT L/S 1 LEV: CPT | Mod: 50,,, | Performed by: ANESTHESIOLOGY

## 2021-03-10 PROCEDURE — 64495 INJ PARAVERT F JNT L/S 3 LEV: CPT | Mod: 50,,, | Performed by: ANESTHESIOLOGY

## 2021-03-10 PROCEDURE — 64494 INJ PARAVERT F JNT L/S 2 LEV: CPT | Mod: 50,PO | Performed by: ANESTHESIOLOGY

## 2021-03-10 PROCEDURE — 64495 PR INJ DX/THER AGNT PARAVERT FACET JOINT,IMG GUIDE,LUMBAR/SAC, ADD LEVEL: ICD-10-PCS | Mod: 50,,, | Performed by: ANESTHESIOLOGY

## 2021-03-10 PROCEDURE — 64494 INJ PARAVERT F JNT L/S 2 LEV: CPT | Mod: 50,,, | Performed by: ANESTHESIOLOGY

## 2021-03-10 PROCEDURE — 25500020 PHARM REV CODE 255: Mod: PO | Performed by: ANESTHESIOLOGY

## 2021-03-10 PROCEDURE — 63600175 PHARM REV CODE 636 W HCPCS: Mod: PO | Performed by: ANESTHESIOLOGY

## 2021-03-10 PROCEDURE — 64494 PR INJ DX/THER AGNT PARAVERT FACET JOINT,IMG GUIDE,LUMBAR/SAC, 2ND LEVEL: ICD-10-PCS | Mod: 50,,, | Performed by: ANESTHESIOLOGY

## 2021-03-10 PROCEDURE — 25000003 PHARM REV CODE 250: Mod: PO | Performed by: ANESTHESIOLOGY

## 2021-03-10 RX ORDER — MIDAZOLAM HYDROCHLORIDE 1 MG/ML
INJECTION INTRAMUSCULAR; INTRAVENOUS
Status: DISCONTINUED | OUTPATIENT
Start: 2021-03-10 | End: 2021-03-10 | Stop reason: HOSPADM

## 2021-03-10 RX ORDER — LIDOCAINE HYDROCHLORIDE 10 MG/ML
INJECTION, SOLUTION EPIDURAL; INFILTRATION; INTRACAUDAL; PERINEURAL
Status: DISCONTINUED | OUTPATIENT
Start: 2021-03-10 | End: 2021-03-10 | Stop reason: HOSPADM

## 2021-03-10 RX ORDER — BUPIVACAINE HYDROCHLORIDE 2.5 MG/ML
INJECTION, SOLUTION EPIDURAL; INFILTRATION; INTRACAUDAL
Status: DISCONTINUED | OUTPATIENT
Start: 2021-03-10 | End: 2021-03-10 | Stop reason: HOSPADM

## 2021-03-10 RX ORDER — SODIUM CHLORIDE, SODIUM LACTATE, POTASSIUM CHLORIDE, CALCIUM CHLORIDE 600; 310; 30; 20 MG/100ML; MG/100ML; MG/100ML; MG/100ML
INJECTION, SOLUTION INTRAVENOUS CONTINUOUS
Status: DISCONTINUED | OUTPATIENT
Start: 2021-03-10 | End: 2021-03-10 | Stop reason: HOSPADM

## 2021-03-10 RX ADMIN — SODIUM CHLORIDE, SODIUM LACTATE, POTASSIUM CHLORIDE, AND CALCIUM CHLORIDE: .6; .31; .03; .02 INJECTION, SOLUTION INTRAVENOUS at 12:03

## 2021-03-12 ENCOUNTER — TELEPHONE (OUTPATIENT)
Dept: PAIN MEDICINE | Facility: CLINIC | Age: 66
End: 2021-03-12

## 2021-03-12 ENCOUNTER — PATIENT MESSAGE (OUTPATIENT)
Dept: PAIN MEDICINE | Facility: CLINIC | Age: 66
End: 2021-03-12

## 2021-03-13 ENCOUNTER — PATIENT MESSAGE (OUTPATIENT)
Dept: PAIN MEDICINE | Facility: CLINIC | Age: 66
End: 2021-03-13

## 2021-03-15 ENCOUNTER — PATIENT MESSAGE (OUTPATIENT)
Dept: PAIN MEDICINE | Facility: CLINIC | Age: 66
End: 2021-03-15

## 2021-03-20 ENCOUNTER — LAB VISIT (OUTPATIENT)
Dept: FAMILY MEDICINE | Facility: CLINIC | Age: 66
End: 2021-03-20
Payer: MEDICARE

## 2021-03-20 DIAGNOSIS — Z13.9 SCREENING PROCEDURE: ICD-10-CM

## 2021-03-20 PROCEDURE — U0005 INFEC AGEN DETEC AMPLI PROBE: HCPCS | Performed by: ANESTHESIOLOGY

## 2021-03-20 PROCEDURE — U0003 INFECTIOUS AGENT DETECTION BY NUCLEIC ACID (DNA OR RNA); SEVERE ACUTE RESPIRATORY SYNDROME CORONAVIRUS 2 (SARS-COV-2) (CORONAVIRUS DISEASE [COVID-19]), AMPLIFIED PROBE TECHNIQUE, MAKING USE OF HIGH THROUGHPUT TECHNOLOGIES AS DESCRIBED BY CMS-2020-01-R: HCPCS | Performed by: ANESTHESIOLOGY

## 2021-03-22 LAB — SARS-COV-2 RNA RESP QL NAA+PROBE: NOT DETECTED

## 2021-03-23 ENCOUNTER — HOSPITAL ENCOUNTER (OUTPATIENT)
Facility: HOSPITAL | Age: 66
Discharge: HOME | End: 2021-03-23
Attending: ANESTHESIOLOGY | Admitting: ANESTHESIOLOGY
Payer: MEDICARE

## 2021-03-23 ENCOUNTER — HOSPITAL ENCOUNTER (OUTPATIENT)
Dept: RADIOLOGY | Facility: HOSPITAL | Age: 66
Discharge: HOME | End: 2021-03-23
Attending: ANESTHESIOLOGY
Payer: MEDICARE

## 2021-03-23 VITALS
WEIGHT: 225 LBS | RESPIRATION RATE: 18 BRPM | DIASTOLIC BLOOD PRESSURE: 84 MMHG | HEART RATE: 59 BPM | HEIGHT: 69 IN | BODY MASS INDEX: 33.33 KG/M2 | OXYGEN SATURATION: 96 % | SYSTOLIC BLOOD PRESSURE: 163 MMHG | TEMPERATURE: 97 F

## 2021-03-23 DIAGNOSIS — M47.816 LUMBAR SPONDYLOSIS: ICD-10-CM

## 2021-03-23 DIAGNOSIS — M54.16 LUMBAR RADICULOPATHY: Primary | ICD-10-CM

## 2021-03-23 DIAGNOSIS — M51.36 DDD (DEGENERATIVE DISC DISEASE), LUMBAR: ICD-10-CM

## 2021-03-23 PROCEDURE — 76000 FLUOROSCOPY <1 HR PHYS/QHP: CPT | Mod: TC,PO

## 2021-03-23 PROCEDURE — 25000003 PHARM REV CODE 250: Mod: PO | Performed by: ANESTHESIOLOGY

## 2021-03-23 PROCEDURE — 64636 DESTROY L/S FACET JNT ADDL: CPT | Mod: 50,,, | Performed by: ANESTHESIOLOGY

## 2021-03-23 PROCEDURE — 99152 PR MOD CONSCIOUS SEDATION, SAME PHYS, 5+ YRS, FIRST 15 MIN: ICD-10-PCS | Mod: ,,, | Performed by: ANESTHESIOLOGY

## 2021-03-23 PROCEDURE — 64636 DESTROY L/S FACET JNT ADDL: CPT | Mod: 50,PO | Performed by: ANESTHESIOLOGY

## 2021-03-23 PROCEDURE — 64635 PR DESTROY LUMB/SAC FACET JNT: ICD-10-PCS | Mod: 50,,, | Performed by: ANESTHESIOLOGY

## 2021-03-23 PROCEDURE — 64635 DESTROY LUMB/SAC FACET JNT: CPT | Mod: 50,PO | Performed by: ANESTHESIOLOGY

## 2021-03-23 PROCEDURE — A4216 STERILE WATER/SALINE, 10 ML: HCPCS | Mod: PO | Performed by: ANESTHESIOLOGY

## 2021-03-23 PROCEDURE — 64635 DESTROY LUMB/SAC FACET JNT: CPT | Mod: 50,,, | Performed by: ANESTHESIOLOGY

## 2021-03-23 PROCEDURE — 99152 MOD SED SAME PHYS/QHP 5/>YRS: CPT | Mod: ,,, | Performed by: ANESTHESIOLOGY

## 2021-03-23 PROCEDURE — 64636 PR DESTROY L/S FACET JNT ADDL: ICD-10-PCS | Mod: 50,,, | Performed by: ANESTHESIOLOGY

## 2021-03-23 PROCEDURE — 63600175 PHARM REV CODE 636 W HCPCS: Mod: PO | Performed by: ANESTHESIOLOGY

## 2021-03-23 RX ORDER — METHYLPREDNISOLONE ACETATE 40 MG/ML
INJECTION, SUSPENSION INTRA-ARTICULAR; INTRALESIONAL; INTRAMUSCULAR; SOFT TISSUE
Status: DISCONTINUED | OUTPATIENT
Start: 2021-03-23 | End: 2021-03-23 | Stop reason: HOSPADM

## 2021-03-23 RX ORDER — SODIUM CHLORIDE 9 MG/ML
INJECTION, SOLUTION INTRAMUSCULAR; INTRAVENOUS; SUBCUTANEOUS
Status: DISCONTINUED | OUTPATIENT
Start: 2021-03-23 | End: 2021-03-23 | Stop reason: HOSPADM

## 2021-03-23 RX ORDER — FENTANYL CITRATE 50 UG/ML
INJECTION, SOLUTION INTRAMUSCULAR; INTRAVENOUS
Status: DISCONTINUED | OUTPATIENT
Start: 2021-03-23 | End: 2021-03-23 | Stop reason: HOSPADM

## 2021-03-23 RX ORDER — SODIUM CHLORIDE, SODIUM LACTATE, POTASSIUM CHLORIDE, CALCIUM CHLORIDE 600; 310; 30; 20 MG/100ML; MG/100ML; MG/100ML; MG/100ML
INJECTION, SOLUTION INTRAVENOUS CONTINUOUS
Status: DISCONTINUED | OUTPATIENT
Start: 2021-03-23 | End: 2021-03-23 | Stop reason: HOSPADM

## 2021-03-23 RX ORDER — LIDOCAINE HYDROCHLORIDE 20 MG/ML
INJECTION, SOLUTION EPIDURAL; INFILTRATION; INTRACAUDAL; PERINEURAL
Status: DISCONTINUED | OUTPATIENT
Start: 2021-03-23 | End: 2021-03-23 | Stop reason: HOSPADM

## 2021-03-23 RX ORDER — MIDAZOLAM HYDROCHLORIDE 2 MG/2ML
INJECTION, SOLUTION INTRAMUSCULAR; INTRAVENOUS
Status: DISCONTINUED | OUTPATIENT
Start: 2021-03-23 | End: 2021-03-23 | Stop reason: HOSPADM

## 2021-03-23 RX ORDER — LIDOCAINE HYDROCHLORIDE 10 MG/ML
INJECTION, SOLUTION EPIDURAL; INFILTRATION; INTRACAUDAL; PERINEURAL
Status: DISCONTINUED | OUTPATIENT
Start: 2021-03-23 | End: 2021-03-23 | Stop reason: HOSPADM

## 2021-03-23 RX ADMIN — SODIUM CHLORIDE, SODIUM LACTATE, POTASSIUM CHLORIDE, AND CALCIUM CHLORIDE: .6; .31; .03; .02 INJECTION, SOLUTION INTRAVENOUS at 01:03

## 2021-04-12 ENCOUNTER — PATIENT OUTREACH (OUTPATIENT)
Dept: ADMINISTRATIVE | Facility: OTHER | Age: 66
End: 2021-04-12

## 2021-04-13 ENCOUNTER — OFFICE VISIT (OUTPATIENT)
Dept: PAIN MEDICINE | Facility: CLINIC | Age: 66
End: 2021-04-13
Payer: MEDICARE

## 2021-04-13 VITALS
SYSTOLIC BLOOD PRESSURE: 152 MMHG | WEIGHT: 238.44 LBS | RESPIRATION RATE: 18 BRPM | HEART RATE: 53 BPM | DIASTOLIC BLOOD PRESSURE: 76 MMHG | BODY MASS INDEX: 35.21 KG/M2 | TEMPERATURE: 98 F

## 2021-04-13 DIAGNOSIS — M47.816 LUMBAR SPONDYLOSIS: Primary | ICD-10-CM

## 2021-04-13 DIAGNOSIS — M54.16 LUMBAR RADICULOPATHY: ICD-10-CM

## 2021-04-13 DIAGNOSIS — M51.36 DDD (DEGENERATIVE DISC DISEASE), LUMBAR: ICD-10-CM

## 2021-04-13 DIAGNOSIS — M47.812 CERVICAL SPONDYLOSIS: ICD-10-CM

## 2021-04-13 PROCEDURE — 1159F MED LIST DOCD IN RCRD: CPT | Mod: S$GLB,,, | Performed by: PHYSICIAN ASSISTANT

## 2021-04-13 PROCEDURE — 3008F BODY MASS INDEX DOCD: CPT | Mod: CPTII,S$GLB,, | Performed by: PHYSICIAN ASSISTANT

## 2021-04-13 PROCEDURE — 1125F PR PAIN SEVERITY QUANTIFIED, PAIN PRESENT: ICD-10-PCS | Mod: S$GLB,,, | Performed by: PHYSICIAN ASSISTANT

## 2021-04-13 PROCEDURE — 3288F FALL RISK ASSESSMENT DOCD: CPT | Mod: CPTII,S$GLB,, | Performed by: PHYSICIAN ASSISTANT

## 2021-04-13 PROCEDURE — 1125F AMNT PAIN NOTED PAIN PRSNT: CPT | Mod: S$GLB,,, | Performed by: PHYSICIAN ASSISTANT

## 2021-04-13 PROCEDURE — 1101F PT FALLS ASSESS-DOCD LE1/YR: CPT | Mod: CPTII,S$GLB,, | Performed by: PHYSICIAN ASSISTANT

## 2021-04-13 PROCEDURE — 1159F PR MEDICATION LIST DOCUMENTED IN MEDICAL RECORD: ICD-10-PCS | Mod: S$GLB,,, | Performed by: PHYSICIAN ASSISTANT

## 2021-04-13 PROCEDURE — 3008F PR BODY MASS INDEX (BMI) DOCUMENTED: ICD-10-PCS | Mod: CPTII,S$GLB,, | Performed by: PHYSICIAN ASSISTANT

## 2021-04-13 PROCEDURE — 3288F PR FALLS RISK ASSESSMENT DOCUMENTED: ICD-10-PCS | Mod: CPTII,S$GLB,, | Performed by: PHYSICIAN ASSISTANT

## 2021-04-13 PROCEDURE — 99999 PR PBB SHADOW E&M-EST. PATIENT-LVL III: CPT | Mod: PBBFAC,,, | Performed by: PHYSICIAN ASSISTANT

## 2021-04-13 PROCEDURE — 99999 PR PBB SHADOW E&M-EST. PATIENT-LVL III: ICD-10-PCS | Mod: PBBFAC,,, | Performed by: PHYSICIAN ASSISTANT

## 2021-04-13 PROCEDURE — 1101F PR PT FALLS ASSESS DOC 0-1 FALLS W/OUT INJ PAST YR: ICD-10-PCS | Mod: CPTII,S$GLB,, | Performed by: PHYSICIAN ASSISTANT

## 2021-04-13 PROCEDURE — 99213 OFFICE O/P EST LOW 20 MIN: CPT | Mod: S$GLB,,, | Performed by: PHYSICIAN ASSISTANT

## 2021-04-13 PROCEDURE — 99213 PR OFFICE/OUTPT VISIT, EST, LEVL III, 20-29 MIN: ICD-10-PCS | Mod: S$GLB,,, | Performed by: PHYSICIAN ASSISTANT

## 2021-04-15 RX ORDER — TIZANIDINE 4 MG/1
TABLET ORAL
Qty: 30 TABLET | Refills: 1 | Status: SHIPPED | OUTPATIENT
Start: 2021-04-15 | End: 2021-07-01

## 2021-04-15 RX ORDER — GABAPENTIN 300 MG/1
CAPSULE ORAL
Qty: 30 CAPSULE | Refills: 2 | Status: SHIPPED | OUTPATIENT
Start: 2021-04-15 | End: 2021-05-20 | Stop reason: SDUPTHER

## 2021-04-19 DIAGNOSIS — E78.00 HYPERCHOLESTEROLEMIA: ICD-10-CM

## 2021-04-19 RX ORDER — ATORVASTATIN CALCIUM 10 MG/1
TABLET, FILM COATED ORAL
Qty: 90 TABLET | Refills: 0 | Status: SHIPPED | OUTPATIENT
Start: 2021-04-19 | End: 2021-05-20 | Stop reason: SDUPTHER

## 2021-05-20 ENCOUNTER — OFFICE VISIT (OUTPATIENT)
Dept: FAMILY MEDICINE | Facility: CLINIC | Age: 66
End: 2021-05-20
Payer: MEDICARE

## 2021-05-20 VITALS
TEMPERATURE: 98 F | HEART RATE: 57 BPM | BODY MASS INDEX: 34.67 KG/M2 | WEIGHT: 234.81 LBS | OXYGEN SATURATION: 96 % | RESPIRATION RATE: 18 BRPM | DIASTOLIC BLOOD PRESSURE: 74 MMHG | SYSTOLIC BLOOD PRESSURE: 122 MMHG

## 2021-05-20 DIAGNOSIS — K21.9 GASTROESOPHAGEAL REFLUX DISEASE, UNSPECIFIED WHETHER ESOPHAGITIS PRESENT: ICD-10-CM

## 2021-05-20 DIAGNOSIS — R35.0 URINARY FREQUENCY: ICD-10-CM

## 2021-05-20 DIAGNOSIS — Z12.5 SCREENING FOR PROSTATE CANCER: ICD-10-CM

## 2021-05-20 DIAGNOSIS — Z76.89 ENCOUNTER TO ESTABLISH CARE: ICD-10-CM

## 2021-05-20 DIAGNOSIS — Z13.1 SCREENING FOR DIABETES MELLITUS: ICD-10-CM

## 2021-05-20 DIAGNOSIS — J30.9 ALLERGIC RHINITIS, UNSPECIFIED SEASONALITY, UNSPECIFIED TRIGGER: ICD-10-CM

## 2021-05-20 DIAGNOSIS — M54.16 LUMBAR RADICULOPATHY: ICD-10-CM

## 2021-05-20 DIAGNOSIS — I10 ESSENTIAL HYPERTENSION: Primary | ICD-10-CM

## 2021-05-20 DIAGNOSIS — E78.00 HYPERCHOLESTEROLEMIA: ICD-10-CM

## 2021-05-20 DIAGNOSIS — F41.9 ANXIETY: ICD-10-CM

## 2021-05-20 PROCEDURE — 1125F AMNT PAIN NOTED PAIN PRSNT: CPT | Mod: S$GLB,,, | Performed by: INTERNAL MEDICINE

## 2021-05-20 PROCEDURE — 3288F FALL RISK ASSESSMENT DOCD: CPT | Mod: CPTII,S$GLB,, | Performed by: INTERNAL MEDICINE

## 2021-05-20 PROCEDURE — 3078F DIAST BP <80 MM HG: CPT | Mod: CPTII,S$GLB,, | Performed by: INTERNAL MEDICINE

## 2021-05-20 PROCEDURE — 3074F SYST BP LT 130 MM HG: CPT | Mod: CPTII,S$GLB,, | Performed by: INTERNAL MEDICINE

## 2021-05-20 PROCEDURE — 99999 PR PBB SHADOW E&M-EST. PATIENT-LVL III: ICD-10-PCS | Mod: PBBFAC,,, | Performed by: INTERNAL MEDICINE

## 2021-05-20 PROCEDURE — 1159F PR MEDICATION LIST DOCUMENTED IN MEDICAL RECORD: ICD-10-PCS | Mod: S$GLB,,, | Performed by: INTERNAL MEDICINE

## 2021-05-20 PROCEDURE — 3008F BODY MASS INDEX DOCD: CPT | Mod: CPTII,S$GLB,, | Performed by: INTERNAL MEDICINE

## 2021-05-20 PROCEDURE — 3074F PR MOST RECENT SYSTOLIC BLOOD PRESSURE < 130 MM HG: ICD-10-PCS | Mod: CPTII,S$GLB,, | Performed by: INTERNAL MEDICINE

## 2021-05-20 PROCEDURE — 99204 OFFICE O/P NEW MOD 45 MIN: CPT | Mod: S$GLB,,, | Performed by: INTERNAL MEDICINE

## 2021-05-20 PROCEDURE — 99204 PR OFFICE/OUTPT VISIT, NEW, LEVL IV, 45-59 MIN: ICD-10-PCS | Mod: S$GLB,,, | Performed by: INTERNAL MEDICINE

## 2021-05-20 PROCEDURE — 1101F PR PT FALLS ASSESS DOC 0-1 FALLS W/OUT INJ PAST YR: ICD-10-PCS | Mod: CPTII,S$GLB,, | Performed by: INTERNAL MEDICINE

## 2021-05-20 PROCEDURE — 3078F PR MOST RECENT DIASTOLIC BLOOD PRESSURE < 80 MM HG: ICD-10-PCS | Mod: CPTII,S$GLB,, | Performed by: INTERNAL MEDICINE

## 2021-05-20 PROCEDURE — 3008F PR BODY MASS INDEX (BMI) DOCUMENTED: ICD-10-PCS | Mod: CPTII,S$GLB,, | Performed by: INTERNAL MEDICINE

## 2021-05-20 PROCEDURE — 1101F PT FALLS ASSESS-DOCD LE1/YR: CPT | Mod: CPTII,S$GLB,, | Performed by: INTERNAL MEDICINE

## 2021-05-20 PROCEDURE — 3288F PR FALLS RISK ASSESSMENT DOCUMENTED: ICD-10-PCS | Mod: CPTII,S$GLB,, | Performed by: INTERNAL MEDICINE

## 2021-05-20 PROCEDURE — 1125F PR PAIN SEVERITY QUANTIFIED, PAIN PRESENT: ICD-10-PCS | Mod: S$GLB,,, | Performed by: INTERNAL MEDICINE

## 2021-05-20 PROCEDURE — 1159F MED LIST DOCD IN RCRD: CPT | Mod: S$GLB,,, | Performed by: INTERNAL MEDICINE

## 2021-05-20 PROCEDURE — 99999 PR PBB SHADOW E&M-EST. PATIENT-LVL III: CPT | Mod: PBBFAC,,, | Performed by: INTERNAL MEDICINE

## 2021-05-20 RX ORDER — FLUTICASONE PROPIONATE 50 MCG
SPRAY, SUSPENSION (ML) NASAL
Qty: 48 G | Refills: 1 | Status: SHIPPED | OUTPATIENT
Start: 2021-05-20 | End: 2021-08-23

## 2021-05-20 RX ORDER — ATORVASTATIN CALCIUM 10 MG/1
10 TABLET, FILM COATED ORAL DAILY
Qty: 90 TABLET | Refills: 3 | Status: SHIPPED | OUTPATIENT
Start: 2021-05-20 | End: 2022-06-27

## 2021-05-20 RX ORDER — BENAZEPRIL HYDROCHLORIDE 20 MG/1
20 TABLET ORAL DAILY
Qty: 90 TABLET | Refills: 3 | Status: SHIPPED | OUTPATIENT
Start: 2021-05-20 | End: 2021-09-10 | Stop reason: SDUPTHER

## 2021-05-20 RX ORDER — PAROXETINE HYDROCHLORIDE 40 MG/1
40 TABLET, FILM COATED ORAL DAILY
Qty: 90 TABLET | Refills: 1 | Status: SHIPPED | OUTPATIENT
Start: 2021-05-20 | End: 2022-02-07

## 2021-05-20 RX ORDER — ESOMEPRAZOLE MAGNESIUM 40 MG/1
40 CAPSULE, DELAYED RELEASE ORAL
Qty: 90 CAPSULE | Refills: 1 | Status: SHIPPED | OUTPATIENT
Start: 2021-05-20 | End: 2022-02-11 | Stop reason: SDUPTHER

## 2021-05-20 RX ORDER — TAMSULOSIN HYDROCHLORIDE 0.4 MG/1
0.4 CAPSULE ORAL DAILY
Qty: 90 CAPSULE | Refills: 3 | Status: SHIPPED | OUTPATIENT
Start: 2021-05-20 | End: 2022-02-07

## 2021-05-20 RX ORDER — ESOMEPRAZOLE MAGNESIUM 40 MG/1
CAPSULE, DELAYED RELEASE ORAL
COMMUNITY
Start: 2021-02-28 | End: 2021-05-20 | Stop reason: SDUPTHER

## 2021-05-20 RX ORDER — GABAPENTIN 300 MG/1
300 CAPSULE ORAL NIGHTLY
Qty: 90 CAPSULE | Refills: 1 | Status: SHIPPED | OUTPATIENT
Start: 2021-05-20 | End: 2022-01-07

## 2021-05-20 RX ORDER — PAROXETINE 10 MG/1
10 TABLET, FILM COATED ORAL EVERY MORNING
Qty: 90 TABLET | Refills: 0 | Status: SHIPPED | OUTPATIENT
Start: 2021-05-20 | End: 2021-08-13

## 2021-05-27 ENCOUNTER — LAB VISIT (OUTPATIENT)
Dept: LAB | Facility: CLINIC | Age: 66
End: 2021-05-27
Payer: MEDICARE

## 2021-05-27 DIAGNOSIS — Z12.5 SCREENING FOR PROSTATE CANCER: ICD-10-CM

## 2021-05-27 DIAGNOSIS — I10 ESSENTIAL HYPERTENSION: ICD-10-CM

## 2021-05-27 LAB
ALBUMIN SERPL BCP-MCNC: 3.8 G/DL (ref 3.5–5.2)
ALP SERPL-CCNC: 81 U/L (ref 55–135)
ALT SERPL W/O P-5'-P-CCNC: 23 U/L (ref 10–44)
ANION GAP SERPL CALC-SCNC: 8 MMOL/L (ref 8–16)
AST SERPL-CCNC: 19 U/L (ref 10–40)
BASOPHILS # BLD AUTO: 0.04 K/UL (ref 0–0.2)
BASOPHILS NFR BLD: 0.9 % (ref 0–1.9)
BILIRUB SERPL-MCNC: 0.5 MG/DL (ref 0.1–1)
BUN SERPL-MCNC: 16 MG/DL (ref 8–23)
CALCIUM SERPL-MCNC: 9.4 MG/DL (ref 8.7–10.5)
CHLORIDE SERPL-SCNC: 109 MMOL/L (ref 95–110)
CHOLEST SERPL-MCNC: 170 MG/DL (ref 120–199)
CHOLEST/HDLC SERPL: 4 {RATIO} (ref 2–5)
CO2 SERPL-SCNC: 27 MMOL/L (ref 23–29)
COMPLEXED PSA SERPL-MCNC: 0.26 NG/ML (ref 0–4)
CREAT SERPL-MCNC: 1 MG/DL (ref 0.5–1.4)
DIFFERENTIAL METHOD: ABNORMAL
EOSINOPHIL # BLD AUTO: 0.1 K/UL (ref 0–0.5)
EOSINOPHIL NFR BLD: 1.1 % (ref 0–8)
ERYTHROCYTE [DISTWIDTH] IN BLOOD BY AUTOMATED COUNT: 13 % (ref 11.5–14.5)
EST. GFR  (AFRICAN AMERICAN): >60 ML/MIN/1.73 M^2
EST. GFR  (NON AFRICAN AMERICAN): >60 ML/MIN/1.73 M^2
GLUCOSE SERPL-MCNC: 118 MG/DL (ref 70–110)
HCT VFR BLD AUTO: 38.9 % (ref 40–54)
HDLC SERPL-MCNC: 42 MG/DL (ref 40–75)
HDLC SERPL: 24.7 % (ref 20–50)
HGB BLD-MCNC: 13.1 G/DL (ref 14–18)
IMM GRANULOCYTES # BLD AUTO: 0.01 K/UL (ref 0–0.04)
IMM GRANULOCYTES NFR BLD AUTO: 0.2 % (ref 0–0.5)
LDLC SERPL CALC-MCNC: 105.6 MG/DL (ref 63–159)
LYMPHOCYTES # BLD AUTO: 1.6 K/UL (ref 1–4.8)
LYMPHOCYTES NFR BLD: 34.9 % (ref 18–48)
MCH RBC QN AUTO: 31.3 PG (ref 27–31)
MCHC RBC AUTO-ENTMCNC: 33.7 G/DL (ref 32–36)
MCV RBC AUTO: 93 FL (ref 82–98)
MONOCYTES # BLD AUTO: 0.4 K/UL (ref 0.3–1)
MONOCYTES NFR BLD: 8 % (ref 4–15)
NEUTROPHILS # BLD AUTO: 2.5 K/UL (ref 1.8–7.7)
NEUTROPHILS NFR BLD: 54.9 % (ref 38–73)
NONHDLC SERPL-MCNC: 128 MG/DL
NRBC BLD-RTO: 0 /100 WBC
PLATELET # BLD AUTO: 191 K/UL (ref 150–450)
PMV BLD AUTO: 11.9 FL (ref 9.2–12.9)
POTASSIUM SERPL-SCNC: 4.2 MMOL/L (ref 3.5–5.1)
PROT SERPL-MCNC: 6.8 G/DL (ref 6–8.4)
RBC # BLD AUTO: 4.19 M/UL (ref 4.6–6.2)
SODIUM SERPL-SCNC: 144 MMOL/L (ref 136–145)
TRIGL SERPL-MCNC: 112 MG/DL (ref 30–150)
TSH SERPL DL<=0.005 MIU/L-ACNC: 1.14 UIU/ML (ref 0.4–4)
WBC # BLD AUTO: 4.5 K/UL (ref 3.9–12.7)

## 2021-05-27 PROCEDURE — 36415 PR COLLECTION VENOUS BLOOD,VENIPUNCTURE: ICD-10-PCS | Mod: ,,, | Performed by: INTERNAL MEDICINE

## 2021-05-27 PROCEDURE — 36415 COLL VENOUS BLD VENIPUNCTURE: CPT | Mod: ,,, | Performed by: INTERNAL MEDICINE

## 2021-05-27 PROCEDURE — 80053 COMPREHEN METABOLIC PANEL: CPT | Performed by: INTERNAL MEDICINE

## 2021-05-27 PROCEDURE — 80061 LIPID PANEL: CPT | Performed by: INTERNAL MEDICINE

## 2021-05-27 PROCEDURE — 84153 ASSAY OF PSA TOTAL: CPT | Performed by: INTERNAL MEDICINE

## 2021-05-27 PROCEDURE — 85025 COMPLETE CBC W/AUTO DIFF WBC: CPT | Performed by: INTERNAL MEDICINE

## 2021-05-27 PROCEDURE — 84443 ASSAY THYROID STIM HORMONE: CPT | Performed by: INTERNAL MEDICINE

## 2021-07-01 ENCOUNTER — IMMUNIZATION (OUTPATIENT)
Dept: PHARMACY | Facility: CLINIC | Age: 66
End: 2021-07-01
Payer: MEDICARE

## 2021-07-01 ENCOUNTER — OFFICE VISIT (OUTPATIENT)
Dept: FAMILY MEDICINE | Facility: CLINIC | Age: 66
End: 2021-07-01
Payer: MEDICARE

## 2021-07-01 VITALS
WEIGHT: 234.81 LBS | DIASTOLIC BLOOD PRESSURE: 74 MMHG | HEART RATE: 64 BPM | BODY MASS INDEX: 34.78 KG/M2 | SYSTOLIC BLOOD PRESSURE: 124 MMHG | HEIGHT: 69 IN | TEMPERATURE: 98 F | OXYGEN SATURATION: 95 %

## 2021-07-01 DIAGNOSIS — R73.03 PREDIABETES: ICD-10-CM

## 2021-07-01 DIAGNOSIS — I10 ESSENTIAL HYPERTENSION: Primary | ICD-10-CM

## 2021-07-01 DIAGNOSIS — E78.00 HYPERCHOLESTEROLEMIA: ICD-10-CM

## 2021-07-01 DIAGNOSIS — F41.9 ANXIETY: ICD-10-CM

## 2021-07-01 DIAGNOSIS — R01.1 SYSTOLIC MURMUR: ICD-10-CM

## 2021-07-01 PROCEDURE — 1125F AMNT PAIN NOTED PAIN PRSNT: CPT | Mod: S$GLB,,, | Performed by: INTERNAL MEDICINE

## 2021-07-01 PROCEDURE — 3288F PR FALLS RISK ASSESSMENT DOCUMENTED: ICD-10-PCS | Mod: CPTII,S$GLB,, | Performed by: INTERNAL MEDICINE

## 2021-07-01 PROCEDURE — 1125F PR PAIN SEVERITY QUANTIFIED, PAIN PRESENT: ICD-10-PCS | Mod: S$GLB,,, | Performed by: INTERNAL MEDICINE

## 2021-07-01 PROCEDURE — 99499 RISK ADDL DX/OHS AUDIT: ICD-10-PCS | Mod: HCNC,S$GLB,, | Performed by: INTERNAL MEDICINE

## 2021-07-01 PROCEDURE — 3008F PR BODY MASS INDEX (BMI) DOCUMENTED: ICD-10-PCS | Mod: CPTII,S$GLB,, | Performed by: INTERNAL MEDICINE

## 2021-07-01 PROCEDURE — 99214 PR OFFICE/OUTPT VISIT, EST, LEVL IV, 30-39 MIN: ICD-10-PCS | Mod: S$GLB,,, | Performed by: INTERNAL MEDICINE

## 2021-07-01 PROCEDURE — 99214 OFFICE O/P EST MOD 30 MIN: CPT | Mod: S$GLB,,, | Performed by: INTERNAL MEDICINE

## 2021-07-01 PROCEDURE — 99499 UNLISTED E&M SERVICE: CPT | Mod: HCNC,S$GLB,, | Performed by: INTERNAL MEDICINE

## 2021-07-01 PROCEDURE — 3008F BODY MASS INDEX DOCD: CPT | Mod: CPTII,S$GLB,, | Performed by: INTERNAL MEDICINE

## 2021-07-01 PROCEDURE — 99999 PR PBB SHADOW E&M-EST. PATIENT-LVL IV: ICD-10-PCS | Mod: PBBFAC,,, | Performed by: INTERNAL MEDICINE

## 2021-07-01 PROCEDURE — 1101F PR PT FALLS ASSESS DOC 0-1 FALLS W/OUT INJ PAST YR: ICD-10-PCS | Mod: CPTII,S$GLB,, | Performed by: INTERNAL MEDICINE

## 2021-07-01 PROCEDURE — 99999 PR PBB SHADOW E&M-EST. PATIENT-LVL IV: CPT | Mod: PBBFAC,,, | Performed by: INTERNAL MEDICINE

## 2021-07-01 PROCEDURE — 1159F PR MEDICATION LIST DOCUMENTED IN MEDICAL RECORD: ICD-10-PCS | Mod: S$GLB,,, | Performed by: INTERNAL MEDICINE

## 2021-07-01 PROCEDURE — 3288F FALL RISK ASSESSMENT DOCD: CPT | Mod: CPTII,S$GLB,, | Performed by: INTERNAL MEDICINE

## 2021-07-01 PROCEDURE — 1159F MED LIST DOCD IN RCRD: CPT | Mod: S$GLB,,, | Performed by: INTERNAL MEDICINE

## 2021-07-01 PROCEDURE — 1101F PT FALLS ASSESS-DOCD LE1/YR: CPT | Mod: CPTII,S$GLB,, | Performed by: INTERNAL MEDICINE

## 2021-08-06 ENCOUNTER — IMMUNIZATION (OUTPATIENT)
Dept: PHARMACY | Facility: CLINIC | Age: 66
End: 2021-08-06
Payer: MEDICARE

## 2021-08-23 ENCOUNTER — PATIENT MESSAGE (OUTPATIENT)
Dept: FAMILY MEDICINE | Facility: CLINIC | Age: 66
End: 2021-08-23

## 2021-09-08 ENCOUNTER — IMMUNIZATION (OUTPATIENT)
Dept: PHARMACY | Facility: CLINIC | Age: 66
End: 2021-09-08
Payer: MEDICARE

## 2021-09-10 ENCOUNTER — PATIENT MESSAGE (OUTPATIENT)
Dept: FAMILY MEDICINE | Facility: CLINIC | Age: 66
End: 2021-09-10

## 2021-09-10 DIAGNOSIS — I10 ESSENTIAL HYPERTENSION: ICD-10-CM

## 2021-09-10 RX ORDER — BENAZEPRIL HYDROCHLORIDE 10 MG/1
10 TABLET ORAL DAILY
Qty: 30 TABLET | Refills: 1 | Status: SHIPPED | OUTPATIENT
Start: 2021-09-10 | End: 2022-07-12

## 2021-09-10 RX ORDER — BENAZEPRIL HYDROCHLORIDE 10 MG/1
20 TABLET ORAL DAILY
Qty: 30 TABLET | Refills: 1 | Status: SHIPPED | OUTPATIENT
Start: 2021-09-10 | End: 2021-09-10

## 2021-09-14 ENCOUNTER — PATIENT MESSAGE (OUTPATIENT)
Dept: FAMILY MEDICINE | Facility: CLINIC | Age: 66
End: 2021-09-14

## 2021-11-03 ENCOUNTER — PATIENT MESSAGE (OUTPATIENT)
Dept: PAIN MEDICINE | Facility: CLINIC | Age: 66
End: 2021-11-03
Payer: MEDICARE

## 2021-12-03 ENCOUNTER — PATIENT OUTREACH (OUTPATIENT)
Dept: ADMINISTRATIVE | Facility: OTHER | Age: 66
End: 2021-12-03
Payer: MEDICARE

## 2021-12-06 ENCOUNTER — OFFICE VISIT (OUTPATIENT)
Dept: OTOLARYNGOLOGY | Facility: CLINIC | Age: 66
End: 2021-12-06
Payer: MEDICARE

## 2021-12-06 ENCOUNTER — CLINICAL SUPPORT (OUTPATIENT)
Dept: AUDIOLOGY | Facility: CLINIC | Age: 66
End: 2021-12-06
Payer: MEDICARE

## 2021-12-06 VITALS — BODY MASS INDEX: 33.89 KG/M2 | WEIGHT: 229.5 LBS

## 2021-12-06 DIAGNOSIS — G47.33 OSA (OBSTRUCTIVE SLEEP APNEA): Primary | ICD-10-CM

## 2021-12-06 DIAGNOSIS — E66.9 OBESITY (BMI 30.0-34.9): ICD-10-CM

## 2021-12-06 DIAGNOSIS — H93.13 TINNITUS, BILATERAL: Primary | ICD-10-CM

## 2021-12-06 DIAGNOSIS — H90.A22 SENSORINEURAL HEARING LOSS (SNHL) OF LEFT EAR WITH RESTRICTED HEARING OF RIGHT EAR: ICD-10-CM

## 2021-12-06 DIAGNOSIS — H90.3 ASYMMETRICAL SENSORINEURAL HEARING LOSS: ICD-10-CM

## 2021-12-06 DIAGNOSIS — H91.90 HEARING DIFFICULTY, UNSPECIFIED LATERALITY: Primary | ICD-10-CM

## 2021-12-06 DIAGNOSIS — Z78.9 INTOLERANCE OF CONTINUOUS POSITIVE AIRWAY PRESSURE (CPAP) VENTILATION: ICD-10-CM

## 2021-12-06 DIAGNOSIS — H93.13 TINNITUS OF BOTH EARS: ICD-10-CM

## 2021-12-06 PROCEDURE — 92557 PR COMPREHENSIVE HEARING TEST: ICD-10-PCS | Mod: HCNC,S$GLB,, | Performed by: AUDIOLOGIST-HEARING AID FITTER

## 2021-12-06 PROCEDURE — 99204 OFFICE O/P NEW MOD 45 MIN: CPT | Mod: HCNC,S$GLB,, | Performed by: OTOLARYNGOLOGY

## 2021-12-06 PROCEDURE — 92567 TYMPANOMETRY: CPT | Mod: HCNC,S$GLB,, | Performed by: AUDIOLOGIST-HEARING AID FITTER

## 2021-12-06 PROCEDURE — 99999 PR PBB SHADOW E&M-EST. PATIENT-LVL III: CPT | Mod: PBBFAC,HCNC,, | Performed by: OTOLARYNGOLOGY

## 2021-12-06 PROCEDURE — 99999 PR PBB SHADOW E&M-EST. PATIENT-LVL II: CPT | Mod: PBBFAC,HCNC,,

## 2021-12-06 PROCEDURE — 92567 PR TYMPA2METRY: ICD-10-PCS | Mod: HCNC,S$GLB,, | Performed by: AUDIOLOGIST-HEARING AID FITTER

## 2021-12-06 PROCEDURE — 4010F PR ACE/ARB THEARPY RXD/TAKEN: ICD-10-PCS | Mod: HCNC,CPTII,S$GLB, | Performed by: OTOLARYNGOLOGY

## 2021-12-06 PROCEDURE — 99999 PR PBB SHADOW E&M-EST. PATIENT-LVL II: ICD-10-PCS | Mod: PBBFAC,HCNC,,

## 2021-12-06 PROCEDURE — 92557 COMPREHENSIVE HEARING TEST: CPT | Mod: HCNC,S$GLB,, | Performed by: AUDIOLOGIST-HEARING AID FITTER

## 2021-12-06 PROCEDURE — 4010F ACE/ARB THERAPY RXD/TAKEN: CPT | Mod: HCNC,CPTII,S$GLB, | Performed by: OTOLARYNGOLOGY

## 2021-12-06 PROCEDURE — 99999 PR PBB SHADOW E&M-EST. PATIENT-LVL III: ICD-10-PCS | Mod: PBBFAC,HCNC,, | Performed by: OTOLARYNGOLOGY

## 2021-12-06 PROCEDURE — 99204 PR OFFICE/OUTPT VISIT, NEW, LEVL IV, 45-59 MIN: ICD-10-PCS | Mod: HCNC,S$GLB,, | Performed by: OTOLARYNGOLOGY

## 2021-12-07 ENCOUNTER — PROCEDURE VISIT (OUTPATIENT)
Dept: SLEEP MEDICINE | Facility: HOSPITAL | Age: 66
End: 2021-12-07
Attending: OTOLARYNGOLOGY
Payer: MEDICARE

## 2021-12-07 DIAGNOSIS — Z78.9 INTOLERANCE OF CONTINUOUS POSITIVE AIRWAY PRESSURE (CPAP) VENTILATION: ICD-10-CM

## 2021-12-07 DIAGNOSIS — G47.33 OSA (OBSTRUCTIVE SLEEP APNEA): ICD-10-CM

## 2021-12-07 DIAGNOSIS — E66.9 OBESITY (BMI 30.0-34.9): ICD-10-CM

## 2021-12-07 PROCEDURE — 95806 SLEEP STUDY UNATT&RESP EFFT: CPT

## 2021-12-08 ENCOUNTER — TELEPHONE (OUTPATIENT)
Dept: OTOLARYNGOLOGY | Facility: CLINIC | Age: 66
End: 2021-12-08
Payer: MEDICARE

## 2021-12-08 DIAGNOSIS — F40.240 CLAUSTROPHOBIA: Primary | ICD-10-CM

## 2021-12-08 RX ORDER — DIAZEPAM 5 MG/1
10 TABLET ORAL ONCE
Qty: 1 TABLET | Refills: 0 | Status: SHIPPED | OUTPATIENT
Start: 2021-12-08 | End: 2021-12-08

## 2021-12-14 ENCOUNTER — LAB VISIT (OUTPATIENT)
Dept: LAB | Facility: HOSPITAL | Age: 66
End: 2021-12-14
Attending: OTOLARYNGOLOGY
Payer: MEDICARE

## 2021-12-14 DIAGNOSIS — H90.A22 SENSORINEURAL HEARING LOSS (SNHL) OF LEFT EAR WITH RESTRICTED HEARING OF RIGHT EAR: ICD-10-CM

## 2021-12-14 DIAGNOSIS — H90.3 ASYMMETRICAL SENSORINEURAL HEARING LOSS: ICD-10-CM

## 2021-12-14 LAB
CREAT SERPL-MCNC: 0.9 MG/DL (ref 0.5–1.4)
EST. GFR  (AFRICAN AMERICAN): >60 ML/MIN/1.73 M^2
EST. GFR  (NON AFRICAN AMERICAN): >60 ML/MIN/1.73 M^2

## 2021-12-14 PROCEDURE — 36415 COLL VENOUS BLD VENIPUNCTURE: CPT | Mod: HCNC,PO | Performed by: OTOLARYNGOLOGY

## 2021-12-14 PROCEDURE — 82565 ASSAY OF CREATININE: CPT | Mod: HCNC | Performed by: OTOLARYNGOLOGY

## 2021-12-15 ENCOUNTER — TELEPHONE (OUTPATIENT)
Dept: OTOLARYNGOLOGY | Facility: CLINIC | Age: 66
End: 2021-12-15
Payer: MEDICARE

## 2021-12-15 DIAGNOSIS — E66.9 OBESITY (BMI 30.0-34.9): ICD-10-CM

## 2021-12-15 DIAGNOSIS — Z78.9 INTOLERANCE OF CONTINUOUS POSITIVE AIRWAY PRESSURE (CPAP) VENTILATION: ICD-10-CM

## 2021-12-15 DIAGNOSIS — G47.33 OSA (OBSTRUCTIVE SLEEP APNEA): Primary | ICD-10-CM

## 2021-12-16 ENCOUNTER — PATIENT MESSAGE (OUTPATIENT)
Dept: OTOLARYNGOLOGY | Facility: CLINIC | Age: 66
End: 2021-12-16
Payer: MEDICARE

## 2021-12-17 ENCOUNTER — HOSPITAL ENCOUNTER (OUTPATIENT)
Dept: RADIOLOGY | Facility: HOSPITAL | Age: 66
Discharge: HOME | End: 2021-12-17
Attending: OTOLARYNGOLOGY
Payer: MEDICARE

## 2021-12-17 DIAGNOSIS — H90.A22 SENSORINEURAL HEARING LOSS (SNHL) OF LEFT EAR WITH RESTRICTED HEARING OF RIGHT EAR: ICD-10-CM

## 2021-12-17 PROCEDURE — 25500020 PHARM REV CODE 255: Mod: HCNC,PO | Performed by: OTOLARYNGOLOGY

## 2021-12-17 PROCEDURE — 70553 MRI IAC/TEMPORAL BONES W W/O CONTRAST: ICD-10-PCS | Mod: 26,HCNC,, | Performed by: RADIOLOGY

## 2021-12-17 PROCEDURE — 70553 MRI BRAIN STEM W/O & W/DYE: CPT | Mod: TC,HCNC,PO

## 2021-12-17 PROCEDURE — A9585 GADOBUTROL INJECTION: HCPCS | Mod: HCNC,PO | Performed by: OTOLARYNGOLOGY

## 2021-12-17 PROCEDURE — 70553 MRI BRAIN STEM W/O & W/DYE: CPT | Mod: 26,HCNC,, | Performed by: RADIOLOGY

## 2021-12-17 RX ORDER — GADOBUTROL 604.72 MG/ML
10 INJECTION INTRAVENOUS
Status: COMPLETED | OUTPATIENT
Start: 2021-12-17 | End: 2021-12-17

## 2021-12-17 RX ADMIN — GADOBUTROL 10 ML: 604.72 INJECTION INTRAVENOUS at 01:12

## 2021-12-22 DIAGNOSIS — J30.9 ALLERGIC RHINITIS, UNSPECIFIED SEASONALITY, UNSPECIFIED TRIGGER: ICD-10-CM

## 2021-12-23 ENCOUNTER — OFFICE VISIT (OUTPATIENT)
Dept: PAIN MEDICINE | Facility: CLINIC | Age: 66
End: 2021-12-23
Payer: MEDICARE

## 2021-12-23 VITALS
RESPIRATION RATE: 18 BRPM | BODY MASS INDEX: 34.33 KG/M2 | DIASTOLIC BLOOD PRESSURE: 76 MMHG | WEIGHT: 232.5 LBS | TEMPERATURE: 97 F | OXYGEN SATURATION: 98 % | HEART RATE: 60 BPM | SYSTOLIC BLOOD PRESSURE: 154 MMHG

## 2021-12-23 DIAGNOSIS — M47.812 FACET ARTHROPATHY, CERVICAL: ICD-10-CM

## 2021-12-23 DIAGNOSIS — M50.30 DDD (DEGENERATIVE DISC DISEASE), CERVICAL: Primary | ICD-10-CM

## 2021-12-23 DIAGNOSIS — M54.16 LUMBAR RADICULOPATHY: ICD-10-CM

## 2021-12-23 DIAGNOSIS — M54.12 CERVICAL RADICULOPATHY: ICD-10-CM

## 2021-12-23 PROCEDURE — 3077F PR MOST RECENT SYSTOLIC BLOOD PRESSURE >= 140 MM HG: ICD-10-PCS | Mod: HCNC,CPTII,S$GLB, | Performed by: ANESTHESIOLOGY

## 2021-12-23 PROCEDURE — 99999 PR PBB SHADOW E&M-EST. PATIENT-LVL V: CPT | Mod: PBBFAC,HCNC,, | Performed by: ANESTHESIOLOGY

## 2021-12-23 PROCEDURE — 99214 OFFICE O/P EST MOD 30 MIN: CPT | Mod: HCNC,S$GLB,, | Performed by: ANESTHESIOLOGY

## 2021-12-23 PROCEDURE — 3078F DIAST BP <80 MM HG: CPT | Mod: HCNC,CPTII,S$GLB, | Performed by: ANESTHESIOLOGY

## 2021-12-23 PROCEDURE — 99214 PR OFFICE/OUTPT VISIT, EST, LEVL IV, 30-39 MIN: ICD-10-PCS | Mod: HCNC,S$GLB,, | Performed by: ANESTHESIOLOGY

## 2021-12-23 PROCEDURE — 3008F PR BODY MASS INDEX (BMI) DOCUMENTED: ICD-10-PCS | Mod: HCNC,CPTII,S$GLB, | Performed by: ANESTHESIOLOGY

## 2021-12-23 PROCEDURE — 99999 PR PBB SHADOW E&M-EST. PATIENT-LVL V: ICD-10-PCS | Mod: PBBFAC,HCNC,, | Performed by: ANESTHESIOLOGY

## 2021-12-23 PROCEDURE — 1125F PR PAIN SEVERITY QUANTIFIED, PAIN PRESENT: ICD-10-PCS | Mod: HCNC,CPTII,S$GLB, | Performed by: ANESTHESIOLOGY

## 2021-12-23 PROCEDURE — 4010F ACE/ARB THERAPY RXD/TAKEN: CPT | Mod: HCNC,CPTII,S$GLB, | Performed by: ANESTHESIOLOGY

## 2021-12-23 PROCEDURE — 1101F PT FALLS ASSESS-DOCD LE1/YR: CPT | Mod: HCNC,CPTII,S$GLB, | Performed by: ANESTHESIOLOGY

## 2021-12-23 PROCEDURE — 3078F PR MOST RECENT DIASTOLIC BLOOD PRESSURE < 80 MM HG: ICD-10-PCS | Mod: HCNC,CPTII,S$GLB, | Performed by: ANESTHESIOLOGY

## 2021-12-23 PROCEDURE — 1101F PR PT FALLS ASSESS DOC 0-1 FALLS W/OUT INJ PAST YR: ICD-10-PCS | Mod: HCNC,CPTII,S$GLB, | Performed by: ANESTHESIOLOGY

## 2021-12-23 PROCEDURE — 3288F PR FALLS RISK ASSESSMENT DOCUMENTED: ICD-10-PCS | Mod: HCNC,CPTII,S$GLB, | Performed by: ANESTHESIOLOGY

## 2021-12-23 PROCEDURE — 4010F PR ACE/ARB THEARPY RXD/TAKEN: ICD-10-PCS | Mod: HCNC,CPTII,S$GLB, | Performed by: ANESTHESIOLOGY

## 2021-12-23 PROCEDURE — 1159F PR MEDICATION LIST DOCUMENTED IN MEDICAL RECORD: ICD-10-PCS | Mod: HCNC,CPTII,S$GLB, | Performed by: ANESTHESIOLOGY

## 2021-12-23 PROCEDURE — 3008F BODY MASS INDEX DOCD: CPT | Mod: HCNC,CPTII,S$GLB, | Performed by: ANESTHESIOLOGY

## 2021-12-23 PROCEDURE — 1159F MED LIST DOCD IN RCRD: CPT | Mod: HCNC,CPTII,S$GLB, | Performed by: ANESTHESIOLOGY

## 2021-12-23 PROCEDURE — 1125F AMNT PAIN NOTED PAIN PRSNT: CPT | Mod: HCNC,CPTII,S$GLB, | Performed by: ANESTHESIOLOGY

## 2021-12-23 PROCEDURE — 3288F FALL RISK ASSESSMENT DOCD: CPT | Mod: HCNC,CPTII,S$GLB, | Performed by: ANESTHESIOLOGY

## 2021-12-23 PROCEDURE — 3077F SYST BP >= 140 MM HG: CPT | Mod: HCNC,CPTII,S$GLB, | Performed by: ANESTHESIOLOGY

## 2021-12-23 RX ORDER — SODIUM CHLORIDE, SODIUM LACTATE, POTASSIUM CHLORIDE, CALCIUM CHLORIDE 600; 310; 30; 20 MG/100ML; MG/100ML; MG/100ML; MG/100ML
INJECTION, SOLUTION INTRAVENOUS CONTINUOUS
Status: CANCELLED | OUTPATIENT
Start: 2021-12-23

## 2021-12-23 NOTE — H&P (VIEW-ONLY)
This note was completed with dictation software and grammatical errors may exist.    CC:  Back pain, neck pain    HPI:  Patient is a 66-year-old man with a history of hypertension, GERD, arthritis, anxiety who presents in self-referral for low back pain radiating into the leg and neck pain.  He returns in follow-up today for neck pain, back pain.  He states that he is still doing fairly well in terms of his low back after the last radiofrequency ablation procedure we had performed.  However, he feels stiffness and tightness is neck, pain with turning his head side to side.  He denies any radiation to his arms or shoulder blades, denies any weakness in his hands.  He feels that this pain is fairly similar to what he had in the back before his previous radiofrequency ablation.    Of note, he plans to proceed with implantation of a vagal nerve stimulator for sleep apnea and this device would not be compatible with MRI.            Previous History:  He reports having low back pain for many years, greater than 15.  Since 2012 he has been seeing Dr. Raffy Vásquez, pain management on the [place] and had been undergoing injections for his back.  He states that the injections would usually help 1-2 years at a time to get rid of back pain and leg pain.  In the last year he had several injections that did not provide any relief and so they obtained a new MRI which showed significant degeneration at the level above where he had his previous injections, now showing issues at L3/4.  He was referred to Neurosurgery who recommended physical therapy.  He tried physical therapy for several months and it did help but he did not continue the exercises on his own.    He states that his pain is currently located across the bilateral low back, worse with working in the yard, bending over, sitting or standing too long.  It radiates along his right anterior thigh to about his knee.  He describes it as aching, dull, tight, deep, sharp and  "shooting.  Is worse with prolonged sitting or standing, lying down, bending or walking, doing any lifting or getting out of a bed or a chair.  He gets some relief with rest, activity, lying down, medications, injections and physical therapy.  He also takes ibuprofen and Tylenol some relief.  His other complaint is neck pain located in the bilateral neck, worse with turning his head from side to side, it radiates into the shoulders and into his upper back at times.  He denies juan weakness in his arms.  He states that he had undergone what sounds like a medial branch block bilaterally which did help but he had not proceeded with the radiofrequency part because the viral pandemic began.  He denies any balance issues, no weakness in the arms or legs.    Pain intervention history:  He reports undergoing multiple injections over the years with very good relief, the last 2 did not seem to help.  From Dr. Raffy Vásquez notes: "The patient underwent bilateral C4, 5, 6 medial branch block on 02/04/2020 with 80% relief.  He did not proceed with the RFA.  He had undergone bilateral L4 and L5 transforaminal injection on 10/24/2019 with 50% relief for 1 month.  He had undergone bilateral L4/5 TF VIET on 02/23/2017 with 80% relief for 2 years.  He underwent bilateral L4 and L5 transforaminal injection on 12/05/2019 without much relief."  He is status post bilateral L4/5 transforaminal epidural steroid injection on 02/11/2021 0% relief.  He is status post bilateral L2,3,4,5 medial branch radiofrequency ablation on 03/20/2021 with 60% relief.    Antineuropathics:  Had taken gabapentin in the past and did not find it helpful.  NSAIDs: he takes ibuprofen and Tylenol with some relief  Physical therapy:  Antidepressants: Paxil  Muscle relaxers:   Zanaflex4 milligrams at night helps  Opioids:  Antiplatelets/Anticoagulants:        ROS: He reports back pain, joint stiffness, anxiety.  Balance of review of systems is negative.    Lab " Results   Component Value Date    HGBA1C 5.9 09/22/2015       Lab Results   Component Value Date    WBC 4.50 05/27/2021    HGB 13.1 (L) 05/27/2021    HCT 38.9 (L) 05/27/2021    MCV 93 05/27/2021     05/27/2021             Past Medical History:   Diagnosis Date    Anxiety     Anxiety     Arthritis     Back problem     bulging disc    GERD (gastroesophageal reflux disease)     Hypercholesterolemia 11/2/2015    2009: Began statin.    Hyperlipidemia     Hypertension     Hypertension, benign 11/2/2015    2008: Diagnosed.    Sleep apnea     uses c-pap       Past Surgical History:   Procedure Laterality Date    APPENDECTOMY      ARTHROSCOPIC REPAIR OF ROTATOR CUFF OF SHOULDER Right 12/21/2018    Procedure: REPAIR, ROTATOR CUFF, ARTHROSCOPIC;  Surgeon: Colby Valenzuela MD;  Location: [facility] OR;  Service: Orthopedics;  Laterality: Right;    ARTHROSCOPY OF SHOULDER WITH DECOMPRESSION OF SUBACROMIAL SPACE Right 12/21/2018    Procedure: ARTHROSCOPY, SHOULDER, WITH SUBACROMIAL SPACE DECOMPRESSION;  Surgeon: Colby Valenzuela MD;  Location: [facility] OR;  Service: Orthopedics;  Laterality: Right;    BICEPS TENDON REPAIR Right 12/21/2018    Procedure: REPAIR, TENDON, BICEPS;  Surgeon: Colby Valenzuela MD;  Location: [facility] OR;  Service: Orthopedics;  Laterality: Right;    magdalene      INJECTION OF ANESTHETIC AGENT AROUND MEDIAL BRANCH NERVES INNERVATING LUMBAR FACET JOINT Bilateral 3/10/2021    Procedure: Block-nerve-medial branch-lumbar L2,L3,L4, L5;  Surgeon: Landon Winchester MD;  Location: [facility] OR;  Service: Pain Management;  Laterality: Bilateral;    KNEE ARTHROSCOPY W/ MENISCAL REPAIR Left 2015    MMT      Left knee A-scope    NOSE SURGERY      RADIOFREQUENCY ABLATION OF LUMBAR MEDIAL BRANCH NERVE AT SINGLE LEVEL Bilateral 3/23/2021    Procedure: Radiofrequency Ablation, Nerve, Spinal, Lumbar, Medial Branch, L2,LL3,L4,L5;  Surgeon: Landon Winchester MD;  Location: [facility] OR;  Service: Pain  Management;  Laterality: Bilateral;    TRANSFORAMINAL EPIDURAL INJECTION OF STEROID Bilateral 2/11/2021    Procedure: Injection,steroid,epidural,transforaminal approach L4/5;  Surgeon: Landon Winchester MD;  Location: [facility] OR;  Service: Pain Management;  Laterality: Bilateral;    UVULECTOMY         Social History     Socioeconomic History    Marital status:    Tobacco Use    Smoking status: Never Smoker    Smokeless tobacco: Never Used   Substance and Sexual Activity    Alcohol use: Yes     Comment: social    Drug use: No    Sexual activity: Yes         Medications/Allergies: See med card    Vitals:    12/23/21 1010   BP: (!) 154/76   Pulse: 60   Resp: 18   Temp: 96.6 °F (35.9 °C)   TempSrc: Oral   SpO2: 98%   Weight: 105.4 kg (232 lb 7.6 oz)   PainSc:   6   PainLoc: Neck         Physical exam:  Gen: A and O x3, pleasant, well-groomed  Skin: No rashes or obvious lesions  HEENT: PERRLA, no obvious deformities on ears or in canals. Trachea midline.  CVS: Regular rate and rhythm, normal palpable pulses.  Resp:No increased work of breathing, symmetrical chest rise.  Abdomen: Soft, NT/ND.  Musculoskeletal: Able to heel walk, toe walk. No antalgic gait.     Neuro:  Upper extremities: 5/5 strength bilaterally   Lower extremities: 5/5 strength bilaterally  Reflexes: Brachioradialis 2+, Bicep 2+, Tricep 2+. Patellar 1+  Right side, 0+ left side, Achilles 1+ bilaterally.  Sensory:  Intact and symmetrical to light touch and pinprick in L2-S1 dermatomes bilaterally. Intact and symmetrical to light touch and pinprick in C2-T1 dermatomes bilaterally.    Cervical Spine:  Cervical spine: ROM is full in flexion, extension and lateral rotation   With increased pain on extension and especially oblique extension to either side.  Spurling's maneuver causes neck pain to either side.  Myofascial exam: No Tenderness to palpation across cervical paraspinous region bilaterally.    Lumbar spine:  Lumbar spine:   Range of  motion is moderately reduced with both flexion and extension with increased pain on flexion greater than extension.  Oblique extension causes pain on the corresponding side.  Jorge's test causes no increased pain on either side.    Supine straight leg raise is negative bilaterally.  Internal and external rotation of the hip causes no increased pain on either side.  Myofascial exam: No tenderness to palpation across lumbar paraspinous muscles.        Imaging:    MRI 01/21/2020:  I reviewed this image with the patient, outside imaging.  This is most significant for moderate disc degeneration at L4/5 with some bilateral foraminal narrowing to moderate degree, L3/4 shows severe disc degeneration with Modic endplate changes and some increased signal within the disc at L3/4.  There is moderate to severe foraminal narrowing to the left and moderate out to the right side.    MRI cervical spine 01/08/2021 outside institution.  I review the images personally.  There appears to be a reversal of the cervical lordosis.  There is disc bulging at C2/3 slightly to the right side deforming the thecal sac and causing possible foraminal narrowing to the right side.  At C3/4 there is mild disc bulging but no canal stenosis.  At C4/5 there is decreased disc height, slight disc bulging but no canal narrowing.  At C5/6 there is larger disc bulge with protrusion slightly more to the right side causing bilateral foraminal stenosis and appears to deform the cord.  At C6/7 there is a disc bulging that contacts the cord as well.  C7/T1 mild disc bulging but no cord contact.  There is no abnormal signal within the cord.    Assessment:   Patient is a 66-year-old man with a history of hypertension, GERD, arthritis, anxiety who presents in self-referral for low back pain radiating into the leg and neck pain.    1. DDD (degenerative disc disease), cervical     2. Cervical radiculopathy  MRI Cervical Spine Without Contrast   3. Lumbar  radiculopathy  MRI Lumbar Spine Without Contrast   4. Facet arthropathy, cervical  Case Request Operating Room: Block-nerve-medial branch-cervical C4 C5 C6       Plan:  1.  We discussed that his pain is mostly facet mediated, I have reviewed his cervical spine MRI.  We also reviewed his lumbar spine MRI.  We discussed that he will not be able to have another MRI once he has he the vagal nerve stimulator implant and so I am going to get a new cervical spine MRI and lumbar spine MRI which will be largely helpful for the next several years unless his symptoms drastically changed.  However in the future if he should need imaging of his spine, he would need a CT and possible myelogram.  2.  I am going to schedule him for a bilateral C4/5, C5/6 medial branch block and we will contact in the following day to see what type of relief he has had.

## 2021-12-27 ENCOUNTER — TELEPHONE (OUTPATIENT)
Dept: PAIN MEDICINE | Facility: CLINIC | Age: 66
End: 2021-12-27
Payer: MEDICARE

## 2021-12-27 ENCOUNTER — PATIENT MESSAGE (OUTPATIENT)
Dept: OTOLARYNGOLOGY | Facility: CLINIC | Age: 66
End: 2021-12-27
Payer: MEDICARE

## 2021-12-27 DIAGNOSIS — E66.9 OBESITY (BMI 30.0-34.9): ICD-10-CM

## 2021-12-27 DIAGNOSIS — G47.33 OSA (OBSTRUCTIVE SLEEP APNEA): Primary | ICD-10-CM

## 2021-12-27 DIAGNOSIS — Z78.9 INTOLERANCE OF CONTINUOUS POSITIVE AIRWAY PRESSURE (CPAP) VENTILATION: ICD-10-CM

## 2021-12-27 RX ORDER — DIAZEPAM 5 MG/1
5 TABLET ORAL
Qty: 1 TABLET | Refills: 0 | Status: SHIPPED | OUTPATIENT
Start: 2021-12-27 | End: 2022-03-15

## 2021-12-30 ENCOUNTER — LAB VISIT (OUTPATIENT)
Dept: LAB | Facility: CLINIC | Age: 66
End: 2021-12-30
Attending: INTERNAL MEDICINE
Payer: MEDICARE

## 2021-12-30 DIAGNOSIS — I10 ESSENTIAL HYPERTENSION: ICD-10-CM

## 2021-12-30 DIAGNOSIS — R73.03 PREDIABETES: ICD-10-CM

## 2021-12-30 LAB
ANION GAP SERPL CALC-SCNC: 8 MMOL/L (ref 8–16)
BUN SERPL-MCNC: 17 MG/DL (ref 8–23)
CALCIUM SERPL-MCNC: 8.7 MG/DL (ref 8.7–10.5)
CHLORIDE SERPL-SCNC: 106 MMOL/L (ref 95–110)
CO2 SERPL-SCNC: 24 MMOL/L (ref 23–29)
CREAT SERPL-MCNC: 0.9 MG/DL (ref 0.5–1.4)
EST. GFR  (AFRICAN AMERICAN): >60 ML/MIN/1.73 M^2
EST. GFR  (NON AFRICAN AMERICAN): >60 ML/MIN/1.73 M^2
ESTIMATED AVG GLUCOSE: 123 MG/DL (ref 68–131)
GLUCOSE SERPL-MCNC: 115 MG/DL (ref 70–110)
HBA1C MFR BLD: 5.9 % (ref 4–5.6)
POTASSIUM SERPL-SCNC: 3.7 MMOL/L (ref 3.5–5.1)
SODIUM SERPL-SCNC: 138 MMOL/L (ref 136–145)

## 2021-12-30 PROCEDURE — 36415 COLL VENOUS BLD VENIPUNCTURE: CPT | Mod: ,,, | Performed by: INTERNAL MEDICINE

## 2021-12-30 PROCEDURE — 83036 HEMOGLOBIN GLYCOSYLATED A1C: CPT | Mod: HCNC | Performed by: INTERNAL MEDICINE

## 2021-12-30 PROCEDURE — 36415 PR COLLECTION VENOUS BLOOD,VENIPUNCTURE: ICD-10-PCS | Mod: ,,, | Performed by: INTERNAL MEDICINE

## 2021-12-30 PROCEDURE — 80048 BASIC METABOLIC PNL TOTAL CA: CPT | Mod: HCNC | Performed by: INTERNAL MEDICINE

## 2021-12-30 RX ORDER — FLUTICASONE PROPIONATE 50 MCG
SPRAY, SUSPENSION (ML) NASAL
Qty: 48 G | Refills: 1 | Status: SHIPPED | OUTPATIENT
Start: 2021-12-30 | End: 2022-06-26

## 2022-01-04 DIAGNOSIS — Z01.818 PRE-OP TESTING: ICD-10-CM

## 2022-01-05 DIAGNOSIS — Z01.818 PRE-OP TESTING: ICD-10-CM

## 2022-01-07 ENCOUNTER — LAB VISIT (OUTPATIENT)
Dept: FAMILY MEDICINE | Facility: CLINIC | Age: 67
End: 2022-01-07
Payer: MEDICARE

## 2022-01-07 DIAGNOSIS — Z01.818 PRE-OP TESTING: ICD-10-CM

## 2022-01-07 PROCEDURE — U0005 INFEC AGEN DETEC AMPLI PROBE: HCPCS | Performed by: ANESTHESIOLOGY

## 2022-01-07 PROCEDURE — U0003 INFECTIOUS AGENT DETECTION BY NUCLEIC ACID (DNA OR RNA); SEVERE ACUTE RESPIRATORY SYNDROME CORONAVIRUS 2 (SARS-COV-2) (CORONAVIRUS DISEASE [COVID-19]), AMPLIFIED PROBE TECHNIQUE, MAKING USE OF HIGH THROUGHPUT TECHNOLOGIES AS DESCRIBED BY CMS-2020-01-R: HCPCS | Mod: HCNC | Performed by: ANESTHESIOLOGY

## 2022-01-08 LAB
SARS-COV-2 RNA RESP QL NAA+PROBE: NOT DETECTED
SARS-COV-2- CYCLE NUMBER: NORMAL

## 2022-01-10 ENCOUNTER — HOSPITAL ENCOUNTER (OUTPATIENT)
Dept: RADIOLOGY | Facility: HOSPITAL | Age: 67
Discharge: HOME | End: 2022-01-10
Attending: ANESTHESIOLOGY
Payer: MEDICARE

## 2022-01-10 ENCOUNTER — HOSPITAL ENCOUNTER (OUTPATIENT)
Facility: HOSPITAL | Age: 67
Discharge: HOME | End: 2022-01-10
Attending: ANESTHESIOLOGY | Admitting: ANESTHESIOLOGY
Payer: MEDICARE

## 2022-01-10 VITALS
HEIGHT: 70 IN | WEIGHT: 232 LBS | DIASTOLIC BLOOD PRESSURE: 88 MMHG | TEMPERATURE: 98 F | SYSTOLIC BLOOD PRESSURE: 150 MMHG | RESPIRATION RATE: 16 BRPM | BODY MASS INDEX: 33.21 KG/M2 | HEART RATE: 60 BPM | OXYGEN SATURATION: 98 %

## 2022-01-10 DIAGNOSIS — M47.812 FACET ARTHROPATHY, CERVICAL: ICD-10-CM

## 2022-01-10 DIAGNOSIS — M54.2 NECK PAIN: ICD-10-CM

## 2022-01-10 DIAGNOSIS — M47.812 CERVICAL SPONDYLOSIS: Primary | ICD-10-CM

## 2022-01-10 PROCEDURE — 64491 INJ PARAVERT F JNT C/T 2 LEV: CPT | Mod: 50,KX,HCNC, | Performed by: ANESTHESIOLOGY

## 2022-01-10 PROCEDURE — 76000 FLUOROSCOPY <1 HR PHYS/QHP: CPT | Mod: TC,HCNC,PO

## 2022-01-10 PROCEDURE — 64490 INJ PARAVERT F JNT C/T 1 LEV: CPT | Mod: 50,KX,HCNC, | Performed by: ANESTHESIOLOGY

## 2022-01-10 PROCEDURE — 99152 MOD SED SAME PHYS/QHP 5/>YRS: CPT | Mod: HCNC,,, | Performed by: ANESTHESIOLOGY

## 2022-01-10 PROCEDURE — 64491 PR INJ DX/THER AGNT PARAVERT FACET JOINT,IMG GUIDE,CERV/THORAC, 2ND LEVEL: ICD-10-PCS | Mod: 50,KX,HCNC, | Performed by: ANESTHESIOLOGY

## 2022-01-10 PROCEDURE — 25500020 PHARM REV CODE 255: Mod: HCNC,PO | Performed by: ANESTHESIOLOGY

## 2022-01-10 PROCEDURE — 64490 PR INJ DX/THER AGNT PARAVERT FACET JOINT,IMG GUIDE,CERV/THORAC, 1ST LEVEL: ICD-10-PCS | Mod: 50,KX,HCNC, | Performed by: ANESTHESIOLOGY

## 2022-01-10 PROCEDURE — 99152 PR MOD CONSCIOUS SEDATION, SAME PHYS, 5+ YRS, FIRST 15 MIN: ICD-10-PCS | Mod: HCNC,,, | Performed by: ANESTHESIOLOGY

## 2022-01-10 PROCEDURE — 25000003 PHARM REV CODE 250: Mod: HCNC,PO | Performed by: ANESTHESIOLOGY

## 2022-01-10 PROCEDURE — 64490 INJ PARAVERT F JNT C/T 1 LEV: CPT | Mod: 50,HCNC,PO | Performed by: ANESTHESIOLOGY

## 2022-01-10 PROCEDURE — 64491 INJ PARAVERT F JNT C/T 2 LEV: CPT | Mod: 50,HCNC,PO | Performed by: ANESTHESIOLOGY

## 2022-01-10 PROCEDURE — 63600175 PHARM REV CODE 636 W HCPCS: Mod: HCNC,PO | Performed by: ANESTHESIOLOGY

## 2022-01-10 RX ORDER — BUPIVACAINE HYDROCHLORIDE 2.5 MG/ML
INJECTION, SOLUTION EPIDURAL; INFILTRATION; INTRACAUDAL
Status: DISCONTINUED | OUTPATIENT
Start: 2022-01-10 | End: 2022-01-10 | Stop reason: HOSPADM

## 2022-01-10 RX ORDER — MIDAZOLAM HYDROCHLORIDE 2 MG/2ML
INJECTION, SOLUTION INTRAMUSCULAR; INTRAVENOUS
Status: DISCONTINUED | OUTPATIENT
Start: 2022-01-10 | End: 2022-01-10 | Stop reason: HOSPADM

## 2022-01-10 RX ORDER — SODIUM CHLORIDE, SODIUM LACTATE, POTASSIUM CHLORIDE, CALCIUM CHLORIDE 600; 310; 30; 20 MG/100ML; MG/100ML; MG/100ML; MG/100ML
INJECTION, SOLUTION INTRAVENOUS CONTINUOUS
Status: DISCONTINUED | OUTPATIENT
Start: 2022-01-10 | End: 2022-01-10 | Stop reason: HOSPADM

## 2022-01-10 RX ORDER — LIDOCAINE HYDROCHLORIDE 10 MG/ML
INJECTION, SOLUTION EPIDURAL; INFILTRATION; INTRACAUDAL; PERINEURAL
Status: DISCONTINUED | OUTPATIENT
Start: 2022-01-10 | End: 2022-01-10 | Stop reason: HOSPADM

## 2022-01-10 RX ADMIN — SODIUM CHLORIDE, SODIUM LACTATE, POTASSIUM CHLORIDE, AND CALCIUM CHLORIDE: .6; .31; .03; .02 INJECTION, SOLUTION INTRAVENOUS at 12:01

## 2022-01-10 NOTE — OP NOTE
PROCEDURE DATE: 1/10/2022    PROCEDURE:  Diagnostic Cervical medial branch block of the bilateral C4,5,6 medial branch nerves on the bilateral-side utilizing fluoroscopy    DIAGNOSIS:  Cervical spondylosis    PHYSICIAN: Landon Winchester MD    MEDICATIONS INJECTED:  0.25% bupivicaine, 0.5ml at each level.    LOCAL ANESTHETIC USED:   1% lidocaine, 1ml at each level.    SEDATION MEDICATIONS: 4mg versed    ESTIMATED BLOOD LOSS:  none    COMPLICATIONS:  none    TECHNIQUE : A time-out was taken to identify patient and procedure side prior to starting the procedure.  The patient was positioned prone with the site of interest side up. The patient was prepped and draped in the usual sterile fashion using ChloraPrep and sterile towels.  The level was determined under fluoroscopic guidance using a slightly posteriorly oblique view.   Local anesthetic was infiltrated superficially at the skin level.  Then, a 25 gauge 3.5 inch needle was inserted to the anatomic location of the midsection of the lateral masses of the right and then left C4,5,6. A cross table view was then taken to ensure that needles did not cross into neural foramina. Omnipaque contrast dye was then injected to assess appropriate spread and that there was no vascular uptake. The above noted medication was then injected. The patient tolerated the procedure well.     The patient was monitored after the procedure. The patient will be contacted tomorrow to determine the extent of relief. The patient was given post procedure and discharge instructions to follow at home. The patient was discharged in a stable condition    Event Time In   In Facility 1132   In Pre-Procedure 1154   Physician Available    Anesthesia Available    Pre-Op: Bedside Procedure Start    Pre-Op: Bedside Procedure Stop    Pre-Procedure Complete 1220   Out of Pre-Procedure    Anesthesia Start    Anesthesia Start Data Collection    Setup Start    Setup Complete    In Room 1246   Prep Start     Procedure Prep Complete    Procedure Start 1254   Procedure Closing    Emergence    Procedure Finish 1304   Sedation Start 1245   Scope In    Extent Reached    Scope Out    Sedation End 1307   Out of Room 1307   Cleanup Start    Cleanup Complete    Cosmetic Start    Cosmetic Stop    Pain Mgmt In Room    Pain Mgmt Out Room    In Recovery    Anesthesia Finish    Bedside Procedure Start    Bedside Procedure Stop    Recovery Care Complete    Out of Recovery    To Phase II    In Phase II    Pain Mgmt Recovery Start    Pain Mgmt Recovery Stop    Obs Rec Start    Obs Rec Stop    Phase II Care Complete    Out of Phase II    Procedural Care Complete    Discharge    Pain Follow Up Needed    Pain Follow Up Complete      Minimal sedation was achieved with midazolam 4mg.  Continuous monitoring of EKG, blood pressure and pulse oximetry was provided by a registered nurse during the entire course of the procedure under my supervision and recorded in the patient's medical record.   Total time for sedation was 22 minutes.

## 2022-01-10 NOTE — INTERVAL H&P NOTE
The patient has been examined and the H&P has been reviewed:    I concur with the findings and no changes have occurred since H&P was written.    Procedure risks, benefits and alternative options discussed and understood by patient/family.    ASA 2, mallampati 2        There are no hospital problems to display for this patient.

## 2022-01-10 NOTE — PLAN OF CARE
Pre op complete. VSS, all questions answered. Pt denies recent fever or illness. Pt states ready for procedure. Personal belonging placed under stretcher. Call light within reach.

## 2022-01-10 NOTE — DISCHARGE SUMMARY
[facility] - Surgery  Discharge Note  Short Stay    Procedure(s) (LRB):  Block-nerve-medial branch-cervical C4 C5 C6 (Bilateral)    OUTCOME: Patient tolerated treatment/procedure well without complication and is now ready for discharge.    DISPOSITION: Home or Self Care    FINAL DIAGNOSIS:  Cervical spondylosis    FOLLOWUP: In clinic    DISCHARGE INSTRUCTIONS:    Discharge Procedure Orders   Diet Adult Regular     No dressing needed     Notify your health care provider if you experience any of the following:  temperature >100.4     Activity as tolerated

## 2022-01-10 NOTE — DISCHARGE INSTRUCTIONS
PAIN MANAGEMENT    Home care instructions   Apply ice pack to the injection site for 20 minute prior for the first 24 hours for soreness/discomfort at injection site   DO NOT USE HEAT FOR 24 HOURS   Keep site clean and dry for 24 hours, remove bandaid when desired   Do not drive until tomorrow  Take care when walking after a lumbar injectio      BLOCKS  Resume regular activities today  Pain office will call in next 2 days      Resume Aspirin, Plavix, or Coumadin the day after the procedure unless other wise instructed  Resume home medication as prescribed today      CALL PHYSICIAN FOR:   Severe increase in your usual pain or appearance of new pain   Prolonged or increasing weakness or numbness in the legs or arms   Fever greater then 100 degrees F..   Drainage from the incision site, redness, active bleeding or increased swelling at the injection site   Headache that increases when your head is upright and decreases when you lie flat    FOR EMERGENCIES:   Go directly to Emergency Department for Shortness of breath, chest pain, or problems breathing

## 2022-01-11 ENCOUNTER — TELEPHONE (OUTPATIENT)
Dept: PAIN MEDICINE | Facility: CLINIC | Age: 67
End: 2022-01-11
Payer: MEDICARE

## 2022-01-11 NOTE — TELEPHONE ENCOUNTER
Spoke with patient regarding his block yesterday and he reported some relief at about 20%. He will come in to discuss this at a follow up appointment.

## 2022-01-20 DIAGNOSIS — F41.9 ANXIETY: ICD-10-CM

## 2022-01-20 NOTE — TELEPHONE ENCOUNTER
No new care gaps identified.  Powered by [employer]. Reference number: [id number].   1/20/2022 4:06:15 AM CST

## 2022-01-25 ENCOUNTER — LAB VISIT (OUTPATIENT)
Dept: FAMILY MEDICINE | Facility: CLINIC | Age: 67
End: 2022-01-25
Payer: MEDICARE

## 2022-01-25 DIAGNOSIS — Z01.818 PRE-OP TESTING: ICD-10-CM

## 2022-01-25 PROCEDURE — U0005 INFEC AGEN DETEC AMPLI PROBE: HCPCS | Performed by: OTOLARYNGOLOGY

## 2022-01-25 PROCEDURE — U0003 INFECTIOUS AGENT DETECTION BY NUCLEIC ACID (DNA OR RNA); SEVERE ACUTE RESPIRATORY SYNDROME CORONAVIRUS 2 (SARS-COV-2) (CORONAVIRUS DISEASE [COVID-19]), AMPLIFIED PROBE TECHNIQUE, MAKING USE OF HIGH THROUGHPUT TECHNOLOGIES AS DESCRIBED BY CMS-2020-01-R: HCPCS | Mod: HCNC | Performed by: OTOLARYNGOLOGY

## 2022-01-26 LAB
SARS-COV-2 RNA RESP QL NAA+PROBE: NOT DETECTED
SARS-COV-2- CYCLE NUMBER: NORMAL

## 2022-01-27 ENCOUNTER — ANESTHESIA EVENT (OUTPATIENT)
Dept: SURGERY | Facility: HOSPITAL | Age: 67
End: 2022-01-27
Payer: MEDICARE

## 2022-01-28 ENCOUNTER — HOSPITAL ENCOUNTER (OUTPATIENT)
Facility: HOSPITAL | Age: 67
Discharge: HOME | End: 2022-01-28
Attending: OTOLARYNGOLOGY | Admitting: OTOLARYNGOLOGY
Payer: MEDICARE

## 2022-01-28 ENCOUNTER — ANESTHESIA (OUTPATIENT)
Dept: SURGERY | Facility: HOSPITAL | Age: 67
End: 2022-01-28
Payer: MEDICARE

## 2022-01-28 VITALS
HEART RATE: 59 BPM | BODY MASS INDEX: 32.93 KG/M2 | TEMPERATURE: 98 F | OXYGEN SATURATION: 99 % | SYSTOLIC BLOOD PRESSURE: 132 MMHG | HEIGHT: 70 IN | DIASTOLIC BLOOD PRESSURE: 70 MMHG | RESPIRATION RATE: 11 BRPM | WEIGHT: 230 LBS

## 2022-01-28 DIAGNOSIS — G47.33 OSA (OBSTRUCTIVE SLEEP APNEA): Primary | ICD-10-CM

## 2022-01-28 DIAGNOSIS — Z78.9 INTOLERANCE OF CONTINUOUS POSITIVE AIRWAY PRESSURE (CPAP) VENTILATION: ICD-10-CM

## 2022-01-28 PROCEDURE — 71000033 HC RECOVERY, INTIAL HOUR: Mod: HCNC,PO | Performed by: OTOLARYNGOLOGY

## 2022-01-28 PROCEDURE — 25000003 PHARM REV CODE 250: Mod: HCNC,PO | Performed by: OTOLARYNGOLOGY

## 2022-01-28 PROCEDURE — 37000009 HC ANESTHESIA EA ADD 15 MINS: Mod: HCNC,PO | Performed by: OTOLARYNGOLOGY

## 2022-01-28 PROCEDURE — D9220A PRA ANESTHESIA: ICD-10-PCS | Mod: HCNC,ANES,, | Performed by: ANESTHESIOLOGY

## 2022-01-28 PROCEDURE — 25000003 PHARM REV CODE 250: Mod: HCNC,PO | Performed by: NURSE ANESTHETIST, CERTIFIED REGISTERED

## 2022-01-28 PROCEDURE — 25000003 PHARM REV CODE 250: Mod: HCNC,PO | Performed by: ANESTHESIOLOGY

## 2022-01-28 PROCEDURE — D9220A PRA ANESTHESIA: Mod: HCNC,CRNA,, | Performed by: NURSE ANESTHETIST, CERTIFIED REGISTERED

## 2022-01-28 PROCEDURE — 36000706: Mod: HCNC,PO | Performed by: OTOLARYNGOLOGY

## 2022-01-28 PROCEDURE — 63600175 PHARM REV CODE 636 W HCPCS: Mod: HCNC,PO | Performed by: ANESTHESIOLOGY

## 2022-01-28 PROCEDURE — D9220A PRA ANESTHESIA: Mod: HCNC,ANES,, | Performed by: ANESTHESIOLOGY

## 2022-01-28 PROCEDURE — 63600175 PHARM REV CODE 636 W HCPCS: Mod: HCNC,PO | Performed by: NURSE ANESTHETIST, CERTIFIED REGISTERED

## 2022-01-28 PROCEDURE — D9220A PRA ANESTHESIA: ICD-10-PCS | Mod: HCNC,CRNA,, | Performed by: NURSE ANESTHETIST, CERTIFIED REGISTERED

## 2022-01-28 PROCEDURE — 31575 DIAGNOSTIC LARYNGOSCOPY: CPT | Mod: HCNC,,, | Performed by: OTOLARYNGOLOGY

## 2022-01-28 PROCEDURE — 31575 PR LARYNGOSCOPY, FLEXIBLE; DIAGNOSTIC: ICD-10-PCS | Mod: HCNC,,, | Performed by: OTOLARYNGOLOGY

## 2022-01-28 PROCEDURE — 37000008 HC ANESTHESIA 1ST 15 MINUTES: Mod: HCNC,PO | Performed by: OTOLARYNGOLOGY

## 2022-01-28 PROCEDURE — 36000707: Mod: HCNC,PO | Performed by: OTOLARYNGOLOGY

## 2022-01-28 RX ORDER — LIDOCAINE HYDROCHLORIDE 10 MG/ML
1 INJECTION, SOLUTION EPIDURAL; INFILTRATION; INTRACAUDAL; PERINEURAL ONCE
Status: DISCONTINUED | OUTPATIENT
Start: 2022-01-28 | End: 2022-01-28 | Stop reason: HOSPADM

## 2022-01-28 RX ORDER — LIDOCAINE HYDROCHLORIDE 20 MG/ML
INJECTION INTRAVENOUS
Status: DISCONTINUED | OUTPATIENT
Start: 2022-01-28 | End: 2022-01-28

## 2022-01-28 RX ORDER — LIDOCAINE HYDROCHLORIDE 20 MG/ML
10 JELLY TOPICAL ONCE
Status: COMPLETED | OUTPATIENT
Start: 2022-01-28 | End: 2022-01-28

## 2022-01-28 RX ORDER — PROPOFOL 10 MG/ML
VIAL (ML) INTRAVENOUS
Status: DISCONTINUED | OUTPATIENT
Start: 2022-01-28 | End: 2022-01-28

## 2022-01-28 RX ORDER — PROPOFOL 10 MG/ML
VIAL (ML) INTRAVENOUS CONTINUOUS PRN
Status: DISCONTINUED | OUTPATIENT
Start: 2022-01-28 | End: 2022-01-28

## 2022-01-28 RX ORDER — FENTANYL CITRATE 50 UG/ML
25 INJECTION, SOLUTION INTRAMUSCULAR; INTRAVENOUS EVERY 5 MIN PRN
Status: DISCONTINUED | OUTPATIENT
Start: 2022-01-28 | End: 2022-01-28 | Stop reason: HOSPADM

## 2022-01-28 RX ORDER — METOCLOPRAMIDE HYDROCHLORIDE 5 MG/ML
10 INJECTION INTRAMUSCULAR; INTRAVENOUS EVERY 10 MIN PRN
Status: DISCONTINUED | OUTPATIENT
Start: 2022-01-28 | End: 2022-01-28 | Stop reason: HOSPADM

## 2022-01-28 RX ORDER — OXYMETAZOLINE HCL 0.05 %
2 SPRAY, NON-AEROSOL (ML) NASAL
Status: ACTIVE | OUTPATIENT
Start: 2022-01-28 | End: 2022-01-28

## 2022-01-28 RX ORDER — OXYCODONE HYDROCHLORIDE 5 MG/1
5 TABLET ORAL ONCE AS NEEDED
Status: DISCONTINUED | OUTPATIENT
Start: 2022-01-28 | End: 2022-01-28 | Stop reason: HOSPADM

## 2022-01-28 RX ORDER — SODIUM CHLORIDE, SODIUM LACTATE, POTASSIUM CHLORIDE, CALCIUM CHLORIDE 600; 310; 30; 20 MG/100ML; MG/100ML; MG/100ML; MG/100ML
INJECTION, SOLUTION INTRAVENOUS CONTINUOUS
Status: DISCONTINUED | OUTPATIENT
Start: 2022-01-28 | End: 2022-01-28 | Stop reason: HOSPADM

## 2022-01-28 RX ORDER — OXYMETAZOLINE HCL 0.05 %
2 SPRAY, NON-AEROSOL (ML) NASAL 2 TIMES DAILY
Status: DISCONTINUED | OUTPATIENT
Start: 2022-01-28 | End: 2022-01-28 | Stop reason: HOSPADM

## 2022-01-28 RX ORDER — SODIUM CHLORIDE 0.9 % (FLUSH) 0.9 %
3 SYRINGE (ML) INJECTION
Status: DISCONTINUED | OUTPATIENT
Start: 2022-01-28 | End: 2022-01-28 | Stop reason: HOSPADM

## 2022-01-28 RX ADMIN — LIDOCAINE HYDROCHLORIDE 10 ML: 20 JELLY TOPICAL at 10:01

## 2022-01-28 RX ADMIN — Medication 2 SPRAY: at 10:01

## 2022-01-28 RX ADMIN — GLYCOPYRROLATE 0.2 MG: 0.2 INJECTION, SOLUTION INTRAMUSCULAR; INTRAVITREAL at 10:01

## 2022-01-28 RX ADMIN — PROPOFOL 100 MCG/KG/MIN: 10 INJECTION, EMULSION INTRAVENOUS at 10:01

## 2022-01-28 RX ADMIN — SODIUM CHLORIDE, SODIUM LACTATE, POTASSIUM CHLORIDE, AND CALCIUM CHLORIDE: .6; .31; .03; .02 INJECTION, SOLUTION INTRAVENOUS at 10:01

## 2022-01-28 RX ADMIN — PROPOFOL 20 MG: 10 INJECTION, EMULSION INTRAVENOUS at 10:01

## 2022-01-28 RX ADMIN — LIDOCAINE HYDROCHLORIDE 75 MG: 20 INJECTION, SOLUTION INTRAVENOUS at 10:01

## 2022-01-28 NOTE — BRIEF OP NOTE
[facility] - Surgery  Brief Operative Note     SUMMARY     Surgery Date: 1/28/2022     Surgeon(s) and Role:     * Lance Wright MD - Primary    Assisting Surgeon: None    Pre-op Diagnosis:  HERNAN (obstructive sleep apnea) [G47.33]  Intolerance of continuous positive airway pressure (CPAP) ventilation [Z78.9]  BMI 33.0-33.9,adult [Z68.33]  Obesity (BMI 30.0-34.9) [E66.9]    Post-op Diagnosis:  Post-Op Diagnosis Codes:     * HERNAN (obstructive sleep apnea) [G47.33]     * Intolerance of continuous positive airway pressure (CPAP) ventilation [Z78.9]     * BMI 33.0-33.9,adult [Z68.33]     * Obesity (BMI 30.0-34.9) [E66.9]    Procedure(s) (LRB):  SLEEP ENDOSCOPY,DRUG-INDUCED (Bilateral)    Anesthesia: General    Description of the findings of the procedure: DISE    Findings/Key Components: DISE    Estimated Blood Loss: * No values recorded between 1/28/2022 10:41 AM and 1/28/2022 10:48 AM *         Specimens:   Specimen (24h ago, onward)            None          Discharge Note    SUMMARY     Admit Date: 1/28/2022    Discharge Date and Time:  01/28/2022 10:55 AM    Hospital Course (synopsis of major diagnoses, care, treatment, and services provided during the course of the hospital stay): Did well following surgery and was discharged uneventfully     Final Diagnosis: Post-Op Diagnosis Codes:     * HERNAN (obstructive sleep apnea) [G47.33]     * Intolerance of continuous positive airway pressure (CPAP) ventilation [Z78.9]     * BMI 33.0-33.9,adult [Z68.33]     * Obesity (BMI 30.0-34.9) [E66.9]    Disposition: Home or Self Care    Follow Up/Patient Instructions: Regular diet. Activity as tolerated    Medications:  Reconciled Home Medications:   Current Discharge Medication List      CONTINUE these medications which have NOT CHANGED    Details   atorvastatin (LIPITOR) 10 MG tablet Take 1 tablet (10 mg total) by mouth once daily.  Qty: 90 tablet, Refills: 3    Associated Diagnoses: Hypercholesterolemia      benazepriL (LOTENSIN)  10 MG tablet Take 1 tablet (10 mg total) by mouth once daily.  Qty: 30 tablet, Refills: 1    Comments: .  Associated Diagnoses: Essential hypertension      cetirizine HCl (CETIRIZINE ORAL) Take 1 Dose by mouth daily as needed.       esomeprazole (NEXIUM) 40 MG capsule Take 1 capsule (40 mg total) by mouth before breakfast.  Qty: 90 capsule, Refills: 1    Associated Diagnoses: Gastroesophageal reflux disease, unspecified whether esophagitis present      fluticasone propionate (FLONASE) 50 mcg/actuation nasal spray SHAKE WELL AND USE ONE SPRAY IN EACH NOSTRIL ONCE DAILY  Qty: 48 g, Refills: 1    Comments: Please delete all prior scripts with same name and strength including on holds.  Associated Diagnoses: Allergic rhinitis, unspecified seasonality, unspecified trigger      !! paroxetine (PAXIL) 10 MG tablet TAKE 1 TABLET(10 MG) BY MOUTH EVERY MORNING  Qty: 90 tablet, Refills: 1    Associated Diagnoses: Anxiety      !! paroxetine (PAXIL) 40 MG tablet Take 1 tablet (40 mg total) by mouth once daily.  Qty: 90 tablet, Refills: 1    Associated Diagnoses: Anxiety      tamsulosin (FLOMAX) 0.4 mg Cap Take 1 capsule (0.4 mg total) by mouth once daily.  Qty: 90 capsule, Refills: 3    Associated Diagnoses: Urinary frequency      diazePAM (VALIUM) 5 MG tablet Take 1 tablet (5 mg total) by mouth On call Procedure for Anxiety.  Qty: 1 tablet, Refills: 0       !! - Potential duplicate medications found. Please discuss with provider.        No discharge procedures on file.

## 2022-01-28 NOTE — ANESTHESIA PREPROCEDURE EVALUATION
01/28/2022  Moris Calderón is a 66 y.o., male.    Pre-op Assessment    I have reviewed the Patient Summary Reports.     I have reviewed the Nursing Notes. I have reviewed the NPO Status.   I have reviewed the Medications.     Review of Systems  Anesthesia Hx:  No problems with previous Anesthesia    Cardiovascular:   Hypertension, well controlled hyperlipidemia    Pulmonary:   Sleep Apnea, CPAP    Hepatic/GI:   GERD, well controlled    Musculoskeletal:   Arthritis   Spine Disorders:    Neurological:   Neuromuscular Disease,    Psych:   anxiety          Physical Exam  General:  Well nourished, Obesity    Airway/Jaw/Neck:  Airway Findings: Mouth Opening: Normal Tongue: Normal  General Airway Assessment: Adult  Mallampati: III  Improves to II with phonation.      Dental:  Dental Findings: In tact   Chest/Lungs:  Chest/Lungs Findings: Clear to auscultation, Normal Respiratory Rate     Heart/Vascular:  Heart Findings: Rate: Normal  Rhythm: Regular Rhythm             Anesthesia Plan  Type of Anesthesia, risks & benefits discussed:  Anesthesia Type:  MAC    Patient's Preference:   Plan Factors:          Intra-op Monitoring Plan: standard ASA monitors  Intra-op Monitoring Plan Comments:   Post Op Pain Control Plan:   Post Op Pain Control Plan Comments:     Induction:   IV  Beta Blocker:  Patient is not currently on a Beta-Blocker (No further documentation required).       Informed Consent: Patient understands risks and agrees with Anesthesia plan.  Questions answered. Anesthesia consent signed with patient.  ASA Score: 3     Day of Surgery Review of History & Physical: I have interviewed and examined the patient. I have reviewed the patient's H&P dated:    H&P update referred to the surgeon.         Ready For Surgery From Anesthesia Perspective.

## 2022-01-28 NOTE — PATIENT INSTRUCTIONS
Post-op Sleep Endoscopy  Lance Wright MD  Otolaryngology - [facility] - [phone]  Cell Phone (after hours) - [phone]      Pain and Activity  · You will have minimal nasal and throat pain. This can be related to the pressure from the scope and will resolve after a few days.  · You can resume your normal activities as tolerated on the day following surgery.    Diet  Make sure to get enough fluids and nutrients. Food and drink guidelines include  · No diet restrictions.       Medication  Give only medications approved by your doctor. Follow directions closely when giving your child medications.  · Resume your normal medications. OK to take Tylenol or Ibuprofen for pain as needed.       Dr. Wright will call to go over the results and options.

## 2022-01-28 NOTE — TRANSFER OF CARE
"Anesthesia Transfer of Care Note    Patient: Moris Calderón    Procedure(s) Performed: Procedure(s) (LRB):  SLEEP ENDOSCOPY,DRUG-INDUCED (Bilateral)    Patient location: PACU    Anesthesia Type: MAC    Transport from OR: Transported from OR on room air with adequate spontaneous ventilation    Post pain: adequate analgesia    Post assessment: no apparent anesthetic complications    Post vital signs: stable    Level of consciousness: awake    Nausea/Vomiting: no nausea/vomiting    Complications: none    Transfer of care protocol was followed      Last vitals:   Visit Vitals  /63   Pulse 62   Temp 36.8 °C (98.2 °F) (Skin)   Resp 13   Ht 5' 9.5" (1.765 m)   Wt 104.3 kg (230 lb)   SpO2 98%   BMI 33.48 kg/m²     "

## 2022-01-28 NOTE — ANESTHESIA POSTPROCEDURE EVALUATION
Anesthesia Post Evaluation    Patient: Moris Calderón    Procedure(s) Performed: Procedure(s) (LRB):  SLEEP ENDOSCOPY,DRUG-INDUCED (Bilateral)    Final Anesthesia Type: MAC      Patient location during evaluation: PACU  Patient participation: Yes- Able to Participate  Level of consciousness: awake and alert and oriented  Post-procedure vital signs: reviewed and stable  Pain management: adequate  Airway patency: patent    PONV status at discharge: No PONV  Anesthetic complications: no      Cardiovascular status: blood pressure returned to baseline and stable  Respiratory status: unassisted and spontaneous ventilation  Hydration status: euvolemic  Follow-up not needed.          Vitals Value Taken Time   /70 01/28/22 1058   Temp   01/28/22 1343   Pulse 59 01/28/22 1058   Resp 11 01/28/22 1058   SpO2 99 % 01/28/22 1058         Event Time   Out of Recovery 11:14:49         Pain/Jesus Score: Jesus Score: 10 (1/28/2022 10:48 AM)

## 2022-01-28 NOTE — H&P
Subjective:       Patient ID: Moris Calderón is a 66 y.o. male.    Chief Complaint: No chief complaint on file.      Moris is here for DISE. No changes since clinic visit     Review of Systems   Constitutional: Negative for activity change and appetite change.   Eyes: Negative for discharge.   Respiratory: Negative for difficulty breathing and wheezing   Cardiovascular: Negative for chest pain.   Gastrointestinal: Negative for abdominal distention and abdominal pain.   Endocrine: Negative for cold intolerance and heat intolerance.   Genitourinary: Negative for dysuria.   Musculoskeletal: Negative for gait problem and joint swelling.   Skin: Negative for color change and pallor.   Neurological: Negative for syncope and weakness.   Psychiatric/Behavioral: Negative for agitation and confusion.     Objective:        Constitutional:   He is oriented to person, place, and time. He appears well-developed and well-nourished. He appears alert. He is active. Normal speech.      Head:  Normocephalic and atraumatic. Head is without TMJ tenderness. No scars. Salivary glands normal.  Facial strength is normal.      Ears:    Right Ear: No drainage or swelling. No middle ear effusion.   Left Ear: No drainage or swelling.  No middle ear effusion.     Nose:  No mucosal edema, rhinorrhea or sinus tenderness. No turbinate hypertrophy.      Mouth/Throat  Oropharynx clear and moist without lesions or asymmetry, normal uvula midline and mirror exam normal. Normal dentition. No uvula swelling, lacerations or trismus. No oropharyngeal exudate. Tonsillar erythema, tonsillar exudate.      Neck:  Full range of motion with neck supple and no adenopathy. Thyroid tenderness is present. No tracheal deviation, no edema, no erythema, normal range of motion, no stridor, no crepitus and no neck rigidity present. No thyroid mass present.     Cardiovascular:   Intact distal pulses and normal pulses.      Pulmonary/Chest:   Effort normal and breath sounds  normal. No stridor.     Psychiatric:   His speech is normal and behavior is normal. His mood appears not anxious. His affect is not labile.     Neurological:   He is alert and oriented to person, place, and time. No sensory deficit.     Skin:   No abrasions, lacerations, lesions, or rashes. No abrasion and no bruising noted.         Tests / Results:  Reviewed     Assessment:       1. HERNAN (obstructive sleep apnea)    2. Intolerance of continuous positive airway pressure (CPAP) ventilation    3. BMI 33.0-33.9,adult          Plan:         DISE as planned

## 2022-01-28 NOTE — OP NOTE
01/28/2022    PREOPERATIVE DIAGNOSES:   1. Obstructive sleep apnea  2. Positive pressure intolerance and persistent sleep apnea after CPAP    POSTOPERATIVE DIAGNOSES:   1. Obstructive sleep apnea  2. Positive pressure intolerance and persistent sleep apnea after CPAP    PROCEDURES:   1. Drug-induced sleep endoscopy (flexible fiberoptic laryngoscopy   with examination under anesthesia).       SURGEON: Lance Wright MD    ASSISTANT: None    ANESTHESIA: IV sedation.     ESTIMATED BLOOD LOSS: None.     COMPLICATIONS: None.     BRIEF CLINICAL HISTORY: This is a 66 y.o. -year-old male with a history of   severe symptomatic obstructive sleep apnea, who is intolerant   and unable to achieve benefit with positive pressure therapy. male presents today for drug-induced sleep endoscopy to better characterize the locations and pattern of obstruction and to predict appropriate medical and/or surgical options moving forward.     DESCRIPTION OF PROCEDURE:  The patient was seen in the pre-operative holding area. Informaed consent was signed. Oxymetazoline was sprayed into the nasal cavities, followed by 2% viscous Lidocaine on pledgets placed into the nasal cavities for anesthesia.     The patient was brought to the operating room and was anesthetized via the standard drug-induced sleep endoscopy  protocol. The propofol infusion rate was started at 100 mcg and gradually increased to a level of 225 mcg, at which point, conditions that mimic sleep were gradually observed.     The patient was non-responsive to verbal commands, but still with spontaneous respiration. Sleep disordered breathing and associated desaturation events were clearly observed.     Under these conditions, the flexible endoscope was inserted to examine both sides of the nose as well as the pharynx and larynx.     The nose was relatively unremarkable other than  A small, clean septal perforation. The retropalatal space showed a intermediate oriented palate. There  was minimal lateral collapse of the pharyngeal wall at this level, and there was near complete AP collapse.     The oropharynx revealed: partial lateral oropharyngeal wall collapse and partial AP collapse.     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     The VOTE score at baseline was:  Velopharynx: 2 primarily AP  Oropharynx: 2, partial lateral and AP  Tongue Base: 1, AP  Epiglottis: 1, AP    In summary, there was no evidence of complete concentric palatal obstruction   and she/he does appear to be a candidate anatomically for hypoglossal nerve   stimulation therapy.     I performed the entire procedure.

## 2022-02-03 ENCOUNTER — OFFICE VISIT (OUTPATIENT)
Dept: PAIN MEDICINE | Facility: CLINIC | Age: 67
End: 2022-02-03
Payer: MEDICARE

## 2022-02-03 VITALS
BODY MASS INDEX: 33.77 KG/M2 | HEIGHT: 70 IN | WEIGHT: 235.88 LBS | HEART RATE: 55 BPM | DIASTOLIC BLOOD PRESSURE: 86 MMHG | SYSTOLIC BLOOD PRESSURE: 150 MMHG

## 2022-02-03 DIAGNOSIS — M54.12 CERVICAL RADICULOPATHY: Primary | ICD-10-CM

## 2022-02-03 DIAGNOSIS — Z01.818 PRE-OP TESTING: ICD-10-CM

## 2022-02-03 DIAGNOSIS — M48.061 SPINAL STENOSIS OF LUMBAR REGION WITHOUT NEUROGENIC CLAUDICATION: ICD-10-CM

## 2022-02-03 DIAGNOSIS — M47.812 CERVICAL SPONDYLOSIS: ICD-10-CM

## 2022-02-03 DIAGNOSIS — M54.16 BILATERAL LUMBAR RADICULOPATHY: ICD-10-CM

## 2022-02-03 DIAGNOSIS — M47.816 LUMBAR SPONDYLOSIS: ICD-10-CM

## 2022-02-03 PROCEDURE — 3008F PR BODY MASS INDEX (BMI) DOCUMENTED: ICD-10-PCS | Mod: HCNC,CPTII,S$GLB, | Performed by: ANESTHESIOLOGY

## 2022-02-03 PROCEDURE — 3288F PR FALLS RISK ASSESSMENT DOCUMENTED: ICD-10-PCS | Mod: HCNC,CPTII,S$GLB, | Performed by: ANESTHESIOLOGY

## 2022-02-03 PROCEDURE — 3079F PR MOST RECENT DIASTOLIC BLOOD PRESSURE 80-89 MM HG: ICD-10-PCS | Mod: HCNC,CPTII,S$GLB, | Performed by: ANESTHESIOLOGY

## 2022-02-03 PROCEDURE — 1101F PT FALLS ASSESS-DOCD LE1/YR: CPT | Mod: HCNC,CPTII,S$GLB, | Performed by: ANESTHESIOLOGY

## 2022-02-03 PROCEDURE — 1159F MED LIST DOCD IN RCRD: CPT | Mod: HCNC,CPTII,S$GLB, | Performed by: ANESTHESIOLOGY

## 2022-02-03 PROCEDURE — 99999 PR PBB SHADOW E&M-EST. PATIENT-LVL III: ICD-10-PCS | Mod: PBBFAC,HCNC,, | Performed by: ANESTHESIOLOGY

## 2022-02-03 PROCEDURE — 3008F BODY MASS INDEX DOCD: CPT | Mod: HCNC,CPTII,S$GLB, | Performed by: ANESTHESIOLOGY

## 2022-02-03 PROCEDURE — 3288F FALL RISK ASSESSMENT DOCD: CPT | Mod: HCNC,CPTII,S$GLB, | Performed by: ANESTHESIOLOGY

## 2022-02-03 PROCEDURE — 1159F PR MEDICATION LIST DOCUMENTED IN MEDICAL RECORD: ICD-10-PCS | Mod: HCNC,CPTII,S$GLB, | Performed by: ANESTHESIOLOGY

## 2022-02-03 PROCEDURE — 1125F PR PAIN SEVERITY QUANTIFIED, PAIN PRESENT: ICD-10-PCS | Mod: HCNC,CPTII,S$GLB, | Performed by: ANESTHESIOLOGY

## 2022-02-03 PROCEDURE — 1101F PR PT FALLS ASSESS DOC 0-1 FALLS W/OUT INJ PAST YR: ICD-10-PCS | Mod: HCNC,CPTII,S$GLB, | Performed by: ANESTHESIOLOGY

## 2022-02-03 PROCEDURE — 99999 PR PBB SHADOW E&M-EST. PATIENT-LVL III: CPT | Mod: PBBFAC,HCNC,, | Performed by: ANESTHESIOLOGY

## 2022-02-03 PROCEDURE — 3077F PR MOST RECENT SYSTOLIC BLOOD PRESSURE >= 140 MM HG: ICD-10-PCS | Mod: HCNC,CPTII,S$GLB, | Performed by: ANESTHESIOLOGY

## 2022-02-03 PROCEDURE — 99214 OFFICE O/P EST MOD 30 MIN: CPT | Mod: HCNC,S$GLB,, | Performed by: ANESTHESIOLOGY

## 2022-02-03 PROCEDURE — 99214 PR OFFICE/OUTPT VISIT, EST, LEVL IV, 30-39 MIN: ICD-10-PCS | Mod: HCNC,S$GLB,, | Performed by: ANESTHESIOLOGY

## 2022-02-03 PROCEDURE — 1125F AMNT PAIN NOTED PAIN PRSNT: CPT | Mod: HCNC,CPTII,S$GLB, | Performed by: ANESTHESIOLOGY

## 2022-02-03 PROCEDURE — 3077F SYST BP >= 140 MM HG: CPT | Mod: HCNC,CPTII,S$GLB, | Performed by: ANESTHESIOLOGY

## 2022-02-03 PROCEDURE — 3079F DIAST BP 80-89 MM HG: CPT | Mod: HCNC,CPTII,S$GLB, | Performed by: ANESTHESIOLOGY

## 2022-02-03 NOTE — PROGRESS NOTES
This note was completed with dictation software and grammatical errors may exist.    CC:  Back pain, neck pain    HPI:  Patient is a 66-year-old man with a history of hypertension, GERD, arthritis, anxiety who presents in self-referral for low back pain radiating into the leg and neck pain.  He returns in follow-up today for neck pain.  He is status post bilateral C4, 5, 6 medial branch block on 01/10/2022 with only about 20% relief of his bilateral neck pain.  He states that he continued to have some aching in the midline neck and out to the shoulder blades.  He states that the day of the procedure he was not having that much pain so is difficult to evaluate but when he went home he did have some slight improvement but overall did not feel that it was significant.  He also reports having some severe low back pain recently, he had been doing well since the last radiofrequency ablation but was doing some work, bent over, had some sharp pain for several days but this has resolved.  He denies any major pain into the back or the legs right now.          Previous History:  He reports having low back pain for many years, greater than 15.  Since 2012 he has been seeing Dr. Raffy Vásquez, pain management on the [place] and had been undergoing injections for his back.  He states that the injections would usually help 1-2 years at a time to get rid of back pain and leg pain.  In the last year he had several injections that did not provide any relief and so they obtained a new MRI which showed significant degeneration at the level above where he had his previous injections, now showing issues at L3/4.  He was referred to Neurosurgery who recommended physical therapy.  He tried physical therapy for several months and it did help but he did not continue the exercises on his own.    He states that his pain is currently located across the bilateral low back, worse with working in the yard, bending over, sitting or standing too  "long.  It radiates along his right anterior thigh to about his knee.  He describes it as aching, dull, tight, deep, sharp and shooting.  Is worse with prolonged sitting or standing, lying down, bending or walking, doing any lifting or getting out of a bed or a chair.  He gets some relief with rest, activity, lying down, medications, injections and physical therapy.  He also takes ibuprofen and Tylenol some relief.  His other complaint is neck pain located in the bilateral neck, worse with turning his head from side to side, it radiates into the shoulders and into his upper back at times.  He denies juan weakness in his arms.  He states that he had undergone what sounds like a medial branch block bilaterally which did help but he had not proceeded with the radiofrequency part because the viral pandemic began.  He denies any balance issues, no weakness in the arms or legs.    Pain intervention history:  He reports undergoing multiple injections over the years with very good relief, the last 2 did not seem to help.  From Dr. Raffy Vásquez notes: "The patient underwent bilateral C4, 5, 6 medial branch block on 02/04/2020 with 80% relief.  He did not proceed with the RFA.  He had undergone bilateral L4 and L5 transforaminal injection on 10/24/2019 with 50% relief for 1 month.  He had undergone bilateral L4/5 TF VIET on 02/23/2017 with 80% relief for 2 years.  He underwent bilateral L4 and L5 transforaminal injection on 12/05/2019 without much relief."  He is status post bilateral L4/5 transforaminal epidural steroid injection on 02/11/2021 0% relief.  He is status post bilateral L2,3,4,5 medial branch radiofrequency ablation on 03/20/2021 with 60% relief.    Antineuropathics:  Had taken gabapentin in the past and did not find it helpful.  NSAIDs: he takes ibuprofen and Tylenol with some relief  Physical therapy:  Antidepressants: Paxil  Muscle relaxers:   Zanaflex 4 milligrams at night " helps  Opioids:  Antiplatelets/Anticoagulants:        ROS: He reports back pain, joint stiffness, anxiety.  Balance of review of systems is negative.    Lab Results   Component Value Date    HGBA1C 5.9 (H) 12/30/2021       Lab Results   Component Value Date    WBC 4.50 05/27/2021    HGB 13.1 (L) 05/27/2021    HCT 38.9 (L) 05/27/2021    MCV 93 05/27/2021     05/27/2021             Past Medical History:   Diagnosis Date    Anxiety     Anxiety     Arthritis     Back problem     bulging disc    GERD (gastroesophageal reflux disease)     Hypercholesterolemia 11/2/2015    2009: Began statin.    Hyperlipidemia     Hypertension     Hypertension, benign 11/2/2015 2008: Diagnosed.    Sleep apnea     uses c-pap       Past Surgical History:   Procedure Laterality Date    APPENDECTOMY      ARTHROSCOPIC REPAIR OF ROTATOR CUFF OF SHOULDER Right 12/21/2018    Procedure: REPAIR, ROTATOR CUFF, ARTHROSCOPIC;  Surgeon: Colby Valenzuela MD;  Location: [facility] OR;  Service: Orthopedics;  Laterality: Right;    ARTHROSCOPY OF SHOULDER WITH DECOMPRESSION OF SUBACROMIAL SPACE Right 12/21/2018    Procedure: ARTHROSCOPY, SHOULDER, WITH SUBACROMIAL SPACE DECOMPRESSION;  Surgeon: Colby Valenzuela MD;  Location: [facility] OR;  Service: Orthopedics;  Laterality: Right;    BICEPS TENDON REPAIR Right 12/21/2018    Procedure: REPAIR, TENDON, BICEPS;  Surgeon: Colby Valenzuela MD;  Location: [facility] OR;  Service: Orthopedics;  Laterality: Right;    magdalene      INJECTION OF ANESTHETIC AGENT AROUND MEDIAL BRANCH NERVES INNERVATING CERVICAL FACET JOINT Bilateral 1/10/2022    Procedure: Block-nerve-medial branch-cervical C4 C5 C6;  Surgeon: Landon Winchester MD;  Location: [facility] OR;  Service: Pain Management;  Laterality: Bilateral;    INJECTION OF ANESTHETIC AGENT AROUND MEDIAL BRANCH NERVES INNERVATING LUMBAR FACET JOINT Bilateral 3/10/2021    Procedure: Block-nerve-medial branch-lumbar L2,L3,L4, L5;  Surgeon: Landon MCKEON  "MD Ranulfo;  Location: [facility] OR;  Service: Pain Management;  Laterality: Bilateral;    KNEE ARTHROSCOPY W/ MENISCAL REPAIR Left 2015    MMT      Left knee A-scope    NOSE SURGERY      RADIOFREQUENCY ABLATION OF LUMBAR MEDIAL BRANCH NERVE AT SINGLE LEVEL Bilateral 3/23/2021    Procedure: Radiofrequency Ablation, Nerve, Spinal, Lumbar, Medial Branch, L2,LL3,L4,L5;  Surgeon: Landon Winchester MD;  Location: [facility] OR;  Service: Pain Management;  Laterality: Bilateral;    SLEEP ENDOSCOPY, DRUG-INDUCED Bilateral 1/28/2022    Procedure: SLEEP ENDOSCOPY,DRUG-INDUCED;  Surgeon: Lance Wright MD;  Location: [facility] OR;  Service: ENT;  Laterality: Bilateral;    TRANSFORAMINAL EPIDURAL INJECTION OF STEROID Bilateral 2/11/2021    Procedure: Injection,steroid,epidural,transforaminal approach L4/5;  Surgeon: Landon Winchester MD;  Location: [facility] OR;  Service: Pain Management;  Laterality: Bilateral;    UVULECTOMY         Social History     Socioeconomic History    Marital status:    Tobacco Use    Smoking status: Never Smoker    Smokeless tobacco: Never Used   Substance and Sexual Activity    Alcohol use: Yes     Alcohol/week: 5.0 standard drinks     Types: 5 Cans of beer per week     Comment: weekly     Drug use: No    Sexual activity: Yes         Medications/Allergies: See med card    Vitals:    02/03/22 0920   BP: (!) 150/86   Pulse: (!) 55   Weight: 107 kg (235 lb 14.3 oz)   Height: 5' 10" (1.778 m)   PainSc:   3   PainLoc: Neck         Physical exam:  Gen: A and O x3, pleasant, well-groomed  Skin: No rashes or obvious lesions  HEENT: PERRLA, no obvious deformities on ears or in canals. Trachea midline.  CVS: Regular rate and rhythm, normal palpable pulses.  Resp:No increased work of breathing, symmetrical chest rise.  Abdomen: Soft, NT/ND.  Musculoskeletal: Able to heel walk, toe walk. No antalgic gait.     Neuro:  Upper extremities: 5/5 strength bilaterally   Lower extremities: 5/5 strength " bilaterally  Reflexes: Brachioradialis 2+, Bicep 2+, Tricep 2+. Patellar 1+  Right side, 0+ left side, Achilles 1+ bilaterally.  Sensory:  Intact and symmetrical to light touch and pinprick in L2-S1 dermatomes bilaterally. Intact and symmetrical to light touch and pinprick in C2-T1 dermatomes bilaterally.    Cervical Spine:  Cervical spine: ROM is full in flexion, extension and lateral rotation   With increased pain on extension and especially oblique extension to either side.  Spurling's maneuver causes neck pain to either side.  Myofascial exam: No Tenderness to palpation across cervical paraspinous region bilaterally.    Lumbar spine:  Lumbar spine:   Range of motion is moderately reduced with both flexion and extension with increased pain on flexion greater than extension.  Oblique extension causes pain on the corresponding side.  Jorge's test causes no increased pain on either side.    Supine straight leg raise is negative bilaterally.  Internal and external rotation of the hip causes no increased pain on either side.  Myofascial exam: No tenderness to palpation across lumbar paraspinous muscles.        Imaging:    MRI 01/21/2020:  I reviewed this image with the patient, outside imaging.  This is most significant for moderate disc degeneration at L4/5 with some bilateral foraminal narrowing to moderate degree, L3/4 shows severe disc degeneration with Modic endplate changes and some increased signal within the disc at L3/4.  There is moderate to severe foraminal narrowing to the left and moderate out to the right side.    MRI cervical spine 01/08/2021 outside institution.  I review the images personally.  There appears to be a reversal of the cervical lordosis.  There is disc bulging at C2/3 slightly to the right side deforming the thecal sac and causing possible foraminal narrowing to the right side.  At C3/4 there is mild disc bulging but no canal stenosis.  At C4/5 there is decreased disc height, slight  disc bulging but no canal narrowing.  At C5/6 there is larger disc bulge with protrusion slightly more to the right side causing bilateral foraminal stenosis and appears to deform the cord.  At C6/7 there is a disc bulging that contacts the cord as well.  C7/T1 mild disc bulging but no cord contact.  There is no abnormal signal within the cord.    1/8/22 MRI C-spine:  Subtle reversal of normal lordosis between C4 and C7 is unchanged.  There is minimal grade 1 retrolisthesis of C2 on C3.  Alignment is otherwise within normal limits.  Minimal anterior wedging of C5, C6 and C7 is unchanged.  High STIR Modic signal changes at C7/T1 are new since the prior study.   There is no signal abnormality in the included cord or posterior fossa.  Paravertebral soft tissues are within normal limits.  There is diffuse desiccation of the intervertebral discs of the cervical spine.   At C2/3, prominent facet and uncovertebral hypertrophy cause mild bilateral foraminal narrowing.  Prominent posterior spondylosis is also seen at this level.   At C3/4, there is a midline dorsal disc protrusion, facet arthropathy and uncovertebral hypertrophy.  There is deformity of the ventral margin of the thecal sac without central canal stenosis.  Facet and uncovertebral hypertrophy cause moderate left foraminal narrowing.   At C4/5, there is a midline dorsal disc protrusion which deforms the ventral margin of the thecal sac but causes no canal stenosis.  Prominent left-sided facet and uncovertebral hypertrophy cause moderate left foraminal narrowing.   At C5/6, there is loss of disc height and broad-based dorsal disc bulging asymmetric to the right.  There is narrowing of the right lateral recess of the canal and borderline right foraminal narrowing.  The AP midline diameter of the canal is 11 mm at this level.   At C6/7, there is minimal concentric disc bulging, facet arthropathy and uncovertebral hypertrophy, causing mild right foraminal  narrowing.   At C7/T1, there is mild broad-based dorsal disc bulging which causes no canal or foraminal stenosis.    1/8/22 MRI L-spine:   1. Spondylosis, disc disease, facet arthrosis and thickening of the ligamentum flavum as well as endplate degenerative changes as described above, worse at L3-L4 and L4-L5.  2. There is mild retrolisthesis of L3 on L4 with otherwise normal alignment.  3. There is mild central canal stenosis at L3-L4 and severe central canal stenosis at L4-L5.  4. Multilevel neural foraminal narrowing, worse at L2-L3, L3-L4 and particularly at L4-L5 as described.    Assessment:   Patient is a 66-year-old man with a history of hypertension, GERD, arthritis, anxiety who presents in self-referral for low back pain radiating into the leg and neck pain.    1. Cervical radiculopathy     2. Cervical spondylosis     3. Lumbar spondylosis     4. Bilateral lumbar radiculopathy     5. Spinal stenosis of lumbar region without neurogenic claudication         Plan:  1. We reviewed his cervical and lumbar spine MRIs, we obtain these because he was going to be having a vagal stimulator placed and this is not MRI compatible.  We discussed that he did not have much relief with the cervical medial branch block and so I think his neck pain may be more due to some of the mild canal narrowing that he has particularly at C5/6.  Nonetheless, he feels that his pain is tolerable right now, he is going to be getting a vagal stimulator for sleep apnea this next month so he wants to hold off on any other procedures for now.  2. For his low back pain, I explained that he has disc degeneration, canal stenosis and may have some nerve root impingement besides his lumbar spondylosis.  Nonetheless it is not bothering him too much right now but if it worsens we can always set up an epidural steroid injection.  What I think will help the most is working on long-term core strengthening exercises I have given him ideas for neutral  position spinal stabilization exercises.  He will follow up in 2-3 months or sooner as needed.

## 2022-02-07 DIAGNOSIS — F41.9 ANXIETY: ICD-10-CM

## 2022-02-07 RX ORDER — PAROXETINE 10 MG/1
10 TABLET, FILM COATED ORAL EVERY MORNING
Qty: 90 TABLET | Refills: 1 | OUTPATIENT
Start: 2022-02-07

## 2022-02-07 RX ORDER — PAROXETINE HYDROCHLORIDE 40 MG/1
TABLET, FILM COATED ORAL
Qty: 90 TABLET | Refills: 1 | Status: SHIPPED | OUTPATIENT
Start: 2022-02-07 | End: 2022-08-04 | Stop reason: SDUPTHER

## 2022-02-07 NOTE — TELEPHONE ENCOUNTER
----- Message from Kamla Velasquez sent at 2/7/2022  9:22 AM CST -----  Contact: pt wife  Type: Needs Medical Advice  Who Called:  pts wife  Pharmacy name and phone #:    [id number] - [place] - [address] AT [facility] [address]  [address]  [place] [address]  Phone: [phone] Fax: [phone]      Best Call Back Number: [phone]    Additional Information: pt requesting refill for   paroxetine (PAXIL) 10 MG tablet and paroxetine (PAXIL) 40 MG tablet, scheduled appt for 2/11, almost out of medication, able to come in sooner for appt

## 2022-02-07 NOTE — TELEPHONE ENCOUNTER
No new care gaps identified.  Powered by [employer] by [employer]. Reference number: [id number].   2/07/2022 10:46:13 AM CST

## 2022-02-11 ENCOUNTER — OFFICE VISIT (OUTPATIENT)
Dept: FAMILY MEDICINE | Facility: CLINIC | Age: 67
End: 2022-02-11
Payer: MEDICARE

## 2022-02-11 VITALS
HEIGHT: 70 IN | OXYGEN SATURATION: 96 % | HEART RATE: 68 BPM | BODY MASS INDEX: 33.55 KG/M2 | WEIGHT: 234.38 LBS | DIASTOLIC BLOOD PRESSURE: 80 MMHG | SYSTOLIC BLOOD PRESSURE: 130 MMHG

## 2022-02-11 DIAGNOSIS — Z86.010 HISTORY OF COLONIC POLYPS: ICD-10-CM

## 2022-02-11 DIAGNOSIS — R39.9 LOWER URINARY TRACT SYMPTOMS (LUTS): ICD-10-CM

## 2022-02-11 DIAGNOSIS — Z12.11 SCREENING FOR COLON CANCER: ICD-10-CM

## 2022-02-11 DIAGNOSIS — I10 ESSENTIAL HYPERTENSION: ICD-10-CM

## 2022-02-11 DIAGNOSIS — G47.33 OBSTRUCTIVE SLEEP APNEA: ICD-10-CM

## 2022-02-11 DIAGNOSIS — E78.00 HYPERCHOLESTEROLEMIA: ICD-10-CM

## 2022-02-11 DIAGNOSIS — Z12.5 SCREENING FOR PROSTATE CANCER: ICD-10-CM

## 2022-02-11 DIAGNOSIS — Z00.00 ANNUAL PHYSICAL EXAM: Primary | ICD-10-CM

## 2022-02-11 DIAGNOSIS — R73.03 PRE-DIABETES: ICD-10-CM

## 2022-02-11 DIAGNOSIS — K21.9 GASTROESOPHAGEAL REFLUX DISEASE, UNSPECIFIED WHETHER ESOPHAGITIS PRESENT: ICD-10-CM

## 2022-02-11 PROBLEM — M75.121 COMPLETE TEAR OF RIGHT ROTATOR CUFF: Status: RESOLVED | Noted: 2018-05-07 | Resolved: 2022-02-11

## 2022-02-11 PROBLEM — M25.511 RIGHT SHOULDER PAIN: Status: RESOLVED | Noted: 2018-06-11 | Resolved: 2022-02-11

## 2022-02-11 PROBLEM — M25.611 DECREASED RANGE OF MOTION OF RIGHT SHOULDER: Status: RESOLVED | Noted: 2019-02-06 | Resolved: 2022-02-11

## 2022-02-11 PROBLEM — R29.898 SHOULDER WEAKNESS: Status: RESOLVED | Noted: 2018-06-11 | Resolved: 2022-02-11

## 2022-02-11 PROBLEM — M25.611 SHOULDER STIFFNESS, RIGHT: Status: RESOLVED | Noted: 2018-06-11 | Resolved: 2022-02-11

## 2022-02-11 PROCEDURE — 3075F SYST BP GE 130 - 139MM HG: CPT | Mod: HCNC,CPTII,S$GLB, | Performed by: INTERNAL MEDICINE

## 2022-02-11 PROCEDURE — 99397 PR PREVENTIVE VISIT,EST,65 & OVER: ICD-10-PCS | Mod: HCNC,S$GLB,, | Performed by: INTERNAL MEDICINE

## 2022-02-11 PROCEDURE — 99397 PER PM REEVAL EST PAT 65+ YR: CPT | Mod: HCNC,S$GLB,, | Performed by: INTERNAL MEDICINE

## 2022-02-11 PROCEDURE — 3079F DIAST BP 80-89 MM HG: CPT | Mod: HCNC,CPTII,S$GLB, | Performed by: INTERNAL MEDICINE

## 2022-02-11 PROCEDURE — 1126F PR PAIN SEVERITY QUANTIFIED, NO PAIN PRESENT: ICD-10-PCS | Mod: HCNC,CPTII,S$GLB, | Performed by: INTERNAL MEDICINE

## 2022-02-11 PROCEDURE — 1160F PR REVIEW ALL MEDS BY PRESCRIBER/CLIN PHARMACIST DOCUMENTED: ICD-10-PCS | Mod: HCNC,CPTII,S$GLB, | Performed by: INTERNAL MEDICINE

## 2022-02-11 PROCEDURE — 1101F PT FALLS ASSESS-DOCD LE1/YR: CPT | Mod: HCNC,CPTII,S$GLB, | Performed by: INTERNAL MEDICINE

## 2022-02-11 PROCEDURE — 3288F FALL RISK ASSESSMENT DOCD: CPT | Mod: HCNC,CPTII,S$GLB, | Performed by: INTERNAL MEDICINE

## 2022-02-11 PROCEDURE — 3288F PR FALLS RISK ASSESSMENT DOCUMENTED: ICD-10-PCS | Mod: HCNC,CPTII,S$GLB, | Performed by: INTERNAL MEDICINE

## 2022-02-11 PROCEDURE — 1126F AMNT PAIN NOTED NONE PRSNT: CPT | Mod: HCNC,CPTII,S$GLB, | Performed by: INTERNAL MEDICINE

## 2022-02-11 PROCEDURE — 3075F PR MOST RECENT SYSTOLIC BLOOD PRESS GE 130-139MM HG: ICD-10-PCS | Mod: HCNC,CPTII,S$GLB, | Performed by: INTERNAL MEDICINE

## 2022-02-11 PROCEDURE — 99999 PR PBB SHADOW E&M-EST. PATIENT-LVL III: CPT | Mod: PBBFAC,HCNC,, | Performed by: INTERNAL MEDICINE

## 2022-02-11 PROCEDURE — 3008F PR BODY MASS INDEX (BMI) DOCUMENTED: ICD-10-PCS | Mod: HCNC,CPTII,S$GLB, | Performed by: INTERNAL MEDICINE

## 2022-02-11 PROCEDURE — 3008F BODY MASS INDEX DOCD: CPT | Mod: HCNC,CPTII,S$GLB, | Performed by: INTERNAL MEDICINE

## 2022-02-11 PROCEDURE — 1101F PR PT FALLS ASSESS DOC 0-1 FALLS W/OUT INJ PAST YR: ICD-10-PCS | Mod: HCNC,CPTII,S$GLB, | Performed by: INTERNAL MEDICINE

## 2022-02-11 PROCEDURE — 99999 PR PBB SHADOW E&M-EST. PATIENT-LVL III: ICD-10-PCS | Mod: PBBFAC,HCNC,, | Performed by: INTERNAL MEDICINE

## 2022-02-11 PROCEDURE — 1159F PR MEDICATION LIST DOCUMENTED IN MEDICAL RECORD: ICD-10-PCS | Mod: HCNC,CPTII,S$GLB, | Performed by: INTERNAL MEDICINE

## 2022-02-11 PROCEDURE — 1159F MED LIST DOCD IN RCRD: CPT | Mod: HCNC,CPTII,S$GLB, | Performed by: INTERNAL MEDICINE

## 2022-02-11 PROCEDURE — 3079F PR MOST RECENT DIASTOLIC BLOOD PRESSURE 80-89 MM HG: ICD-10-PCS | Mod: HCNC,CPTII,S$GLB, | Performed by: INTERNAL MEDICINE

## 2022-02-11 PROCEDURE — 1160F RVW MEDS BY RX/DR IN RCRD: CPT | Mod: HCNC,CPTII,S$GLB, | Performed by: INTERNAL MEDICINE

## 2022-02-11 RX ORDER — ESOMEPRAZOLE MAGNESIUM 40 MG/1
40 CAPSULE, DELAYED RELEASE ORAL
Qty: 90 CAPSULE | Refills: 1 | Status: SHIPPED | OUTPATIENT
Start: 2022-02-11 | End: 2022-09-23

## 2022-02-11 NOTE — PROGRESS NOTES
Patient ID: Moris Calderón is a 66 y.o. male.    Chief Complaint: Annual Exam      Patient Active Problem List   Diagnosis    Hypertension    Hypercholesterolemia    Spinal stenosis of lumbar region without neurogenic claudication    Lumbar radiculopathy    Lumbar spondylosis    Anxiety    Systolic murmur    Cervical spondylosis    Lower urinary tract symptoms (LUTS)    Obstructive sleep apnea    Pre-diabetes      Here for annual.   Following with Dr. Wright, Getting inspire (hypoglossal neurostimulator) in March.  Following with dr. Winchester for back and neck pain.     Hypertension-benazepril, controlled,     Hyperlipidemia-atorvastatin, controlled    LUTS- tamsulosin    Anxiety-paroxetine 50 mg, stable.     GERD- stable on prn nexium    Colonoscopy-    Review of Systems   Constitutional: Negative for fever.   Respiratory: Negative for shortness of breath.    Cardiovascular: Negative for chest pain.   Gastrointestinal: Negative for abdominal pain.        Vitals:    02/11/22 1330   BP: 130/80   Pulse: 68     Physical Exam  Cardiovascular:      Rate and Rhythm: Normal rate and regular rhythm.      Heart sounds: No murmur heard.  No gallop.    Pulmonary:      Breath sounds: Normal breath sounds. No wheezing or rhonchi.   Abdominal:      General: Bowel sounds are normal.      Palpations: Abdomen is soft.      Tenderness: There is no abdominal tenderness.   Musculoskeletal:         General: Normal range of motion.      Cervical back: Neck supple.   Skin:     General: Skin is warm.      Findings: No rash.   Neurological:      Mental Status: He is alert.   Psychiatric:         Behavior: Behavior normal.            Assessment & Plan      1. Annual physical exam    2. Essential hypertension  - Comprehensive Metabolic Panel; Future    3. Lower urinary tract symptoms (LUTS)    4. Pre-diabetes  - Hemoglobin A1C; Future    5. Obstructive sleep apnea    6. Screening for prostate cancer    7. Hypercholesterolemia  -  Lipid Panel; Future    8. Gastroesophageal reflux disease, unspecified whether esophagitis present  - esomeprazole (NEXIUM) 40 MG capsule; Take 1 capsule (40 mg total) by mouth before breakfast.  Dispense: 90 capsule; Refill: 1    9. Screening for colon cancer  - Case Request Endoscopy: COLONOSCOPY    10. History of colonic polyps  - Case Request Endoscopy: COLONOSCOPY    Doing well  Continue benazepril  A1c in 6 months  PSA in 6 months  Continue atorvastatin  Continue p.r.n. Nexium  Continue Flomax  Continue Paxil      Recommend exercise and healthy diet (fresh fruits and vegetables, lean protein including chicken and fish)   I personally reviewed past medical, family and social history.       Medication List with Changes/Refills   Current Medications    ATORVASTATIN (LIPITOR) 10 MG TABLET    Take 1 tablet (10 mg total) by mouth once daily.    BENAZEPRIL (LOTENSIN) 10 MG TABLET    Take 1 tablet (10 mg total) by mouth once daily.    CETIRIZINE HCL (CETIRIZINE ORAL)    Take 1 Dose by mouth daily as needed.     DIAZEPAM (VALIUM) 5 MG TABLET    Take 1 tablet (5 mg total) by mouth On call Procedure for Anxiety.    FLUTICASONE PROPIONATE (FLONASE) 50 MCG/ACTUATION NASAL SPRAY    SHAKE WELL AND USE ONE SPRAY IN EACH NOSTRIL ONCE DAILY    PAROXETINE (PAXIL) 10 MG TABLET    Take 1 tablet (10 mg total) by mouth every morning.    PAROXETINE (PAXIL) 40 MG TABLET    TAKE 1 TABLET(40 MG) BY MOUTH EVERY DAY    TAMSULOSIN (FLOMAX) 0.4 MG CAP    TAKE 1 CAPSULE(0.4 MG) BY MOUTH EVERY DAY   Changed and/or Refilled Medications    Modified Medication Previous Medication    ESOMEPRAZOLE (NEXIUM) 40 MG CAPSULE esomeprazole (NEXIUM) 40 MG capsule       Take 1 capsule (40 mg total) by mouth before breakfast.    Take 1 capsule (40 mg total) by mouth before breakfast.

## 2022-02-15 ENCOUNTER — TELEPHONE (OUTPATIENT)
Dept: GASTROENTEROLOGY | Facility: CLINIC | Age: 67
End: 2022-02-15
Payer: MEDICARE

## 2022-03-14 ENCOUNTER — LAB VISIT (OUTPATIENT)
Dept: FAMILY MEDICINE | Facility: CLINIC | Age: 67
End: 2022-03-14
Payer: MEDICARE

## 2022-03-14 DIAGNOSIS — Z01.818 PRE-OP TESTING: ICD-10-CM

## 2022-03-14 PROCEDURE — U0005 INFEC AGEN DETEC AMPLI PROBE: HCPCS | Performed by: OTOLARYNGOLOGY

## 2022-03-14 PROCEDURE — U0003 INFECTIOUS AGENT DETECTION BY NUCLEIC ACID (DNA OR RNA); SEVERE ACUTE RESPIRATORY SYNDROME CORONAVIRUS 2 (SARS-COV-2) (CORONAVIRUS DISEASE [COVID-19]), AMPLIFIED PROBE TECHNIQUE, MAKING USE OF HIGH THROUGHPUT TECHNOLOGIES AS DESCRIBED BY CMS-2020-01-R: HCPCS | Performed by: OTOLARYNGOLOGY

## 2022-03-15 LAB
SARS-COV-2 RNA RESP QL NAA+PROBE: NOT DETECTED
SARS-COV-2- CYCLE NUMBER: NORMAL

## 2022-03-25 ENCOUNTER — TELEPHONE (OUTPATIENT)
Dept: OTOLARYNGOLOGY | Facility: CLINIC | Age: 67
End: 2022-03-25
Payer: MEDICARE

## 2022-03-25 ENCOUNTER — OFFICE VISIT (OUTPATIENT)
Dept: OTOLARYNGOLOGY | Facility: CLINIC | Age: 67
End: 2022-03-25
Payer: MEDICARE

## 2022-03-25 VITALS — WEIGHT: 229.94 LBS | BODY MASS INDEX: 34.06 KG/M2 | HEIGHT: 69 IN

## 2022-03-25 DIAGNOSIS — G89.18 POST-OP PAIN: Primary | ICD-10-CM

## 2022-03-25 PROCEDURE — 3288F FALL RISK ASSESSMENT DOCD: CPT | Mod: CPTII,S$GLB,, | Performed by: OTOLARYNGOLOGY

## 2022-03-25 PROCEDURE — 99024 POSTOP FOLLOW-UP VISIT: CPT | Mod: S$GLB,,, | Performed by: OTOLARYNGOLOGY

## 2022-03-25 PROCEDURE — 1159F PR MEDICATION LIST DOCUMENTED IN MEDICAL RECORD: ICD-10-PCS | Mod: CPTII,S$GLB,, | Performed by: OTOLARYNGOLOGY

## 2022-03-25 PROCEDURE — 1126F AMNT PAIN NOTED NONE PRSNT: CPT | Mod: CPTII,S$GLB,, | Performed by: OTOLARYNGOLOGY

## 2022-03-25 PROCEDURE — 3008F PR BODY MASS INDEX (BMI) DOCUMENTED: ICD-10-PCS | Mod: CPTII,S$GLB,, | Performed by: OTOLARYNGOLOGY

## 2022-03-25 PROCEDURE — 99024 PR POST-OP FOLLOW-UP VISIT: ICD-10-PCS | Mod: S$GLB,,, | Performed by: OTOLARYNGOLOGY

## 2022-03-25 PROCEDURE — 3288F PR FALLS RISK ASSESSMENT DOCUMENTED: ICD-10-PCS | Mod: CPTII,S$GLB,, | Performed by: OTOLARYNGOLOGY

## 2022-03-25 PROCEDURE — 99999 PR PBB SHADOW E&M-EST. PATIENT-LVL III: CPT | Mod: PBBFAC,,, | Performed by: OTOLARYNGOLOGY

## 2022-03-25 PROCEDURE — 1101F PR PT FALLS ASSESS DOC 0-1 FALLS W/OUT INJ PAST YR: ICD-10-PCS | Mod: CPTII,S$GLB,, | Performed by: OTOLARYNGOLOGY

## 2022-03-25 PROCEDURE — 1126F PR PAIN SEVERITY QUANTIFIED, NO PAIN PRESENT: ICD-10-PCS | Mod: CPTII,S$GLB,, | Performed by: OTOLARYNGOLOGY

## 2022-03-25 PROCEDURE — 1160F RVW MEDS BY RX/DR IN RCRD: CPT | Mod: CPTII,S$GLB,, | Performed by: OTOLARYNGOLOGY

## 2022-03-25 PROCEDURE — 99999 PR PBB SHADOW E&M-EST. PATIENT-LVL III: ICD-10-PCS | Mod: PBBFAC,,, | Performed by: OTOLARYNGOLOGY

## 2022-03-25 PROCEDURE — 1101F PT FALLS ASSESS-DOCD LE1/YR: CPT | Mod: CPTII,S$GLB,, | Performed by: OTOLARYNGOLOGY

## 2022-03-25 PROCEDURE — 4010F PR ACE/ARB THEARPY RXD/TAKEN: ICD-10-PCS | Mod: CPTII,S$GLB,, | Performed by: OTOLARYNGOLOGY

## 2022-03-25 PROCEDURE — 3008F BODY MASS INDEX DOCD: CPT | Mod: CPTII,S$GLB,, | Performed by: OTOLARYNGOLOGY

## 2022-03-25 PROCEDURE — 1159F MED LIST DOCD IN RCRD: CPT | Mod: CPTII,S$GLB,, | Performed by: OTOLARYNGOLOGY

## 2022-03-25 PROCEDURE — 1160F PR REVIEW ALL MEDS BY PRESCRIBER/CLIN PHARMACIST DOCUMENTED: ICD-10-PCS | Mod: CPTII,S$GLB,, | Performed by: OTOLARYNGOLOGY

## 2022-03-25 PROCEDURE — 4010F ACE/ARB THERAPY RXD/TAKEN: CPT | Mod: CPTII,S$GLB,, | Performed by: OTOLARYNGOLOGY

## 2022-03-25 RX ORDER — MUPIROCIN 20 MG/G
OINTMENT TOPICAL 2 TIMES DAILY
Status: ON HOLD | COMMUNITY
Start: 2022-03-15 | End: 2022-10-06 | Stop reason: HOSPADM

## 2022-03-25 NOTE — TELEPHONE ENCOUNTER
----- Message from Mylene Stanford sent at 3/25/2022 11:58 AM CDT -----  Contact: jayy  Type:  Insurance Calling to Clarify an RX    Name of Caller: Jayy with [employer]  Prescription Name:  oxyCODONE-acetaminophen (PERCOCET) 5-325 mg per tablet  What do they need to clarify?:  PA for medication  Best Call Back Number:  [phone], ref# [id number]  Additional Information:

## 2022-03-25 NOTE — TELEPHONE ENCOUNTER
S/w Castillo at [facility] and completed PA for pt's pain medication. When I asked why it took 7 days for them to reach out to the dr's office to complete the PA, he could not tell me why it took so long. PA completed and expedited for less than 24 hr notification.

## 2022-03-30 NOTE — PROGRESS NOTES
Moris is here for a post-operative visit following   Hypoglossal nerve stimulator    More pain than expected.   getting better slowly but surely    Exam:  Alert, active, appropriate  Incisions c/d/I  Expected amount of swelling; no hematoma or seroma  Tongue midline with protrusion and strong  No facial weakness    Healing well  Activation at 1 mo post-op - will coordinate  Follow-up with me as needed

## 2022-04-04 ENCOUNTER — TELEPHONE (OUTPATIENT)
Dept: GASTROENTEROLOGY | Facility: CLINIC | Age: 67
End: 2022-04-04
Payer: MEDICARE

## 2022-05-09 ENCOUNTER — PATIENT MESSAGE (OUTPATIENT)
Dept: SMOKING CESSATION | Facility: CLINIC | Age: 67
End: 2022-05-09
Payer: MEDICARE

## 2022-05-09 ENCOUNTER — PATIENT MESSAGE (OUTPATIENT)
Dept: FAMILY MEDICINE | Facility: CLINIC | Age: 67
End: 2022-05-09
Payer: MEDICARE

## 2022-06-13 DIAGNOSIS — I10 ESSENTIAL HYPERTENSION: ICD-10-CM

## 2022-06-13 NOTE — TELEPHONE ENCOUNTER
Care Due:                  Date            Visit Type   Department     Provider  --------------------------------------------------------------------------------                                EP -                              [facility] FAMILY  Last Visit: 02-      CARE ([facility])   ASIYA Kumar                              EP -                              PRIMARY      [facility] FAMILY  Next Visit: 08-      CARE ([facility])   ASIYA Kumar                                                            Last  Test          Frequency    Reason                     Performed    Due Date  --------------------------------------------------------------------------------    CMP.........  12 months..  atorvastatin.............  05- 05-    Lipid Panel.  12 months..  atorvastatin.............  05- 05-    [employer] Embedded Care Gaps. Reference number: [id number]. 6/13/2022   6:13:10 AM CDT

## 2022-06-14 RX ORDER — BENAZEPRIL HYDROCHLORIDE 20 MG/1
TABLET ORAL
Qty: 90 TABLET | Refills: 3 | OUTPATIENT
Start: 2022-06-14

## 2022-06-14 NOTE — TELEPHONE ENCOUNTER
Quick DC. Inappropriate Request    Refill Authorization Note   Moris Lamasoie  is requesting a refill authorization.  Brief Assessment and Rationale for Refill:  Quick Discontinue  Medication Therapy Plan:  FLOS 08/04/22    Medication Reconciliation Completed:  No      Comments:     Note composed:8:20 AM 06/14/2022

## 2022-06-22 ENCOUNTER — PATIENT OUTREACH (OUTPATIENT)
Dept: ADMINISTRATIVE | Facility: HOSPITAL | Age: 67
End: 2022-06-22
Payer: MEDICARE

## 2022-06-22 NOTE — PROGRESS NOTES
[employer] non-compliant report chart audits for COLON CANCER SCREENING.     Care Everywhere and media, updates requested and reviewed.      Outreach to patient in reference to colon cancer screening.    RE:  Patient needs colon cancer screening    Pt scheduled for colonoscopy 7/27/2022    Outreach:  Colon cancer screening

## 2022-06-25 DIAGNOSIS — J30.9 ALLERGIC RHINITIS, UNSPECIFIED SEASONALITY, UNSPECIFIED TRIGGER: ICD-10-CM

## 2022-06-25 NOTE — TELEPHONE ENCOUNTER
No new care gaps identified.  [facility] Embedded Care Gaps. Reference number: [id number]. 6/25/2022   7:45:24 AM CDT

## 2022-06-26 DIAGNOSIS — E78.00 HYPERCHOLESTEROLEMIA: ICD-10-CM

## 2022-06-26 RX ORDER — FLUTICASONE PROPIONATE 50 MCG
SPRAY, SUSPENSION (ML) NASAL
Qty: 48 G | Refills: 2 | Status: SHIPPED | OUTPATIENT
Start: 2022-06-26 | End: 2022-07-15 | Stop reason: SDUPTHER

## 2022-06-26 NOTE — TELEPHONE ENCOUNTER
Refill Decision Note   Morislea Calderón  is requesting a refill authorization.  Brief Assessment and Rationale for Refill:  Approve     Medication Therapy Plan:       Medication Reconciliation Completed: No   Comments:     No Care Gaps recommended.     Note composed:10:55 AM 06/26/2022

## 2022-06-26 NOTE — TELEPHONE ENCOUNTER
No new care gaps identified.  [facility] Embedded Care Gaps. Reference number: [id number]. 6/26/2022   10:55:20 AM CDT

## 2022-06-27 RX ORDER — ATORVASTATIN CALCIUM 10 MG/1
TABLET, FILM COATED ORAL
Qty: 90 TABLET | Refills: 0 | Status: SHIPPED | OUTPATIENT
Start: 2022-06-27 | End: 2022-07-15 | Stop reason: SDUPTHER

## 2022-06-27 NOTE — TELEPHONE ENCOUNTER
Refill Routing Note   Medication(s) are not appropriate for processing by [facility] for the following reason(s):      - Required laboratory values are outdated    [facility] action(s):  Defer       Medication Therapy Plan: FLOS;FOV;  Medication reconciliation completed: No     Appointments  past 12m or future 3m with PCP    Date Provider   Last Visit   2/11/2022 Giovanni Kumar, DO   Next Visit   8/11/2022 Giovanni Kumar, DO   ED visits in past 90 days: 0        Note composed:12:54 PM 06/27/2022

## 2022-07-12 ENCOUNTER — PATIENT MESSAGE (OUTPATIENT)
Dept: FAMILY MEDICINE | Facility: CLINIC | Age: 67
End: 2022-07-12
Payer: MEDICARE

## 2022-07-12 DIAGNOSIS — I10 ESSENTIAL HYPERTENSION: ICD-10-CM

## 2022-07-12 RX ORDER — BENAZEPRIL HYDROCHLORIDE 10 MG/1
10 TABLET ORAL DAILY
Qty: 30 TABLET | Refills: 1 | Status: CANCELLED | OUTPATIENT
Start: 2022-07-12

## 2022-07-12 RX ORDER — BENAZEPRIL HYDROCHLORIDE 20 MG/1
20 TABLET ORAL DAILY
Qty: 90 TABLET | Refills: 3 | Status: SHIPPED | OUTPATIENT
Start: 2022-07-12 | End: 2022-08-04 | Stop reason: SDUPTHER

## 2022-07-12 NOTE — TELEPHONE ENCOUNTER
No new care gaps identified.  [facility] Embedded Care Gaps. Reference number: [id number]. 7/12/2022   9:25:32 AM CDT

## 2022-07-15 DIAGNOSIS — J30.9 ALLERGIC RHINITIS, UNSPECIFIED SEASONALITY, UNSPECIFIED TRIGGER: ICD-10-CM

## 2022-07-15 DIAGNOSIS — E78.00 HYPERCHOLESTEROLEMIA: ICD-10-CM

## 2022-07-15 DIAGNOSIS — F41.9 ANXIETY: ICD-10-CM

## 2022-07-15 RX ORDER — ATORVASTATIN CALCIUM 10 MG/1
10 TABLET, FILM COATED ORAL DAILY
Qty: 90 TABLET | Refills: 0 | Status: SHIPPED | OUTPATIENT
Start: 2022-07-15 | End: 2022-08-11 | Stop reason: SDUPTHER

## 2022-07-15 RX ORDER — FLUTICASONE PROPIONATE 50 MCG
SPRAY, SUSPENSION (ML) NASAL
Qty: 48 G | Refills: 0 | Status: SHIPPED | OUTPATIENT
Start: 2022-07-15 | End: 2022-11-16

## 2022-07-15 RX ORDER — PAROXETINE 10 MG/1
10 TABLET, FILM COATED ORAL EVERY MORNING
Qty: 90 TABLET | Refills: 0 | Status: SHIPPED | OUTPATIENT
Start: 2022-07-15 | End: 2022-08-11 | Stop reason: SDUPTHER

## 2022-07-15 NOTE — TELEPHONE ENCOUNTER
No new care gaps identified.  [facility] Embedded Care Gaps. Reference number: [id number]. 7/15/2022   2:05:13 PM CDT

## 2022-07-21 ENCOUNTER — TELEPHONE (OUTPATIENT)
Dept: GASTROENTEROLOGY | Facility: CLINIC | Age: 67
End: 2022-07-21
Payer: MEDICARE

## 2022-07-21 NOTE — TELEPHONE ENCOUNTER
Spoke with pt. Verified pt still has prep instructions for upcoming procedure with Dr. Lewis. Pt stated they do. Pt informed surgery center will call day or two prior with arrival time. Pt verbalized understanding to all.

## 2022-07-27 ENCOUNTER — HOSPITAL ENCOUNTER (OUTPATIENT)
Facility: HOSPITAL | Age: 67
Discharge: HOME | End: 2022-07-27
Attending: INTERNAL MEDICINE | Admitting: INTERNAL MEDICINE
Payer: MEDICARE

## 2022-07-27 ENCOUNTER — ANESTHESIA EVENT (OUTPATIENT)
Dept: ENDOSCOPY | Facility: HOSPITAL | Age: 67
End: 2022-07-27
Payer: MEDICARE

## 2022-07-27 ENCOUNTER — ANESTHESIA (OUTPATIENT)
Dept: ENDOSCOPY | Facility: HOSPITAL | Age: 67
End: 2022-07-27
Payer: MEDICARE

## 2022-07-27 VITALS
HEART RATE: 68 BPM | SYSTOLIC BLOOD PRESSURE: 113 MMHG | TEMPERATURE: 97 F | RESPIRATION RATE: 18 BRPM | OXYGEN SATURATION: 97 % | BODY MASS INDEX: 34.07 KG/M2 | DIASTOLIC BLOOD PRESSURE: 71 MMHG | WEIGHT: 230 LBS | HEIGHT: 69 IN

## 2022-07-27 DIAGNOSIS — Z86.010 HX OF COLONIC POLYPS: ICD-10-CM

## 2022-07-27 PROCEDURE — 37000009 HC ANESTHESIA EA ADD 15 MINS: Mod: PO | Performed by: INTERNAL MEDICINE

## 2022-07-27 PROCEDURE — D9220A PRA ANESTHESIA: ICD-10-PCS | Mod: ANES,,, | Performed by: ANESTHESIOLOGY

## 2022-07-27 PROCEDURE — 25000003 PHARM REV CODE 250: Mod: PO | Performed by: NURSE ANESTHETIST, CERTIFIED REGISTERED

## 2022-07-27 PROCEDURE — G0105 COLORECTAL SCRN; HI RISK IND: HCPCS | Mod: ,,, | Performed by: INTERNAL MEDICINE

## 2022-07-27 PROCEDURE — D9220A PRA ANESTHESIA: ICD-10-PCS | Mod: CRNA,,, | Performed by: NURSE ANESTHETIST, CERTIFIED REGISTERED

## 2022-07-27 PROCEDURE — 37000008 HC ANESTHESIA 1ST 15 MINUTES: Mod: PO | Performed by: INTERNAL MEDICINE

## 2022-07-27 PROCEDURE — G0105 COLORECTAL SCRN; HI RISK IND: ICD-10-PCS | Mod: ,,, | Performed by: INTERNAL MEDICINE

## 2022-07-27 PROCEDURE — G0105 COLORECTAL SCRN; HI RISK IND: HCPCS | Mod: PO | Performed by: INTERNAL MEDICINE

## 2022-07-27 PROCEDURE — D9220A PRA ANESTHESIA: Mod: ANES,,, | Performed by: ANESTHESIOLOGY

## 2022-07-27 PROCEDURE — D9220A PRA ANESTHESIA: Mod: CRNA,,, | Performed by: NURSE ANESTHETIST, CERTIFIED REGISTERED

## 2022-07-27 PROCEDURE — 63600175 PHARM REV CODE 636 W HCPCS: Mod: PO | Performed by: NURSE ANESTHETIST, CERTIFIED REGISTERED

## 2022-07-27 PROCEDURE — 63600175 PHARM REV CODE 636 W HCPCS: Mod: PO | Performed by: INTERNAL MEDICINE

## 2022-07-27 RX ORDER — SODIUM CHLORIDE 0.9 % (FLUSH) 0.9 %
10 SYRINGE (ML) INJECTION
Status: DISCONTINUED | OUTPATIENT
Start: 2022-07-27 | End: 2022-07-27 | Stop reason: HOSPADM

## 2022-07-27 RX ORDER — LIDOCAINE HCL/PF 100 MG/5ML
SYRINGE (ML) INTRAVENOUS
Status: DISCONTINUED | OUTPATIENT
Start: 2022-07-27 | End: 2022-07-27

## 2022-07-27 RX ORDER — PROPOFOL 10 MG/ML
VIAL (ML) INTRAVENOUS
Status: DISCONTINUED | OUTPATIENT
Start: 2022-07-27 | End: 2022-07-27

## 2022-07-27 RX ORDER — SODIUM CHLORIDE, SODIUM LACTATE, POTASSIUM CHLORIDE, CALCIUM CHLORIDE 600; 310; 30; 20 MG/100ML; MG/100ML; MG/100ML; MG/100ML
INJECTION, SOLUTION INTRAVENOUS CONTINUOUS
Status: DISCONTINUED | OUTPATIENT
Start: 2022-07-27 | End: 2022-07-27 | Stop reason: HOSPADM

## 2022-07-27 RX ADMIN — PROPOFOL 25 MG: 10 INJECTION, EMULSION INTRAVENOUS at 12:07

## 2022-07-27 RX ADMIN — PROPOFOL 150 MG: 10 INJECTION, EMULSION INTRAVENOUS at 12:07

## 2022-07-27 RX ADMIN — PROPOFOL 50 MG: 10 INJECTION, EMULSION INTRAVENOUS at 12:07

## 2022-07-27 RX ADMIN — SODIUM CHLORIDE, SODIUM LACTATE, POTASSIUM CHLORIDE, AND CALCIUM CHLORIDE: .6; .31; .03; .02 INJECTION, SOLUTION INTRAVENOUS at 11:07

## 2022-07-27 RX ADMIN — LIDOCAINE HYDROCHLORIDE 100 MG: 20 INJECTION, SOLUTION INTRAVENOUS at 12:07

## 2022-07-27 NOTE — TRANSFER OF CARE
"Anesthesia Transfer of Care Note    Patient: Moris Calderón    Procedure(s) Performed: Procedure(s) (LRB):  COLONOSCOPY (N/A)    Patient location: PACU    Anesthesia Type: general    Transport from OR: Transported from OR on room air with adequate spontaneous ventilation    Post pain: adequate analgesia    Post assessment: no apparent anesthetic complications and tolerated procedure well    Post vital signs: stable    Level of consciousness: sedated and awake    Nausea/Vomiting: no nausea/vomiting    Complications: none    Transfer of care protocol was followed      Last vitals:   Visit Vitals  BP (!) 151/83   Pulse 69   Temp 36.6 °C (97.9 °F)   Resp 18   Ht 5' 9" (1.753 m)   Wt 104.3 kg (230 lb)   SpO2 98%   BMI 33.97 kg/m²     "

## 2022-07-27 NOTE — DISCHARGE SUMMARY
[facility] - Endoscopy  Discharge Note  Short Stay    Discharge Note  Short Stay      SUMMARY     Admit Date: 7/27/2022    Attending Physician: Dwight Lewis MD     Discharge Physician: Dwight Lewis MD    Discharge Date: 7/27/2022 12:52 PM    Final Diagnosis: Screening for colon cancer [Z12.11]  History of colonic polyps [Z86.010]    Disposition: HOME OR SELF CARE    Patient Instructions:   Current Discharge Medication List      CONTINUE these medications which have NOT CHANGED    Details   atorvastatin (LIPITOR) 10 MG tablet Take 1 tablet (10 mg total) by mouth once daily.  Qty: 90 tablet, Refills: 0    Associated Diagnoses: Hypercholesterolemia      benazepriL (LOTENSIN) 20 MG tablet Take 1 tablet (20 mg total) by mouth once daily.  Qty: 90 tablet, Refills: 3    Comments: .      cetirizine HCl (CETIRIZINE ORAL) Take 1 Dose by mouth daily as needed.       esomeprazole (NEXIUM) 40 MG capsule Take 1 capsule (40 mg total) by mouth before breakfast.  Qty: 90 capsule, Refills: 1    Associated Diagnoses: Gastroesophageal reflux disease, unspecified whether esophagitis present      fluticasone propionate (FLONASE) 50 mcg/actuation nasal spray SHAKE LIQUID AND USE 1 SPRAY IN EACH NOSTRIL EVERY DAY  Qty: 48 g, Refills: 0    Associated Diagnoses: Allergic rhinitis, unspecified seasonality, unspecified trigger      !! paroxetine (PAXIL) 10 MG tablet Take 1 tablet (10 mg total) by mouth every morning.  Qty: 90 tablet, Refills: 0    Associated Diagnoses: Anxiety      !! paroxetine (PAXIL) 40 MG tablet TAKE 1 TABLET(40 MG) BY MOUTH EVERY DAY  Qty: 90 tablet, Refills: 1    Associated Diagnoses: Anxiety      tamsulosin (FLOMAX) 0.4 mg Cap TAKE 1 CAPSULE(0.4 MG) BY MOUTH EVERY DAY  Qty: 30 capsule, Refills: 11    Associated Diagnoses: Urinary frequency      chlorhexidine (PERIDEX) 0.12 % solution Use as directed 10 mLs in the mouth or throat 2 (two) times daily.      mupirocin (BACTROBAN) 2 % ointment 2 (two) times  daily.      mupirocin calcium 2% nasl oint (BACTROBAN) 2 % Oint by Nasal route 2 (two) times daily.      ondansetron (ZOFRAN-ODT) 4 MG TbDL Take 1 tablet (4 mg total) by mouth every 6 (six) hours as needed (Nausea).  Qty: 10 tablet, Refills: 0      oxyCODONE-acetaminophen (PERCOCET) 5-325 mg per tablet Take 2 tablets by mouth every 6 (six) hours as needed for Pain.  Qty: 15 tablet, Refills: 0    Comments: Quantity prescribed more than 7 day supply? No       !! - Potential duplicate medications found. Please discuss with provider.          Discharge Procedure Orders (must include Diet, Follow-up, Activity)    Follow Up:  Follow up with PCP as previously scheduled  Resume routine diet.  Activity as tolerated.    No driving day of procedure.

## 2022-07-27 NOTE — H&P
History & Physical - Short Stay  Gastroenterology      SUBJECTIVE:     Procedure: Colonoscopy    Chief Complaint/Indication for Procedure: Previous Polyps    PTA Medications   Medication Sig    atorvastatin (LIPITOR) 10 MG tablet Take 1 tablet (10 mg total) by mouth once daily.    benazepriL (LOTENSIN) 20 MG tablet Take 1 tablet (20 mg total) by mouth once daily.    cetirizine HCl (CETIRIZINE ORAL) Take 1 Dose by mouth daily as needed.     esomeprazole (NEXIUM) 40 MG capsule Take 1 capsule (40 mg total) by mouth before breakfast.    fluticasone propionate (FLONASE) 50 mcg/actuation nasal spray SHAKE LIQUID AND USE 1 SPRAY IN EACH NOSTRIL EVERY DAY    paroxetine (PAXIL) 10 MG tablet Take 1 tablet (10 mg total) by mouth every morning.    paroxetine (PAXIL) 40 MG tablet TAKE 1 TABLET(40 MG) BY MOUTH EVERY DAY    tamsulosin (FLOMAX) 0.4 mg Cap TAKE 1 CAPSULE(0.4 MG) BY MOUTH EVERY DAY    chlorhexidine (PERIDEX) 0.12 % solution Use as directed 10 mLs in the mouth or throat 2 (two) times daily.    mupirocin (BACTROBAN) 2 % ointment 2 (two) times daily.    mupirocin calcium 2% nasl oint (BACTROBAN) 2 % Oint by Nasal route 2 (two) times daily.    ondansetron (ZOFRAN-ODT) 4 MG TbDL Take 1 tablet (4 mg total) by mouth every 6 (six) hours as needed (Nausea). (Patient not taking: Reported on 3/25/2022)    oxyCODONE-acetaminophen (PERCOCET) 5-325 mg per tablet Take 2 tablets by mouth every 6 (six) hours as needed for Pain. (Patient not taking: Reported on 3/25/2022)       Review of patient's allergies indicates:   Allergen Reactions    Buspar [buspirone]      Face flushed, possible elevated BP    Adhesive Rash        Past Medical History:   Diagnosis Date    Anxiety     Arthritis     Back problem     bulging disc    GERD (gastroesophageal reflux disease)     Hypercholesterolemia 11/02/2015    2009: Began statin.    Hyperlipidemia     Hypertension, benign 11/02/2015 2008: Diagnosed.    Sleep apnea      uses implant     Past Surgical History:   Procedure Laterality Date    APPENDECTOMY      ARTHROSCOPIC REPAIR OF ROTATOR CUFF OF SHOULDER Right 12/21/2018    Procedure: REPAIR, ROTATOR CUFF, ARTHROSCOPIC;  Surgeon: Colby Valenzuela MD;  Location: [facility] OR;  Service: Orthopedics;  Laterality: Right;    ARTHROSCOPY OF SHOULDER WITH DECOMPRESSION OF SUBACROMIAL SPACE Right 12/21/2018    Procedure: ARTHROSCOPY, SHOULDER, WITH SUBACROMIAL SPACE DECOMPRESSION;  Surgeon: Colby Valenzuela MD;  Location: [facility] OR;  Service: Orthopedics;  Laterality: Right;    BICEPS TENDON REPAIR Right 12/21/2018    Procedure: REPAIR, TENDON, BICEPS;  Surgeon: Colby Valenzuela MD;  Location: [facility] OR;  Service: Orthopedics;  Laterality: Right;    magdalene      INJECTION OF ANESTHETIC AGENT AROUND MEDIAL BRANCH NERVES INNERVATING CERVICAL FACET JOINT Bilateral 1/10/2022    Procedure: Block-nerve-medial branch-cervical C4 C5 C6;  Surgeon: Landon Winchester MD;  Location: [facility] OR;  Service: Pain Management;  Laterality: Bilateral;    INJECTION OF ANESTHETIC AGENT AROUND MEDIAL BRANCH NERVES INNERVATING LUMBAR FACET JOINT Bilateral 3/10/2021    Procedure: Block-nerve-medial branch-lumbar L2,L3,L4, L5;  Surgeon: Landon Winchester MD;  Location: [facility] OR;  Service: Pain Management;  Laterality: Bilateral;    INSERTION, NEUROSTIMULATOR, HYPOGLOSSAL Right 3/17/2022    Procedure: INSERTION,NEUROSTIMULATOR,HYPOGLOSSAL;  Surgeon: Lance Wright MD;  Location: [facility] OR;  Service: ENT;  Laterality: Right;    KNEE ARTHROSCOPY W/ MENISCAL REPAIR Left 2015    MMT      Left knee A-scope    NOSE SURGERY      RADIOFREQUENCY ABLATION OF LUMBAR MEDIAL BRANCH NERVE AT SINGLE LEVEL Bilateral 3/23/2021    Procedure: Radiofrequency Ablation, Nerve, Spinal, Lumbar, Medial Branch, L2,LL3,L4,L5;  Surgeon: Landon Winchester MD;  Location: [facility] OR;  Service: Pain Management;  Laterality: Bilateral;    SLEEP ENDOSCOPY, DRUG-INDUCED Bilateral  1/28/2022    Procedure: SLEEP ENDOSCOPY,DRUG-INDUCED;  Surgeon: Lance Wright MD;  Location: [facility] OR;  Service: ENT;  Laterality: Bilateral;    TRANSFORAMINAL EPIDURAL INJECTION OF STEROID Bilateral 2/11/2021    Procedure: Injection,steroid,epidural,transforaminal approach L4/5;  Surgeon: Landon Winchester MD;  Location: [facility] OR;  Service: Pain Management;  Laterality: Bilateral;    UVULECTOMY       Family History   Problem Relation Age of Onset    Glaucoma Father     Melanoma Neg Hx     Psoriasis Neg Hx     Lupus Neg Hx     Eczema Neg Hx      Social History     Tobacco Use    Smoking status: Never Smoker    Smokeless tobacco: Never Used   Substance Use Topics    Alcohol use: Yes     Alcohol/week: 5.0 standard drinks     Types: 5 Cans of beer per week     Comment: weekly     Drug use: No         OBJECTIVE:     Vital Signs (Most Recent)  Temp: 97.9 °F (36.6 °C) (07/27/22 1153)  Pulse: 69 (07/27/22 1153)  Resp: 18 (07/27/22 1153)  BP: (!) 151/83 (07/27/22 1153)  SpO2: 98 % (07/27/22 1153)    Physical Exam:                                                       GENERAL:  Comfortable, in no acute distress.                                 HEENT EXAM:  Nonicteric.  No adenopathy.  Oropharynx is clear.               NECK:  Supple.                                                               LUNGS:  Clear.                                                               CARDIAC:  Regular rate and rhythm.  S1, S2.  No murmur.                      ABDOMEN:  Soft, positive bowel sounds, nontender.  No hepatosplenomegaly or masses.  No rebound or guarding.                                             EXTREMITIES:  No edema.     MENTAL STATUS:  Normal, alert and oriented.      ASSESSMENT/PLAN:     Assessment: Previous Polyps    Plan: Colonoscopy    Anesthesia Plan: General    ASA Grade: ASA 2 - Patient with mild systemic disease with no functional limitations    MALLAMPATI SCORE:  I (soft palate, uvula, fauces, and  tonsillar pillars visible)

## 2022-07-27 NOTE — ANESTHESIA POSTPROCEDURE EVALUATION
Anesthesia Post Evaluation    Patient: Moris Calderón    Procedure(s) Performed: Procedure(s) (LRB):  COLONOSCOPY (N/A)    Final Anesthesia Type: general      Patient location during evaluation: PACU  Patient participation: Yes- Able to Participate  Level of consciousness: awake and alert, oriented and awake  Post-procedure vital signs: reviewed and stable  Pain management: adequate  Airway patency: patent    PONV status at discharge: No PONV  Anesthetic complications: no      Cardiovascular status: blood pressure returned to baseline and hemodynamically stable  Respiratory status: unassisted, spontaneous ventilation and room air  Hydration status: euvolemic  Follow-up not needed.          Vitals Value Taken Time   /71 07/27/22 1320   Temp 36.3 °C (97.3 °F) 07/27/22 1250   Pulse 68 07/27/22 1320   Resp 18 07/27/22 1320   SpO2 97 % 07/27/22 1320         Event Time   Out of Recovery 13:28:00         Pain/Jesus Score: Jesus Score: 10 (7/27/2022  1:00 PM)

## 2022-07-27 NOTE — ANESTHESIA PREPROCEDURE EVALUATION
07/27/2022  Moris Calderón is a 66 y.o., male.    Pre-op Assessment    I have reviewed the Patient Summary Reports.    I have reviewed the Nursing Notes. I have reviewed the NPO Status.   I have reviewed the Medications.     Review of Systems  Anesthesia Hx:  No problems with previous Anesthesia    Cardiovascular:   Hypertension, well controlled hyperlipidemia    Pulmonary:   Sleep Apnea, CPAP    Hepatic/GI:   GERD, well controlled    Musculoskeletal:   Arthritis   Spine Disorders:    Neurological:   Neuromuscular Disease,    Psych:   anxiety          Physical Exam  General:  Well nourished and Obesity      Airway/Jaw/Neck:  Airway Findings: Mouth Opening: Normal   Tongue: Normal   General Airway Assessment: Adult Mallampati: III  Improves to II with phonation.      Dental:  Dental Findings: In tact     Chest/Lungs:  Chest/Lungs Findings: Clear to auscultation, Normal Respiratory Rate      Heart/Vascular:  Heart Findings: Rate: Normal  Rhythm: Regular Rhythm             Anesthesia Plan  Type of Anesthesia, risks & benefits discussed:  Anesthesia Type:  Gen Natural Airway    Patient's Preference:   Plan Factors:          Intra-op Monitoring Plan: standard ASA monitors  Intra-op Monitoring Plan Comments:   Post Op Pain Control Plan:   Post Op Pain Control Plan Comments:     Induction:   IV  Beta Blocker:  Patient is not currently on a Beta-Blocker (No further documentation required).       Informed Consent: Informed consent signed with the Patient and all parties understand the risks and agree with anesthesia plan.  All questions answered.  Anesthesia consent signed with patient.  ASA Score: 3     Day of Surgery Review of History & Physical:    H&P Update referred to the surgeon/provider.          Ready For Surgery From Anesthesia Perspective.           Physical Exam  General: Well nourished and  Obesity    Airway:  Mallampati: III / II  Mouth Opening: Normal  Tongue: Normal    Dental:  In tact    Chest/Lungs:  Clear to auscultation, Normal Respiratory Rate    Heart:  Rate: Normal  Rhythm: Regular Rhythm          Anesthesia Plan  Type of Anesthesia, risks & benefits discussed:    Anesthesia Type: Gen Natural Airway  Intra-op Monitoring Plan: standard ASA monitors  Induction:  IV  Informed Consent: Informed consent signed with the Patient and all parties understand the risks and agree with anesthesia plan.  All questions answered.   ASA Score: 3  Day of Surgery Review of History & Physical: H&P Update referred to the surgeon/provider.    Ready For Surgery From Anesthesia Perspective.       .

## 2022-07-27 NOTE — PROVATION PATIENT INSTRUCTIONS
Discharge Summary/Instructions after an Endoscopic Procedure  Patient Name: Moris Calderón  Patient MRN: [id number]  Patient YOB: 1955 Wednesday, July 27, 2022  Dwight Lewis MD  Dear patient,  As a result of recent federal legislation (The Federal Cures Act), you may   receive lab or pathology results from your procedure in your [facility]   account before your physician is able to contact you. Your physician or   their representative will relay the results to you with their   recommendations at their soonest availability.  Thank you,  RESTRICTIONS:  During your procedure today, you received medications for sedation.  These   medications may affect your judgment, balance and coordination.  Therefore,   for 24 hours, you have the following restrictions:   - DO NOT drive a car, operate machinery, make legal/financial decisions,   sign important papers or drink alcohol.    ACTIVITY:  Today: no heavy lifting, straining or running due to procedural   sedation/anesthesia.  The following day: return to full activity including work.  DIET:  Eat and drink normally unless instructed otherwise.     TREATMENT FOR COMMON SIDE EFFECTS:  - Mild abdominal pain, nausea, belching, bloating or excessive gas:  rest,   eat lightly and use a heating pad.  - Sore Throat: treat with throat lozenges and/or gargle with warm salt   water.  - Because air was used during the procedure, expelling large amounts of air   from your rectum or belching is normal.  - If a bowel prep was taken, you may not have a bowel movement for 1-3 days.    This is normal.  SYMPTOMS TO WATCH FOR AND REPORT TO YOUR PHYSICIAN:  1. Abdominal pain or bloating, other than gas cramps.  2. Chest pain.  3. Back pain.  4. Signs of infection such as: chills or fever occurring within 24 hours   after the procedure.  5. Rectal bleeding, which would show as bright red, maroon, or black stools.   (A tablespoon of blood from the rectum is not serious, especially if    hemorrhoids are present.)  6. Vomiting.  7. Weakness or dizziness.  GO DIRECTLY TO THE NEAREST EMERGENCY ROOM IF YOU HAVE ANY OF THE FOLLOWING:      Difficulty breathing              Chills and/or fever over 101 F   Persistent vomiting and/or vomiting blood   Severe abdominal pain   Severe chest pain   Black, tarry stools   Bleeding- more than one tablespoon   Any other symptom or condition that you feel may need urgent attention  Your doctor recommends these additional instructions:  If any biopsies were taken, your doctors clinic will contact you in 1 to 2   weeks with any results.  Your physician has recommended a repeat colonoscopy in five years for   surveillance.   You are being discharged to home.  For questions, problems or results please call your physician - Dwight Lewis MD at Work:  [phone].  EMERGENCY PHONE NUMBER: [phone], LAB RESULTS: [phone]  IF A COMPLICATION OR EMERGENCY SITUATION ARISES AND YOU ARE UNABLE TO REACH   YOUR PHYSICIAN - GO DIRECTLY TO THE EMERGENCY ROOM.  ___________________________________________  Nurse Signature  ___________________________________________  Patient/Designated Responsible Party Signature  Dwight Lewis MD  7/27/2022 12:52:12 PM  This report has been verified and signed electronically.  Dear patient,  As a result of recent federal legislation (The Federal Cures Act), you may   receive lab or pathology results from your procedure in your [facility]   account before your physician is able to contact you. Your physician or   their representative will relay the results to you with their   recommendations at their soonest availability.  Thank you.  PROVATION

## 2022-08-04 ENCOUNTER — LAB VISIT (OUTPATIENT)
Dept: LAB | Facility: CLINIC | Age: 67
End: 2022-08-04
Payer: MEDICARE

## 2022-08-04 DIAGNOSIS — R35.0 URINARY FREQUENCY: ICD-10-CM

## 2022-08-04 DIAGNOSIS — E78.00 HYPERCHOLESTEROLEMIA: ICD-10-CM

## 2022-08-04 DIAGNOSIS — I10 ESSENTIAL HYPERTENSION: ICD-10-CM

## 2022-08-04 DIAGNOSIS — R73.03 PRE-DIABETES: ICD-10-CM

## 2022-08-04 LAB
ALBUMIN SERPL BCP-MCNC: 3.7 G/DL (ref 3.5–5.2)
ALP SERPL-CCNC: 72 U/L (ref 55–135)
ALT SERPL W/O P-5'-P-CCNC: 20 U/L (ref 10–44)
ANION GAP SERPL CALC-SCNC: 7 MMOL/L (ref 8–16)
AST SERPL-CCNC: 18 U/L (ref 10–40)
BILIRUB SERPL-MCNC: 0.3 MG/DL (ref 0.1–1)
BUN SERPL-MCNC: 16 MG/DL (ref 8–23)
CALCIUM SERPL-MCNC: 9 MG/DL (ref 8.7–10.5)
CHLORIDE SERPL-SCNC: 105 MMOL/L (ref 95–110)
CHOLEST SERPL-MCNC: 182 MG/DL (ref 120–199)
CHOLEST/HDLC SERPL: 4.4 {RATIO} (ref 2–5)
CO2 SERPL-SCNC: 25 MMOL/L (ref 23–29)
CREAT SERPL-MCNC: 0.9 MG/DL (ref 0.5–1.4)
EST. GFR  (NO RACE VARIABLE): >60 ML/MIN/1.73 M^2
ESTIMATED AVG GLUCOSE: 123 MG/DL (ref 68–131)
GLUCOSE SERPL-MCNC: 113 MG/DL (ref 70–110)
HBA1C MFR BLD: 5.9 % (ref 4–5.6)
HDLC SERPL-MCNC: 41 MG/DL (ref 40–75)
HDLC SERPL: 22.5 % (ref 20–50)
LDLC SERPL CALC-MCNC: 101.6 MG/DL (ref 63–159)
NONHDLC SERPL-MCNC: 141 MG/DL
POTASSIUM SERPL-SCNC: 3.9 MMOL/L (ref 3.5–5.1)
PROT SERPL-MCNC: 6.7 G/DL (ref 6–8.4)
SODIUM SERPL-SCNC: 137 MMOL/L (ref 136–145)
TRIGL SERPL-MCNC: 197 MG/DL (ref 30–150)

## 2022-08-04 PROCEDURE — 36415 PR COLLECTION VENOUS BLOOD,VENIPUNCTURE: ICD-10-PCS | Mod: ,,, | Performed by: STUDENT IN AN ORGANIZED HEALTH CARE EDUCATION/TRAINING PROGRAM

## 2022-08-04 PROCEDURE — 36415 COLL VENOUS BLD VENIPUNCTURE: CPT | Mod: ,,, | Performed by: STUDENT IN AN ORGANIZED HEALTH CARE EDUCATION/TRAINING PROGRAM

## 2022-08-04 PROCEDURE — 83036 HEMOGLOBIN GLYCOSYLATED A1C: CPT | Performed by: INTERNAL MEDICINE

## 2022-08-04 PROCEDURE — 80061 LIPID PANEL: CPT | Performed by: INTERNAL MEDICINE

## 2022-08-04 PROCEDURE — 80053 COMPREHEN METABOLIC PANEL: CPT | Performed by: INTERNAL MEDICINE

## 2022-08-04 RX ORDER — CETIRIZINE HYDROCHLORIDE 1 MG/ML
SOLUTION ORAL DAILY PRN
Refills: 0 | OUTPATIENT
Start: 2022-08-04 | End: 2023-08-04

## 2022-08-04 RX ORDER — TAMSULOSIN HYDROCHLORIDE 0.4 MG/1
0.4 CAPSULE ORAL DAILY
Qty: 90 CAPSULE | Refills: 3 | Status: SHIPPED | OUTPATIENT
Start: 2022-08-04 | End: 2023-05-24

## 2022-08-04 RX ORDER — BENAZEPRIL HYDROCHLORIDE 20 MG/1
20 TABLET ORAL DAILY
Qty: 90 TABLET | Refills: 3 | Status: SHIPPED | OUTPATIENT
Start: 2022-08-04 | End: 2023-04-06 | Stop reason: SDUPTHER

## 2022-08-04 NOTE — TELEPHONE ENCOUNTER
----- Message from Delisa Sow sent at 8/4/2022 10:22 AM CDT -----  Regarding: pharmacy called to transfer medication  Name of Who is Calling: JES DARLING [[id number]] Ana([facility] pharmacy)      What is the request in detail: please transfer patients script to [facility]. Please advise     paroxetine (PAXIL) 40 MG tablet (  pt is  requesting an emergency fill to [facility] because patient is out of medication for 30 day) then  he would like it transfer to [facility]  pharmacy for 90 day     benazepriL (LOTENSIN) 20 MG tablet    tamsulosin (FLOMAX) 0.4 mg Cap    cetirizine HCl (CETIRIZINE ORAL)    Can the clinic reply by [facility]: No      What Number to Call Back if not in [facility]: [phone] [facility] refill dept.

## 2022-08-04 NOTE — TELEPHONE ENCOUNTER
paroxetine (PAXIL) 40 MG tablet (  pt is  requesting an emergency fill to [facility] because patient is out of medication for 30 day) then  he would like it transfer to [facility]  pharmacy for 90 day     That's the only one need to be sent to [facility]

## 2022-08-04 NOTE — TELEPHONE ENCOUNTER
No new care gaps identified.  [facility] Embedded Care Gaps. Reference number: [id number]. 8/04/2022   12:03:41 PM CDT

## 2022-08-11 ENCOUNTER — OFFICE VISIT (OUTPATIENT)
Dept: FAMILY MEDICINE | Facility: CLINIC | Age: 67
End: 2022-08-11
Payer: MEDICARE

## 2022-08-11 VITALS
HEIGHT: 69 IN | HEART RATE: 61 BPM | DIASTOLIC BLOOD PRESSURE: 80 MMHG | WEIGHT: 224.44 LBS | SYSTOLIC BLOOD PRESSURE: 130 MMHG | OXYGEN SATURATION: 96 % | BODY MASS INDEX: 33.24 KG/M2

## 2022-08-11 DIAGNOSIS — G47.33 OBSTRUCTIVE SLEEP APNEA: ICD-10-CM

## 2022-08-11 DIAGNOSIS — E78.00 HYPERCHOLESTEROLEMIA: ICD-10-CM

## 2022-08-11 DIAGNOSIS — I10 PRIMARY HYPERTENSION: Primary | ICD-10-CM

## 2022-08-11 DIAGNOSIS — R73.03 PRE-DIABETES: ICD-10-CM

## 2022-08-11 DIAGNOSIS — G25.0 ESSENTIAL TREMOR: ICD-10-CM

## 2022-08-11 DIAGNOSIS — F41.9 ANXIETY: ICD-10-CM

## 2022-08-11 PROCEDURE — 1160F RVW MEDS BY RX/DR IN RCRD: CPT | Mod: CPTII,S$GLB,, | Performed by: INTERNAL MEDICINE

## 2022-08-11 PROCEDURE — 4010F PR ACE/ARB THEARPY RXD/TAKEN: ICD-10-PCS | Mod: CPTII,S$GLB,, | Performed by: INTERNAL MEDICINE

## 2022-08-11 PROCEDURE — 1159F MED LIST DOCD IN RCRD: CPT | Mod: CPTII,S$GLB,, | Performed by: INTERNAL MEDICINE

## 2022-08-11 PROCEDURE — 3008F BODY MASS INDEX DOCD: CPT | Mod: CPTII,S$GLB,, | Performed by: INTERNAL MEDICINE

## 2022-08-11 PROCEDURE — 4010F ACE/ARB THERAPY RXD/TAKEN: CPT | Mod: CPTII,S$GLB,, | Performed by: INTERNAL MEDICINE

## 2022-08-11 PROCEDURE — 1101F PR PT FALLS ASSESS DOC 0-1 FALLS W/OUT INJ PAST YR: ICD-10-PCS | Mod: CPTII,S$GLB,, | Performed by: INTERNAL MEDICINE

## 2022-08-11 PROCEDURE — 3075F PR MOST RECENT SYSTOLIC BLOOD PRESS GE 130-139MM HG: ICD-10-PCS | Mod: CPTII,S$GLB,, | Performed by: INTERNAL MEDICINE

## 2022-08-11 PROCEDURE — 99214 OFFICE O/P EST MOD 30 MIN: CPT | Mod: S$GLB,,, | Performed by: INTERNAL MEDICINE

## 2022-08-11 PROCEDURE — 1126F PR PAIN SEVERITY QUANTIFIED, NO PAIN PRESENT: ICD-10-PCS | Mod: CPTII,S$GLB,, | Performed by: INTERNAL MEDICINE

## 2022-08-11 PROCEDURE — 1126F AMNT PAIN NOTED NONE PRSNT: CPT | Mod: CPTII,S$GLB,, | Performed by: INTERNAL MEDICINE

## 2022-08-11 PROCEDURE — 1101F PT FALLS ASSESS-DOCD LE1/YR: CPT | Mod: CPTII,S$GLB,, | Performed by: INTERNAL MEDICINE

## 2022-08-11 PROCEDURE — 3008F PR BODY MASS INDEX (BMI) DOCUMENTED: ICD-10-PCS | Mod: CPTII,S$GLB,, | Performed by: INTERNAL MEDICINE

## 2022-08-11 PROCEDURE — 99999 PR PBB SHADOW E&M-EST. PATIENT-LVL III: ICD-10-PCS | Mod: PBBFAC,,, | Performed by: INTERNAL MEDICINE

## 2022-08-11 PROCEDURE — 99999 PR PBB SHADOW E&M-EST. PATIENT-LVL III: CPT | Mod: PBBFAC,,, | Performed by: INTERNAL MEDICINE

## 2022-08-11 PROCEDURE — 3288F PR FALLS RISK ASSESSMENT DOCUMENTED: ICD-10-PCS | Mod: CPTII,S$GLB,, | Performed by: INTERNAL MEDICINE

## 2022-08-11 PROCEDURE — 3044F HG A1C LEVEL LT 7.0%: CPT | Mod: CPTII,S$GLB,, | Performed by: INTERNAL MEDICINE

## 2022-08-11 PROCEDURE — 3079F DIAST BP 80-89 MM HG: CPT | Mod: CPTII,S$GLB,, | Performed by: INTERNAL MEDICINE

## 2022-08-11 PROCEDURE — 3288F FALL RISK ASSESSMENT DOCD: CPT | Mod: CPTII,S$GLB,, | Performed by: INTERNAL MEDICINE

## 2022-08-11 PROCEDURE — 3075F SYST BP GE 130 - 139MM HG: CPT | Mod: CPTII,S$GLB,, | Performed by: INTERNAL MEDICINE

## 2022-08-11 PROCEDURE — 3079F PR MOST RECENT DIASTOLIC BLOOD PRESSURE 80-89 MM HG: ICD-10-PCS | Mod: CPTII,S$GLB,, | Performed by: INTERNAL MEDICINE

## 2022-08-11 PROCEDURE — 99214 PR OFFICE/OUTPT VISIT, EST, LEVL IV, 30-39 MIN: ICD-10-PCS | Mod: S$GLB,,, | Performed by: INTERNAL MEDICINE

## 2022-08-11 PROCEDURE — 3044F PR MOST RECENT HEMOGLOBIN A1C LEVEL <7.0%: ICD-10-PCS | Mod: CPTII,S$GLB,, | Performed by: INTERNAL MEDICINE

## 2022-08-11 PROCEDURE — 1160F PR REVIEW ALL MEDS BY PRESCRIBER/CLIN PHARMACIST DOCUMENTED: ICD-10-PCS | Mod: CPTII,S$GLB,, | Performed by: INTERNAL MEDICINE

## 2022-08-11 PROCEDURE — 1159F PR MEDICATION LIST DOCUMENTED IN MEDICAL RECORD: ICD-10-PCS | Mod: CPTII,S$GLB,, | Performed by: INTERNAL MEDICINE

## 2022-08-11 RX ORDER — PAROXETINE 10 MG/1
10 TABLET, FILM COATED ORAL EVERY MORNING
Qty: 90 TABLET | Refills: 3 | Status: SHIPPED | OUTPATIENT
Start: 2022-08-11 | End: 2023-07-06

## 2022-08-11 RX ORDER — ATORVASTATIN CALCIUM 10 MG/1
10 TABLET, FILM COATED ORAL DAILY
Qty: 90 TABLET | Refills: 3 | Status: SHIPPED | OUTPATIENT
Start: 2022-08-11 | End: 2023-07-06

## 2022-08-11 RX ORDER — SERTRALINE HYDROCHLORIDE 50 MG/1
50 TABLET, FILM COATED ORAL
COMMUNITY
End: 2022-08-11

## 2022-08-11 RX ORDER — PAROXETINE HYDROCHLORIDE 40 MG/1
40 TABLET, FILM COATED ORAL DAILY
Qty: 90 TABLET | Refills: 3 | Status: SHIPPED | OUTPATIENT
Start: 2022-08-11 | End: 2023-05-31

## 2022-08-11 RX ORDER — FLUTICASONE PROPIONATE AND SALMETEROL 100; 50 UG/1; UG/1
1 POWDER RESPIRATORY (INHALATION)
COMMUNITY
End: 2022-08-11

## 2022-08-11 NOTE — PROGRESS NOTES
Patient ID: Moris Calderón is a 67 y.o. male.    Chief Complaint: m+6      Assessment and Plan      Hypertension Control, continue benazepril   Hyperlipidemia Triglycerides up, LDL okay, continue atorvastatin, monitor triglycerides     HERNAN Had inspire stimulator placed and working well.   Pre diabetes A1c 5.9, Stable, continue to monitor   GERD Stable on nexium   Low back pain  Has appointment with pain managment   Anxiety  Stable on paxil 50 mg    LUTS Stable on flomax   Essential tremor Will monitor for now    Class I obesity  Lost 6 lbs intentionally. Monitor      1. Primary hypertension     2. Hypercholesterolemia     3. Pre-diabetes     4. Obstructive sleep apnea     5. Essential tremor        HPI   6 mo follow up:    Hypertension, hyperlipidemia, anxiety, HERNAN, GERD, pre diabetes  C scope up-to-date, repeat in 5 years  Had laceration of left pinky, healed well.   Low back pain decreasing sleep. History of ablation which helped. He would like to same thing done. Has appointment.  Has L>R hand fine tremor. More with grabbing or holding. Sister has same thing. He does not think it is bad enough to treat yet.     Health Maintenance Due   Topic Date Due    COVID-19 Vaccine (4 - Booster for Moderna series) 02/25/2022      Wt Readings from Last 3 Encounters:   08/11/22 1049 101.8 kg (224 lb 6.9 oz)   07/25/22 0944 104.3 kg (230 lb)   03/25/22 0832 104.3 kg (229 lb 15 oz)      Body mass index is 33.14 kg/m².      Hypertension Medications             benazepriL (LOTENSIN) 20 MG tablet Take 1 tablet (20 mg total) by mouth once daily.         Hyperlipidemia Medications             atorvastatin (LIPITOR) 10 MG tablet Take 1 tablet (10 mg total) by mouth once daily.         Review of Systems   Constitutional: Negative for fever.   Respiratory: Negative for shortness of breath.    Cardiovascular: Negative for chest pain.   Gastrointestinal: Negative for abdominal pain.         Vitals:    08/11/22 1049   BP: 130/80   Pulse:  61     Physical Exam  Cardiovascular:      Rate and Rhythm: Normal rate and regular rhythm.      Heart sounds: No murmur heard.    No gallop.   Pulmonary:      Breath sounds: Normal breath sounds. No wheezing or rhonchi.   Abdominal:      General: Bowel sounds are normal.      Palpations: Abdomen is soft.      Tenderness: There is no abdominal tenderness.   Musculoskeletal:         General: Normal range of motion.      Cervical back: Neck supple.   Skin:     General: Skin is warm.      Findings: No rash.   Neurological:      Mental Status: He is alert.      Comments: Fine tremor left greater than right   Psychiatric:         Behavior: Behavior normal.         I personally reviewed past medical, family and social history.  Medication List with Changes/Refills   Current Medications    ATORVASTATIN (LIPITOR) 10 MG TABLET    Take 1 tablet (10 mg total) by mouth once daily.    BENAZEPRIL (LOTENSIN) 20 MG TABLET    Take 1 tablet (20 mg total) by mouth once daily.    CETIRIZINE HCL (CETIRIZINE ORAL)    Take 1 Dose by mouth daily as needed.     CHLORHEXIDINE (PERIDEX) 0.12 % SOLUTION    Use as directed 10 mLs in the mouth or throat 2 (two) times daily.    ESOMEPRAZOLE (NEXIUM) 40 MG CAPSULE    Take 1 capsule (40 mg total) by mouth before breakfast.    FLUTICASONE PROPIONATE (FLONASE) 50 MCG/ACTUATION NASAL SPRAY    SHAKE LIQUID AND USE 1 SPRAY IN EACH NOSTRIL EVERY DAY    MUPIROCIN (BACTROBAN) 2 % OINTMENT    2 (two) times daily.    MUPIROCIN CALCIUM 2% NASL OINT (BACTROBAN) 2 % OINT    by Nasal route 2 (two) times daily.    ONDANSETRON (ZOFRAN-ODT) 4 MG TBDL    Take 1 tablet (4 mg total) by mouth every 6 (six) hours as needed (Nausea).    OXYCODONE-ACETAMINOPHEN (PERCOCET) 5-325 MG PER TABLET    Take 2 tablets by mouth every 6 (six) hours as needed for Pain.    PAROXETINE (PAXIL) 10 MG TABLET    Take 1 tablet (10 mg total) by mouth every morning.    PAROXETINE (PAXIL) 40 MG TABLET    Take 1 tablet (40 mg total) by mouth  once daily.    TAMSULOSIN (FLOMAX) 0.4 MG CAP    Take 1 capsule (0.4 mg total) by mouth once daily.   Discontinued Medications    FLUTICASONE-SALMETEROL DISKUS INHALER 100-50 MCG    Inhale 1 puff into the lungs.    SERTRALINE (ZOLOFT) 50 MG TABLET    Take 50 mg by mouth.

## 2022-08-30 ENCOUNTER — PATIENT MESSAGE (OUTPATIENT)
Dept: FAMILY MEDICINE | Facility: CLINIC | Age: 67
End: 2022-08-30
Payer: MEDICARE

## 2022-08-30 ENCOUNTER — OFFICE VISIT (OUTPATIENT)
Dept: OTOLARYNGOLOGY | Facility: CLINIC | Age: 67
End: 2022-08-30
Payer: MEDICARE

## 2022-08-30 VITALS — HEIGHT: 69 IN | WEIGHT: 216.94 LBS | BODY MASS INDEX: 32.13 KG/M2

## 2022-08-30 DIAGNOSIS — Z78.9 INTOLERANCE OF CONTINUOUS POSITIVE AIRWAY PRESSURE (CPAP) VENTILATION: ICD-10-CM

## 2022-08-30 DIAGNOSIS — G47.33 OSA (OBSTRUCTIVE SLEEP APNEA): ICD-10-CM

## 2022-08-30 DIAGNOSIS — H90.3 ASYMMETRICAL SENSORINEURAL HEARING LOSS: Primary | ICD-10-CM

## 2022-08-30 DIAGNOSIS — R06.09 EXERTIONAL DYSPNEA: Primary | ICD-10-CM

## 2022-08-30 PROCEDURE — 3044F PR MOST RECENT HEMOGLOBIN A1C LEVEL <7.0%: ICD-10-PCS | Mod: CPTII,S$GLB,, | Performed by: OTOLARYNGOLOGY

## 2022-08-30 PROCEDURE — 3008F BODY MASS INDEX DOCD: CPT | Mod: CPTII,S$GLB,, | Performed by: OTOLARYNGOLOGY

## 2022-08-30 PROCEDURE — 99213 OFFICE O/P EST LOW 20 MIN: CPT | Mod: S$GLB,,, | Performed by: OTOLARYNGOLOGY

## 2022-08-30 PROCEDURE — 1159F MED LIST DOCD IN RCRD: CPT | Mod: CPTII,S$GLB,, | Performed by: OTOLARYNGOLOGY

## 2022-08-30 PROCEDURE — 1101F PT FALLS ASSESS-DOCD LE1/YR: CPT | Mod: CPTII,S$GLB,, | Performed by: OTOLARYNGOLOGY

## 2022-08-30 PROCEDURE — 99213 PR OFFICE/OUTPT VISIT, EST, LEVL III, 20-29 MIN: ICD-10-PCS | Mod: S$GLB,,, | Performed by: OTOLARYNGOLOGY

## 2022-08-30 PROCEDURE — 4010F ACE/ARB THERAPY RXD/TAKEN: CPT | Mod: CPTII,S$GLB,, | Performed by: OTOLARYNGOLOGY

## 2022-08-30 PROCEDURE — 3288F PR FALLS RISK ASSESSMENT DOCUMENTED: ICD-10-PCS | Mod: CPTII,S$GLB,, | Performed by: OTOLARYNGOLOGY

## 2022-08-30 PROCEDURE — 1101F PR PT FALLS ASSESS DOC 0-1 FALLS W/OUT INJ PAST YR: ICD-10-PCS | Mod: CPTII,S$GLB,, | Performed by: OTOLARYNGOLOGY

## 2022-08-30 PROCEDURE — 3008F PR BODY MASS INDEX (BMI) DOCUMENTED: ICD-10-PCS | Mod: CPTII,S$GLB,, | Performed by: OTOLARYNGOLOGY

## 2022-08-30 PROCEDURE — 99999 PR PBB SHADOW E&M-EST. PATIENT-LVL III: ICD-10-PCS | Mod: PBBFAC,,, | Performed by: OTOLARYNGOLOGY

## 2022-08-30 PROCEDURE — 3044F HG A1C LEVEL LT 7.0%: CPT | Mod: CPTII,S$GLB,, | Performed by: OTOLARYNGOLOGY

## 2022-08-30 PROCEDURE — 1160F PR REVIEW ALL MEDS BY PRESCRIBER/CLIN PHARMACIST DOCUMENTED: ICD-10-PCS | Mod: CPTII,S$GLB,, | Performed by: OTOLARYNGOLOGY

## 2022-08-30 PROCEDURE — 1126F PR PAIN SEVERITY QUANTIFIED, NO PAIN PRESENT: ICD-10-PCS | Mod: CPTII,S$GLB,, | Performed by: OTOLARYNGOLOGY

## 2022-08-30 PROCEDURE — 1160F RVW MEDS BY RX/DR IN RCRD: CPT | Mod: CPTII,S$GLB,, | Performed by: OTOLARYNGOLOGY

## 2022-08-30 PROCEDURE — 1159F PR MEDICATION LIST DOCUMENTED IN MEDICAL RECORD: ICD-10-PCS | Mod: CPTII,S$GLB,, | Performed by: OTOLARYNGOLOGY

## 2022-08-30 PROCEDURE — 3288F FALL RISK ASSESSMENT DOCD: CPT | Mod: CPTII,S$GLB,, | Performed by: OTOLARYNGOLOGY

## 2022-08-30 PROCEDURE — 1126F AMNT PAIN NOTED NONE PRSNT: CPT | Mod: CPTII,S$GLB,, | Performed by: OTOLARYNGOLOGY

## 2022-08-30 PROCEDURE — 99999 PR PBB SHADOW E&M-EST. PATIENT-LVL III: CPT | Mod: PBBFAC,,, | Performed by: OTOLARYNGOLOGY

## 2022-08-30 PROCEDURE — 4010F PR ACE/ARB THEARPY RXD/TAKEN: ICD-10-PCS | Mod: CPTII,S$GLB,, | Performed by: OTOLARYNGOLOGY

## 2022-08-30 NOTE — PROGRESS NOTES
Subjective:       Patient ID: Moris Calderón is a 67 y.o. male.    Chief Complaint: Consult (With inspire) and Hearing Loss (Questions with hearing test)    Moris is here for follow-up. Here for asymmetric HL, noted at [facility].   Had MRI last yr. No changes.     History of Hypoglossal nerve stimulator 1/2022.   Has been satisfied with it, but has noticed that sometimes he will activate before he falls asleep. Has not followed up with Dr. Abbott.   He feels sleep is doing much better.     Patient validated questionnaires (if applicable):      %       No flowsheet data found.  No flowsheet data found.  No flowsheet data found.         Review of Systems   Constitutional: Negative for activity change and appetite change.   Respiratory: Negative for difficulty breathing and wheezing   Cardiovascular: Negative for chest pain.      Objective:        Constitutional:   Vital signs are normal. He appears well-developed and well-nourished.     Head:  Normocephalic and atraumatic.     Ears:  Hearing normal to normal and whispered voice; external ear normal without scars, lesions, or masses; ear canal, tympanic membrane, and middle ear normal..     Nose:  Nose normal including turbinates, nasal mucosa, sinuses and nasal septum.     Mouth/Throat  Oropharynx clear and moist without lesions or asymmetry.     Neck:  Neck normal without thyromegaly masses, asymmetry, normal tracheal structure, crepitus, and tenderness.       Tests / Results:  Reviewed audiogram at [facility] recently which showed relative stability.           Assessment:       1. Asymmetrical sensorineural hearing loss    2. Intolerance of continuous positive airway pressure (CPAP) ventilation    3. HERNAN (obstructive sleep apnea)          Plan:         Cleared for hearing aids.   Prev MRI clear    Doing well with Inspire. Needs fu and subsequent titration PSG

## 2022-08-30 NOTE — PATIENT INSTRUCTIONS
Moris had an MRI last December for Asymmetric hearing loss AS. This was normal. The audiogram is stable since 12/2021. He is interested in pursuing aid(s) and he is cleared for this.    Lance Wright MD  Otolaryngology - Head and Neck Surgery  Office: [phone]  Cell: [phone]  Fax: [phone]    This message was generated using voice dictation. Please excuse any errors that may have been created by the transcription software.

## 2022-08-31 ENCOUNTER — PATIENT MESSAGE (OUTPATIENT)
Dept: FAMILY MEDICINE | Facility: CLINIC | Age: 67
End: 2022-08-31
Payer: MEDICARE

## 2022-09-21 ENCOUNTER — OFFICE VISIT (OUTPATIENT)
Dept: OTOLARYNGOLOGY | Facility: CLINIC | Age: 67
End: 2022-09-21
Payer: MEDICARE

## 2022-09-21 VITALS — BODY MASS INDEX: 32.13 KG/M2 | WEIGHT: 216.94 LBS | HEIGHT: 69 IN

## 2022-09-21 DIAGNOSIS — G47.33 OSA (OBSTRUCTIVE SLEEP APNEA): Primary | ICD-10-CM

## 2022-09-21 DIAGNOSIS — Z78.9 INTOLERANCE OF CONTINUOUS POSITIVE AIRWAY PRESSURE (CPAP) VENTILATION: ICD-10-CM

## 2022-09-21 PROCEDURE — 3044F HG A1C LEVEL LT 7.0%: CPT | Mod: CPTII,S$GLB,, | Performed by: OTOLARYNGOLOGY

## 2022-09-21 PROCEDURE — 3008F PR BODY MASS INDEX (BMI) DOCUMENTED: ICD-10-PCS | Mod: CPTII,S$GLB,, | Performed by: OTOLARYNGOLOGY

## 2022-09-21 PROCEDURE — 99999 PR PBB SHADOW E&M-EST. PATIENT-LVL III: CPT | Mod: PBBFAC,,, | Performed by: OTOLARYNGOLOGY

## 2022-09-21 PROCEDURE — 99213 PR OFFICE/OUTPT VISIT, EST, LEVL III, 20-29 MIN: ICD-10-PCS | Mod: S$GLB,,, | Performed by: OTOLARYNGOLOGY

## 2022-09-21 PROCEDURE — 3008F BODY MASS INDEX DOCD: CPT | Mod: CPTII,S$GLB,, | Performed by: OTOLARYNGOLOGY

## 2022-09-21 PROCEDURE — 99213 OFFICE O/P EST LOW 20 MIN: CPT | Mod: S$GLB,,, | Performed by: OTOLARYNGOLOGY

## 2022-09-21 PROCEDURE — 4010F ACE/ARB THERAPY RXD/TAKEN: CPT | Mod: CPTII,S$GLB,, | Performed by: OTOLARYNGOLOGY

## 2022-09-21 PROCEDURE — 1159F PR MEDICATION LIST DOCUMENTED IN MEDICAL RECORD: ICD-10-PCS | Mod: CPTII,S$GLB,, | Performed by: OTOLARYNGOLOGY

## 2022-09-21 PROCEDURE — 1160F RVW MEDS BY RX/DR IN RCRD: CPT | Mod: CPTII,S$GLB,, | Performed by: OTOLARYNGOLOGY

## 2022-09-21 PROCEDURE — 3044F PR MOST RECENT HEMOGLOBIN A1C LEVEL <7.0%: ICD-10-PCS | Mod: CPTII,S$GLB,, | Performed by: OTOLARYNGOLOGY

## 2022-09-21 PROCEDURE — 1160F PR REVIEW ALL MEDS BY PRESCRIBER/CLIN PHARMACIST DOCUMENTED: ICD-10-PCS | Mod: CPTII,S$GLB,, | Performed by: OTOLARYNGOLOGY

## 2022-09-21 PROCEDURE — 4010F PR ACE/ARB THEARPY RXD/TAKEN: ICD-10-PCS | Mod: CPTII,S$GLB,, | Performed by: OTOLARYNGOLOGY

## 2022-09-21 PROCEDURE — 1159F MED LIST DOCD IN RCRD: CPT | Mod: CPTII,S$GLB,, | Performed by: OTOLARYNGOLOGY

## 2022-09-21 PROCEDURE — 99999 PR PBB SHADOW E&M-EST. PATIENT-LVL III: ICD-10-PCS | Mod: PBBFAC,,, | Performed by: OTOLARYNGOLOGY

## 2022-09-21 NOTE — LETTER
September 24, 2022      Ambar Abbott MD  [address]  [facility] [address]           [place] [facility]  [address] [place] [address]  Phone: [phone]  Fax: [phone]          Patient: Moris Calderón   MR Number: [id number]   YOB: 1955   Date of Visit: 9/21/2022       Dear No ref. provider found:    Thank you for referring Moris Calderón to me for evaluation. Attached you will find relevant portions of my assessment and plan of care.    If you have questions, please do not hesitate to call me. I look forward to following Moris Calderón along with you.    Sincerely,    Lance Wright MD    Enclosure  CC:    No Recipients    If you would like to receive this communication electronically, please contact [email or web address] or [phone] to request more information on [facility] Link access.    For providers and/or their staff who would like to refer a patient to [facility], please contact us through our one-stop-shop provider referral line, [facility], at [phone].    If you feel you have received this communication in error or would no longer like to receive these types of communications, please e-mail [email or web address]

## 2022-09-23 ENCOUNTER — OFFICE VISIT (OUTPATIENT)
Dept: PAIN MEDICINE | Facility: CLINIC | Age: 67
End: 2022-09-23
Payer: MEDICARE

## 2022-09-23 VITALS
WEIGHT: 214.63 LBS | HEIGHT: 69 IN | OXYGEN SATURATION: 98 % | HEART RATE: 63 BPM | DIASTOLIC BLOOD PRESSURE: 78 MMHG | SYSTOLIC BLOOD PRESSURE: 129 MMHG | BODY MASS INDEX: 31.79 KG/M2

## 2022-09-23 DIAGNOSIS — M48.061 SPINAL STENOSIS OF LUMBAR REGION WITHOUT NEUROGENIC CLAUDICATION: ICD-10-CM

## 2022-09-23 DIAGNOSIS — M50.30 DDD (DEGENERATIVE DISC DISEASE), CERVICAL: ICD-10-CM

## 2022-09-23 DIAGNOSIS — M54.12 CERVICAL RADICULOPATHY: ICD-10-CM

## 2022-09-23 DIAGNOSIS — M47.816 LUMBAR SPONDYLOSIS: Primary | ICD-10-CM

## 2022-09-23 DIAGNOSIS — M51.36 DDD (DEGENERATIVE DISC DISEASE), LUMBAR: ICD-10-CM

## 2022-09-23 PROCEDURE — 1125F AMNT PAIN NOTED PAIN PRSNT: CPT | Mod: CPTII,S$GLB,, | Performed by: PHYSICIAN ASSISTANT

## 2022-09-23 PROCEDURE — 1159F PR MEDICATION LIST DOCUMENTED IN MEDICAL RECORD: ICD-10-PCS | Mod: CPTII,S$GLB,, | Performed by: PHYSICIAN ASSISTANT

## 2022-09-23 PROCEDURE — 1101F PR PT FALLS ASSESS DOC 0-1 FALLS W/OUT INJ PAST YR: ICD-10-PCS | Mod: CPTII,S$GLB,, | Performed by: PHYSICIAN ASSISTANT

## 2022-09-23 PROCEDURE — 4010F ACE/ARB THERAPY RXD/TAKEN: CPT | Mod: CPTII,S$GLB,, | Performed by: PHYSICIAN ASSISTANT

## 2022-09-23 PROCEDURE — 1159F MED LIST DOCD IN RCRD: CPT | Mod: CPTII,S$GLB,, | Performed by: PHYSICIAN ASSISTANT

## 2022-09-23 PROCEDURE — 3008F BODY MASS INDEX DOCD: CPT | Mod: CPTII,S$GLB,, | Performed by: PHYSICIAN ASSISTANT

## 2022-09-23 PROCEDURE — 1125F PR PAIN SEVERITY QUANTIFIED, PAIN PRESENT: ICD-10-PCS | Mod: CPTII,S$GLB,, | Performed by: PHYSICIAN ASSISTANT

## 2022-09-23 PROCEDURE — 99214 PR OFFICE/OUTPT VISIT, EST, LEVL IV, 30-39 MIN: ICD-10-PCS | Mod: S$GLB,,, | Performed by: PHYSICIAN ASSISTANT

## 2022-09-23 PROCEDURE — 4010F PR ACE/ARB THEARPY RXD/TAKEN: ICD-10-PCS | Mod: CPTII,S$GLB,, | Performed by: PHYSICIAN ASSISTANT

## 2022-09-23 PROCEDURE — 3288F FALL RISK ASSESSMENT DOCD: CPT | Mod: CPTII,S$GLB,, | Performed by: PHYSICIAN ASSISTANT

## 2022-09-23 PROCEDURE — 3074F PR MOST RECENT SYSTOLIC BLOOD PRESSURE < 130 MM HG: ICD-10-PCS | Mod: CPTII,S$GLB,, | Performed by: PHYSICIAN ASSISTANT

## 2022-09-23 PROCEDURE — 3078F DIAST BP <80 MM HG: CPT | Mod: CPTII,S$GLB,, | Performed by: PHYSICIAN ASSISTANT

## 2022-09-23 PROCEDURE — 3078F PR MOST RECENT DIASTOLIC BLOOD PRESSURE < 80 MM HG: ICD-10-PCS | Mod: CPTII,S$GLB,, | Performed by: PHYSICIAN ASSISTANT

## 2022-09-23 PROCEDURE — 3008F PR BODY MASS INDEX (BMI) DOCUMENTED: ICD-10-PCS | Mod: CPTII,S$GLB,, | Performed by: PHYSICIAN ASSISTANT

## 2022-09-23 PROCEDURE — 1160F RVW MEDS BY RX/DR IN RCRD: CPT | Mod: CPTII,S$GLB,, | Performed by: PHYSICIAN ASSISTANT

## 2022-09-23 PROCEDURE — 1160F PR REVIEW ALL MEDS BY PRESCRIBER/CLIN PHARMACIST DOCUMENTED: ICD-10-PCS | Mod: CPTII,S$GLB,, | Performed by: PHYSICIAN ASSISTANT

## 2022-09-23 PROCEDURE — 3044F PR MOST RECENT HEMOGLOBIN A1C LEVEL <7.0%: ICD-10-PCS | Mod: CPTII,S$GLB,, | Performed by: PHYSICIAN ASSISTANT

## 2022-09-23 PROCEDURE — 3074F SYST BP LT 130 MM HG: CPT | Mod: CPTII,S$GLB,, | Performed by: PHYSICIAN ASSISTANT

## 2022-09-23 PROCEDURE — 3044F HG A1C LEVEL LT 7.0%: CPT | Mod: CPTII,S$GLB,, | Performed by: PHYSICIAN ASSISTANT

## 2022-09-23 PROCEDURE — 99999 PR PBB SHADOW E&M-EST. PATIENT-LVL V: ICD-10-PCS | Mod: PBBFAC,,, | Performed by: PHYSICIAN ASSISTANT

## 2022-09-23 PROCEDURE — 1101F PT FALLS ASSESS-DOCD LE1/YR: CPT | Mod: CPTII,S$GLB,, | Performed by: PHYSICIAN ASSISTANT

## 2022-09-23 PROCEDURE — 3288F PR FALLS RISK ASSESSMENT DOCUMENTED: ICD-10-PCS | Mod: CPTII,S$GLB,, | Performed by: PHYSICIAN ASSISTANT

## 2022-09-23 PROCEDURE — 99214 OFFICE O/P EST MOD 30 MIN: CPT | Mod: S$GLB,,, | Performed by: PHYSICIAN ASSISTANT

## 2022-09-23 PROCEDURE — 99999 PR PBB SHADOW E&M-EST. PATIENT-LVL V: CPT | Mod: PBBFAC,,, | Performed by: PHYSICIAN ASSISTANT

## 2022-09-23 RX ORDER — TIZANIDINE 4 MG/1
4 TABLET ORAL NIGHTLY PRN
Qty: 30 TABLET | Refills: 2 | Status: SHIPPED | OUTPATIENT
Start: 2022-09-23 | End: 2022-11-28

## 2022-09-23 RX ORDER — SODIUM CHLORIDE, SODIUM LACTATE, POTASSIUM CHLORIDE, CALCIUM CHLORIDE 600; 310; 30; 20 MG/100ML; MG/100ML; MG/100ML; MG/100ML
INJECTION, SOLUTION INTRAVENOUS CONTINUOUS
Status: CANCELLED | OUTPATIENT
Start: 2022-09-23

## 2022-09-24 NOTE — PROGRESS NOTES
Subjective:       Patient ID: Moris Calderón is a 67 y.o. male.    Chief Complaint: Post-op Evaluation (Po-inspire)      Moris is here for follow-up of HGN for HERNAN.   Doing well. Adherent with device nightly. Feels great subj benefit. Had some concerns about stimulation prior to sleep. Here to have equipment checked.    Patient validated questionnaires (if applicable):      %       No flowsheet data found.  No flowsheet data found.  No flowsheet data found.         Review of Systems   Constitutional: Negative for activity change and appetite change.   Respiratory: Negative for difficulty breathing and wheezing   Cardiovascular: Negative for chest pain.      Objective:        Constitutional:   Vital signs are normal. He appears well-developed and well-nourished.     Head:  Normocephalic and atraumatic.     Ears:  Hearing normal to normal and whispered voice; external ear normal without scars, lesions, or masses; ear canal, tympanic membrane, and middle ear normal..     Nose:  Nose normal including turbinates, nasal mucosa, sinuses and nasal septum.     Mouth/Throat  Oropharynx clear and moist without lesions or asymmetry.     Neck:  Neck normal without thyromegaly masses, asymmetry, normal tracheal structure, crepitus, and tenderness.       Tests / Results:  Stimulator working well, currently at 2.2  Range 1.6-2.6    Assessment:       1. HERNAN (obstructive sleep apnea)    2. Intolerance of continuous positive airway pressure (CPAP) ventilation          Plan:         Went over how device works. Working normally. Continuing to use and derive benefit. Scheduled for PSG next month. OK to proceed. Discussed going up on levels if able to tolerate.

## 2022-09-26 ENCOUNTER — PES CALL (OUTPATIENT)
Dept: ADMINISTRATIVE | Facility: CLINIC | Age: 67
End: 2022-09-26
Payer: MEDICARE

## 2022-09-29 NOTE — H&P (VIEW-ONLY)
This note was completed with dictation software and grammatical errors may exist.    CC:  Back pain, neck pain    HPI:  Patient is a 67-year-old man with a history of hypertension, GERD, arthritis, anxiety who presents in self-referral for low back pain radiating into the leg and neck pain.  He returns in follow-up today with back pain greater than neck pain.  He describes constant pain across the low back similar to what he has had prior to radiofrequency ablation.  He also complains of pain radiating from his neck to his upper back, shoulders and down his right arm.  He describes worsening pain at night.  He has intermittent weakness in his right arm.  He denies numbness.      Previous History:  He reports having low back pain for many years, greater than 15.  Since 2012 he has been seeing Dr. Raffy Vásquez, pain management on the [place] and had been undergoing injections for his back.  He states that the injections would usually help 1-2 years at a time to get rid of back pain and leg pain.  In the last year he had several injections that did not provide any relief and so they obtained a new MRI which showed significant degeneration at the level above where he had his previous injections, now showing issues at L3/4.  He was referred to Neurosurgery who recommended physical therapy.  He tried physical therapy for several months and it did help but he did not continue the exercises on his own.    He states that his pain is currently located across the bilateral low back, worse with working in the yard, bending over, sitting or standing too long.  It radiates along his right anterior thigh to about his knee.  He describes it as aching, dull, tight, deep, sharp and shooting.  Is worse with prolonged sitting or standing, lying down, bending or walking, doing any lifting or getting out of a bed or a chair.  He gets some relief with rest, activity, lying down, medications, injections and physical therapy.  He also  "takes ibuprofen and Tylenol some relief.  His other complaint is neck pain located in the bilateral neck, worse with turning his head from side to side, it radiates into the shoulders and into his upper back at times.  He denies juan weakness in his arms.  He states that he had undergone what sounds like a medial branch block bilaterally which did help but he had not proceeded with the radiofrequency part because the viral pandemic began.  He denies any balance issues, no weakness in the arms or legs.    Pain intervention history:  He reports undergoing multiple injections over the years with very good relief, the last 2 did not seem to help.  From Dr. Raffy Vásquez notes: "The patient underwent bilateral C4, 5, 6 medial branch block on 02/04/2020 with 80% relief.  He did not proceed with the RFA.  He had undergone bilateral L4 and L5 transforaminal injection on 10/24/2019 with 50% relief for 1 month.  He had undergone bilateral L4/5 TF VIET on 02/23/2017 with 80% relief for 2 years.  He underwent bilateral L4 and L5 transforaminal injection on 12/05/2019 without much relief."  He is status post bilateral L4/5 transforaminal epidural steroid injection on 02/11/2021 0% relief.  He is status post bilateral L2,3,4,5 medial branch radiofrequency ablation on 03/20/2021 with 60% relief. He is status post bilateral C4, 5, 6 medial branch block on 01/10/2022 with only about 20% relief of his bilateral neck pain.     Antineuropathics:  Had taken gabapentin in the past and did not find it helpful.  NSAIDs: he takes ibuprofen and Tylenol with some relief  Physical therapy:  Antidepressants: Paxil  Muscle relaxers:   Zanaflex 4 milligrams at night helps  Opioids:  Antiplatelets/Anticoagulants:        ROS: He reports back pain, joint stiffness, anxiety.  Balance of review of systems is negative.    Lab Results   Component Value Date    HGBA1C 5.9 (H) 08/04/2022       Lab Results   Component Value Date    WBC 4.50 05/27/2021    " HGB 13.1 (L) 05/27/2021    HCT 38.9 (L) 05/27/2021    MCV 93 05/27/2021     05/27/2021             Past Medical History:   Diagnosis Date    Anxiety     Arthritis     Back problem     bulging disc    GERD (gastroesophageal reflux disease)     Hypercholesterolemia 11/02/2015    2009: Began statin.    Hyperlipidemia     Hypertension, benign 11/02/2015    2008: Diagnosed.    Sleep apnea     uses implant       Past Surgical History:   Procedure Laterality Date    APPENDECTOMY      ARTHROSCOPIC REPAIR OF ROTATOR CUFF OF SHOULDER Right 12/21/2018    Procedure: REPAIR, ROTATOR CUFF, ARTHROSCOPIC;  Surgeon: Colby Valenzuela MD;  Location: [facility] OR;  Service: Orthopedics;  Laterality: Right;    ARTHROSCOPY OF SHOULDER WITH DECOMPRESSION OF SUBACROMIAL SPACE Right 12/21/2018    Procedure: ARTHROSCOPY, SHOULDER, WITH SUBACROMIAL SPACE DECOMPRESSION;  Surgeon: Colby Valenzuela MD;  Location: [facility] OR;  Service: Orthopedics;  Laterality: Right;    BICEPS TENDON REPAIR Right 12/21/2018    Procedure: REPAIR, TENDON, BICEPS;  Surgeon: Colby Valenzuela MD;  Location: [facility] OR;  Service: Orthopedics;  Laterality: Right;    COLONOSCOPY N/A 7/27/2022    Procedure: COLONOSCOPY;  Surgeon: Dwight Lewis MD;  Location: [facility] ENDO;  Service: Endoscopy;  Laterality: N/A;    magdalene      INJECTION OF ANESTHETIC AGENT AROUND MEDIAL BRANCH NERVES INNERVATING CERVICAL FACET JOINT Bilateral 1/10/2022    Procedure: Block-nerve-medial branch-cervical C4 C5 C6;  Surgeon: Landon Winchester MD;  Location: [facility] OR;  Service: Pain Management;  Laterality: Bilateral;    INJECTION OF ANESTHETIC AGENT AROUND MEDIAL BRANCH NERVES INNERVATING LUMBAR FACET JOINT Bilateral 3/10/2021    Procedure: Block-nerve-medial branch-lumbar L2,L3,L4, L5;  Surgeon: Landon Winchester MD;  Location: [facility] OR;  Service: Pain Management;  Laterality: Bilateral;    INSERTION, NEUROSTIMULATOR, HYPOGLOSSAL Right 3/17/2022    Procedure:  INSERTION,NEUROSTIMULATOR,HYPOGLOSSAL;  Surgeon: Lance Wright MD;  Location: [facility] OR;  Service: ENT;  Laterality: Right;    KNEE ARTHROSCOPY W/ MENISCAL REPAIR Left 2015    MMT      Left knee A-scope    NOSE SURGERY      RADIOFREQUENCY ABLATION OF LUMBAR MEDIAL BRANCH NERVE AT SINGLE LEVEL Bilateral 3/23/2021    Procedure: Radiofrequency Ablation, Nerve, Spinal, Lumbar, Medial Branch, L2,LL3,L4,L5;  Surgeon: Landon Winchester MD;  Location: [facility] OR;  Service: Pain Management;  Laterality: Bilateral;    SLEEP ENDOSCOPY, DRUG-INDUCED Bilateral 1/28/2022    Procedure: SLEEP ENDOSCOPY,DRUG-INDUCED;  Surgeon: Lance Wright MD;  Location: [facility] OR;  Service: ENT;  Laterality: Bilateral;    TRANSFORAMINAL EPIDURAL INJECTION OF STEROID Bilateral 2/11/2021    Procedure: Injection,steroid,epidural,transforaminal approach L4/5;  Surgeon: Landon Winchester MD;  Location: [facility] OR;  Service: Pain Management;  Laterality: Bilateral;    UVULECTOMY         Social History     Socioeconomic History    Marital status:    Tobacco Use    Smoking status: Never    Smokeless tobacco: Never   Substance and Sexual Activity    Alcohol use: Yes     Alcohol/week: 5.0 standard drinks     Types: 5 Cans of beer per week     Comment: weekly     Drug use: No    Sexual activity: Yes     Social Determinants of Health     Financial Resource Strain: Low Risk     Difficulty of Paying Living Expenses: Not hard at all   Food Insecurity: No Food Insecurity    Worried About Running Out of Food in the Last Year: Never true    Ran Out of Food in the Last Year: Never true   Transportation Needs: No Transportation Needs    Lack of Transportation (Medical): No    Lack of Transportation (Non-Medical): No   Physical Activity: Unknown    Days of Exercise per Week: 0 days   Stress: Stress Concern Present    Feeling of Stress : Very much   Social Connections: Unknown    Frequency of Communication with Friends and Family: More than three times a week  "   Frequency of Social Gatherings with Friends and Family: More than three times a week    Active Member of Clubs or Organizations: No    Marital Status:    Housing Stability: Low Risk     Unable to Pay for Housing in the Last Year: No    Number of Places Lived in the Last Year: 1    Unstable Housing in the Last Year: No         Medications/Allergies: See med card    Vitals:    09/23/22 1312   BP: 129/78   Pulse: 63   SpO2: 98%   Weight: 97.3 kg (214 lb 9.9 oz)   Height: 5' 9" (1.753 m)   PainSc:   6   PainLoc: Back         Physical exam:  Gen: A and O x3, pleasant, well-groomed  Skin: No rashes or obvious lesions  HEENT: PERRLA, no obvious deformities on ears or in canals. Trachea midline.  CVS: Regular rate and rhythm, normal palpable pulses.  Resp:No increased work of breathing, symmetrical chest rise.  Abdomen: Soft, NT/ND.  Musculoskeletal: Able to heel walk, toe walk. No antalgic gait.     Neuro:  Upper extremities: 5/5 strength bilaterally   Lower extremities: 5/5 strength bilaterally  Reflexes: Brachioradialis 2+, Bicep 2+, Tricep 2+. Patellar 1+  Right side, 0+ left side, Achilles 1+ bilaterally.  Sensory:  Intact and symmetrical to light touch and pinprick in L2-S1 dermatomes bilaterally. Intact and symmetrical to light touch and pinprick in C2-T1 dermatomes bilaterally.    Cervical Spine:  Cervical spine: ROM is full in flexion, extension and lateral rotation   With increased pain on extension and especially oblique extension to either side.  Spurling's maneuver causes neck pain to either side.  Myofascial exam: No Tenderness to palpation across cervical paraspinous region bilaterally.    Lumbar spine:  Lumbar spine:   Range of motion is moderately reduced with both flexion and extension with increased pain on flexion greater than extension.  Oblique extension causes pain on the corresponding side.  Jorge's test causes no increased pain on either side.    Supine straight leg raise is negative " bilaterally.  Internal and external rotation of the hip causes no increased pain on either side.  Myofascial exam: No tenderness to palpation across lumbar paraspinous muscles.        Imaging:    MRI 01/21/2020:  I reviewed this image with the patient, outside imaging.  This is most significant for moderate disc degeneration at L4/5 with some bilateral foraminal narrowing to moderate degree, L3/4 shows severe disc degeneration with Modic endplate changes and some increased signal within the disc at L3/4.  There is moderate to severe foraminal narrowing to the left and moderate out to the right side.    MRI cervical spine 01/08/2021 outside institution.  I review the images personally.  There appears to be a reversal of the cervical lordosis.  There is disc bulging at C2/3 slightly to the right side deforming the thecal sac and causing possible foraminal narrowing to the right side.  At C3/4 there is mild disc bulging but no canal stenosis.  At C4/5 there is decreased disc height, slight disc bulging but no canal narrowing.  At C5/6 there is larger disc bulge with protrusion slightly more to the right side causing bilateral foraminal stenosis and appears to deform the cord.  At C6/7 there is a disc bulging that contacts the cord as well.  C7/T1 mild disc bulging but no cord contact.  There is no abnormal signal within the cord.    1/8/22 MRI C-spine:  Subtle reversal of normal lordosis between C4 and C7 is unchanged.  There is minimal grade 1 retrolisthesis of C2 on C3.  Alignment is otherwise within normal limits.  Minimal anterior wedging of C5, C6 and C7 is unchanged.  High STIR Modic signal changes at C7/T1 are new since the prior study.   There is no signal abnormality in the included cord or posterior fossa.  Paravertebral soft tissues are within normal limits.  There is diffuse desiccation of the intervertebral discs of the cervical spine.   At C2/3, prominent facet and uncovertebral hypertrophy cause mild  bilateral foraminal narrowing.  Prominent posterior spondylosis is also seen at this level.   At C3/4, there is a midline dorsal disc protrusion, facet arthropathy and uncovertebral hypertrophy.  There is deformity of the ventral margin of the thecal sac without central canal stenosis.  Facet and uncovertebral hypertrophy cause moderate left foraminal narrowing.   At C4/5, there is a midline dorsal disc protrusion which deforms the ventral margin of the thecal sac but causes no canal stenosis.  Prominent left-sided facet and uncovertebral hypertrophy cause moderate left foraminal narrowing.   At C5/6, there is loss of disc height and broad-based dorsal disc bulging asymmetric to the right.  There is narrowing of the right lateral recess of the canal and borderline right foraminal narrowing.  The AP midline diameter of the canal is 11 mm at this level.   At C6/7, there is minimal concentric disc bulging, facet arthropathy and uncovertebral hypertrophy, causing mild right foraminal narrowing.   At C7/T1, there is mild broad-based dorsal disc bulging which causes no canal or foraminal stenosis.    1/8/22 MRI L-spine:   1. Spondylosis, disc disease, facet arthrosis and thickening of the ligamentum flavum as well as endplate degenerative changes as described above, worse at L3-L4 and L4-L5.  2. There is mild retrolisthesis of L3 on L4 with otherwise normal alignment.  3. There is mild central canal stenosis at L3-L4 and severe central canal stenosis at L4-L5.  4. Multilevel neural foraminal narrowing, worse at L2-L3, L3-L4 and particularly at L4-L5 as described.    Assessment:   Patient is a 67-year-old man with a history of hypertension, GERD, arthritis, anxiety who presents in self-referral for low back pain radiating into the leg and neck pain.    1. Lumbar spondylosis  Vital signs    Place 18-22 [facility] IV     Verify informed consent    Notify physician     Notify physician     Notify physician (specify)     Diet NPO    Case Request Operating Room: Radiofrequency Ablation, Nerve, Spinal, Lumbar, Medial Branch, L4/5, L5/S1    Place in Outpatient    lactated ringers infusion      2. Cervical radiculopathy        3. Spinal stenosis of lumbar region without neurogenic claudication        4. DDD (degenerative disc disease), cervical        5. DDD (degenerative disc disease), lumbar            Plan:  1. I will schedule the patient to repeat bilateral L4/5 and L5/S1 medial branch radiofrequency ablation.  We discussed that if he has relief but not sufficient we can also repeat bilateral L3/4 medial branch radiofrequency ablation.  2. We discussed considering a cervical VIET for his neck and right arm pain in the future.    3. He will take tizanidine at night as needed.  4. Follow-up in 4 weeks postprocedure or sooner as needed.

## 2022-09-29 NOTE — PROGRESS NOTES
This note was completed with dictation software and grammatical errors may exist.    CC:  Back pain, neck pain    HPI:  Patient is a 67-year-old man with a history of hypertension, GERD, arthritis, anxiety who presents in self-referral for low back pain radiating into the leg and neck pain.  He returns in follow-up today with back pain greater than neck pain.  He describes constant pain across the low back similar to what he has had prior to radiofrequency ablation.  He also complains of pain radiating from his neck to his upper back, shoulders and down his right arm.  He describes worsening pain at night.  He has intermittent weakness in his right arm.  He denies numbness.      Previous History:  He reports having low back pain for many years, greater than 15.  Since 2012 he has been seeing Dr. Raffy Vásquez, pain management on the [place] and had been undergoing injections for his back.  He states that the injections would usually help 1-2 years at a time to get rid of back pain and leg pain.  In the last year he had several injections that did not provide any relief and so they obtained a new MRI which showed significant degeneration at the level above where he had his previous injections, now showing issues at L3/4.  He was referred to Neurosurgery who recommended physical therapy.  He tried physical therapy for several months and it did help but he did not continue the exercises on his own.    He states that his pain is currently located across the bilateral low back, worse with working in the yard, bending over, sitting or standing too long.  It radiates along his right anterior thigh to about his knee.  He describes it as aching, dull, tight, deep, sharp and shooting.  Is worse with prolonged sitting or standing, lying down, bending or walking, doing any lifting or getting out of a bed or a chair.  He gets some relief with rest, activity, lying down, medications, injections and physical therapy.  He also  "takes ibuprofen and Tylenol some relief.  His other complaint is neck pain located in the bilateral neck, worse with turning his head from side to side, it radiates into the shoulders and into his upper back at times.  He denies juan weakness in his arms.  He states that he had undergone what sounds like a medial branch block bilaterally which did help but he had not proceeded with the radiofrequency part because the viral pandemic began.  He denies any balance issues, no weakness in the arms or legs.    Pain intervention history:  He reports undergoing multiple injections over the years with very good relief, the last 2 did not seem to help.  From Dr. Raffy Vásquez notes: "The patient underwent bilateral C4, 5, 6 medial branch block on 02/04/2020 with 80% relief.  He did not proceed with the RFA.  He had undergone bilateral L4 and L5 transforaminal injection on 10/24/2019 with 50% relief for 1 month.  He had undergone bilateral L4/5 TF VIET on 02/23/2017 with 80% relief for 2 years.  He underwent bilateral L4 and L5 transforaminal injection on 12/05/2019 without much relief."  He is status post bilateral L4/5 transforaminal epidural steroid injection on 02/11/2021 0% relief.  He is status post bilateral L2,3,4,5 medial branch radiofrequency ablation on 03/20/2021 with 60% relief. He is status post bilateral C4, 5, 6 medial branch block on 01/10/2022 with only about 20% relief of his bilateral neck pain.     Antineuropathics:  Had taken gabapentin in the past and did not find it helpful.  NSAIDs: he takes ibuprofen and Tylenol with some relief  Physical therapy:  Antidepressants: Paxil  Muscle relaxers:   Zanaflex 4 milligrams at night helps  Opioids:  Antiplatelets/Anticoagulants:        ROS: He reports back pain, joint stiffness, anxiety.  Balance of review of systems is negative.    Lab Results   Component Value Date    HGBA1C 5.9 (H) 08/04/2022       Lab Results   Component Value Date    WBC 4.50 05/27/2021    " HGB 13.1 (L) 05/27/2021    HCT 38.9 (L) 05/27/2021    MCV 93 05/27/2021     05/27/2021             Past Medical History:   Diagnosis Date    Anxiety     Arthritis     Back problem     bulging disc    GERD (gastroesophageal reflux disease)     Hypercholesterolemia 11/02/2015    2009: Began statin.    Hyperlipidemia     Hypertension, benign 11/02/2015    2008: Diagnosed.    Sleep apnea     uses implant       Past Surgical History:   Procedure Laterality Date    APPENDECTOMY      ARTHROSCOPIC REPAIR OF ROTATOR CUFF OF SHOULDER Right 12/21/2018    Procedure: REPAIR, ROTATOR CUFF, ARTHROSCOPIC;  Surgeon: Colby Valenzuela MD;  Location: [facility] OR;  Service: Orthopedics;  Laterality: Right;    ARTHROSCOPY OF SHOULDER WITH DECOMPRESSION OF SUBACROMIAL SPACE Right 12/21/2018    Procedure: ARTHROSCOPY, SHOULDER, WITH SUBACROMIAL SPACE DECOMPRESSION;  Surgeon: Colby Valenzuela MD;  Location: [facility] OR;  Service: Orthopedics;  Laterality: Right;    BICEPS TENDON REPAIR Right 12/21/2018    Procedure: REPAIR, TENDON, BICEPS;  Surgeon: Colby Valenzuela MD;  Location: [facility] OR;  Service: Orthopedics;  Laterality: Right;    COLONOSCOPY N/A 7/27/2022    Procedure: COLONOSCOPY;  Surgeon: Dwight Lewis MD;  Location: [facility] ENDO;  Service: Endoscopy;  Laterality: N/A;    magdalene      INJECTION OF ANESTHETIC AGENT AROUND MEDIAL BRANCH NERVES INNERVATING CERVICAL FACET JOINT Bilateral 1/10/2022    Procedure: Block-nerve-medial branch-cervical C4 C5 C6;  Surgeon: Landon Winchester MD;  Location: [facility] OR;  Service: Pain Management;  Laterality: Bilateral;    INJECTION OF ANESTHETIC AGENT AROUND MEDIAL BRANCH NERVES INNERVATING LUMBAR FACET JOINT Bilateral 3/10/2021    Procedure: Block-nerve-medial branch-lumbar L2,L3,L4, L5;  Surgeon: Landon Winchester MD;  Location: [facility] OR;  Service: Pain Management;  Laterality: Bilateral;    INSERTION, NEUROSTIMULATOR, HYPOGLOSSAL Right 3/17/2022    Procedure:  INSERTION,NEUROSTIMULATOR,HYPOGLOSSAL;  Surgeon: Lance Wright MD;  Location: [facility] OR;  Service: ENT;  Laterality: Right;    KNEE ARTHROSCOPY W/ MENISCAL REPAIR Left 2015    MMT      Left knee A-scope    NOSE SURGERY      RADIOFREQUENCY ABLATION OF LUMBAR MEDIAL BRANCH NERVE AT SINGLE LEVEL Bilateral 3/23/2021    Procedure: Radiofrequency Ablation, Nerve, Spinal, Lumbar, Medial Branch, L2,LL3,L4,L5;  Surgeon: Landon Winchester MD;  Location: [facility] OR;  Service: Pain Management;  Laterality: Bilateral;    SLEEP ENDOSCOPY, DRUG-INDUCED Bilateral 1/28/2022    Procedure: SLEEP ENDOSCOPY,DRUG-INDUCED;  Surgeon: Lance Wright MD;  Location: [facility] OR;  Service: ENT;  Laterality: Bilateral;    TRANSFORAMINAL EPIDURAL INJECTION OF STEROID Bilateral 2/11/2021    Procedure: Injection,steroid,epidural,transforaminal approach L4/5;  Surgeon: Landon Winchester MD;  Location: [facility] OR;  Service: Pain Management;  Laterality: Bilateral;    UVULECTOMY         Social History     Socioeconomic History    Marital status:    Tobacco Use    Smoking status: Never    Smokeless tobacco: Never   Substance and Sexual Activity    Alcohol use: Yes     Alcohol/week: 5.0 standard drinks     Types: 5 Cans of beer per week     Comment: weekly     Drug use: No    Sexual activity: Yes     Social Determinants of Health     Financial Resource Strain: Low Risk     Difficulty of Paying Living Expenses: Not hard at all   Food Insecurity: No Food Insecurity    Worried About Running Out of Food in the Last Year: Never true    Ran Out of Food in the Last Year: Never true   Transportation Needs: No Transportation Needs    Lack of Transportation (Medical): No    Lack of Transportation (Non-Medical): No   Physical Activity: Unknown    Days of Exercise per Week: 0 days   Stress: Stress Concern Present    Feeling of Stress : Very much   Social Connections: Unknown    Frequency of Communication with Friends and Family: More than three times a week  "   Frequency of Social Gatherings with Friends and Family: More than three times a week    Active Member of Clubs or Organizations: No    Marital Status:    Housing Stability: Low Risk     Unable to Pay for Housing in the Last Year: No    Number of Places Lived in the Last Year: 1    Unstable Housing in the Last Year: No         Medications/Allergies: See med card    Vitals:    09/23/22 1312   BP: 129/78   Pulse: 63   SpO2: 98%   Weight: 97.3 kg (214 lb 9.9 oz)   Height: 5' 9" (1.753 m)   PainSc:   6   PainLoc: Back         Physical exam:  Gen: A and O x3, pleasant, well-groomed  Skin: No rashes or obvious lesions  HEENT: PERRLA, no obvious deformities on ears or in canals. Trachea midline.  CVS: Regular rate and rhythm, normal palpable pulses.  Resp:No increased work of breathing, symmetrical chest rise.  Abdomen: Soft, NT/ND.  Musculoskeletal: Able to heel walk, toe walk. No antalgic gait.     Neuro:  Upper extremities: 5/5 strength bilaterally   Lower extremities: 5/5 strength bilaterally  Reflexes: Brachioradialis 2+, Bicep 2+, Tricep 2+. Patellar 1+  Right side, 0+ left side, Achilles 1+ bilaterally.  Sensory:  Intact and symmetrical to light touch and pinprick in L2-S1 dermatomes bilaterally. Intact and symmetrical to light touch and pinprick in C2-T1 dermatomes bilaterally.    Cervical Spine:  Cervical spine: ROM is full in flexion, extension and lateral rotation   With increased pain on extension and especially oblique extension to either side.  Spurling's maneuver causes neck pain to either side.  Myofascial exam: No Tenderness to palpation across cervical paraspinous region bilaterally.    Lumbar spine:  Lumbar spine:   Range of motion is moderately reduced with both flexion and extension with increased pain on flexion greater than extension.  Oblique extension causes pain on the corresponding side.  Jorge's test causes no increased pain on either side.    Supine straight leg raise is negative " bilaterally.  Internal and external rotation of the hip causes no increased pain on either side.  Myofascial exam: No tenderness to palpation across lumbar paraspinous muscles.        Imaging:    MRI 01/21/2020:  I reviewed this image with the patient, outside imaging.  This is most significant for moderate disc degeneration at L4/5 with some bilateral foraminal narrowing to moderate degree, L3/4 shows severe disc degeneration with Modic endplate changes and some increased signal within the disc at L3/4.  There is moderate to severe foraminal narrowing to the left and moderate out to the right side.    MRI cervical spine 01/08/2021 outside institution.  I review the images personally.  There appears to be a reversal of the cervical lordosis.  There is disc bulging at C2/3 slightly to the right side deforming the thecal sac and causing possible foraminal narrowing to the right side.  At C3/4 there is mild disc bulging but no canal stenosis.  At C4/5 there is decreased disc height, slight disc bulging but no canal narrowing.  At C5/6 there is larger disc bulge with protrusion slightly more to the right side causing bilateral foraminal stenosis and appears to deform the cord.  At C6/7 there is a disc bulging that contacts the cord as well.  C7/T1 mild disc bulging but no cord contact.  There is no abnormal signal within the cord.    1/8/22 MRI C-spine:  Subtle reversal of normal lordosis between C4 and C7 is unchanged.  There is minimal grade 1 retrolisthesis of C2 on C3.  Alignment is otherwise within normal limits.  Minimal anterior wedging of C5, C6 and C7 is unchanged.  High STIR Modic signal changes at C7/T1 are new since the prior study.   There is no signal abnormality in the included cord or posterior fossa.  Paravertebral soft tissues are within normal limits.  There is diffuse desiccation of the intervertebral discs of the cervical spine.   At C2/3, prominent facet and uncovertebral hypertrophy cause mild  bilateral foraminal narrowing.  Prominent posterior spondylosis is also seen at this level.   At C3/4, there is a midline dorsal disc protrusion, facet arthropathy and uncovertebral hypertrophy.  There is deformity of the ventral margin of the thecal sac without central canal stenosis.  Facet and uncovertebral hypertrophy cause moderate left foraminal narrowing.   At C4/5, there is a midline dorsal disc protrusion which deforms the ventral margin of the thecal sac but causes no canal stenosis.  Prominent left-sided facet and uncovertebral hypertrophy cause moderate left foraminal narrowing.   At C5/6, there is loss of disc height and broad-based dorsal disc bulging asymmetric to the right.  There is narrowing of the right lateral recess of the canal and borderline right foraminal narrowing.  The AP midline diameter of the canal is 11 mm at this level.   At C6/7, there is minimal concentric disc bulging, facet arthropathy and uncovertebral hypertrophy, causing mild right foraminal narrowing.   At C7/T1, there is mild broad-based dorsal disc bulging which causes no canal or foraminal stenosis.    1/8/22 MRI L-spine:   1. Spondylosis, disc disease, facet arthrosis and thickening of the ligamentum flavum as well as endplate degenerative changes as described above, worse at L3-L4 and L4-L5.  2. There is mild retrolisthesis of L3 on L4 with otherwise normal alignment.  3. There is mild central canal stenosis at L3-L4 and severe central canal stenosis at L4-L5.  4. Multilevel neural foraminal narrowing, worse at L2-L3, L3-L4 and particularly at L4-L5 as described.    Assessment:   Patient is a 67-year-old man with a history of hypertension, GERD, arthritis, anxiety who presents in self-referral for low back pain radiating into the leg and neck pain.    1. Lumbar spondylosis  Vital signs    Place 18-22 [facility] IV     Verify informed consent    Notify physician     Notify physician     Notify physician (specify)     Diet NPO    Case Request Operating Room: Radiofrequency Ablation, Nerve, Spinal, Lumbar, Medial Branch, L4/5, L5/S1    Place in Outpatient    lactated ringers infusion      2. Cervical radiculopathy        3. Spinal stenosis of lumbar region without neurogenic claudication        4. DDD (degenerative disc disease), cervical        5. DDD (degenerative disc disease), lumbar            Plan:  1. I will schedule the patient to repeat bilateral L4/5 and L5/S1 medial branch radiofrequency ablation.  We discussed that if he has relief but not sufficient we can also repeat bilateral L3/4 medial branch radiofrequency ablation.  2. We discussed considering a cervical VIET for his neck and right arm pain in the future.    3. He will take tizanidine at night as needed.  4. Follow-up in 4 weeks postprocedure or sooner as needed.

## 2022-10-06 ENCOUNTER — HOSPITAL ENCOUNTER (OUTPATIENT)
Facility: HOSPITAL | Age: 67
Discharge: HOME | End: 2022-10-06
Attending: ANESTHESIOLOGY | Admitting: ANESTHESIOLOGY
Payer: MEDICARE

## 2022-10-06 ENCOUNTER — HOSPITAL ENCOUNTER (OUTPATIENT)
Dept: RADIOLOGY | Facility: HOSPITAL | Age: 67
Discharge: HOME | End: 2022-10-06
Attending: ANESTHESIOLOGY | Admitting: ANESTHESIOLOGY
Payer: MEDICARE

## 2022-10-06 DIAGNOSIS — M47.816 LUMBAR SPONDYLOSIS: ICD-10-CM

## 2022-10-06 DIAGNOSIS — M54.50 LOWER BACK PAIN: ICD-10-CM

## 2022-10-06 PROCEDURE — A4216 STERILE WATER/SALINE, 10 ML: HCPCS | Mod: PO | Performed by: ANESTHESIOLOGY

## 2022-10-06 PROCEDURE — 64493 INJ PARAVERT F JNT L/S 1 LEV: CPT | Mod: 50,PO | Performed by: ANESTHESIOLOGY

## 2022-10-06 PROCEDURE — 63600175 PHARM REV CODE 636 W HCPCS: Mod: PO | Performed by: ANESTHESIOLOGY

## 2022-10-06 PROCEDURE — 64635 DESTROY LUMB/SAC FACET JNT: CPT | Mod: 50,,, | Performed by: ANESTHESIOLOGY

## 2022-10-06 PROCEDURE — 64635 DESTROY LUMB/SAC FACET JNT: CPT | Mod: 50 | Performed by: ANESTHESIOLOGY

## 2022-10-06 PROCEDURE — 64636 DESTROY L/S FACET JNT ADDL: CPT | Mod: 50 | Performed by: ANESTHESIOLOGY

## 2022-10-06 PROCEDURE — 99152 PR MOD CONSCIOUS SEDATION, SAME PHYS, 5+ YRS, FIRST 15 MIN: ICD-10-PCS | Mod: ,,, | Performed by: ANESTHESIOLOGY

## 2022-10-06 PROCEDURE — 99152 MOD SED SAME PHYS/QHP 5/>YRS: CPT | Mod: ,,, | Performed by: ANESTHESIOLOGY

## 2022-10-06 PROCEDURE — 76000 FLUOROSCOPY <1 HR PHYS/QHP: CPT | Mod: TC,PO

## 2022-10-06 PROCEDURE — 25000003 PHARM REV CODE 250: Mod: PO | Performed by: ANESTHESIOLOGY

## 2022-10-06 PROCEDURE — 64636 PR DESTROY L/S FACET JNT ADDL: ICD-10-PCS | Mod: 50,,, | Performed by: ANESTHESIOLOGY

## 2022-10-06 PROCEDURE — 64636 DESTROY L/S FACET JNT ADDL: CPT | Mod: 50,,, | Performed by: ANESTHESIOLOGY

## 2022-10-06 PROCEDURE — 64635 PR DESTROY LUMB/SAC FACET JNT: ICD-10-PCS | Mod: 50,,, | Performed by: ANESTHESIOLOGY

## 2022-10-06 PROCEDURE — 64494 INJ PARAVERT F JNT L/S 2 LEV: CPT | Mod: 50,PO | Performed by: ANESTHESIOLOGY

## 2022-10-06 RX ORDER — LIDOCAINE HYDROCHLORIDE 20 MG/ML
INJECTION, SOLUTION EPIDURAL; INFILTRATION; INTRACAUDAL; PERINEURAL
Status: DISCONTINUED | OUTPATIENT
Start: 2022-10-06 | End: 2022-10-06 | Stop reason: HOSPADM

## 2022-10-06 RX ORDER — SODIUM CHLORIDE 9 MG/ML
INJECTION, SOLUTION INTRAMUSCULAR; INTRAVENOUS; SUBCUTANEOUS
Status: DISCONTINUED | OUTPATIENT
Start: 2022-10-06 | End: 2022-10-06 | Stop reason: HOSPADM

## 2022-10-06 RX ORDER — LIDOCAINE HYDROCHLORIDE 10 MG/ML
INJECTION, SOLUTION EPIDURAL; INFILTRATION; INTRACAUDAL; PERINEURAL
Status: DISCONTINUED | OUTPATIENT
Start: 2022-10-06 | End: 2022-10-06 | Stop reason: HOSPADM

## 2022-10-06 RX ORDER — SODIUM CHLORIDE, SODIUM LACTATE, POTASSIUM CHLORIDE, CALCIUM CHLORIDE 600; 310; 30; 20 MG/100ML; MG/100ML; MG/100ML; MG/100ML
INJECTION, SOLUTION INTRAVENOUS CONTINUOUS
Status: DISCONTINUED | OUTPATIENT
Start: 2022-10-06 | End: 2022-10-06 | Stop reason: HOSPADM

## 2022-10-06 RX ORDER — METHYLPREDNISOLONE ACETATE 40 MG/ML
INJECTION, SUSPENSION INTRA-ARTICULAR; INTRALESIONAL; INTRAMUSCULAR; SOFT TISSUE
Status: DISCONTINUED | OUTPATIENT
Start: 2022-10-06 | End: 2022-10-06 | Stop reason: HOSPADM

## 2022-10-06 RX ORDER — FENTANYL CITRATE 50 UG/ML
INJECTION, SOLUTION INTRAMUSCULAR; INTRAVENOUS
Status: DISCONTINUED | OUTPATIENT
Start: 2022-10-06 | End: 2022-10-06 | Stop reason: HOSPADM

## 2022-10-06 RX ORDER — MIDAZOLAM HYDROCHLORIDE 2 MG/2ML
INJECTION, SOLUTION INTRAMUSCULAR; INTRAVENOUS
Status: DISCONTINUED | OUTPATIENT
Start: 2022-10-06 | End: 2022-10-06 | Stop reason: HOSPADM

## 2022-10-06 RX ADMIN — SODIUM CHLORIDE, SODIUM LACTATE, POTASSIUM CHLORIDE, AND CALCIUM CHLORIDE: .6; .31; .03; .02 INJECTION, SOLUTION INTRAVENOUS at 01:10

## 2022-10-06 NOTE — OP NOTE
PROCEDURE DATE: 10/6/2022    PROCEDURE:  Radiofrequency ablation of the bilateral L4/5 and L5/S1 medial branch nerves on the bilateral-side utilizing fluoroscopy    DIAGNOSIS:  Lumbar spondylosis    Post op Diagnosis: Same    PHYSICIAN: Landon Winchester MD    MEDICATIONS INJECTED:  From a mixture of 4ml of 2% lidocaine and 40mg of methylprednisone, 1ml of this solution was injected at each level.    LOCAL ANESTHETIC USED: Lidocaine 1%, 4 ml given at each site.    SEDATION MEDICATIONS: 4mg versed, 50mcg fentanyl    ESTIMATED BLOOD LOSS:  none    COMPLICATIONS:  none    TECHNIQUE:  A time out was taken to identify patient and procedure side prior to starting the procedure. Laying in a prone position, the patient was prepped and draped in the usual sterile fashion using ChloraPrep and sterile towels.  The levels were determined under fluoroscopic guidance and then marked.  Local anesthetic was given by raising a wheal at the skin over each site and then infiltrated approximately 2cm deeper.  A 20-gauge  100 mm Wilfredo RF needle was introduced to the anatomic location of the right and then left L4/5, L5/S1 medial branch nerves.  Motor stimulation up to 2 Volts at each level confirmed no motor nerve involvement.  Impedance was less than 800 ohms at each level. The above noted medication was then injected slowly.  Ablation was performed per level utilizing Pearblossom radiofrequency generator 80°C for 90 seconds. The patient tolerated the procedure well.     The patient was monitored after the procedure.  Patient was given post procedure and discharge instructions to follow at home.  The patient was discharged in a stable condition    Event Time In   In Facility 1224   In Pre-Procedure 1240   Physician Available    Anesthesia Available    Pre-Op: Bedside Procedure Start    Pre-Op: Bedside Procedure Stop    Pre-Procedure Complete 1313   Out of Pre-Procedure    Anesthesia Start    Anesthesia Start Data Collection    Setup Start     Setup Complete    In Room 1344   Prep Start    Procedure Prep Complete    Procedure Start 1352   Procedure Closing    Emergence    Procedure Finish 1411   Sedation Start 1343   Scope In    Extent Reached    Scope Out    Sedation End 1411   Out of Room 1412   Cleanup Start    Cleanup Complete    Cosmetic Start    Cosmetic Stop    Pain Mgmt In Room    Pain Mgmt Out Room    In Recovery    Anesthesia Finish    Bedside Procedure Start    Bedside Procedure Stop    Recovery Care Complete    Out of Recovery    To Phase II    In Phase II    Pain Mgmt Recovery Start    Pain Mgmt Recovery Stop    Obs Rec Start    Obs Rec Stop    Phase II Care Complete    Out of Phase II    Procedural Care Complete    Discharge    Pain Follow Up Needed    Pain Follow Up Complete      Moderate sedation was achieved with midazolam 4 mg and fentanyl 50 mcg.  Continuous monitoring of EKG, blood pressure and pulse oximetry was provided by a registered nurse during the entire course of the procedure under my supervision and recorded in the patient's medical record.   Total time for sedation was 28 minutes.

## 2022-10-06 NOTE — DISCHARGE SUMMARY
[facility] - Surgery  Discharge Note  Short Stay    Procedure(s) (LRB):  Radiofrequency Ablation, Nerve, Spinal, Lumbar, Medial Branch, L4/5, L5/S1 (Bilateral)      OUTCOME: Patient tolerated treatment/procedure well without complication and is now ready for discharge.    DISPOSITION: Home or Self Care    FINAL DIAGNOSIS:  <principal problem not specified>    FOLLOWUP: In clinic    DISCHARGE INSTRUCTIONS:    Discharge Procedure Orders   Diet Adult Regular     No dressing needed     Notify your health care provider if you experience any of the following:  temperature >100.4     Activity as tolerated

## 2022-10-06 NOTE — DISCHARGE INSTRUCTIONS

## 2022-10-07 VITALS
SYSTOLIC BLOOD PRESSURE: 150 MMHG | WEIGHT: 214 LBS | TEMPERATURE: 98 F | RESPIRATION RATE: 16 BRPM | OXYGEN SATURATION: 98 % | DIASTOLIC BLOOD PRESSURE: 78 MMHG | BODY MASS INDEX: 31.7 KG/M2 | HEIGHT: 69 IN | HEART RATE: 60 BPM

## 2022-10-31 ENCOUNTER — TELEPHONE (OUTPATIENT)
Dept: OTOLARYNGOLOGY | Facility: CLINIC | Age: 67
End: 2022-10-31
Payer: MEDICARE

## 2022-10-31 NOTE — TELEPHONE ENCOUNTER
----- Message from Russel Chapin sent at 10/31/2022  2:26 PM CDT -----  Contact: pt's wife at [phone]  Type:  Sooner Appointment Request    Caller is requesting a sooner appointment.  Caller declined first available appointment listed below.  Caller will not accept being placed on the waitlist and is requesting a message be sent to doctor.    Name of Caller:  pt's wife  When is the first available appointment?  N/A  Symptoms:  Fractures to the sinus cavity  Best Call Back Number:  [phone]  Additional Information:  pt needs to be seen asap. Please call back to advise.

## 2022-10-31 NOTE — TELEPHONE ENCOUNTER
S/w pt and pt stated that he has some fractures in sinus cavity area. I read chart notes from ER and it is stated that he has orbital fractures. Referred him to Dr. Head at [facility]. Pt understood and got number and it was also faxed over

## 2022-11-01 ENCOUNTER — TELEPHONE (OUTPATIENT)
Dept: FAMILY MEDICINE | Facility: CLINIC | Age: 67
End: 2022-11-01
Payer: MEDICARE

## 2022-11-01 NOTE — TELEPHONE ENCOUNTER
----- Message from Jonathan Dial sent at 10/31/2022 11:24 AM CDT -----  Regarding: Appt  Contact: MORIS CALDERÓN [[id number]]  Morning,    Portal request for an appointment. Please advise    ===View-only below this line===      ----- Message -----       From:Moris Calderón       Sent:10/31/2022  5:38 AM CDT         To:Giovanni Kumar DO    Subject:Appointment Request    Appointment Request From: Moris Calderón    With Provider: Giovanni Kumar DO [[facility]]    Preferred Date Range: 10/31/2022 - 11/4/2022    Preferred Times: Any Time    Reason for visit: Office Visit    Comments:  ER visit ([facility] in [place]) Saturday 10/29 after a fall. A CT revealed facial / sinus fractures.  Was told to f/u w/ PCP asap

## 2022-11-02 ENCOUNTER — OFFICE VISIT (OUTPATIENT)
Dept: PAIN MEDICINE | Facility: CLINIC | Age: 67
End: 2022-11-02
Payer: MEDICARE

## 2022-11-02 VITALS
OXYGEN SATURATION: 97 % | BODY MASS INDEX: 31.4 KG/M2 | DIASTOLIC BLOOD PRESSURE: 86 MMHG | WEIGHT: 212 LBS | HEART RATE: 61 BPM | HEIGHT: 69 IN | SYSTOLIC BLOOD PRESSURE: 165 MMHG

## 2022-11-02 DIAGNOSIS — M51.36 DDD (DEGENERATIVE DISC DISEASE), LUMBAR: ICD-10-CM

## 2022-11-02 DIAGNOSIS — M54.16 LUMBAR RADICULOPATHY: Primary | ICD-10-CM

## 2022-11-02 DIAGNOSIS — M50.30 DDD (DEGENERATIVE DISC DISEASE), CERVICAL: ICD-10-CM

## 2022-11-02 DIAGNOSIS — M48.061 SPINAL STENOSIS OF LUMBAR REGION WITHOUT NEUROGENIC CLAUDICATION: ICD-10-CM

## 2022-11-02 DIAGNOSIS — M47.816 LUMBAR SPONDYLOSIS: ICD-10-CM

## 2022-11-02 PROCEDURE — 3079F PR MOST RECENT DIASTOLIC BLOOD PRESSURE 80-89 MM HG: ICD-10-PCS | Mod: CPTII,S$GLB,, | Performed by: PHYSICIAN ASSISTANT

## 2022-11-02 PROCEDURE — 1125F PR PAIN SEVERITY QUANTIFIED, PAIN PRESENT: ICD-10-PCS | Mod: CPTII,S$GLB,, | Performed by: PHYSICIAN ASSISTANT

## 2022-11-02 PROCEDURE — 1160F PR REVIEW ALL MEDS BY PRESCRIBER/CLIN PHARMACIST DOCUMENTED: ICD-10-PCS | Mod: CPTII,S$GLB,, | Performed by: PHYSICIAN ASSISTANT

## 2022-11-02 PROCEDURE — 3044F PR MOST RECENT HEMOGLOBIN A1C LEVEL <7.0%: ICD-10-PCS | Mod: CPTII,S$GLB,, | Performed by: PHYSICIAN ASSISTANT

## 2022-11-02 PROCEDURE — 3008F BODY MASS INDEX DOCD: CPT | Mod: CPTII,S$GLB,, | Performed by: PHYSICIAN ASSISTANT

## 2022-11-02 PROCEDURE — 1101F PR PT FALLS ASSESS DOC 0-1 FALLS W/OUT INJ PAST YR: ICD-10-PCS | Mod: CPTII,S$GLB,, | Performed by: PHYSICIAN ASSISTANT

## 2022-11-02 PROCEDURE — 3077F PR MOST RECENT SYSTOLIC BLOOD PRESSURE >= 140 MM HG: ICD-10-PCS | Mod: CPTII,S$GLB,, | Performed by: PHYSICIAN ASSISTANT

## 2022-11-02 PROCEDURE — 1101F PT FALLS ASSESS-DOCD LE1/YR: CPT | Mod: CPTII,S$GLB,, | Performed by: PHYSICIAN ASSISTANT

## 2022-11-02 PROCEDURE — 3288F PR FALLS RISK ASSESSMENT DOCUMENTED: ICD-10-PCS | Mod: CPTII,S$GLB,, | Performed by: PHYSICIAN ASSISTANT

## 2022-11-02 PROCEDURE — 1160F RVW MEDS BY RX/DR IN RCRD: CPT | Mod: CPTII,S$GLB,, | Performed by: PHYSICIAN ASSISTANT

## 2022-11-02 PROCEDURE — 1159F PR MEDICATION LIST DOCUMENTED IN MEDICAL RECORD: ICD-10-PCS | Mod: CPTII,S$GLB,, | Performed by: PHYSICIAN ASSISTANT

## 2022-11-02 PROCEDURE — 99999 PR PBB SHADOW E&M-EST. PATIENT-LVL III: ICD-10-PCS | Mod: PBBFAC,,, | Performed by: PHYSICIAN ASSISTANT

## 2022-11-02 PROCEDURE — 3288F FALL RISK ASSESSMENT DOCD: CPT | Mod: CPTII,S$GLB,, | Performed by: PHYSICIAN ASSISTANT

## 2022-11-02 PROCEDURE — 4010F PR ACE/ARB THEARPY RXD/TAKEN: ICD-10-PCS | Mod: CPTII,S$GLB,, | Performed by: PHYSICIAN ASSISTANT

## 2022-11-02 PROCEDURE — 99999 PR PBB SHADOW E&M-EST. PATIENT-LVL III: CPT | Mod: PBBFAC,,, | Performed by: PHYSICIAN ASSISTANT

## 2022-11-02 PROCEDURE — 3077F SYST BP >= 140 MM HG: CPT | Mod: CPTII,S$GLB,, | Performed by: PHYSICIAN ASSISTANT

## 2022-11-02 PROCEDURE — 1159F MED LIST DOCD IN RCRD: CPT | Mod: CPTII,S$GLB,, | Performed by: PHYSICIAN ASSISTANT

## 2022-11-02 PROCEDURE — 3044F HG A1C LEVEL LT 7.0%: CPT | Mod: CPTII,S$GLB,, | Performed by: PHYSICIAN ASSISTANT

## 2022-11-02 PROCEDURE — 3008F PR BODY MASS INDEX (BMI) DOCUMENTED: ICD-10-PCS | Mod: CPTII,S$GLB,, | Performed by: PHYSICIAN ASSISTANT

## 2022-11-02 PROCEDURE — 1125F AMNT PAIN NOTED PAIN PRSNT: CPT | Mod: CPTII,S$GLB,, | Performed by: PHYSICIAN ASSISTANT

## 2022-11-02 PROCEDURE — 99214 OFFICE O/P EST MOD 30 MIN: CPT | Mod: S$GLB,,, | Performed by: PHYSICIAN ASSISTANT

## 2022-11-02 PROCEDURE — 99214 PR OFFICE/OUTPT VISIT, EST, LEVL IV, 30-39 MIN: ICD-10-PCS | Mod: S$GLB,,, | Performed by: PHYSICIAN ASSISTANT

## 2022-11-02 PROCEDURE — 4010F ACE/ARB THERAPY RXD/TAKEN: CPT | Mod: CPTII,S$GLB,, | Performed by: PHYSICIAN ASSISTANT

## 2022-11-02 PROCEDURE — 3079F DIAST BP 80-89 MM HG: CPT | Mod: CPTII,S$GLB,, | Performed by: PHYSICIAN ASSISTANT

## 2022-11-02 RX ORDER — OXYCODONE AND ACETAMINOPHEN 5; 325 MG/1; MG/1
1 TABLET ORAL EVERY 4 HOURS PRN
COMMUNITY
Start: 2022-10-29 | End: 2022-12-02

## 2022-11-02 RX ORDER — GABAPENTIN 300 MG/1
300 CAPSULE ORAL 3 TIMES DAILY
Qty: 90 CAPSULE | Refills: 2 | Status: SHIPPED | OUTPATIENT
Start: 2022-11-02 | End: 2023-02-13 | Stop reason: SDUPTHER

## 2022-11-02 RX ORDER — BNT162B2 ORIGINAL AND OMICRON BA.4/BA.5 .1125; .1125 MG/2.25ML; MG/2.25ML
INJECTION, SUSPENSION INTRAMUSCULAR
COMMUNITY
Start: 2022-09-08 | End: 2023-05-10

## 2022-11-07 NOTE — PROGRESS NOTES
This note was completed with dictation software and grammatical errors may exist.    CC:  Back pain, neck pain    HPI:  Patient is a 67-year-old man with a history of hypertension, GERD, arthritis, anxiety who presents in self-referral for low back pain radiating into the leg and neck pain.  He is status post bilateral L4/5 and L5/S1 medial branch radiofrequency ablation on 10/06/2022 with 50% relief.  He does have remaining right greater than left buttock pain radiating to the posterior thigh as well as intermittent numbness in the right foot.  He describes his pain is shooting.  He is not sure if he has any weakness.  He also has pain in the right anterior thigh at times.  He complains of bilateral neck pain as well without radiation into his arms.  He does report falling Saturday morning hitting his head and fracturing facial bones.  He is going to be seeing a facial surgeon, Dr. Badillo in the near future.    Previous History:  He reports having low back pain for many years, greater than 15.  Since 2012 he has been seeing Dr. Raffy Vásquez, pain management on the [place] and had been undergoing injections for his back.  He states that the injections would usually help 1-2 years at a time to get rid of back pain and leg pain.  In the last year he had several injections that did not provide any relief and so they obtained a new MRI which showed significant degeneration at the level above where he had his previous injections, now showing issues at L3/4.  He was referred to Neurosurgery who recommended physical therapy.  He tried physical therapy for several months and it did help but he did not continue the exercises on his own.    He states that his pain is currently located across the bilateral low back, worse with working in the yard, bending over, sitting or standing too long.  It radiates along his right anterior thigh to about his knee.  He describes it as aching, dull, tight, deep, sharp and shooting.  Is  "worse with prolonged sitting or standing, lying down, bending or walking, doing any lifting or getting out of a bed or a chair.  He gets some relief with rest, activity, lying down, medications, injections and physical therapy.  He also takes ibuprofen and Tylenol some relief.  His other complaint is neck pain located in the bilateral neck, worse with turning his head from side to side, it radiates into the shoulders and into his upper back at times.  He denies juan weakness in his arms.  He states that he had undergone what sounds like a medial branch block bilaterally which did help but he had not proceeded with the radiofrequency part because the viral pandemic began.  He denies any balance issues, no weakness in the arms or legs.    Pain intervention history:  He reports undergoing multiple injections over the years with very good relief, the last 2 did not seem to help.  From Dr. Raffy Vásquez notes: "The patient underwent bilateral C4, 5, 6 medial branch block on 02/04/2020 with 80% relief.  He did not proceed with the RFA.  He had undergone bilateral L4 and L5 transforaminal injection on 10/24/2019 with 50% relief for 1 month.  He had undergone bilateral L4/5 TF VIET on 02/23/2017 with 80% relief for 2 years.  He underwent bilateral L4 and L5 transforaminal injection on 12/05/2019 without much relief."  He is status post bilateral L4/5 transforaminal epidural steroid injection on 02/11/2021 0% relief.  He is status post bilateral L2,3,4,5 medial branch radiofrequency ablation on 03/20/2021 with 60% relief. He is status post bilateral C4, 5, 6 medial branch block on 01/10/2022 with only about 20% relief of his bilateral neck pain.He is status post bilateral L4/5 and L5/S1 medial branch radiofrequency ablation on 10/06/2022 with 50% relief.      Antineuropathics:  Had taken gabapentin in the past and did not find it helpful.  NSAIDs: he takes ibuprofen and Tylenol with some relief  Physical " therapy:  Antidepressants: Paxil  Muscle relaxers:   Zanaflex 4 milligrams at night helps  Opioids:  Antiplatelets/Anticoagulants:    ROS: He reports back pain, joint stiffness, anxiety.  Balance of review of systems is negative.    Lab Results   Component Value Date    HGBA1C 5.9 (H) 08/04/2022       Lab Results   Component Value Date    WBC 4.50 05/27/2021    HGB 13.1 (L) 05/27/2021    HCT 38.9 (L) 05/27/2021    MCV 93 05/27/2021     05/27/2021             Past Medical History:   Diagnosis Date    Anxiety     Arthritis     Back problem     bulging disc    GERD (gastroesophageal reflux disease)     Hypercholesterolemia 11/02/2015    2009: Began statin.    Hyperlipidemia     Hypertension, benign 11/02/2015 2008: Diagnosed.    Sleep apnea     uses implant       Past Surgical History:   Procedure Laterality Date    APPENDECTOMY      ARTHROSCOPIC REPAIR OF ROTATOR CUFF OF SHOULDER Right 12/21/2018    Procedure: REPAIR, ROTATOR CUFF, ARTHROSCOPIC;  Surgeon: Colby Valenzuela MD;  Location: [facility] OR;  Service: Orthopedics;  Laterality: Right;    ARTHROSCOPY OF SHOULDER WITH DECOMPRESSION OF SUBACROMIAL SPACE Right 12/21/2018    Procedure: ARTHROSCOPY, SHOULDER, WITH SUBACROMIAL SPACE DECOMPRESSION;  Surgeon: Colby Valenzuela MD;  Location: [facility] OR;  Service: Orthopedics;  Laterality: Right;    BICEPS TENDON REPAIR Right 12/21/2018    Procedure: REPAIR, TENDON, BICEPS;  Surgeon: Colby Valenzuela MD;  Location: [facility] OR;  Service: Orthopedics;  Laterality: Right;    COLONOSCOPY N/A 7/27/2022    Procedure: COLONOSCOPY;  Surgeon: Dwight Lewis MD;  Location: [facility] ENDO;  Service: Endoscopy;  Laterality: N/A;    magdalene      INJECTION OF ANESTHETIC AGENT AROUND MEDIAL BRANCH NERVES INNERVATING CERVICAL FACET JOINT Bilateral 1/10/2022    Procedure: Block-nerve-medial branch-cervical C4 C5 C6;  Surgeon: Landon Winchester MD;  Location: [facility] OR;  Service: Pain Management;  Laterality: Bilateral;     INJECTION OF ANESTHETIC AGENT AROUND MEDIAL BRANCH NERVES INNERVATING LUMBAR FACET JOINT Bilateral 3/10/2021    Procedure: Block-nerve-medial branch-lumbar L2,L3,L4, L5;  Surgeon: Landon Winchester MD;  Location: [facility] OR;  Service: Pain Management;  Laterality: Bilateral;    INSERTION, NEUROSTIMULATOR, HYPOGLOSSAL Right 3/17/2022    Procedure: INSERTION,NEUROSTIMULATOR,HYPOGLOSSAL;  Surgeon: Lance Wright MD;  Location: [facility] OR;  Service: ENT;  Laterality: Right;    KNEE ARTHROSCOPY W/ MENISCAL REPAIR Left 2015    MMT      Left knee A-scope    NOSE SURGERY      RADIOFREQUENCY ABLATION OF LUMBAR MEDIAL BRANCH NERVE AT SINGLE LEVEL Bilateral 3/23/2021    Procedure: Radiofrequency Ablation, Nerve, Spinal, Lumbar, Medial Branch, L2,LL3,L4,L5;  Surgeon: Landon Winchester MD;  Location: [facility];  Service: Pain Management;  Laterality: Bilateral;    RADIOFREQUENCY ABLATION OF LUMBAR MEDIAL BRANCH NERVE AT SINGLE LEVEL Bilateral 10/6/2022    Procedure: Radiofrequency Ablation, Nerve, Spinal, Lumbar, Medial Branch, L4/5, L5/S1;  Surgeon: Landon Winchester MD;  Location: [facility] OR;  Service: Pain Management;  Laterality: Bilateral;    SLEEP ENDOSCOPY, DRUG-INDUCED Bilateral 1/28/2022    Procedure: SLEEP ENDOSCOPY,DRUG-INDUCED;  Surgeon: Lance Wright MD;  Location: [facility] OR;  Service: ENT;  Laterality: Bilateral;    TRANSFORAMINAL EPIDURAL INJECTION OF STEROID Bilateral 2/11/2021    Procedure: Injection,steroid,epidural,transforaminal approach L4/5;  Surgeon: Landon Winchester MD;  Location: [facility];  Service: Pain Management;  Laterality: Bilateral;    UVULECTOMY         Social History     Socioeconomic History    Marital status:    Tobacco Use    Smoking status: Never    Smokeless tobacco: Never   Substance and Sexual Activity    Alcohol use: Yes     Alcohol/week: 5.0 standard drinks     Types: 5 Cans of beer per week     Comment: weekly     Drug use: No    Sexual activity: Yes     Social Determinants of  "Health     Financial Resource Strain: Low Risk     Difficulty of Paying Living Expenses: Not hard at all   Food Insecurity: No Food Insecurity    Worried About Running Out of Food in the Last Year: Never true    Ran Out of Food in the Last Year: Never true   Transportation Needs: No Transportation Needs    Lack of Transportation (Medical): No    Lack of Transportation (Non-Medical): No   Physical Activity: Insufficiently Active    Days of Exercise per Week: 1 day    Minutes of Exercise per Session: 60 min   Stress: Stress Concern Present    Feeling of Stress : Rather much   Social Connections: Unknown    Frequency of Communication with Friends and Family: Three times a week    Frequency of Social Gatherings with Friends and Family: Twice a week    Active Member of Clubs or Organizations: No    Attends Club or Organization Meetings: 1 to 4 times per year    Marital Status:    Housing Stability: Low Risk     Unable to Pay for Housing in the Last Year: No    Number of Places Lived in the Last Year: 1    Unstable Housing in the Last Year: No         Medications/Allergies: See med card    Vitals:    11/02/22 1353   BP: (!) 165/86   Pulse: 61   SpO2: 97%   Weight: 96.2 kg (211 lb 15.6 oz)   Height: 5' 9" (1.753 m)   PainSc:   6   PainLoc: Hip         Physical exam:  Gen: A and O x3, pleasant, well-groomed  Skin: No rashes or obvious lesions  HEENT: PERRLA, no obvious deformities on ears or in canals. Trachea midline.  CVS: Regular rate and rhythm, normal palpable pulses.  Resp:No increased work of breathing, symmetrical chest rise.  Abdomen: Soft, NT/ND.  Musculoskeletal: Able to heel walk, toe walk. No antalgic gait.       Exam deferred due to facial fractures      Imaging:    MRI 01/21/2020:  I reviewed this image with the patient, outside imaging.  This is most significant for moderate disc degeneration at L4/5 with some bilateral foraminal narrowing to moderate degree, L3/4 shows severe disc degeneration with " Modic endplate changes and some increased signal within the disc at L3/4.  There is moderate to severe foraminal narrowing to the left and moderate out to the right side.    MRI cervical spine 01/08/2021 outside institution.  I review the images personally.  There appears to be a reversal of the cervical lordosis.  There is disc bulging at C2/3 slightly to the right side deforming the thecal sac and causing possible foraminal narrowing to the right side.  At C3/4 there is mild disc bulging but no canal stenosis.  At C4/5 there is decreased disc height, slight disc bulging but no canal narrowing.  At C5/6 there is larger disc bulge with protrusion slightly more to the right side causing bilateral foraminal stenosis and appears to deform the cord.  At C6/7 there is a disc bulging that contacts the cord as well.  C7/T1 mild disc bulging but no cord contact.  There is no abnormal signal within the cord.    1/8/22 MRI C-spine:  Subtle reversal of normal lordosis between C4 and C7 is unchanged.  There is minimal grade 1 retrolisthesis of C2 on C3.  Alignment is otherwise within normal limits.  Minimal anterior wedging of C5, C6 and C7 is unchanged.  High STIR Modic signal changes at C7/T1 are new since the prior study.   There is no signal abnormality in the included cord or posterior fossa.  Paravertebral soft tissues are within normal limits.  There is diffuse desiccation of the intervertebral discs of the cervical spine.   At C2/3, prominent facet and uncovertebral hypertrophy cause mild bilateral foraminal narrowing.  Prominent posterior spondylosis is also seen at this level.   At C3/4, there is a midline dorsal disc protrusion, facet arthropathy and uncovertebral hypertrophy.  There is deformity of the ventral margin of the thecal sac without central canal stenosis.  Facet and uncovertebral hypertrophy cause moderate left foraminal narrowing.   At C4/5, there is a midline dorsal disc protrusion which deforms the  ventral margin of the thecal sac but causes no canal stenosis.  Prominent left-sided facet and uncovertebral hypertrophy cause moderate left foraminal narrowing.   At C5/6, there is loss of disc height and broad-based dorsal disc bulging asymmetric to the right.  There is narrowing of the right lateral recess of the canal and borderline right foraminal narrowing.  The AP midline diameter of the canal is 11 mm at this level.   At C6/7, there is minimal concentric disc bulging, facet arthropathy and uncovertebral hypertrophy, causing mild right foraminal narrowing.   At C7/T1, there is mild broad-based dorsal disc bulging which causes no canal or foraminal stenosis.    1/8/22 MRI L-spine:   1. Spondylosis, disc disease, facet arthrosis and thickening of the ligamentum flavum as well as endplate degenerative changes as described above, worse at L3-L4 and L4-L5.  2. There is mild retrolisthesis of L3 on L4 with otherwise normal alignment.  3. There is mild central canal stenosis at L3-L4 and severe central canal stenosis at L4-L5.  4. Multilevel neural foraminal narrowing, worse at L2-L3, L3-L4 and particularly at L4-L5 as described.    Assessment:   Patient is a 67-year-old man with a history of hypertension, GERD, arthritis, anxiety who presents in self-referral for low back pain radiating into the leg and neck pain.    1. Lumbar radiculopathy        2. Lumbar spondylosis        3. DDD (degenerative disc disease), lumbar        4. Spinal stenosis of lumbar region without neurogenic claudication        5. DDD (degenerative disc disease), cervical            Plan:  1. The patient did well following the radiofrequency ablation.  We discussed that he would likely benefit from an L5/S1 interlaminar epidural steroid injection to the right.  However, we are going to wait until he is cleared by his facial surgeon prior to proceeding with any steroid injections.    2. I sent a prescription for gabapentin 300 mg 3 times daily,  starting with 300 mg nightly and increasing if tolerated.    3. He will contact me once cleared by Dr. Badillo and we will schedule the injection at that time.    The total time spent for evaluation and management on 11/2/22 including reviewing separately obtained history, performing a medically appropriate exam and evaluation, documenting clinical information in the health record, independently interpreting results and communicating them to the patient/family/caregiver, and ordering medications/tests/procedures was between 30-39 minutes.

## 2022-11-08 ENCOUNTER — OFFICE VISIT (OUTPATIENT)
Dept: CARDIOLOGY | Facility: CLINIC | Age: 67
End: 2022-11-08
Payer: MEDICARE

## 2022-11-08 VITALS
HEART RATE: 54 BPM | BODY MASS INDEX: 32.39 KG/M2 | DIASTOLIC BLOOD PRESSURE: 90 MMHG | WEIGHT: 218.69 LBS | SYSTOLIC BLOOD PRESSURE: 150 MMHG | HEIGHT: 69 IN

## 2022-11-08 DIAGNOSIS — R01.1 SYSTOLIC MURMUR: ICD-10-CM

## 2022-11-08 DIAGNOSIS — G47.33 OBSTRUCTIVE SLEEP APNEA: ICD-10-CM

## 2022-11-08 DIAGNOSIS — R73.03 PRE-DIABETES: ICD-10-CM

## 2022-11-08 DIAGNOSIS — I10 PRIMARY HYPERTENSION: Primary | ICD-10-CM

## 2022-11-08 DIAGNOSIS — E78.00 HYPERCHOLESTEROLEMIA: ICD-10-CM

## 2022-11-08 DIAGNOSIS — R06.09 EXERTIONAL DYSPNEA: ICD-10-CM

## 2022-11-08 PROCEDURE — 99999 PR PBB SHADOW E&M-EST. PATIENT-LVL IV: CPT | Mod: PBBFAC,,, | Performed by: INTERNAL MEDICINE

## 2022-11-08 PROCEDURE — 1159F MED LIST DOCD IN RCRD: CPT | Mod: CPTII,S$GLB,, | Performed by: INTERNAL MEDICINE

## 2022-11-08 PROCEDURE — 1126F PR PAIN SEVERITY QUANTIFIED, NO PAIN PRESENT: ICD-10-PCS | Mod: CPTII,S$GLB,, | Performed by: INTERNAL MEDICINE

## 2022-11-08 PROCEDURE — 1101F PT FALLS ASSESS-DOCD LE1/YR: CPT | Mod: CPTII,S$GLB,, | Performed by: INTERNAL MEDICINE

## 2022-11-08 PROCEDURE — 3080F PR MOST RECENT DIASTOLIC BLOOD PRESSURE >= 90 MM HG: ICD-10-PCS | Mod: CPTII,S$GLB,, | Performed by: INTERNAL MEDICINE

## 2022-11-08 PROCEDURE — 99204 PR OFFICE/OUTPT VISIT, NEW, LEVL IV, 45-59 MIN: ICD-10-PCS | Mod: S$GLB,,, | Performed by: INTERNAL MEDICINE

## 2022-11-08 PROCEDURE — 93005 ELECTROCARDIOGRAM TRACING: CPT | Mod: PO

## 2022-11-08 PROCEDURE — 4010F ACE/ARB THERAPY RXD/TAKEN: CPT | Mod: CPTII,S$GLB,, | Performed by: INTERNAL MEDICINE

## 2022-11-08 PROCEDURE — 4010F PR ACE/ARB THEARPY RXD/TAKEN: ICD-10-PCS | Mod: CPTII,S$GLB,, | Performed by: INTERNAL MEDICINE

## 2022-11-08 PROCEDURE — 3080F DIAST BP >= 90 MM HG: CPT | Mod: CPTII,S$GLB,, | Performed by: INTERNAL MEDICINE

## 2022-11-08 PROCEDURE — 3077F PR MOST RECENT SYSTOLIC BLOOD PRESSURE >= 140 MM HG: ICD-10-PCS | Mod: CPTII,S$GLB,, | Performed by: INTERNAL MEDICINE

## 2022-11-08 PROCEDURE — 3044F PR MOST RECENT HEMOGLOBIN A1C LEVEL <7.0%: ICD-10-PCS | Mod: CPTII,S$GLB,, | Performed by: INTERNAL MEDICINE

## 2022-11-08 PROCEDURE — 99204 OFFICE O/P NEW MOD 45 MIN: CPT | Mod: S$GLB,,, | Performed by: INTERNAL MEDICINE

## 2022-11-08 PROCEDURE — 1160F PR REVIEW ALL MEDS BY PRESCRIBER/CLIN PHARMACIST DOCUMENTED: ICD-10-PCS | Mod: CPTII,S$GLB,, | Performed by: INTERNAL MEDICINE

## 2022-11-08 PROCEDURE — 1126F AMNT PAIN NOTED NONE PRSNT: CPT | Mod: CPTII,S$GLB,, | Performed by: INTERNAL MEDICINE

## 2022-11-08 PROCEDURE — 93010 ELECTROCARDIOGRAM REPORT: CPT | Mod: S$GLB,,, | Performed by: INTERNAL MEDICINE

## 2022-11-08 PROCEDURE — 1160F RVW MEDS BY RX/DR IN RCRD: CPT | Mod: CPTII,S$GLB,, | Performed by: INTERNAL MEDICINE

## 2022-11-08 PROCEDURE — 99999 PR PBB SHADOW E&M-EST. PATIENT-LVL IV: ICD-10-PCS | Mod: PBBFAC,,, | Performed by: INTERNAL MEDICINE

## 2022-11-08 PROCEDURE — 3008F PR BODY MASS INDEX (BMI) DOCUMENTED: ICD-10-PCS | Mod: CPTII,S$GLB,, | Performed by: INTERNAL MEDICINE

## 2022-11-08 PROCEDURE — 3044F HG A1C LEVEL LT 7.0%: CPT | Mod: CPTII,S$GLB,, | Performed by: INTERNAL MEDICINE

## 2022-11-08 PROCEDURE — 3288F FALL RISK ASSESSMENT DOCD: CPT | Mod: CPTII,S$GLB,, | Performed by: INTERNAL MEDICINE

## 2022-11-08 PROCEDURE — 93010 EKG 12-LEAD: ICD-10-PCS | Mod: S$GLB,,, | Performed by: INTERNAL MEDICINE

## 2022-11-08 PROCEDURE — 3008F BODY MASS INDEX DOCD: CPT | Mod: CPTII,S$GLB,, | Performed by: INTERNAL MEDICINE

## 2022-11-08 PROCEDURE — 3288F PR FALLS RISK ASSESSMENT DOCUMENTED: ICD-10-PCS | Mod: CPTII,S$GLB,, | Performed by: INTERNAL MEDICINE

## 2022-11-08 PROCEDURE — 1101F PR PT FALLS ASSESS DOC 0-1 FALLS W/OUT INJ PAST YR: ICD-10-PCS | Mod: CPTII,S$GLB,, | Performed by: INTERNAL MEDICINE

## 2022-11-08 PROCEDURE — 1159F PR MEDICATION LIST DOCUMENTED IN MEDICAL RECORD: ICD-10-PCS | Mod: CPTII,S$GLB,, | Performed by: INTERNAL MEDICINE

## 2022-11-08 PROCEDURE — 3077F SYST BP >= 140 MM HG: CPT | Mod: CPTII,S$GLB,, | Performed by: INTERNAL MEDICINE

## 2022-11-08 NOTE — PROGRESS NOTES
Subjective:    Patient ID:  Moris Calderón is a 67 y.o. male patient here for evaluation Establish Care and Shortness of Breath      History of Present Illness:  New patient cardiac evaluation.  History of hypertension dyslipidemia, prediabetes mellitus better with weight loss.  Patient presents with complaints of progressive dyspnea on exertion x6 months.  No PND orthopnea.  His underlying sleep apnea has been treated with neuro stimulator.    No definite angina chest pain.  No arrhythmia.  No syncope/presyncope.    No CVA Ca.  No chronic kidney disease.  Remote history of hep C, treated.    No chronic lung disease.  No tobacco use.    No early family history of heart disease.             Review of patient's allergies indicates:   Allergen Reactions    Buspar [buspirone]      Face flushed, possible elevated BP    Adhesive Rash       Past Medical History:   Diagnosis Date    Anxiety     Arthritis     Back problem     bulging disc    GERD (gastroesophageal reflux disease)     Hypercholesterolemia 11/02/2015    2009: Began statin.    Hyperlipidemia     Hypertension, benign 11/02/2015    2008: Diagnosed.    Sleep apnea     uses implant     Past Surgical History:   Procedure Laterality Date    APPENDECTOMY      ARTHROSCOPIC REPAIR OF ROTATOR CUFF OF SHOULDER Right 12/21/2018    Procedure: REPAIR, ROTATOR CUFF, ARTHROSCOPIC;  Surgeon: Colby Valenzuela MD;  Location: [facility] OR;  Service: Orthopedics;  Laterality: Right;    ARTHROSCOPY OF SHOULDER WITH DECOMPRESSION OF SUBACROMIAL SPACE Right 12/21/2018    Procedure: ARTHROSCOPY, SHOULDER, WITH SUBACROMIAL SPACE DECOMPRESSION;  Surgeon: Colby Valenzuela MD;  Location: [facility] OR;  Service: Orthopedics;  Laterality: Right;    BICEPS TENDON REPAIR Right 12/21/2018    Procedure: REPAIR, TENDON, BICEPS;  Surgeon: Colby Valenzuela MD;  Location: [facility] OR;  Service: Orthopedics;  Laterality: Right;    COLONOSCOPY N/A 7/27/2022    Procedure: COLONOSCOPY;  Surgeon:  Dwight Lewis MD;  Location: [facility] ENDO;  Service: Endoscopy;  Laterality: N/A;    magdalene      INJECTION OF ANESTHETIC AGENT AROUND MEDIAL BRANCH NERVES INNERVATING CERVICAL FACET JOINT Bilateral 1/10/2022    Procedure: Block-nerve-medial branch-cervical C4 C5 C6;  Surgeon: Landon Winchester MD;  Location: [facility] OR;  Service: Pain Management;  Laterality: Bilateral;    INJECTION OF ANESTHETIC AGENT AROUND MEDIAL BRANCH NERVES INNERVATING LUMBAR FACET JOINT Bilateral 3/10/2021    Procedure: Block-nerve-medial branch-lumbar L2,L3,L4, L5;  Surgeon: Landon Winchester MD;  Location: [facility] OR;  Service: Pain Management;  Laterality: Bilateral;    INSERTION, NEUROSTIMULATOR, HYPOGLOSSAL Right 3/17/2022    Procedure: INSERTION,NEUROSTIMULATOR,HYPOGLOSSAL;  Surgeon: Lance Wright MD;  Location: [facility] OR;  Service: ENT;  Laterality: Right;    KNEE ARTHROSCOPY W/ MENISCAL REPAIR Left 2015    MMT      Left knee A-scope    NOSE SURGERY      RADIOFREQUENCY ABLATION OF LUMBAR MEDIAL BRANCH NERVE AT SINGLE LEVEL Bilateral 3/23/2021    Procedure: Radiofrequency Ablation, Nerve, Spinal, Lumbar, Medial Branch, L2,LL3,L4,L5;  Surgeon: Landon Winchester MD;  Location: [facility] OR;  Service: Pain Management;  Laterality: Bilateral;    RADIOFREQUENCY ABLATION OF LUMBAR MEDIAL BRANCH NERVE AT SINGLE LEVEL Bilateral 10/6/2022    Procedure: Radiofrequency Ablation, Nerve, Spinal, Lumbar, Medial Branch, L4/5, L5/S1;  Surgeon: Landon Winchester MD;  Location: [facility] OR;  Service: Pain Management;  Laterality: Bilateral;    SLEEP ENDOSCOPY, DRUG-INDUCED Bilateral 1/28/2022    Procedure: SLEEP ENDOSCOPY,DRUG-INDUCED;  Surgeon: Lance Wright MD;  Location: [facility] OR;  Service: ENT;  Laterality: Bilateral;    TRANSFORAMINAL EPIDURAL INJECTION OF STEROID Bilateral 2/11/2021    Procedure: Injection,steroid,epidural,transforaminal approach L4/5;  Surgeon: Landon Winchester MD;  Location: [facility] OR;  Service: Pain Management;  Laterality:  Bilateral;    UVULECTOMY       Social History     Tobacco Use    Smoking status: Never    Smokeless tobacco: Never   Substance Use Topics    Alcohol use: Yes     Alcohol/week: 5.0 standard drinks     Types: 5 Cans of beer per week     Comment: weekly     Drug use: No        Review of Systems:    As noted in HPI in addition         REVIEW OF SYSTEMS  Review of Systems   Constitutional: Negative for decreased appetite, diaphoresis, night sweats, weight gain and weight loss.   HENT:  Negative for nosebleeds and odynophagia.    Eyes:  Negative for double vision and photophobia.   Cardiovascular:  Positive for dyspnea on exertion. Negative for chest pain, claudication, cyanosis, irregular heartbeat, leg swelling, near-syncope, orthopnea, palpitations, paroxysmal nocturnal dyspnea and syncope.   Respiratory:  Positive for shortness of breath. Negative for cough, hemoptysis and wheezing.    Hematologic/Lymphatic: Negative for adenopathy.   Skin:  Negative for flushing, skin cancer and suspicious lesions.   Musculoskeletal:  Negative for gout, myalgias and neck pain.   Gastrointestinal:  Negative for abdominal pain, heartburn, hematemesis and hematochezia.   Genitourinary:  Negative for bladder incontinence, hesitancy and nocturia.   Neurological:  Negative for focal weakness, headaches, light-headedness and paresthesias.   Psychiatric/Behavioral:  Negative for memory loss and substance abuse.      Objective:        Vitals:    11/08/22 0939   BP: (!) 150/90   Pulse: (!) 54       Lab Results   Component Value Date    WBC 4.50 05/27/2021    HGB 13.1 (L) 05/27/2021    HCT 38.9 (L) 05/27/2021     05/27/2021    CHOL 182 08/04/2022    TRIG 197 (H) 08/04/2022    HDL 41 08/04/2022    ALT 20 08/04/2022    AST 18 08/04/2022     08/04/2022    K 3.9 08/04/2022     08/04/2022    CREATININE 0.9 08/04/2022    BUN 16 08/04/2022    CO2 25 08/04/2022    TSH 1.139 05/27/2021    PSA 0.26 05/27/2021    INR 1.0 09/21/2015     HGBA1C 5.9 (H) 2022      CARDIOGRAM RESULTS  No results found for this or any previous visit.        CURRENT/PREVIOUS VISIT EKG  Results for orders placed or performed during the hospital encounter of 19   EKG 12-lead    Collection Time: 19 12:51 PM    [facility]                                                                                  Test Date:    2019  Pat Name:     JES DARLING            Department:     Patient ID:   [id number]                  Room:         EXAM 05EXAM 05  Gender:       Male                     Technician:   MB  :          1955               Requested By:   Order Number:                          Reading MD:   Jose Chin MD                                   Measurements  Intervals                              Axis            Rate:         53                       P:            21  MS:           150                      QRS:          69  QRSD:         92                       T:            64  QT:           422                                      QTc:          395                                                                 Interpretive Statements  Sinus bradycardia  Otherwise normal ECG  No previous ECG available for comparison    Electronically Signed On 9-3-2019 14:21:44 CDT by Jose Chin MD       No valid procedures specified.   No results found for this or any previous visit.    No valid procedures specified.    PHYSICAL EXAM  CONSTITUTIONAL: Well built, well nourished in no apparent distress  NECK: no carotid bruit, no JVD  LUNGS: CTA  CHEST WALL: no tenderness,  HEART: regular rate and rhythm, S1, S2 normal, no murmur, click, rub or gallop   ABDOMEN: soft, non-tender; bowel sounds normal; no masses,  no organomegaly  EXTREMITIES: Extremities normal, no edema, no calf tenderness noted  VASCULAR EXAM: 2 PLUS UPPER AND LOWER EXT PULSES  NEURO: AAO X 3, NO ACUTE FOCAL OR LATERALIZING  FINDINGS    I HAVE REVIEWED :    The vital signs, nurses notes, and all the pertinent radiology and labs.         Current Outpatient Medications   Medication Instructions    atorvastatin (LIPITOR) 10 mg, Oral, Daily    benazepriL (LOTENSIN) 20 mg, Oral, Daily    cetirizine HCl (CETIRIZINE ORAL) 1 Dose, Oral, Daily PRN    esomeprazole (NEXIUM) 40 MG capsule TAKE 1 CAPSULE(40 MG) BY MOUTH BEFORE BREAKFAST    fluticasone propionate (FLONASE) 50 mcg/actuation nasal spray SHAKE LIQUID AND USE 1 SPRAY IN EACH NOSTRIL EVERY DAY    gabapentin (NEURONTIN) 300 mg, Oral, 3 times daily    oxyCODONE-acetaminophen (PERCOCET) 5-325 mg per tablet 1 tablet, Oral, Every 4 hours PRN    paroxetine (PAXIL) 10 mg, Oral, Every morning    paroxetine (PAXIL) 40 mg, Oral, Daily    PFIZER COVID BIVAL,12Y UP,,PF, 30 mcg/0.3 mL injection No dose, route, or frequency recorded.    tamsulosin (FLOMAX) 0.4 mg, Oral, Daily          Assessment:   Dyspnea on exertion, rule out angina equivalent.  Hypertension, dyslipidemia, prediabetes mellitus.  HERNAN treated with neurostimulator.        Plan:   Last noninvasive cardiac assessment greater than 5 years ago.  Suggest  MARY Cardiolite, echo.   24          No follow-ups on file.

## 2022-11-10 ENCOUNTER — PATIENT MESSAGE (OUTPATIENT)
Dept: PAIN MEDICINE | Facility: CLINIC | Age: 67
End: 2022-11-10
Payer: MEDICARE

## 2022-11-15 NOTE — TELEPHONE ENCOUNTER
Please schedule patient for an L5/S1 interlaminar epidural steroid injection to the right with 4 week follow-up.    Type of Procedure/Injection - Lumbar Epidural  L5/S1           Laterality - NA      Type of Sedation - Local      Need to hold medication - no      N/A      Clearance needed - no      Follow up - 4 week

## 2022-11-18 DIAGNOSIS — M54.16 LUMBAR RADICULOPATHY: Primary | ICD-10-CM

## 2022-11-18 RX ORDER — ALPRAZOLAM 0.5 MG/1
1 TABLET, ORALLY DISINTEGRATING ORAL ONCE AS NEEDED
Status: CANCELLED | OUTPATIENT
Start: 2022-11-18 | End: 2034-04-16

## 2022-11-18 NOTE — TELEPHONE ENCOUNTER
Sounds great. I have also scheduled your follow up on 1/11 at 1pm.  A pre-op nurse from the surgery center will call you a day or two before your scheduled date to advise on arrival time and provide instructions.   You will need someone over the age of 18 to bring you home after your procedure. Please call the office if you get a fever, start steroids or antibiotics prior to your procedure date as you cannot have your procedure if you have been sick.     Please let me know if you have any questions or concerns. Thanks!

## 2022-12-07 ENCOUNTER — HOSPITAL ENCOUNTER (OUTPATIENT)
Dept: RADIOLOGY | Facility: HOSPITAL | Age: 67
Discharge: HOME | End: 2022-12-07
Attending: ANESTHESIOLOGY | Admitting: ANESTHESIOLOGY
Payer: MEDICARE

## 2022-12-07 ENCOUNTER — HOSPITAL ENCOUNTER (OUTPATIENT)
Facility: HOSPITAL | Age: 67
Discharge: HOME | End: 2022-12-07
Attending: ANESTHESIOLOGY | Admitting: ANESTHESIOLOGY
Payer: MEDICARE

## 2022-12-07 DIAGNOSIS — M54.50 LOWER BACK PAIN: ICD-10-CM

## 2022-12-07 DIAGNOSIS — M54.16 LUMBAR RADICULOPATHY: ICD-10-CM

## 2022-12-07 PROCEDURE — 62323 PR INJ LUMBAR/SACRAL, W/IMAGING GUIDANCE: ICD-10-PCS | Mod: ,,, | Performed by: ANESTHESIOLOGY

## 2022-12-07 PROCEDURE — 25500020 PHARM REV CODE 255: Mod: PO | Performed by: ANESTHESIOLOGY

## 2022-12-07 PROCEDURE — 63600175 PHARM REV CODE 636 W HCPCS: Mod: PO | Performed by: ANESTHESIOLOGY

## 2022-12-07 PROCEDURE — 76000 FLUOROSCOPY <1 HR PHYS/QHP: CPT | Mod: TC,PO

## 2022-12-07 PROCEDURE — 25000003 PHARM REV CODE 250: Mod: PO | Performed by: ANESTHESIOLOGY

## 2022-12-07 PROCEDURE — A9579 GAD-BASE MR CONTRAST NOS,1ML: HCPCS | Mod: PO | Performed by: ANESTHESIOLOGY

## 2022-12-07 PROCEDURE — 62323 NJX INTERLAMINAR LMBR/SAC: CPT | Mod: PO | Performed by: ANESTHESIOLOGY

## 2022-12-07 PROCEDURE — 62323 NJX INTERLAMINAR LMBR/SAC: CPT | Mod: ,,, | Performed by: ANESTHESIOLOGY

## 2022-12-07 PROCEDURE — A4216 STERILE WATER/SALINE, 10 ML: HCPCS | Mod: PO | Performed by: ANESTHESIOLOGY

## 2022-12-07 RX ORDER — METHYLPREDNISOLONE ACETATE 80 MG/ML
INJECTION, SUSPENSION INTRA-ARTICULAR; INTRALESIONAL; INTRAMUSCULAR; SOFT TISSUE
Status: DISCONTINUED | OUTPATIENT
Start: 2022-12-07 | End: 2022-12-07 | Stop reason: HOSPADM

## 2022-12-07 RX ORDER — MIDAZOLAM HYDROCHLORIDE 2 MG/2ML
INJECTION, SOLUTION INTRAMUSCULAR; INTRAVENOUS
Status: DISCONTINUED | OUTPATIENT
Start: 2022-12-07 | End: 2022-12-07 | Stop reason: HOSPADM

## 2022-12-07 RX ORDER — SODIUM CHLORIDE, SODIUM LACTATE, POTASSIUM CHLORIDE, CALCIUM CHLORIDE 600; 310; 30; 20 MG/100ML; MG/100ML; MG/100ML; MG/100ML
INJECTION, SOLUTION INTRAVENOUS CONTINUOUS
Status: DISCONTINUED | OUTPATIENT
Start: 2022-12-07 | End: 2022-12-07 | Stop reason: HOSPADM

## 2022-12-07 RX ORDER — LIDOCAINE HYDROCHLORIDE 10 MG/ML
INJECTION, SOLUTION EPIDURAL; INFILTRATION; INTRACAUDAL; PERINEURAL
Status: DISCONTINUED | OUTPATIENT
Start: 2022-12-07 | End: 2022-12-07 | Stop reason: HOSPADM

## 2022-12-07 RX ORDER — ALPRAZOLAM 0.5 MG/1
1 TABLET, ORALLY DISINTEGRATING ORAL ONCE AS NEEDED
Status: DISCONTINUED | OUTPATIENT
Start: 2022-12-07 | End: 2022-12-07

## 2022-12-07 RX ORDER — SODIUM CHLORIDE 9 MG/ML
INJECTION, SOLUTION INTRAMUSCULAR; INTRAVENOUS; SUBCUTANEOUS
Status: DISCONTINUED | OUTPATIENT
Start: 2022-12-07 | End: 2022-12-07 | Stop reason: HOSPADM

## 2022-12-07 RX ADMIN — SODIUM CHLORIDE, SODIUM LACTATE, POTASSIUM CHLORIDE, AND CALCIUM CHLORIDE: .6; .31; .03; .02 INJECTION, SOLUTION INTRAVENOUS at 03:12

## 2022-12-07 NOTE — OP NOTE

## 2022-12-07 NOTE — H&P
CC: Back pain    HPI: The patient is a 68yo with a history of lumbar radiculopathy here for L5/S1. There are no major changes in history and physical from 11/2/22.    Past Medical History:   Diagnosis Date    Anxiety     Arthritis     Back problem     bulging disc    GERD (gastroesophageal reflux disease)     Hypercholesterolemia 11/02/2015    2009: Began statin.    Hyperlipidemia     Hypertension, benign 11/02/2015 2008: Diagnosed.    Sleep apnea     uses implant       Past Surgical History:   Procedure Laterality Date    APPENDECTOMY      ARTHROSCOPIC REPAIR OF ROTATOR CUFF OF SHOULDER Right 12/21/2018    Procedure: REPAIR, ROTATOR CUFF, ARTHROSCOPIC;  Surgeon: Colby Valenzuela MD;  Location: [facility] OR;  Service: Orthopedics;  Laterality: Right;    ARTHROSCOPY OF SHOULDER WITH DECOMPRESSION OF SUBACROMIAL SPACE Right 12/21/2018    Procedure: ARTHROSCOPY, SHOULDER, WITH SUBACROMIAL SPACE DECOMPRESSION;  Surgeon: Colby Valenzuela MD;  Location: [facility] OR;  Service: Orthopedics;  Laterality: Right;    BICEPS TENDON REPAIR Right 12/21/2018    Procedure: REPAIR, TENDON, BICEPS;  Surgeon: Colby Valenzuela MD;  Location: [facility] OR;  Service: Orthopedics;  Laterality: Right;    COLONOSCOPY N/A 7/27/2022    Procedure: COLONOSCOPY;  Surgeon: Dwight Lewis MD;  Location: [facility] ENDO;  Service: Endoscopy;  Laterality: N/A;    magdalene      INJECTION OF ANESTHETIC AGENT AROUND MEDIAL BRANCH NERVES INNERVATING CERVICAL FACET JOINT Bilateral 1/10/2022    Procedure: Block-nerve-medial branch-cervical C4 C5 C6;  Surgeon: Landon Winchester MD;  Location: [facility] OR;  Service: Pain Management;  Laterality: Bilateral;    INJECTION OF ANESTHETIC AGENT AROUND MEDIAL BRANCH NERVES INNERVATING LUMBAR FACET JOINT Bilateral 3/10/2021    Procedure: Block-nerve-medial branch-lumbar L2,L3,L4, L5;  Surgeon: Landon Winchester MD;  Location: [facility] OR;  Service: Pain Management;  Laterality: Bilateral;    INSERTION, NEUROSTIMULATOR,  HYPOGLOSSAL Right 3/17/2022    Procedure: INSERTION,NEUROSTIMULATOR,HYPOGLOSSAL;  Surgeon: Lance Wright MD;  Location: [facility] OR;  Service: ENT;  Laterality: Right;    KNEE ARTHROSCOPY W/ MENISCAL REPAIR Left 2015    MMT      Left knee A-scope    NOSE SURGERY      RADIOFREQUENCY ABLATION OF LUMBAR MEDIAL BRANCH NERVE AT SINGLE LEVEL Bilateral 3/23/2021    Procedure: Radiofrequency Ablation, Nerve, Spinal, Lumbar, Medial Branch, L2,LL3,L4,L5;  Surgeon: Landon Winchester MD;  Location: [facility] OR;  Service: Pain Management;  Laterality: Bilateral;    RADIOFREQUENCY ABLATION OF LUMBAR MEDIAL BRANCH NERVE AT SINGLE LEVEL Bilateral 10/6/2022    Procedure: Radiofrequency Ablation, Nerve, Spinal, Lumbar, Medial Branch, L4/5, L5/S1;  Surgeon: Landon Winchester MD;  Location: [facility] OR;  Service: Pain Management;  Laterality: Bilateral;    SLEEP ENDOSCOPY, DRUG-INDUCED Bilateral 1/28/2022    Procedure: SLEEP ENDOSCOPY,DRUG-INDUCED;  Surgeon: Lance Wright MD;  Location: [facility] OR;  Service: ENT;  Laterality: Bilateral;    TRANSFORAMINAL EPIDURAL INJECTION OF STEROID Bilateral 2/11/2021    Procedure: Injection,steroid,epidural,transforaminal approach L4/5;  Surgeon: Landon Winchester MD;  Location: [facility];  Service: Pain Management;  Laterality: Bilateral;    UVULECTOMY         Family History   Problem Relation Age of Onset    Glaucoma Father     Melanoma Neg Hx     Psoriasis Neg Hx     Lupus Neg Hx     Eczema Neg Hx        Social History     Socioeconomic History    Marital status:    Tobacco Use    Smoking status: Never    Smokeless tobacco: Never   Substance and Sexual Activity    Alcohol use: Yes     Alcohol/week: 5.0 standard drinks     Types: 5 Cans of beer per week     Comment: weekly     Drug use: No    Sexual activity: Yes     Social Determinants of Health     Financial Resource Strain: Low Risk     Difficulty of Paying Living Expenses: Not hard at all   Food Insecurity: No Food Insecurity    Worried  "About Running Out of Food in the Last Year: Never true    Ran Out of Food in the Last Year: Never true   Transportation Needs: No Transportation Needs    Lack of Transportation (Medical): No    Lack of Transportation (Non-Medical): No   Physical Activity: Insufficiently Active    Days of Exercise per Week: 1 day    Minutes of Exercise per Session: 60 min   Stress: Stress Concern Present    Feeling of Stress : Rather much   Social Connections: Unknown    Frequency of Communication with Friends and Family: Three times a week    Frequency of Social Gatherings with Friends and Family: Twice a week    Active Member of Clubs or Organizations: No    Attends Club or Organization Meetings: 1 to 4 times per year    Marital Status:    Housing Stability: Low Risk     Unable to Pay for Housing in the Last Year: No    Number of Places Lived in the Last Year: 1    Unstable Housing in the Last Year: No       No current facility-administered medications for this encounter.     Facility-Administered Medications Ordered in Other Encounters   Medication Dose Route Frequency Provider Last Rate Last Admin    lactated ringers infusion   Intravenous Continuous Jorge Espinosa MD 20 mL/hr at 03/17/22 0931 New Bag at 03/17/22 1241       Review of patient's allergies indicates:   Allergen Reactions    Buspar [buspirone]      Face flushed, possible elevated BP    Adhesive Rash       Vitals:    12/02/22 1637 12/07/22 1457   BP:  (!) 191/95   Pulse:  60   Resp:  16   Temp:  98.2 °F (36.8 °C)   SpO2:  97%   Weight: 98.9 kg (218 lb)    Height: 5' 9" (1.753 m)        ASA 2, Mallampati 2    REVIEW OF SYSTEMS:     GENERAL: No weight loss, malaise or fevers.  HEENT:  No recent changes in vision or hearing  NECK: Negative for lumps, no difficulty with swallowing.  RESPIRATORY: Negative for cough, wheezing or shortness of breath, patient denies any recent URI.  CARDIOVASCULAR: Negative for chest pain, leg swelling or palpitations.  GI: " Negative for abdominal discomfort, blood in stools or black stools or change in bowel habits.  MUSCULOSKELETAL: See HPI.  SKIN: Negative for lesions, rash, and itching.  PSYCH: No suicidal or homicidal ideations, no current mood disturbances.  HEMATOLOGY/LYMPHOLOGY: Negative for prolonged bleeding, bruising easily or swollen nodes. Patient is not currently taking any anti-coagulants  ENDO: No history of diabetes or thyroid dysfunction  NEURO: No history of syncope, paralysis, seizures or tremors.All other reviewed and negative other than HPI.    Physical exam:  Gen: A and O x3, pleasant, well-groomed  Skin: No rashes or obvious lesions  HEENT: PERRLA, no obvious deformities on ears or in canals. No thyroid masses, trachea midline, no palpable lymph nodes in neck, axilla.  CVS: Regular rate and rhythm, normal S1 and S2, no murmurs.  Resp: Clear to auscultation bilaterally.  Abdomen: Soft, NT/ND, normal bowel sounds present.  Musculoskeletal/Neuro: Moving all extremities    Assessment:  Lumbar radiculopathy  -     Case Request Operating Room: Injection-steroid-epidural-lumbar L5/S1  -     Vital signs; Standing  -     Verify informed consent; Standing  -     Notify physician ; Standing  -     Notify physician ; Standing  -     Notify physician (specify); Standing  -     Diet NPO; Standing  -     Place in Outpatient; Standing  -     Discontinue: alprazolam ODT dissolvable tablet 1 mg    Other orders  -     IP VTE LOW RISK PATIENT; Standing  -     Insert peripheral IV; Standing  -     lactated ringers infusion

## 2022-12-08 VITALS
BODY MASS INDEX: 32.29 KG/M2 | WEIGHT: 218 LBS | HEART RATE: 60 BPM | RESPIRATION RATE: 15 BRPM | DIASTOLIC BLOOD PRESSURE: 91 MMHG | TEMPERATURE: 98 F | SYSTOLIC BLOOD PRESSURE: 187 MMHG | HEIGHT: 69 IN | OXYGEN SATURATION: 97 %

## 2022-12-15 ENCOUNTER — PATIENT MESSAGE (OUTPATIENT)
Dept: CARDIOLOGY | Facility: HOSPITAL | Age: 67
End: 2022-12-15
Payer: MEDICARE

## 2022-12-16 ENCOUNTER — HOSPITAL ENCOUNTER (OUTPATIENT)
Dept: RADIOLOGY | Facility: HOSPITAL | Age: 67
Discharge: HOME | End: 2022-12-16
Attending: INTERNAL MEDICINE
Payer: MEDICARE

## 2022-12-16 ENCOUNTER — CLINICAL SUPPORT (OUTPATIENT)
Dept: CARDIOLOGY | Facility: HOSPITAL | Age: 67
End: 2022-12-16
Attending: INTERNAL MEDICINE
Payer: MEDICARE

## 2022-12-16 VITALS — HEIGHT: 69 IN | BODY MASS INDEX: 32.29 KG/M2 | WEIGHT: 218 LBS

## 2022-12-16 DIAGNOSIS — R06.09 EXERTIONAL DYSPNEA: ICD-10-CM

## 2022-12-16 DIAGNOSIS — G47.33 OBSTRUCTIVE SLEEP APNEA: ICD-10-CM

## 2022-12-16 DIAGNOSIS — R73.03 PRE-DIABETES: ICD-10-CM

## 2022-12-16 DIAGNOSIS — I10 PRIMARY HYPERTENSION: ICD-10-CM

## 2022-12-16 DIAGNOSIS — E78.00 HYPERCHOLESTEROLEMIA: ICD-10-CM

## 2022-12-16 DIAGNOSIS — R01.1 SYSTOLIC MURMUR: ICD-10-CM

## 2022-12-16 LAB
ASCENDING AORTA: 2.9 CM
AV INDEX (PROSTH): 0.62
AV MEAN GRADIENT: 9 MMHG
AV PEAK GRADIENT: 17 MMHG
AV VALVE AREA: 2.15 CM2
AV VELOCITY RATIO: 0.58
BSA FOR ECHO PROCEDURE: 2.19 M2
CV ECHO LV RWT: 0.41 CM
CV PHARM DOSE: 0.4 MG
CV STRESS BASE HR: 53 BPM
DIASTOLIC BLOOD PRESSURE: 80 MMHG
DOP CALC AO PEAK VEL: 2.04 M/S
DOP CALC AO VTI: 45.1 CM
DOP CALC LVOT AREA: 3.5 CM2
DOP CALC LVOT DIAMETER: 2.1 CM
DOP CALC LVOT PEAK VEL: 1.19 M/S
DOP CALC LVOT STROKE VOLUME: 96.93 CM3
DOP CALCLVOT PEAK VEL VTI: 28 CM
E WAVE DECELERATION TIME: 208.87 MSEC
E/A RATIO: 1.24
E/E' RATIO: 12.27 M/S
ECHO LV POSTERIOR WALL: 1.08 CM (ref 0.6–1.1)
EJECTION FRACTION: 65 %
FRACTIONAL SHORTENING: 43 % (ref 28–44)
INTERVENTRICULAR SEPTUM: 1.1 CM (ref 0.6–1.1)
IVRT: 108.47 MSEC
LA MAJOR: 5.33 CM
LA MINOR: 5.46 CM
LA WIDTH: 4.1 CM
LEFT ATRIUM SIZE: 4.73 CM
LEFT ATRIUM VOLUME INDEX: 41.6 ML/M2
LEFT ATRIUM VOLUME: 88.92 CM3
LEFT INTERNAL DIMENSION IN SYSTOLE: 3.03 CM (ref 2.1–4)
LEFT VENTRICLE DIASTOLIC VOLUME INDEX: 62.78 ML/M2
LEFT VENTRICLE DIASTOLIC VOLUME: 134.35 ML
LEFT VENTRICLE MASS INDEX: 104 G/M2
LEFT VENTRICLE SYSTOLIC VOLUME INDEX: 16.8 ML/M2
LEFT VENTRICLE SYSTOLIC VOLUME: 35.9 ML
LEFT VENTRICULAR INTERNAL DIMENSION IN DIASTOLE: 5.28 CM (ref 3.5–6)
LEFT VENTRICULAR MASS: 223.55 G
LV LATERAL E/E' RATIO: 11.5 M/S
LV SEPTAL E/E' RATIO: 13.14 M/S
LVOT MG: 3.04 MMHG
LVOT MV: 0.83 CM/S
MV PEAK A VEL: 0.74 M/S
MV PEAK E VEL: 0.92 M/S
MV STENOSIS PRESSURE HALF TIME: 60.57 MS
MV VALVE AREA P 1/2 METHOD: 3.63 CM2
NUC STRESS EJECTION FRACTION: 77 %
OHS CV CPX 1 MINUTE RECOVERY HEART RATE: 74 BPM
OHS CV CPX 85 PERCENT MAX PREDICTED HEART RATE MALE: 129
OHS CV CPX MAX PREDICTED HEART RATE: 152
OHS CV CPX PATIENT IS FEMALE: 0
OHS CV CPX PATIENT IS MALE: 1
OHS CV CPX PEAK DIASTOLIC BLOOD PRESSURE: 80 MMHG
OHS CV CPX PEAK HEAR RATE: 80 BPM
OHS CV CPX PEAK RATE PRESSURE PRODUCT: NORMAL
OHS CV CPX PEAK SYSTOLIC BLOOD PRESSURE: 156 MMHG
OHS CV CPX PERCENT MAX PREDICTED HEART RATE ACHIEVED: 53
OHS CV CPX RATE PRESSURE PRODUCT PRESENTING: 8268
OHS CV PHARM TIME: 1004 MIN
PISA TR MAX VEL: 2.44 M/S
PULM VEIN S/D RATIO: 1.29
PV PEAK D VEL: 0.52 M/S
PV PEAK S VEL: 0.67 M/S
RA MAJOR: 4.26 CM
RA PRESSURE: 3 MMHG
RA WIDTH: 3.66 CM
RIGHT VENTRICULAR END-DIASTOLIC DIMENSION: 4.15 CM
RIGHT VENTRICULAR LENGTH IN DIASTOLE (APICAL 4-CHAMBER VIEW): 6.66 CM
RV MID DIAMA: 2.92 CM
RV TISSUE DOPPLER FREE WALL SYSTOLIC VELOCITY 1 (APICAL 4 CHAMBER VIEW): 0.01 CM/S
SINUS: 2.84 CM
STJ: 2.78 CM
SYSTOLIC BLOOD PRESSURE: 156 MMHG
TDI LATERAL: 0.08 M/S
TDI SEPTAL: 0.07 M/S
TDI: 0.08 M/S
TR MAX PG: 24 MMHG
TRICUSPID ANNULAR PLANE SYSTOLIC EXCURSION: 2.4 CM
TV REST PULMONARY ARTERY PRESSURE: 27 MMHG

## 2022-12-16 PROCEDURE — 93306 ECHO (CUPID ONLY): ICD-10-PCS | Mod: 26,,, | Performed by: INTERNAL MEDICINE

## 2022-12-16 PROCEDURE — 93018 CV STRESS TEST I&R ONLY: CPT | Mod: ,,, | Performed by: INTERNAL MEDICINE

## 2022-12-16 PROCEDURE — 93018 NUCLEAR STRESS - CARDIOLOGY INTERPRETED (CUPID ONLY): ICD-10-PCS | Mod: ,,, | Performed by: INTERNAL MEDICINE

## 2022-12-16 PROCEDURE — 93306 TTE W/DOPPLER COMPLETE: CPT | Mod: 26,,, | Performed by: INTERNAL MEDICINE

## 2022-12-16 PROCEDURE — 93017 CV STRESS TEST TRACING ONLY: CPT | Mod: PO

## 2022-12-16 PROCEDURE — 78452 NUCLEAR STRESS - CARDIOLOGY INTERPRETED (CUPID ONLY): ICD-10-PCS | Mod: 26,,, | Performed by: INTERNAL MEDICINE

## 2022-12-16 PROCEDURE — 93227 HOLTER MONITOR - 24 HOUR (CUPID ONLY): ICD-10-PCS | Mod: HCNC,,, | Performed by: INTERNAL MEDICINE

## 2022-12-16 PROCEDURE — 93227 XTRNL ECG REC<48 HR R&I: CPT | Mod: HCNC,,, | Performed by: INTERNAL MEDICINE

## 2022-12-16 PROCEDURE — 93226 XTRNL ECG REC<48 HR SCAN A/R: CPT | Mod: PO

## 2022-12-16 PROCEDURE — 93016 NUCLEAR STRESS - CARDIOLOGY INTERPRETED (CUPID ONLY): ICD-10-PCS | Mod: ,,, | Performed by: INTERNAL MEDICINE

## 2022-12-16 PROCEDURE — 93016 CV STRESS TEST SUPVJ ONLY: CPT | Mod: ,,, | Performed by: INTERNAL MEDICINE

## 2022-12-16 PROCEDURE — 93306 TTE W/DOPPLER COMPLETE: CPT | Mod: PO

## 2022-12-16 PROCEDURE — 78452 HT MUSCLE IMAGE SPECT MULT: CPT | Mod: 26,,, | Performed by: INTERNAL MEDICINE

## 2022-12-16 PROCEDURE — 63600175 PHARM REV CODE 636 W HCPCS: Mod: PO | Performed by: INTERNAL MEDICINE

## 2022-12-16 PROCEDURE — 78452 HT MUSCLE IMAGE SPECT MULT: CPT | Mod: PO

## 2022-12-16 RX ORDER — REGADENOSON 0.08 MG/ML
0.4 INJECTION, SOLUTION INTRAVENOUS
Status: COMPLETED | OUTPATIENT
Start: 2022-12-16 | End: 2022-12-16

## 2022-12-16 RX ADMIN — REGADENOSON 0.4 MG: 0.08 INJECTION, SOLUTION INTRAVENOUS at 10:12

## 2022-12-20 LAB
OHS CV EVENT MONITOR DAY: 0
OHS CV HOLTER LENGTH DECIMAL HOURS: 48
OHS CV HOLTER LENGTH HOURS: 48
OHS CV HOLTER LENGTH MINUTES: 0
OHS CV HOLTER SINUS AVERAGE HR: 64
OHS CV HOLTER SINUS MAX HR: 118
OHS CV HOLTER SINUS MIN HR: 46

## 2022-12-29 ENCOUNTER — OFFICE VISIT (OUTPATIENT)
Dept: CARDIOLOGY | Facility: CLINIC | Age: 67
End: 2022-12-29
Payer: MEDICARE

## 2022-12-29 VITALS
DIASTOLIC BLOOD PRESSURE: 87 MMHG | SYSTOLIC BLOOD PRESSURE: 147 MMHG | WEIGHT: 222 LBS | HEIGHT: 69 IN | HEART RATE: 65 BPM | BODY MASS INDEX: 32.88 KG/M2

## 2022-12-29 DIAGNOSIS — I10 PRIMARY HYPERTENSION: Primary | ICD-10-CM

## 2022-12-29 DIAGNOSIS — R01.1 SYSTOLIC MURMUR: ICD-10-CM

## 2022-12-29 DIAGNOSIS — E78.00 HYPERCHOLESTEROLEMIA: ICD-10-CM

## 2022-12-29 DIAGNOSIS — G47.33 OBSTRUCTIVE SLEEP APNEA: ICD-10-CM

## 2022-12-29 PROCEDURE — 99999 PR PBB SHADOW E&M-EST. PATIENT-LVL III: CPT | Mod: PBBFAC,HCNC,, | Performed by: INTERNAL MEDICINE

## 2022-12-29 PROCEDURE — 1159F MED LIST DOCD IN RCRD: CPT | Mod: HCNC,CPTII,S$GLB, | Performed by: INTERNAL MEDICINE

## 2022-12-29 PROCEDURE — 3008F BODY MASS INDEX DOCD: CPT | Mod: HCNC,CPTII,S$GLB, | Performed by: INTERNAL MEDICINE

## 2022-12-29 PROCEDURE — 1101F PR PT FALLS ASSESS DOC 0-1 FALLS W/OUT INJ PAST YR: ICD-10-PCS | Mod: HCNC,CPTII,S$GLB, | Performed by: INTERNAL MEDICINE

## 2022-12-29 PROCEDURE — 99999 PR PBB SHADOW E&M-EST. PATIENT-LVL III: ICD-10-PCS | Mod: PBBFAC,HCNC,, | Performed by: INTERNAL MEDICINE

## 2022-12-29 PROCEDURE — 3008F PR BODY MASS INDEX (BMI) DOCUMENTED: ICD-10-PCS | Mod: HCNC,CPTII,S$GLB, | Performed by: INTERNAL MEDICINE

## 2022-12-29 PROCEDURE — 1101F PT FALLS ASSESS-DOCD LE1/YR: CPT | Mod: HCNC,CPTII,S$GLB, | Performed by: INTERNAL MEDICINE

## 2022-12-29 PROCEDURE — 3044F PR MOST RECENT HEMOGLOBIN A1C LEVEL <7.0%: ICD-10-PCS | Mod: HCNC,CPTII,S$GLB, | Performed by: INTERNAL MEDICINE

## 2022-12-29 PROCEDURE — 3288F PR FALLS RISK ASSESSMENT DOCUMENTED: ICD-10-PCS | Mod: HCNC,CPTII,S$GLB, | Performed by: INTERNAL MEDICINE

## 2022-12-29 PROCEDURE — 3044F HG A1C LEVEL LT 7.0%: CPT | Mod: HCNC,CPTII,S$GLB, | Performed by: INTERNAL MEDICINE

## 2022-12-29 PROCEDURE — 1160F PR REVIEW ALL MEDS BY PRESCRIBER/CLIN PHARMACIST DOCUMENTED: ICD-10-PCS | Mod: HCNC,CPTII,S$GLB, | Performed by: INTERNAL MEDICINE

## 2022-12-29 PROCEDURE — 1126F PR PAIN SEVERITY QUANTIFIED, NO PAIN PRESENT: ICD-10-PCS | Mod: HCNC,CPTII,S$GLB, | Performed by: INTERNAL MEDICINE

## 2022-12-29 PROCEDURE — 3079F DIAST BP 80-89 MM HG: CPT | Mod: HCNC,CPTII,S$GLB, | Performed by: INTERNAL MEDICINE

## 2022-12-29 PROCEDURE — 1160F RVW MEDS BY RX/DR IN RCRD: CPT | Mod: HCNC,CPTII,S$GLB, | Performed by: INTERNAL MEDICINE

## 2022-12-29 PROCEDURE — 3077F SYST BP >= 140 MM HG: CPT | Mod: HCNC,CPTII,S$GLB, | Performed by: INTERNAL MEDICINE

## 2022-12-29 PROCEDURE — 3079F PR MOST RECENT DIASTOLIC BLOOD PRESSURE 80-89 MM HG: ICD-10-PCS | Mod: HCNC,CPTII,S$GLB, | Performed by: INTERNAL MEDICINE

## 2022-12-29 PROCEDURE — 4010F ACE/ARB THERAPY RXD/TAKEN: CPT | Mod: HCNC,CPTII,S$GLB, | Performed by: INTERNAL MEDICINE

## 2022-12-29 PROCEDURE — 4010F PR ACE/ARB THEARPY RXD/TAKEN: ICD-10-PCS | Mod: HCNC,CPTII,S$GLB, | Performed by: INTERNAL MEDICINE

## 2022-12-29 PROCEDURE — 3077F PR MOST RECENT SYSTOLIC BLOOD PRESSURE >= 140 MM HG: ICD-10-PCS | Mod: HCNC,CPTII,S$GLB, | Performed by: INTERNAL MEDICINE

## 2022-12-29 PROCEDURE — 1126F AMNT PAIN NOTED NONE PRSNT: CPT | Mod: HCNC,CPTII,S$GLB, | Performed by: INTERNAL MEDICINE

## 2022-12-29 PROCEDURE — 1159F PR MEDICATION LIST DOCUMENTED IN MEDICAL RECORD: ICD-10-PCS | Mod: HCNC,CPTII,S$GLB, | Performed by: INTERNAL MEDICINE

## 2022-12-29 PROCEDURE — 99214 OFFICE O/P EST MOD 30 MIN: CPT | Mod: HCNC,S$GLB,, | Performed by: INTERNAL MEDICINE

## 2022-12-29 PROCEDURE — 3288F FALL RISK ASSESSMENT DOCD: CPT | Mod: HCNC,CPTII,S$GLB, | Performed by: INTERNAL MEDICINE

## 2022-12-29 PROCEDURE — 99214 PR OFFICE/OUTPT VISIT, EST, LEVL IV, 30-39 MIN: ICD-10-PCS | Mod: HCNC,S$GLB,, | Performed by: INTERNAL MEDICINE

## 2022-12-29 NOTE — PROGRESS NOTES
Subjective:    Patient ID:  Moris Calderón is a 67 y.o. male patient here for evaluation No chief complaint on file.      History of Present Illness:  Cardiology follow-up test results.  Risk factors positive hypertension, dyslipidemia, prediabetes mellitus.  Better with weight loss.  Cardiac evaluation for MOY.  Noninvasive cardiac assessment this visit with unremarkable nuclear, echo and Holter monitor.    Patient with HERNAN, neurostimulator.   Overall symptoms are nonprogressive if anything improved.          Review of patient's allergies indicates:   Allergen Reactions    Buspar [buspirone]      Face flushed, possible elevated BP    Adhesive Rash       Past Medical History:   Diagnosis Date    Anxiety     Arthritis     Back problem     bulging disc    GERD (gastroesophageal reflux disease)     Hypercholesterolemia 11/02/2015 2009: Began statin.    Hyperlipidemia     Hypertension, benign 11/02/2015 2008: Diagnosed.    Sleep apnea     uses implant     Past Surgical History:   Procedure Laterality Date    APPENDECTOMY      ARTHROSCOPIC REPAIR OF ROTATOR CUFF OF SHOULDER Right 12/21/2018    Procedure: REPAIR, ROTATOR CUFF, ARTHROSCOPIC;  Surgeon: Colby Valenzuela MD;  Location: [facility] OR;  Service: Orthopedics;  Laterality: Right;    ARTHROSCOPY OF SHOULDER WITH DECOMPRESSION OF SUBACROMIAL SPACE Right 12/21/2018    Procedure: ARTHROSCOPY, SHOULDER, WITH SUBACROMIAL SPACE DECOMPRESSION;  Surgeon: Colby Valenzuela MD;  Location: [facility] OR;  Service: Orthopedics;  Laterality: Right;    BICEPS TENDON REPAIR Right 12/21/2018    Procedure: REPAIR, TENDON, BICEPS;  Surgeon: Colby Valenzuela MD;  Location: [facility] OR;  Service: Orthopedics;  Laterality: Right;    COLONOSCOPY N/A 7/27/2022    Procedure: COLONOSCOPY;  Surgeon: Dwight Lewis MD;  Location: [facility] ENDO;  Service: Endoscopy;  Laterality: N/A;    EPIDURAL STEROID INJECTION INTO LUMBAR SPINE N/A 12/7/2022    Procedure:  Injection-steroid-epidural-lumbar L5/S1;  Surgeon: Landon Winchester MD;  Location: [facility] OR;  Service: Pain Management;  Laterality: N/A;    magdalene      INJECTION OF ANESTHETIC AGENT AROUND MEDIAL BRANCH NERVES INNERVATING CERVICAL FACET JOINT Bilateral 1/10/2022    Procedure: Block-nerve-medial branch-cervical C4 C5 C6;  Surgeon: Landon Winchester MD;  Location: [facility] OR;  Service: Pain Management;  Laterality: Bilateral;    INJECTION OF ANESTHETIC AGENT AROUND MEDIAL BRANCH NERVES INNERVATING LUMBAR FACET JOINT Bilateral 3/10/2021    Procedure: Block-nerve-medial branch-lumbar L2,L3,L4, L5;  Surgeon: Landon Winchester MD;  Location: [facility] OR;  Service: Pain Management;  Laterality: Bilateral;    INSERTION, NEUROSTIMULATOR, HYPOGLOSSAL Right 3/17/2022    Procedure: INSERTION,NEUROSTIMULATOR,HYPOGLOSSAL;  Surgeon: Lance Wright MD;  Location: [facility] [place] OR;  Service: ENT;  Laterality: Right;    KNEE ARTHROSCOPY W/ MENISCAL REPAIR Left 2015    MMT      Left knee A-scope    NOSE SURGERY      RADIOFREQUENCY ABLATION OF LUMBAR MEDIAL BRANCH NERVE AT SINGLE LEVEL Bilateral 3/23/2021    Procedure: Radiofrequency Ablation, Nerve, Spinal, Lumbar, Medial Branch, L2,LL3,L4,L5;  Surgeon: Landon Winchester MD;  Location: [facility] OR;  Service: Pain Management;  Laterality: Bilateral;    RADIOFREQUENCY ABLATION OF LUMBAR MEDIAL BRANCH NERVE AT SINGLE LEVEL Bilateral 10/6/2022    Procedure: Radiofrequency Ablation, Nerve, Spinal, Lumbar, Medial Branch, L4/5, L5/S1;  Surgeon: Landon Winchester MD;  Location: [facility] OR;  Service: Pain Management;  Laterality: Bilateral;    SLEEP ENDOSCOPY, DRUG-INDUCED Bilateral 1/28/2022    Procedure: SLEEP ENDOSCOPY,DRUG-INDUCED;  Surgeon: Lance Wright MD;  Location: [facility] OR;  Service: ENT;  Laterality: Bilateral;    TRANSFORAMINAL EPIDURAL INJECTION OF STEROID Bilateral 2/11/2021    Procedure: Injection,steroid,epidural,transforaminal approach L4/5;  Surgeon: Landon Winchester MD;  Location:  [facility] OR;  Service: Pain Management;  Laterality: Bilateral;    UVULECTOMY       Social History     Tobacco Use    Smoking status: Never    Smokeless tobacco: Never   Substance Use Topics    Alcohol use: Yes     Alcohol/week: 5.0 standard drinks     Types: 5 Cans of beer per week     Comment: weekly     Drug use: No        Review of Systems:    As noted in HPI in addition      REVIEW OF SYSTEMS  Review of Systems   Constitutional: Negative for decreased appetite, diaphoresis, night sweats, weight gain and weight loss.   HENT:  Negative for nosebleeds and odynophagia.    Eyes:  Negative for double vision and photophobia.   Cardiovascular:  Negative for chest pain, claudication, cyanosis, dyspnea on exertion, irregular heartbeat, leg swelling, near-syncope, orthopnea, palpitations, paroxysmal nocturnal dyspnea and syncope.   Respiratory:  Negative for cough, hemoptysis, shortness of breath and wheezing.    Hematologic/Lymphatic: Negative for adenopathy.   Skin:  Negative for flushing, skin cancer and suspicious lesions.   Musculoskeletal:  Negative for gout, myalgias and neck pain.   Gastrointestinal:  Negative for abdominal pain, heartburn, hematemesis and hematochezia.   Genitourinary:  Negative for bladder incontinence, hesitancy and nocturia.   Neurological:  Negative for focal weakness, headaches, light-headedness and paresthesias.   Psychiatric/Behavioral:  Negative for memory loss and substance abuse.             Objective:        Vitals:    12/29/22 1007   BP: (!) 147/87   Pulse: 65       Lab Results   Component Value Date    WBC 4.50 05/27/2021    HGB 13.1 (L) 05/27/2021    HCT 38.9 (L) 05/27/2021     05/27/2021    CHOL 182 08/04/2022    TRIG 197 (H) 08/04/2022    HDL 41 08/04/2022    ALT 20 08/04/2022    AST 18 08/04/2022     08/04/2022    K 3.9 08/04/2022     08/04/2022    CREATININE 0.9 08/04/2022    BUN 16 08/04/2022    CO2 25 08/04/2022    TSH 1.139 05/27/2021    PSA 0.26 05/27/2021     INR 1.0 09/21/2015    HGBA1C 5.9 (H) 08/04/2022        ECHOCARDIOGRAM RESULTS  Results for orders placed in visit on 12/16/22    Echo    Interpretation Summary  · Normal systolic function.  · The estimated ejection fraction is 65%.  · Indeterminate left ventricular diastolic function.  · Normal right ventricular size with normal right ventricular systolic function.  · Moderate left atrial enlargement.  · Mild tricuspid regurgitation.  · Normal central venous pressure (3 mmHg).  · The estimated PA systolic pressure is 27 mmHg.        CURRENT/PREVIOUS VISIT EKG  Results for orders placed or performed in visit on 11/08/22   IN OFFICE EKG 12-LEAD (to [employer])    Collection Time: 11/08/22  9:44 AM    Narrative    Test Reason : z00.00    Vent. Rate : 052 BPM     Atrial Rate : 052 BPM     P-R Int : 152 ms          QRS Dur : 086 ms      QT Int : 426 ms       P-R-T Axes : 028 070 076 degrees     QTc Int : 396 ms    Sinus bradycardia  Otherwise normal ECG  When compared with ECG of 15-MAY-2018 10:53,  No significant change was found  Confirmed by JUAN M VANCE MD ([id number]) on 11/8/2022 8:07:29 PM    Referred By: BIBIANA NINO           Confirmed By:JUAN M VANCE MD     No valid procedures specified.   Results for orders placed in visit on 12/16/22    Nuclear Stress - Cardiology Interpreted    Interpretation Summary    Normal myocardial perfusion scan. There is no evidence of myocardial ischemia or infarction.    The gated perfusion images showed an ejection fraction of 77% post stress.    There is normal wall motion post stress.    LV cavity size is normal at rest and normal at stress.    The ECG portion of the study is negative for ischemia.    The patient reported no chest pain during the stress test.    No valid procedures specified.    PHYSICAL EXAM  CONSTITUTIONAL: Well built, well nourished in no apparent distress  NECK: no carotid bruit, no JVD  LUNGS: CTA  CHEST WALL: no tenderness,  HEART: regular rate and rhythm, S1,  #Discharge: do not delete    77 y/o male, legally blind, PMHX HTN, HLD, DM, PVD,  known CAD, s/p CABG (LIMA to LAD with Dr Roberson,  4/1/19), s/p multiple PCIs, found with positive NST, and unstable angina, R/I NSTEMI with elevated Troponins transferred to [facility] for management of NSTEMI and  C with Dr. Richard    Problem List/Main Diagnoses:     #NSTEMI (non-ST elevation myocardial infarction).   Pt presented from [facility] w/ chest pain and elevated trops I (560) and R/I NSTEMI. o/a trops 0.05, , CKMB 4.7, . EKG SB @ 45bpm, no acute ischemic changes. s/p LHC with WINTER pLCx and PTCA dLCx. ECHO 08/12 - EF 50% w/ LV regional wall motion abnormalities. Hb drop to 10.7 post-LHC, asymptomatic.  - c/w ASA 81mg QD, Plavix, 75mg QD, Ranexa 500mg BID, Imdur 30mg QD and Atorvastatin 80mg QHS (increased from home dose 40mg QHS)     #CAD (coronary artery disease).   Known CAD, s/p CABG (LIMA to LAD  with Dr Roberson,  4/1/19), s/p multiple PCIs  -c/w ASA 81mg QD, Plavix 75mg QD and Atorvastatin 80mg QD.    #Bradycardia.   EKG showing SB @ 45bpm o/a. Asymptomatic, resolved after PCI and holding metoprolol. ECHO as above.  - d/c Metoprolol Succinate 100mg QD (transfer meds)    #PVD (peripheral vascular disease).   B/L LE redness, with mild tenderness, L>R. Duplex US to r/o DVT demonstrated *****. Given Bactroban q8h while IP and received DVT prophylaxis  - c/w ASA 81mg QD, Plavix 75mg QD and Atorvastatin 80mg QD.    #History of HTN. Home Benazepril 20mg interchanged to Lisinopril 20mg daily while IP. continued home Imdur 30mg QD  - cont home benazepril 20mg QD and Imdur 30mg QD  - d/c home Metoprolol Succ 100mg QD due to bradycardia and review on discharge    #HLD (hyperlipidemia).   Lipid panel o/a NAD. Increased from Atorvastatin 40mg to 80mg QHS iso NSTEMI.  - cont atorvastatin 80mg QD    #DM (diabetes mellitus).   Per pt, prescribed Lantus 45units, Humalog 6units TID. Continued this regimen while IP with iSS. FSGs monitored and in range during hospitalization.  -c/w Lantus 45 units QHS and Humalog 7units TID    Patient was discharged to: Home    New medications:  isosorbide mononitrate 30mg XR QD  ranolazine 500mg QD     Changes to old medications:   Increased atorvastatin to 80mg QD    Medications that were stopped:  Metoprolol Succ 100mg QD    Items to Follow up as outpatient  Cardio f/u in 7 days      Physical exam at time of discharge:   Constitutional: NAD  HEENT: sclera non-icteric, neck supple, trachea midline, no masses, no JVD, MMM  Respiratory: CTA b/l, good air entry b/l, no wheezing, no rhonchi, no rales, without accessory muscle use and no intercostal retractions  Cardiovascular: RRR, normal S1S2, no M/R/G  Gastrointestinal: soft, NT, distended obese abdomen, no masses palpable, BS normal  Extremities: Warm, well perfused with purple venous insufficiency changes around ankles b/l, pulses equal bilateral upper and lower extremities, no edema, no clubbing. L calf bigger in diameter compared to R. More erythematous and painful to touch.  Neurological: AAOx3, CN Grossly intact  Skin: Normal temperature, warm, dry S2 normal, no murmur, click, rub or gallop   ABDOMEN: soft, non-tender; bowel sounds normal; no masses,  no organomegaly  EXTREMITIES: Extremities normal, no edema, no calf tenderness noted  NEURO: AAO X 3    I HAVE REVIEWED :    The vital signs, nurses notes, and all the pertinent radiology and labs.         Current Outpatient Medications   Medication Instructions    atorvastatin (LIPITOR) 10 mg, Oral, Daily    benazepriL (LOTENSIN) 20 mg, Oral, Daily    cetirizine HCl (CETIRIZINE ORAL) 1 Dose, Oral, Daily PRN    esomeprazole (NEXIUM) 40 MG capsule TAKE 1 CAPSULE(40 MG) BY MOUTH BEFORE BREAKFAST    fluticasone propionate (FLONASE) 50 mcg/actuation nasal spray SHAKE LIQUID AND USE 1 SPRAY IN EACH NOSTRIL EVERY DAY    gabapentin (NEURONTIN) 300 mg, Oral, 3 times daily    paroxetine (PAXIL) 10 mg, Oral, Every morning    paroxetine (PAXIL) 40 mg, Oral, Daily    PFIZER COVID BIVAL,12Y UP,,PF, 30 mcg/0.3 mL injection No dose, route, or frequency recorded.    tamsulosin (FLOMAX) 0.4 mg, Oral, Daily    tiZANidine (ZANAFLEX) 4 MG tablet TAKE 1 TABLET(4 MG) BY MOUTH EVERY NIGHT AS NEEDED          Assessment:   HERNAN, neurostimulator.  MOY, negative noninvasive cardiac assessment for ischemia, arrhythmic heart disease or structural heart issues.  Hypertension, prediabetes mellitus, dyslipidemia.        Plan:     Continue risk factor modification.  Weight loss diet exercise.  Medications reviewed and reconciled.  Recommend no changes.  Continue home blood pressure monitoring.  Follow up with me again 6 months.      No follow-ups on file.        #Discharge: do not delete    75 y/o male, legally blind, PMHX HTN, HLD, DM, PVD,  known CAD, s/p CABG (LIMA to LAD with Dr Roberson,  4/1/19), s/p multiple PCIs, found with positive NST, and unstable angina, R/I NSTEMI with elevated Troponins transferred to [facility] for management of NSTEMI and  C with Dr. Richard    Problem List/Main Diagnoses:     #NSTEMI (non-ST elevation myocardial infarction).   Pt presented from [facility] w/ chest pain and elevated trops I (560) and R/I NSTEMI. o/a trops 0.05, , CKMB 4.7, . EKG SB @ 45bpm, no acute ischemic changes. s/p LHC on 8/12/22 with WINTER pLCx and PTCA dLCx. ECHO 08/12 - EF 50% w/ LV regional wall motion abnormalities. Hb drop to 10.7 post-LHC, asymptomatic and remained stable. Left Radial access site, TR band placed, successfully removed, no hematoma, no bleed  Radial pulse: +2, back to baseline  - c/w ASA 81mg QD, Plavix, 75mg QD, Ranexa 500mg BID, Imdur 30mg QD and Atorvastatin 80mg QHS (increased from home dose 40mg QHS)   - Cardiac Rehab Indications (STEMI/NSTEMI/ACS/Unstable Angina/CHF/Chronic Stable Angina/Heart Surgery (CABG,Valve)/Post PCI):            *Education on benefits of Cardiac Rehab provided to patient: Yes         *Referral and Prescription Given for Cardiac Rehab: Yes.  - AMI: Beta Blocker Prescribed: No ISO ongoing bradycardia            ACE-I/ARB Prescribed: Yes  - Statin Prescribed (STEMI/NSTEMI/ACS/UA &/OR Post PCI this admission):  Yes  - DAPT Post PCI: Prescriptions for Aspirin/Plavix/Brilinta/Effient e-prescribed to patient's pharmacy: Yes.    #CAD (coronary artery disease).   Known CAD, s/p CABG (LIMA to LAD  with Dr Roberson,  4/1/19), s/p multiple PCIs  -c/w ASA 81mg QD, Plavix 75mg QD and Atorvastatin 80mg QD.    #Bradycardia.   EKG showing SB @ 45bpm o/a. Asymptomatic, resolved after PCI and holding metoprolol. ECHO as above.  - d/c Metoprolol Succinate 100mg QD (transfer meds)    #PVD (peripheral vascular disease).   B/L LE redness, with mild tenderness, L>R. Duplex US to r/o DVT demonstrated *****. Given Bactroban q8h while IP and received DVT prophylaxis  - c/w ASA 81mg QD, Plavix 75mg QD and Atorvastatin 80mg QD.    #History of HTN. Home Benazepril 20mg interchanged to Lisinopril 20mg daily while IP. continued home Imdur 30mg QD  - cont home benazepril 20mg QD and Imdur 30mg QD  - d/c home Metoprolol Succ 100mg QD due to bradycardia and review on discharge    #HLD (hyperlipidemia).   Lipid panel o/a NAD. Increased from Atorvastatin 40mg to 80mg QHS iso NSTEMI.  - cont atorvastatin 80mg QD    #DM (diabetes mellitus).   Per pt, prescribed Lantus 45units, Humalog 6units TID. Continued this regimen while IP with iSS. FSGs monitored and in range during hospitalization.  -c/w Lantus 45 units QHS and Humalog 7units TID    AMI: Beta Blocker Prescribed: No ISO ongoing bradycardia            ACE-I/ARB Prescribed: Yes  Statin Prescribed (STEMI/NSTEMI/ACS/UA &/OR Post PCI this admission):  Yes  DAPT Post PCI: Prescriptions for Aspirin/Plavix/Brilinta/Effient e-prescribed to patient's pharmacy: Yes.    Patient was discharged to: Home    New medications:  isosorbide mononitrate 30mg XR QD  ranolazine 500mg QD     Changes to old medications:   Increased atorvastatin to 80mg QD    Medications that were stopped:  Metoprolol Succ 100mg QD    Items to Follow up as outpatient  Cardio f/u in 7 days      Physical exam at time of discharge:   Constitutional: NAD  HEENT: sclera non-icteric, neck supple, trachea midline, no masses, no JVD, MMM  Respiratory: CTA b/l, good air entry b/l, no wheezing, no rhonchi, no rales, without accessory muscle use and no intercostal retractions  Cardiovascular: RRR, normal S1S2, no M/R/G  Gastrointestinal: soft, NT, distended obese abdomen, no masses palpable, BS normal  Extremities: Warm, well perfused with purple venous insufficiency changes around ankles b/l, pulses equal bilateral upper and lower extremities, no edema, no clubbing. L calf bigger in diameter compared to R. More erythematous and painful to touch.  Neurological: AAOx3, CN Grossly intact  Skin: Normal temperature, warm, dry

## 2023-01-11 ENCOUNTER — TELEPHONE (OUTPATIENT)
Dept: PAIN MEDICINE | Facility: CLINIC | Age: 68
End: 2023-01-11

## 2023-01-11 ENCOUNTER — OFFICE VISIT (OUTPATIENT)
Dept: PAIN MEDICINE | Facility: CLINIC | Age: 68
End: 2023-01-11
Payer: MEDICARE

## 2023-01-11 VITALS
HEART RATE: 64 BPM | SYSTOLIC BLOOD PRESSURE: 143 MMHG | BODY MASS INDEX: 32.86 KG/M2 | HEIGHT: 69 IN | DIASTOLIC BLOOD PRESSURE: 80 MMHG | WEIGHT: 221.88 LBS | OXYGEN SATURATION: 98 %

## 2023-01-11 DIAGNOSIS — M47.816 LUMBAR SPONDYLOSIS: ICD-10-CM

## 2023-01-11 DIAGNOSIS — M48.061 SPINAL STENOSIS OF LUMBAR REGION WITHOUT NEUROGENIC CLAUDICATION: ICD-10-CM

## 2023-01-11 DIAGNOSIS — M54.16 LUMBAR RADICULOPATHY: Primary | ICD-10-CM

## 2023-01-11 PROCEDURE — 3079F DIAST BP 80-89 MM HG: CPT | Mod: HCNC,CPTII,S$GLB, | Performed by: PHYSICIAN ASSISTANT

## 2023-01-11 PROCEDURE — 3008F PR BODY MASS INDEX (BMI) DOCUMENTED: ICD-10-PCS | Mod: HCNC,CPTII,S$GLB, | Performed by: PHYSICIAN ASSISTANT

## 2023-01-11 PROCEDURE — 3008F BODY MASS INDEX DOCD: CPT | Mod: HCNC,CPTII,S$GLB, | Performed by: PHYSICIAN ASSISTANT

## 2023-01-11 PROCEDURE — 3077F PR MOST RECENT SYSTOLIC BLOOD PRESSURE >= 140 MM HG: ICD-10-PCS | Mod: HCNC,CPTII,S$GLB, | Performed by: PHYSICIAN ASSISTANT

## 2023-01-11 PROCEDURE — 99214 PR OFFICE/OUTPT VISIT, EST, LEVL IV, 30-39 MIN: ICD-10-PCS | Mod: HCNC,S$GLB,, | Performed by: PHYSICIAN ASSISTANT

## 2023-01-11 PROCEDURE — 1159F PR MEDICATION LIST DOCUMENTED IN MEDICAL RECORD: ICD-10-PCS | Mod: HCNC,CPTII,S$GLB, | Performed by: PHYSICIAN ASSISTANT

## 2023-01-11 PROCEDURE — 1160F RVW MEDS BY RX/DR IN RCRD: CPT | Mod: HCNC,CPTII,S$GLB, | Performed by: PHYSICIAN ASSISTANT

## 2023-01-11 PROCEDURE — 3077F SYST BP >= 140 MM HG: CPT | Mod: HCNC,CPTII,S$GLB, | Performed by: PHYSICIAN ASSISTANT

## 2023-01-11 PROCEDURE — 1159F MED LIST DOCD IN RCRD: CPT | Mod: HCNC,CPTII,S$GLB, | Performed by: PHYSICIAN ASSISTANT

## 2023-01-11 PROCEDURE — 1125F PR PAIN SEVERITY QUANTIFIED, PAIN PRESENT: ICD-10-PCS | Mod: HCNC,CPTII,S$GLB, | Performed by: PHYSICIAN ASSISTANT

## 2023-01-11 PROCEDURE — 99999 PR PBB SHADOW E&M-EST. PATIENT-LVL III: ICD-10-PCS | Mod: PBBFAC,HCNC,, | Performed by: PHYSICIAN ASSISTANT

## 2023-01-11 PROCEDURE — 1125F AMNT PAIN NOTED PAIN PRSNT: CPT | Mod: HCNC,CPTII,S$GLB, | Performed by: PHYSICIAN ASSISTANT

## 2023-01-11 PROCEDURE — 3079F PR MOST RECENT DIASTOLIC BLOOD PRESSURE 80-89 MM HG: ICD-10-PCS | Mod: HCNC,CPTII,S$GLB, | Performed by: PHYSICIAN ASSISTANT

## 2023-01-11 PROCEDURE — 1101F PR PT FALLS ASSESS DOC 0-1 FALLS W/OUT INJ PAST YR: ICD-10-PCS | Mod: HCNC,CPTII,S$GLB, | Performed by: PHYSICIAN ASSISTANT

## 2023-01-11 PROCEDURE — 3288F PR FALLS RISK ASSESSMENT DOCUMENTED: ICD-10-PCS | Mod: HCNC,CPTII,S$GLB, | Performed by: PHYSICIAN ASSISTANT

## 2023-01-11 PROCEDURE — 1160F PR REVIEW ALL MEDS BY PRESCRIBER/CLIN PHARMACIST DOCUMENTED: ICD-10-PCS | Mod: HCNC,CPTII,S$GLB, | Performed by: PHYSICIAN ASSISTANT

## 2023-01-11 PROCEDURE — 99214 OFFICE O/P EST MOD 30 MIN: CPT | Mod: HCNC,S$GLB,, | Performed by: PHYSICIAN ASSISTANT

## 2023-01-11 PROCEDURE — 1101F PT FALLS ASSESS-DOCD LE1/YR: CPT | Mod: HCNC,CPTII,S$GLB, | Performed by: PHYSICIAN ASSISTANT

## 2023-01-11 PROCEDURE — 99999 PR PBB SHADOW E&M-EST. PATIENT-LVL III: CPT | Mod: PBBFAC,HCNC,, | Performed by: PHYSICIAN ASSISTANT

## 2023-01-11 PROCEDURE — 3288F FALL RISK ASSESSMENT DOCD: CPT | Mod: HCNC,CPTII,S$GLB, | Performed by: PHYSICIAN ASSISTANT

## 2023-01-11 RX ORDER — INFLUENZA A VIRUS A/VICTORIA/2570/2019 IVR-215 (H1N1) ANTIGEN (FORMALDEHYDE INACTIVATED), INFLUENZA A VIRUS A/DARWIN/9/2021 SAN-010 (H3N2) ANTIGEN (FORMALDEHYDE INACTIVATED), INFLUENZA B VIRUS B/PHUKET/3073/2013 ANTIGEN (FORMALDEHYDE INACTIVATED), AND INFLUENZA B VIRUS B/MICHIGAN/01/2021 ANTIGEN (FORMALDEHYDE INACTIVATED) 60; 60; 60; 60 UG/.7ML; UG/.7ML; UG/.7ML; UG/.7ML
INJECTION, SUSPENSION INTRAMUSCULAR
COMMUNITY
Start: 2022-09-28 | End: 2023-05-10

## 2023-01-11 RX ORDER — TIZANIDINE 4 MG/1
TABLET ORAL
Qty: 90 TABLET | Refills: 3 | Status: SHIPPED | OUTPATIENT
Start: 2023-01-11

## 2023-01-11 NOTE — H&P (VIEW-ONLY)
This note was completed with dictation software and grammatical errors may exist.    CC:  Back pain, neck pain    HPI:  Patient is a 67-year-old man with a history of hypertension, GERD, arthritis, anxiety who presents in self-referral for low back pain radiating into the leg and neck pain.    He is status post L5/S1 interlaminar epidural steroid injection on 12/07/2022 initially with 70% relief, now reporting 40% relief.  He complains of right greater than left buttock pain radiating to the right thigh, points both posterior and anterior. The pain is worse with normal activity and constant.  He has occasional numbness on the bottom of his left toes.  He reports intermittent weakness in his right leg and uses his left leg to go from a seated to standing position but is not sure if this is true weakness or if he is guarding.    Previous History:  He reports having low back pain for many years, greater than 15.  Since 2012 he has been seeing Dr. Raffy Vásquez, pain management on the [place] and had been undergoing injections for his back.  He states that the injections would usually help 1-2 years at a time to get rid of back pain and leg pain.  In the last year he had several injections that did not provide any relief and so they obtained a new MRI which showed significant degeneration at the level above where he had his previous injections, now showing issues at L3/4.  He was referred to Neurosurgery who recommended physical therapy.  He tried physical therapy for several months and it did help but he did not continue the exercises on his own.    He states that his pain is currently located across the bilateral low back, worse with working in the yard, bending over, sitting or standing too long.  It radiates along his right anterior thigh to about his knee.  He describes it as aching, dull, tight, deep, sharp and shooting.  Is worse with prolonged sitting or standing, lying down, bending or walking, doing any  "lifting or getting out of a bed or a chair.  He gets some relief with rest, activity, lying down, medications, injections and physical therapy.  He also takes ibuprofen and Tylenol some relief.  His other complaint is neck pain located in the bilateral neck, worse with turning his head from side to side, it radiates into the shoulders and into his upper back at times.  He denies juan weakness in his arms.  He states that he had undergone what sounds like a medial branch block bilaterally which did help but he had not proceeded with the radiofrequency part because the viral pandemic began.  He denies any balance issues, no weakness in the arms or legs.    Pain intervention history:  He reports undergoing multiple injections over the years with very good relief, the last 2 did not seem to help.  From Dr. Raffy Vásquez notes: "The patient underwent bilateral C4, 5, 6 medial branch block on 02/04/2020 with 80% relief.  He did not proceed with the RFA.  He had undergone bilateral L4 and L5 transforaminal injection on 10/24/2019 with 50% relief for 1 month.  He had undergone bilateral L4/5 TF VIET on 02/23/2017 with 80% relief for 2 years.  He underwent bilateral L4 and L5 transforaminal injection on 12/05/2019 without much relief."  He is status post bilateral L4/5 transforaminal epidural steroid injection on 02/11/2021 0% relief.  He is status post bilateral L2,3,4,5 medial branch radiofrequency ablation on 03/20/2021 with 60% relief. He is status post bilateral C4, 5, 6 medial branch block on 01/10/2022 with only about 20% relief of his bilateral neck pain.He is status post bilateral L4/5 and L5/S1 medial branch radiofrequency ablation on 10/06/2022 with 50% relief.    He is status post L5/S1 interlaminar epidural steroid injection on 12/07/2022 initially with 70% relief, now reporting 40% relief.     Antineuropathics:  Had taken gabapentin in the past and did not find it helpful.  NSAIDs: he takes ibuprofen and Tylenol " with some relief  Physical therapy:  Antidepressants: Paxil  Muscle relaxers:   Zanaflex 4 milligrams at night helps  Opioids:  Antiplatelets/Anticoagulants:    ROS: He reports back pain, joint stiffness, anxiety.  Balance of review of systems is negative.    Lab Results   Component Value Date    HGBA1C 5.9 (H) 08/04/2022       Lab Results   Component Value Date    WBC 4.50 05/27/2021    HGB 13.1 (L) 05/27/2021    HCT 38.9 (L) 05/27/2021    MCV 93 05/27/2021     05/27/2021             Past Medical History:   Diagnosis Date    Anxiety     Arthritis     Back problem     bulging disc    GERD (gastroesophageal reflux disease)     Hypercholesterolemia 11/02/2015    2009: Began statin.    Hyperlipidemia     Hypertension, benign 11/02/2015 2008: Diagnosed.    Sleep apnea     uses implant       Past Surgical History:   Procedure Laterality Date    APPENDECTOMY      ARTHROSCOPIC REPAIR OF ROTATOR CUFF OF SHOULDER Right 12/21/2018    Procedure: REPAIR, ROTATOR CUFF, ARTHROSCOPIC;  Surgeon: Colby Valenzuela MD;  Location: [facility] OR;  Service: Orthopedics;  Laterality: Right;    ARTHROSCOPY OF SHOULDER WITH DECOMPRESSION OF SUBACROMIAL SPACE Right 12/21/2018    Procedure: ARTHROSCOPY, SHOULDER, WITH SUBACROMIAL SPACE DECOMPRESSION;  Surgeon: Colby Valenzuela MD;  Location: [facility] OR;  Service: Orthopedics;  Laterality: Right;    BICEPS TENDON REPAIR Right 12/21/2018    Procedure: REPAIR, TENDON, BICEPS;  Surgeon: Colby Valenzuela MD;  Location: [facility] OR;  Service: Orthopedics;  Laterality: Right;    COLONOSCOPY N/A 7/27/2022    Procedure: COLONOSCOPY;  Surgeon: Dwight Lewis MD;  Location: [facility] ENDO;  Service: Endoscopy;  Laterality: N/A;    EPIDURAL STEROID INJECTION INTO LUMBAR SPINE N/A 12/7/2022    Procedure: Injection-steroid-epidural-lumbar L5/S1;  Surgeon: Landon Winchester MD;  Location: [facility] OR;  Service: Pain Management;  Laterality: N/A;    magdalene      INJECTION OF ANESTHETIC AGENT AROUND  MEDIAL BRANCH NERVES INNERVATING CERVICAL FACET JOINT Bilateral 1/10/2022    Procedure: Block-nerve-medial branch-cervical C4 C5 C6;  Surgeon: Landon Winchester MD;  Location: [facility] OR;  Service: Pain Management;  Laterality: Bilateral;    INJECTION OF ANESTHETIC AGENT AROUND MEDIAL BRANCH NERVES INNERVATING LUMBAR FACET JOINT Bilateral 3/10/2021    Procedure: Block-nerve-medial branch-lumbar L2,L3,L4, L5;  Surgeon: Landon Winchester MD;  Location: [facility] OR;  Service: Pain Management;  Laterality: Bilateral;    INSERTION, NEUROSTIMULATOR, HYPOGLOSSAL Right 3/17/2022    Procedure: INSERTION,NEUROSTIMULATOR,HYPOGLOSSAL;  Surgeon: Lance Wright MD;  Location: [facility];  Service: ENT;  Laterality: Right;    KNEE ARTHROSCOPY W/ MENISCAL REPAIR Left 2015    MMT      Left knee A-scope    NOSE SURGERY      RADIOFREQUENCY ABLATION OF LUMBAR MEDIAL BRANCH NERVE AT SINGLE LEVEL Bilateral 3/23/2021    Procedure: Radiofrequency Ablation, Nerve, Spinal, Lumbar, Medial Branch, L2,LL3,L4,L5;  Surgeon: Landon Winchester MD;  Location: [facility] OR;  Service: Pain Management;  Laterality: Bilateral;    RADIOFREQUENCY ABLATION OF LUMBAR MEDIAL BRANCH NERVE AT SINGLE LEVEL Bilateral 10/6/2022    Procedure: Radiofrequency Ablation, Nerve, Spinal, Lumbar, Medial Branch, L4/5, L5/S1;  Surgeon: Landon Winchester MD;  Location: [facility] OR;  Service: Pain Management;  Laterality: Bilateral;    SLEEP ENDOSCOPY, DRUG-INDUCED Bilateral 1/28/2022    Procedure: SLEEP ENDOSCOPY,DRUG-INDUCED;  Surgeon: Lance Wright MD;  Location: [facility] OR;  Service: ENT;  Laterality: Bilateral;    TRANSFORAMINAL EPIDURAL INJECTION OF STEROID Bilateral 2/11/2021    Procedure: Injection,steroid,epidural,transforaminal approach L4/5;  Surgeon: Landon Winchester MD;  Location: [facility] OR;  Service: Pain Management;  Laterality: Bilateral;    UVULECTOMY         Social History     Socioeconomic History    Marital status:    Tobacco Use    Smoking status: Never  "   Smokeless tobacco: Never   Substance and Sexual Activity    Alcohol use: Yes     Alcohol/week: 5.0 standard drinks     Types: 5 Cans of beer per week     Comment: weekly     Drug use: No    Sexual activity: Yes     Social Determinants of Health     Financial Resource Strain: Low Risk     Difficulty of Paying Living Expenses: Not hard at all   Food Insecurity: No Food Insecurity    Worried About Running Out of Food in the Last Year: Never true    Ran Out of Food in the Last Year: Never true   Transportation Needs: No Transportation Needs    Lack of Transportation (Medical): No    Lack of Transportation (Non-Medical): No   Physical Activity: Insufficiently Active    Days of Exercise per Week: 1 day    Minutes of Exercise per Session: 60 min   Stress: Stress Concern Present    Feeling of Stress : Rather much   Social Connections: Unknown    Frequency of Communication with Friends and Family: Three times a week    Frequency of Social Gatherings with Friends and Family: Twice a week    Active Member of Clubs or Organizations: No    Attends Club or Organization Meetings: 1 to 4 times per year    Marital Status:    Housing Stability: Low Risk     Unable to Pay for Housing in the Last Year: No    Number of Places Lived in the Last Year: 1    Unstable Housing in the Last Year: No         Medications/Allergies: See med card    Vitals:    01/11/23 1258   BP: (!) 143/80   Pulse: 64   SpO2: 98%   Weight: 100.7 kg (221 lb 14.3 oz)   Height: 5' 9" (1.753 m)   PainSc:   3   PainLoc: Back         Physical exam:  Gen: A and O x3, pleasant, well-groomed  Skin: No rashes or obvious lesions  HEENT: PERRLA, no obvious deformities on ears or in canals. Trachea midline.  CVS: Regular rate and rhythm, normal palpable pulses.  Resp:No increased work of breathing, symmetrical chest rise.  Abdomen: Soft, NT/ND.  Musculoskeletal: Able to heel walk, toe walk. No antalgic gait.     Neuro:  Upper extremities: 5/5 strength bilaterally "   Lower extremities: 5/5 strength bilaterally  Reflexes: Brachioradialis 2+, Bicep 2+, Tricep 2+. Patellar 1+  Right side, 0+ left side, Achilles 1+ bilaterally.  Sensory:  Intact and symmetrical to light touch and pinprick in L2-S1 dermatomes bilaterally. Intact and symmetrical to light touch and pinprick in C2-T1 dermatomes bilaterally.     Lumbar spine:  Lumbar spine:   Range of motion is moderately reduced with both flexion and extension with increased pain on flexion greater than extension.  Oblique extension causes pain on the corresponding side.  Jorge's test causes no increased pain on either side.    Supine straight leg raise is negative bilaterally.  Internal and external rotation of the hip causes no increased pain on either side.  Myofascial exam: No tenderness to palpation across lumbar paraspinous muscles.      Imaging:    MRI 01/21/2020:  I reviewed this image with the patient, outside imaging.  This is most significant for moderate disc degeneration at L4/5 with some bilateral foraminal narrowing to moderate degree, L3/4 shows severe disc degeneration with Modic endplate changes and some increased signal within the disc at L3/4.  There is moderate to severe foraminal narrowing to the left and moderate out to the right side.    MRI cervical spine 01/08/2021 outside institution.  I review the images personally.  There appears to be a reversal of the cervical lordosis.  There is disc bulging at C2/3 slightly to the right side deforming the thecal sac and causing possible foraminal narrowing to the right side.  At C3/4 there is mild disc bulging but no canal stenosis.  At C4/5 there is decreased disc height, slight disc bulging but no canal narrowing.  At C5/6 there is larger disc bulge with protrusion slightly more to the right side causing bilateral foraminal stenosis and appears to deform the cord.  At C6/7 there is a disc bulging that contacts the cord as well.  C7/T1 mild disc bulging but no cord  contact.  There is no abnormal signal within the cord.    1/8/22 MRI C-spine:  Subtle reversal of normal lordosis between C4 and C7 is unchanged.  There is minimal grade 1 retrolisthesis of C2 on C3.  Alignment is otherwise within normal limits.  Minimal anterior wedging of C5, C6 and C7 is unchanged.  High STIR Modic signal changes at C7/T1 are new since the prior study.   There is no signal abnormality in the included cord or posterior fossa.  Paravertebral soft tissues are within normal limits.  There is diffuse desiccation of the intervertebral discs of the cervical spine.   At C2/3, prominent facet and uncovertebral hypertrophy cause mild bilateral foraminal narrowing.  Prominent posterior spondylosis is also seen at this level.   At C3/4, there is a midline dorsal disc protrusion, facet arthropathy and uncovertebral hypertrophy.  There is deformity of the ventral margin of the thecal sac without central canal stenosis.  Facet and uncovertebral hypertrophy cause moderate left foraminal narrowing.   At C4/5, there is a midline dorsal disc protrusion which deforms the ventral margin of the thecal sac but causes no canal stenosis.  Prominent left-sided facet and uncovertebral hypertrophy cause moderate left foraminal narrowing.   At C5/6, there is loss of disc height and broad-based dorsal disc bulging asymmetric to the right.  There is narrowing of the right lateral recess of the canal and borderline right foraminal narrowing.  The AP midline diameter of the canal is 11 mm at this level.   At C6/7, there is minimal concentric disc bulging, facet arthropathy and uncovertebral hypertrophy, causing mild right foraminal narrowing.   At C7/T1, there is mild broad-based dorsal disc bulging which causes no canal or foraminal stenosis.    1/8/22 MRI L-spine:   1. Spondylosis, disc disease, facet arthrosis and thickening of the ligamentum flavum as well as endplate degenerative changes as described above, worse at L3-L4  and L4-L5.  2. There is mild retrolisthesis of L3 on L4 with otherwise normal alignment.  3. There is mild central canal stenosis at L3-L4 and severe central canal stenosis at L4-L5.  4. Multilevel neural foraminal narrowing, worse at L2-L3, L3-L4 and particularly at L4-L5 as described.    Assessment:   Patient is a 67-year-old man with a history of hypertension, GERD, arthritis, anxiety who presents in self-referral for low back pain radiating into the leg and neck pain.    1. Lumbar radiculopathy  Procedure Order to Pain Management      2. Lumbar spondylosis        3. Spinal stenosis of lumbar region without neurogenic claudication            Plan:  1. The patient had moderate relief following the L5/S1 interlaminar epidural steroid injection and I am going to  schedule him to repeat this to see if he can get closer to full relief.  2.  I refilled tizanidine nightly for him.  3.  Follow-up in 4 weeks postprocedure or sooner as needed.

## 2023-01-11 NOTE — TELEPHONE ENCOUNTER
Authorizing Provider: SHAYAN Roca [[id number]   Supervising Provider:BUCK CRAFT [[id number]]   Type of Supervision:Collaborating Physician   Department:[facility] PAIN MANAGEMENT[[id number]]      Common Order Information   Procedure -> Epidural Injection (specify level) Cmt: L5/S1      Pre-op Diagnosis -> Lumbar radiculopathy       Order Specific Information   Order: Procedure Order to    Pain Management [Custom: [id number]]  Order #:           [id number]: 1 FUTURE     Priority: Routine  Class: Clinic Performed     Future Order Information       Expires on:01/11/2024            Expected by:01/11/2023                    Associated Diagnoses       M54.16 Lumbar radiculopathy       Physician -> Raunlfo           Is patient on anti-coagulants? -> No           Facility Name: -> [facility]           Follow-up: ->    4 weeks Cmt: local

## 2023-01-11 NOTE — PROGRESS NOTES
This note was completed with dictation software and grammatical errors may exist.    CC:  Back pain, neck pain    HPI:  Patient is a 67-year-old man with a history of hypertension, GERD, arthritis, anxiety who presents in self-referral for low back pain radiating into the leg and neck pain.    He is status post L5/S1 interlaminar epidural steroid injection on 12/07/2022 initially with 70% relief, now reporting 40% relief.  He complains of right greater than left buttock pain radiating to the right thigh, points both posterior and anterior. The pain is worse with normal activity and constant.  He has occasional numbness on the bottom of his left toes.  He reports intermittent weakness in his right leg and uses his left leg to go from a seated to standing position but is not sure if this is true weakness or if he is guarding.    Previous History:  He reports having low back pain for many years, greater than 15.  Since 2012 he has been seeing Dr. Raffy Vásquez, pain management on the [facility] and had been undergoing injections for his back.  He states that the injections would usually help 1-2 years at a time to get rid of back pain and leg pain.  In the last year he had several injections that did not provide any relief and so they obtained a new MRI which showed significant degeneration at the level above where he had his previous injections, now showing issues at L3/4.  He was referred to Neurosurgery who recommended physical therapy.  He tried physical therapy for several months and it did help but he did not continue the exercises on his own.    He states that his pain is currently located across the bilateral low back, worse with working in the yard, bending over, sitting or standing too long.  It radiates along his right anterior thigh to about his knee.  He describes it as aching, dull, tight, deep, sharp and shooting.  Is worse with prolonged sitting or standing, lying down, bending or walking, doing any  "lifting or getting out of a bed or a chair.  He gets some relief with rest, activity, lying down, medications, injections and physical therapy.  He also takes ibuprofen and Tylenol some relief.  His other complaint is neck pain located in the bilateral neck, worse with turning his head from side to side, it radiates into the shoulders and into his upper back at times.  He denies juan weakness in his arms.  He states that he had undergone what sounds like a medial branch block bilaterally which did help but he had not proceeded with the radiofrequency part because the viral pandemic began.  He denies any balance issues, no weakness in the arms or legs.    Pain intervention history:  He reports undergoing multiple injections over the years with very good relief, the last 2 did not seem to help.  From Dr. Raffy Vásquez notes: "The patient underwent bilateral C4, 5, 6 medial branch block on 02/04/2020 with 80% relief.  He did not proceed with the RFA.  He had undergone bilateral L4 and L5 transforaminal injection on 10/24/2019 with 50% relief for 1 month.  He had undergone bilateral L4/5 TF VIET on 02/23/2017 with 80% relief for 2 years.  He underwent bilateral L4 and L5 transforaminal injection on 12/05/2019 without much relief."  He is status post bilateral L4/5 transforaminal epidural steroid injection on 02/11/2021 0% relief.  He is status post bilateral L2,3,4,5 medial branch radiofrequency ablation on 03/20/2021 with 60% relief. He is status post bilateral C4, 5, 6 medial branch block on 01/10/2022 with only about 20% relief of his bilateral neck pain.He is status post bilateral L4/5 and L5/S1 medial branch radiofrequency ablation on 10/06/2022 with 50% relief.    He is status post L5/S1 interlaminar epidural steroid injection on 12/07/2022 initially with 70% relief, now reporting 40% relief.     Antineuropathics:  Had taken gabapentin in the past and did not find it helpful.  NSAIDs: he takes ibuprofen and Tylenol " with some relief  Physical therapy:  Antidepressants: Paxil  Muscle relaxers:   Zanaflex 4 milligrams at night helps  Opioids:  Antiplatelets/Anticoagulants:    ROS: He reports back pain, joint stiffness, anxiety.  Balance of review of systems is negative.    Lab Results   Component Value Date    HGBA1C 5.9 (H) 08/04/2022       Lab Results   Component Value Date    WBC 4.50 05/27/2021    HGB 13.1 (L) 05/27/2021    HCT 38.9 (L) 05/27/2021    MCV 93 05/27/2021     05/27/2021             Past Medical History:   Diagnosis Date    Anxiety     Arthritis     Back problem     bulging disc    GERD (gastroesophageal reflux disease)     Hypercholesterolemia 11/02/2015    2009: Began statin.    Hyperlipidemia     Hypertension, benign 11/02/2015 2008: Diagnosed.    Sleep apnea     uses implant       Past Surgical History:   Procedure Laterality Date    APPENDECTOMY      ARTHROSCOPIC REPAIR OF ROTATOR CUFF OF SHOULDER Right 12/21/2018    Procedure: REPAIR, ROTATOR CUFF, ARTHROSCOPIC;  Surgeon: Colby Valenzuela MD;  Location: [facility] OR;  Service: Orthopedics;  Laterality: Right;    ARTHROSCOPY OF SHOULDER WITH DECOMPRESSION OF SUBACROMIAL SPACE Right 12/21/2018    Procedure: ARTHROSCOPY, SHOULDER, WITH SUBACROMIAL SPACE DECOMPRESSION;  Surgeon: Colby Valenzuela MD;  Location: [facility] OR;  Service: Orthopedics;  Laterality: Right;    BICEPS TENDON REPAIR Right 12/21/2018    Procedure: REPAIR, TENDON, BICEPS;  Surgeon: Colby Valenzuela MD;  Location: [facility] OR;  Service: Orthopedics;  Laterality: Right;    COLONOSCOPY N/A 7/27/2022    Procedure: COLONOSCOPY;  Surgeon: Dwight Lewis MD;  Location: [facility] ENDO;  Service: Endoscopy;  Laterality: N/A;    EPIDURAL STEROID INJECTION INTO LUMBAR SPINE N/A 12/7/2022    Procedure: Injection-steroid-epidural-lumbar L5/S1;  Surgeon: Landon Winchester MD;  Location: [facility] OR;  Service: Pain Management;  Laterality: N/A;    magdalene      INJECTION OF ANESTHETIC AGENT AROUND  MEDIAL BRANCH NERVES INNERVATING CERVICAL FACET JOINT Bilateral 1/10/2022    Procedure: Block-nerve-medial branch-cervical C4 C5 C6;  Surgeon: Landon Winchester MD;  Location: [facility] OR;  Service: Pain Management;  Laterality: Bilateral;    INJECTION OF ANESTHETIC AGENT AROUND MEDIAL BRANCH NERVES INNERVATING LUMBAR FACET JOINT Bilateral 3/10/2021    Procedure: Block-nerve-medial branch-lumbar L2,L3,L4, L5;  Surgeon: Landon Winchester MD;  Location: [facility] OR;  Service: Pain Management;  Laterality: Bilateral;    INSERTION, NEUROSTIMULATOR, HYPOGLOSSAL Right 3/17/2022    Procedure: INSERTION,NEUROSTIMULATOR,HYPOGLOSSAL;  Surgeon: Lance Wright MD;  Location: [facility];  Service: ENT;  Laterality: Right;    KNEE ARTHROSCOPY W/ MENISCAL REPAIR Left 2015    MMT      Left knee A-scope    NOSE SURGERY      RADIOFREQUENCY ABLATION OF LUMBAR MEDIAL BRANCH NERVE AT SINGLE LEVEL Bilateral 3/23/2021    Procedure: Radiofrequency Ablation, Nerve, Spinal, Lumbar, Medial Branch, L2,LL3,L4,L5;  Surgeon: Landon Winchester MD;  Location: [facility] OR;  Service: Pain Management;  Laterality: Bilateral;    RADIOFREQUENCY ABLATION OF LUMBAR MEDIAL BRANCH NERVE AT SINGLE LEVEL Bilateral 10/6/2022    Procedure: Radiofrequency Ablation, Nerve, Spinal, Lumbar, Medial Branch, L4/5, L5/S1;  Surgeon: Landon Winchester MD;  Location: [facility] OR;  Service: Pain Management;  Laterality: Bilateral;    SLEEP ENDOSCOPY, DRUG-INDUCED Bilateral 1/28/2022    Procedure: SLEEP ENDOSCOPY,DRUG-INDUCED;  Surgeon: Lance Wright MD;  Location: [facility] OR;  Service: ENT;  Laterality: Bilateral;    TRANSFORAMINAL EPIDURAL INJECTION OF STEROID Bilateral 2/11/2021    Procedure: Injection,steroid,epidural,transforaminal approach L4/5;  Surgeon: Landon Winchester MD;  Location: [facility] OR;  Service: Pain Management;  Laterality: Bilateral;    UVULECTOMY         Social History     Socioeconomic History    Marital status:    Tobacco Use    Smoking status: Never  "   Smokeless tobacco: Never   Substance and Sexual Activity    Alcohol use: Yes     Alcohol/week: 5.0 standard drinks     Types: 5 Cans of beer per week     Comment: weekly     Drug use: No    Sexual activity: Yes     Social Determinants of Health     Financial Resource Strain: Low Risk     Difficulty of Paying Living Expenses: Not hard at all   Food Insecurity: No Food Insecurity    Worried About Running Out of Food in the Last Year: Never true    Ran Out of Food in the Last Year: Never true   Transportation Needs: No Transportation Needs    Lack of Transportation (Medical): No    Lack of Transportation (Non-Medical): No   Physical Activity: Insufficiently Active    Days of Exercise per Week: 1 day    Minutes of Exercise per Session: 60 min   Stress: Stress Concern Present    Feeling of Stress : Rather much   Social Connections: Unknown    Frequency of Communication with Friends and Family: Three times a week    Frequency of Social Gatherings with Friends and Family: Twice a week    Active Member of Clubs or Organizations: No    Attends Club or Organization Meetings: 1 to 4 times per year    Marital Status:    Housing Stability: Low Risk     Unable to Pay for Housing in the Last Year: No    Number of Places Lived in the Last Year: 1    Unstable Housing in the Last Year: No         Medications/Allergies: See med card    Vitals:    01/11/23 1258   BP: (!) 143/80   Pulse: 64   SpO2: 98%   Weight: 100.7 kg (221 lb 14.3 oz)   Height: 5' 9" (1.753 m)   PainSc:   3   PainLoc: Back         Physical exam:  Gen: A and O x3, pleasant, well-groomed  Skin: No rashes or obvious lesions  HEENT: PERRLA, no obvious deformities on ears or in canals. Trachea midline.  CVS: Regular rate and rhythm, normal palpable pulses.  Resp:No increased work of breathing, symmetrical chest rise.  Abdomen: Soft, NT/ND.  Musculoskeletal: Able to heel walk, toe walk. No antalgic gait.     Neuro:  Upper extremities: 5/5 strength bilaterally "   Lower extremities: 5/5 strength bilaterally  Reflexes: Brachioradialis 2+, Bicep 2+, Tricep 2+. Patellar 1+  Right side, 0+ left side, Achilles 1+ bilaterally.  Sensory:  Intact and symmetrical to light touch and pinprick in L2-S1 dermatomes bilaterally. Intact and symmetrical to light touch and pinprick in C2-T1 dermatomes bilaterally.     Lumbar spine:  Lumbar spine:   Range of motion is moderately reduced with both flexion and extension with increased pain on flexion greater than extension.  Oblique extension causes pain on the corresponding side.  Jorge's test causes no increased pain on either side.    Supine straight leg raise is negative bilaterally.  Internal and external rotation of the hip causes no increased pain on either side.  Myofascial exam: No tenderness to palpation across lumbar paraspinous muscles.      Imaging:    MRI 01/21/2020:  I reviewed this image with the patient, outside imaging.  This is most significant for moderate disc degeneration at L4/5 with some bilateral foraminal narrowing to moderate degree, L3/4 shows severe disc degeneration with Modic endplate changes and some increased signal within the disc at L3/4.  There is moderate to severe foraminal narrowing to the left and moderate out to the right side.    MRI cervical spine 01/08/2021 outside institution.  I review the images personally.  There appears to be a reversal of the cervical lordosis.  There is disc bulging at C2/3 slightly to the right side deforming the thecal sac and causing possible foraminal narrowing to the right side.  At C3/4 there is mild disc bulging but no canal stenosis.  At C4/5 there is decreased disc height, slight disc bulging but no canal narrowing.  At C5/6 there is larger disc bulge with protrusion slightly more to the right side causing bilateral foraminal stenosis and appears to deform the cord.  At C6/7 there is a disc bulging that contacts the cord as well.  C7/T1 mild disc bulging but no cord  contact.  There is no abnormal signal within the cord.    1/8/22 MRI C-spine:  Subtle reversal of normal lordosis between C4 and C7 is unchanged.  There is minimal grade 1 retrolisthesis of C2 on C3.  Alignment is otherwise within normal limits.  Minimal anterior wedging of C5, C6 and C7 is unchanged.  High STIR Modic signal changes at C7/T1 are new since the prior study.   There is no signal abnormality in the included cord or posterior fossa.  Paravertebral soft tissues are within normal limits.  There is diffuse desiccation of the intervertebral discs of the cervical spine.   At C2/3, prominent facet and uncovertebral hypertrophy cause mild bilateral foraminal narrowing.  Prominent posterior spondylosis is also seen at this level.   At C3/4, there is a midline dorsal disc protrusion, facet arthropathy and uncovertebral hypertrophy.  There is deformity of the ventral margin of the thecal sac without central canal stenosis.  Facet and uncovertebral hypertrophy cause moderate left foraminal narrowing.   At C4/5, there is a midline dorsal disc protrusion which deforms the ventral margin of the thecal sac but causes no canal stenosis.  Prominent left-sided facet and uncovertebral hypertrophy cause moderate left foraminal narrowing.   At C5/6, there is loss of disc height and broad-based dorsal disc bulging asymmetric to the right.  There is narrowing of the right lateral recess of the canal and borderline right foraminal narrowing.  The AP midline diameter of the canal is 11 mm at this level.   At C6/7, there is minimal concentric disc bulging, facet arthropathy and uncovertebral hypertrophy, causing mild right foraminal narrowing.   At C7/T1, there is mild broad-based dorsal disc bulging which causes no canal or foraminal stenosis.    1/8/22 MRI L-spine:   1. Spondylosis, disc disease, facet arthrosis and thickening of the ligamentum flavum as well as endplate degenerative changes as described above, worse at L3-L4  and L4-L5.  2. There is mild retrolisthesis of L3 on L4 with otherwise normal alignment.  3. There is mild central canal stenosis at L3-L4 and severe central canal stenosis at L4-L5.  4. Multilevel neural foraminal narrowing, worse at L2-L3, L3-L4 and particularly at L4-L5 as described.    Assessment:   Patient is a 67-year-old man with a history of hypertension, GERD, arthritis, anxiety who presents in self-referral for low back pain radiating into the leg and neck pain.    1. Lumbar radiculopathy  Procedure Order to Pain Management      2. Lumbar spondylosis        3. Spinal stenosis of lumbar region without neurogenic claudication            Plan:  1. The patient had moderate relief following the L5/S1 interlaminar epidural steroid injection and I am going to  schedule him to repeat this to see if he can get closer to full relief.  2.  I refilled tizanidine nightly for him.  3.  Follow-up in 4 weeks postprocedure or sooner as needed.

## 2023-01-17 ENCOUNTER — PATIENT MESSAGE (OUTPATIENT)
Dept: PAIN MEDICINE | Facility: CLINIC | Age: 68
End: 2023-01-17
Payer: MEDICARE

## 2023-01-17 DIAGNOSIS — M54.16 LUMBAR RADICULOPATHY: Primary | ICD-10-CM

## 2023-01-17 RX ORDER — ALPRAZOLAM 0.25 MG/1
1 TABLET ORAL ONCE
Status: CANCELLED | OUTPATIENT
Start: 2023-01-17 | End: 2023-01-17

## 2023-02-02 ENCOUNTER — HOSPITAL ENCOUNTER (OUTPATIENT)
Facility: HOSPITAL | Age: 68
Discharge: HOME | End: 2023-02-02
Attending: ANESTHESIOLOGY | Admitting: ANESTHESIOLOGY
Payer: MEDICARE

## 2023-02-02 ENCOUNTER — HOSPITAL ENCOUNTER (OUTPATIENT)
Dept: RADIOLOGY | Facility: HOSPITAL | Age: 68
Discharge: HOME | End: 2023-02-02
Attending: ANESTHESIOLOGY | Admitting: ANESTHESIOLOGY
Payer: MEDICARE

## 2023-02-02 DIAGNOSIS — M54.16 LUMBAR RADICULOPATHY: ICD-10-CM

## 2023-02-02 DIAGNOSIS — M54.50 LOWER BACK PAIN: ICD-10-CM

## 2023-02-02 PROCEDURE — 62323 NJX INTERLAMINAR LMBR/SAC: CPT | Mod: HCNC,PO | Performed by: ANESTHESIOLOGY

## 2023-02-02 PROCEDURE — 25000003 PHARM REV CODE 250: Mod: HCNC,PO | Performed by: ANESTHESIOLOGY

## 2023-02-02 PROCEDURE — 62323 NJX INTERLAMINAR LMBR/SAC: CPT | Mod: HCNC,,, | Performed by: ANESTHESIOLOGY

## 2023-02-02 PROCEDURE — 25500020 PHARM REV CODE 255: Mod: HCNC,PO | Performed by: ANESTHESIOLOGY

## 2023-02-02 PROCEDURE — 62323 PR INJ LUMBAR/SACRAL, W/IMAGING GUIDANCE: ICD-10-PCS | Mod: HCNC,,, | Performed by: ANESTHESIOLOGY

## 2023-02-02 PROCEDURE — 63600175 PHARM REV CODE 636 W HCPCS: Mod: HCNC,PO | Performed by: ANESTHESIOLOGY

## 2023-02-02 PROCEDURE — 76000 FLUOROSCOPY <1 HR PHYS/QHP: CPT | Mod: TC,HCNC,PO

## 2023-02-02 RX ORDER — METHYLPREDNISOLONE ACETATE 80 MG/ML
INJECTION, SUSPENSION INTRA-ARTICULAR; INTRALESIONAL; INTRAMUSCULAR; SOFT TISSUE
Status: DISCONTINUED | OUTPATIENT
Start: 2023-02-02 | End: 2023-02-02 | Stop reason: HOSPADM

## 2023-02-02 RX ORDER — LIDOCAINE HYDROCHLORIDE 10 MG/ML
INJECTION, SOLUTION EPIDURAL; INFILTRATION; INTRACAUDAL; PERINEURAL
Status: DISCONTINUED | OUTPATIENT
Start: 2023-02-02 | End: 2023-02-02 | Stop reason: HOSPADM

## 2023-02-02 RX ORDER — ALPRAZOLAM 0.5 MG/1
1 TABLET, ORALLY DISINTEGRATING ORAL ONCE
Status: COMPLETED | OUTPATIENT
Start: 2023-02-02 | End: 2023-02-02

## 2023-02-02 RX ORDER — ALPRAZOLAM 0.5 MG/1
1 TABLET ORAL ONCE
Status: DISCONTINUED | OUTPATIENT
Start: 2023-02-02 | End: 2023-02-02

## 2023-02-02 RX ADMIN — ALPRAZOLAM 1 MG: 0.5 TABLET, ORALLY DISINTEGRATING ORAL at 11:02

## 2023-02-02 NOTE — OP NOTE

## 2023-02-02 NOTE — DISCHARGE SUMMARY
[facility] - [facility]  Discharge Note  Short Stay    Procedure(s) (LRB):  Injection-steroid-epidural-lumbar L5/s1 (N/A)      OUTCOME: Patient tolerated treatment/procedure well without complication and is now ready for discharge.    DISPOSITION: Home or Self Care    FINAL DIAGNOSIS:  Lumbar radiculopathy    FOLLOWUP: In clinic    DISCHARGE INSTRUCTIONS:    Discharge Procedure Orders   Diet Adult Regular     No dressing needed     Notify your health care provider if you experience any of the following:  temperature >100.4     Activity as tolerated

## 2023-02-03 VITALS
TEMPERATURE: 98 F | SYSTOLIC BLOOD PRESSURE: 170 MMHG | RESPIRATION RATE: 16 BRPM | DIASTOLIC BLOOD PRESSURE: 77 MMHG | HEART RATE: 50 BPM | OXYGEN SATURATION: 95 %

## 2023-02-07 DIAGNOSIS — Z00.00 ENCOUNTER FOR MEDICARE ANNUAL WELLNESS EXAM: ICD-10-CM

## 2023-02-09 DIAGNOSIS — Z00.00 ENCOUNTER FOR MEDICARE ANNUAL WELLNESS EXAM: ICD-10-CM

## 2023-02-13 ENCOUNTER — OFFICE VISIT (OUTPATIENT)
Dept: FAMILY MEDICINE | Facility: CLINIC | Age: 68
End: 2023-02-13
Payer: MEDICARE

## 2023-02-13 VITALS
HEART RATE: 70 BPM | BODY MASS INDEX: 33.34 KG/M2 | OXYGEN SATURATION: 98 % | WEIGHT: 225.06 LBS | DIASTOLIC BLOOD PRESSURE: 85 MMHG | SYSTOLIC BLOOD PRESSURE: 138 MMHG | HEIGHT: 69 IN

## 2023-02-13 DIAGNOSIS — D64.9 ANEMIA, UNSPECIFIED TYPE: ICD-10-CM

## 2023-02-13 DIAGNOSIS — R73.03 PRE-DIABETES: ICD-10-CM

## 2023-02-13 DIAGNOSIS — Z12.5 SCREENING FOR PROSTATE CANCER: ICD-10-CM

## 2023-02-13 DIAGNOSIS — R22.2 LUMP OF SKIN OF BACK: ICD-10-CM

## 2023-02-13 DIAGNOSIS — E78.00 HYPERCHOLESTEROLEMIA: ICD-10-CM

## 2023-02-13 DIAGNOSIS — M54.16 LUMBAR RADICULOPATHY: ICD-10-CM

## 2023-02-13 DIAGNOSIS — I10 PRIMARY HYPERTENSION: ICD-10-CM

## 2023-02-13 DIAGNOSIS — Z00.00 ANNUAL PHYSICAL EXAM: Primary | ICD-10-CM

## 2023-02-13 DIAGNOSIS — G25.0 ESSENTIAL TREMOR: ICD-10-CM

## 2023-02-13 DIAGNOSIS — T78.40XD ALLERGY, SUBSEQUENT ENCOUNTER: ICD-10-CM

## 2023-02-13 DIAGNOSIS — G47.33 OBSTRUCTIVE SLEEP APNEA: ICD-10-CM

## 2023-02-13 PROCEDURE — 3008F BODY MASS INDEX DOCD: CPT | Mod: HCNC,CPTII,S$GLB, | Performed by: INTERNAL MEDICINE

## 2023-02-13 PROCEDURE — 3288F PR FALLS RISK ASSESSMENT DOCUMENTED: ICD-10-PCS | Mod: HCNC,CPTII,S$GLB, | Performed by: INTERNAL MEDICINE

## 2023-02-13 PROCEDURE — 1160F RVW MEDS BY RX/DR IN RCRD: CPT | Mod: HCNC,CPTII,S$GLB, | Performed by: INTERNAL MEDICINE

## 2023-02-13 PROCEDURE — 1159F MED LIST DOCD IN RCRD: CPT | Mod: HCNC,CPTII,S$GLB, | Performed by: INTERNAL MEDICINE

## 2023-02-13 PROCEDURE — 99214 PR OFFICE/OUTPT VISIT, EST, LEVL IV, 30-39 MIN: ICD-10-PCS | Mod: HCNC,S$GLB,, | Performed by: INTERNAL MEDICINE

## 2023-02-13 PROCEDURE — 99999 PR PBB SHADOW E&M-EST. PATIENT-LVL III: ICD-10-PCS | Mod: PBBFAC,HCNC,, | Performed by: INTERNAL MEDICINE

## 2023-02-13 PROCEDURE — 3075F SYST BP GE 130 - 139MM HG: CPT | Mod: HCNC,CPTII,S$GLB, | Performed by: INTERNAL MEDICINE

## 2023-02-13 PROCEDURE — 1159F PR MEDICATION LIST DOCUMENTED IN MEDICAL RECORD: ICD-10-PCS | Mod: HCNC,CPTII,S$GLB, | Performed by: INTERNAL MEDICINE

## 2023-02-13 PROCEDURE — 3075F PR MOST RECENT SYSTOLIC BLOOD PRESS GE 130-139MM HG: ICD-10-PCS | Mod: HCNC,CPTII,S$GLB, | Performed by: INTERNAL MEDICINE

## 2023-02-13 PROCEDURE — 99214 OFFICE O/P EST MOD 30 MIN: CPT | Mod: HCNC,S$GLB,, | Performed by: INTERNAL MEDICINE

## 2023-02-13 PROCEDURE — 1160F PR REVIEW ALL MEDS BY PRESCRIBER/CLIN PHARMACIST DOCUMENTED: ICD-10-PCS | Mod: HCNC,CPTII,S$GLB, | Performed by: INTERNAL MEDICINE

## 2023-02-13 PROCEDURE — 3079F DIAST BP 80-89 MM HG: CPT | Mod: HCNC,CPTII,S$GLB, | Performed by: INTERNAL MEDICINE

## 2023-02-13 PROCEDURE — 3079F PR MOST RECENT DIASTOLIC BLOOD PRESSURE 80-89 MM HG: ICD-10-PCS | Mod: HCNC,CPTII,S$GLB, | Performed by: INTERNAL MEDICINE

## 2023-02-13 PROCEDURE — 99999 PR PBB SHADOW E&M-EST. PATIENT-LVL III: CPT | Mod: PBBFAC,HCNC,, | Performed by: INTERNAL MEDICINE

## 2023-02-13 PROCEDURE — 1101F PR PT FALLS ASSESS DOC 0-1 FALLS W/OUT INJ PAST YR: ICD-10-PCS | Mod: HCNC,CPTII,S$GLB, | Performed by: INTERNAL MEDICINE

## 2023-02-13 PROCEDURE — 3288F FALL RISK ASSESSMENT DOCD: CPT | Mod: HCNC,CPTII,S$GLB, | Performed by: INTERNAL MEDICINE

## 2023-02-13 PROCEDURE — 1101F PT FALLS ASSESS-DOCD LE1/YR: CPT | Mod: HCNC,CPTII,S$GLB, | Performed by: INTERNAL MEDICINE

## 2023-02-13 PROCEDURE — 3008F PR BODY MASS INDEX (BMI) DOCUMENTED: ICD-10-PCS | Mod: HCNC,CPTII,S$GLB, | Performed by: INTERNAL MEDICINE

## 2023-02-13 RX ORDER — GABAPENTIN 300 MG/1
300 CAPSULE ORAL NIGHTLY
Qty: 90 CAPSULE | Refills: 3 | Status: SHIPPED | OUTPATIENT
Start: 2023-02-13 | End: 2023-07-27

## 2023-02-13 RX ORDER — PROPRANOLOL HYDROCHLORIDE 60 MG/1
60 CAPSULE, EXTENDED RELEASE ORAL DAILY
Qty: 30 CAPSULE | Refills: 2 | Status: SHIPPED | OUTPATIENT
Start: 2023-02-13 | End: 2023-03-27

## 2023-02-13 RX ORDER — CETIRIZINE HYDROCHLORIDE 10 MG/1
10 TABLET ORAL DAILY PRN
Qty: 90 TABLET | Refills: 3 | Status: SHIPPED | OUTPATIENT
Start: 2023-02-13 | End: 2024-02-13

## 2023-02-13 NOTE — PROGRESS NOTES
Subjective       Patient ID: Moris Calderón is a 67 y.o. male.    Chief Complaint: Annual Exam      HPI:    Chronic medical includes hypertension, hyperlipidemia, prediabetes, essential tremor, chronic neck and back pain.  Here for annual.  He is not checking blood pressures at home and today his blood pressure is elevated 138 over 85.  He is taking benazepril and will continue this.  We are starting propanolol for his essential tremor so this may help lower blood pressures as well.  He will check blood pressure 2 times daily and will follow-up in 2 weeks for tremor and blood pressure.  We will continue gabapentin at night for radiculopathy symptoms.  He will continue Zyrtec for allergies.  He is having a left shoulder blade swelling lump.  He had a surgery to remove a fatty tumor in the past at the same area.  It is bothering him so we will get him back in with General surgery.  His acid reflux has worsened and thankfully has an appointment with GI so will follow their recommendations.    1. Annual physical exam    2. Lumbar radiculopathy    3. Allergy, subsequent encounter    4. Essential tremor    5. Primary hypertension    6. Hypercholesterolemia    7. Pre-diabetes    8. Obstructive sleep apnea    9. Screening for prostate cancer    10. Anemia, unspecified type    11. Lump of skin of back        Review of Systems   Constitutional:  Negative for fever.   Respiratory:  Negative for shortness of breath.    Cardiovascular:  Negative for chest pain.   Gastrointestinal:  Negative for abdominal pain.   Skin:         Large area of swelling at left shoulder blade, no definite firm lump.   Neurological:  Positive for tremors.        Fine Bilateral upper extremity tremor      Objective     Vitals:    02/13/23 1139   BP: 138/85   Pulse:      Wt Readings from Last 3 Encounters:   02/13/23 1101 102.1 kg (225 lb 1.4 oz)   01/11/23 1258 100.7 kg (221 lb 14.3 oz)   12/29/22 1007 100.7 kg (222 lb 0.1 oz)      Body mass index is  33.24 kg/m².   Physical Exam  Cardiovascular:      Rate and Rhythm: Normal rate and regular rhythm.      Heart sounds: No murmur heard.    No gallop.   Pulmonary:      Breath sounds: Normal breath sounds. No wheezing or rhonchi.   Abdominal:      Palpations: Abdomen is soft.      Tenderness: There is no abdominal tenderness.   Musculoskeletal:         General: Normal range of motion.      Cervical back: Neck supple.   Skin:     General: Skin is warm.      Findings: No rash.   Neurological:      Mental Status: He is alert.   Psychiatric:         Behavior: Behavior normal.        Assessment and plan         Moris was seen today for annual exam.    Diagnoses and all orders for this visit:    Annual physical exam    Lumbar radiculopathy  -     gabapentin (NEURONTIN) 300 MG capsule; Take 1 capsule (300 mg total) by mouth every evening.    Allergy, subsequent encounter  -     cetirizine (ZYRTEC) 10 MG tablet; Take 1 tablet (10 mg total) by mouth daily as needed for Allergies.    Essential tremor  -     propranoloL (INDERAL LA) 60 MG 24 hr capsule; Take 1 capsule (60 mg total) by mouth once daily.    Primary hypertension    Hypercholesterolemia  -     Comprehensive Metabolic Panel; Future  -     Lipid Panel; Future    Pre-diabetes  -     Hemoglobin A1C; Future    Obstructive sleep apnea    Screening for prostate cancer  -     PSA, Screening; Future    Anemia, unspecified type  -     CBC Auto Differential; Future    Lump of skin of back  -     Ambulatory referral/consult to General Surgery; Future            Hypertension Medications               benazepriL (LOTENSIN) 20 MG tablet Take 1 tablet (20 mg total) by mouth once daily.           Hyperlipidemia Medications               atorvastatin (LIPITOR) 10 MG tablet Take 1 tablet (10 mg total) by mouth once daily.           Medication List with Changes/Refills   New Medications    PROPRANOLOL (INDERAL LA) 60 MG 24 HR CAPSULE    Take 1 capsule (60 mg total) by mouth once daily.    Current Medications    ATORVASTATIN (LIPITOR) 10 MG TABLET    Take 1 tablet (10 mg total) by mouth once daily.    BENAZEPRIL (LOTENSIN) 20 MG TABLET    Take 1 tablet (20 mg total) by mouth once daily.    ESOMEPRAZOLE (NEXIUM) 40 MG CAPSULE    TAKE 1 CAPSULE(40 MG) BY MOUTH BEFORE BREAKFAST    FLUTICASONE PROPIONATE (FLONASE) 50 MCG/ACTUATION NASAL SPRAY    SHAKE LIQUID AND USE 1 SPRAY IN EACH NOSTRIL EVERY DAY    FLUZONE HIGHDOSE QUAD 22-23  MCG/0.7 ML SYRG        PAROXETINE (PAXIL) 10 MG TABLET    Take 1 tablet (10 mg total) by mouth every morning.    PAROXETINE (PAXIL) 40 MG TABLET    Take 1 tablet (40 mg total) by mouth once daily.    PFIZER COVID BIVAL,12Y UP,,PF, 30 MCG/0.3 ML INJECTION        TAMSULOSIN (FLOMAX) 0.4 MG CAP    Take 1 capsule (0.4 mg total) by mouth once daily.    TIZANIDINE (ZANAFLEX) 4 MG TABLET    TAKE 1 TABLET(4 MG) BY MOUTH EVERY NIGHT AS NEEDED   Changed and/or Refilled Medications    Modified Medication Previous Medication    CETIRIZINE (ZYRTEC) 10 MG TABLET cetirizine HCl (CETIRIZINE ORAL)       Take 1 tablet (10 mg total) by mouth daily as needed for Allergies.    Take 1 Dose by mouth daily as needed.     GABAPENTIN (NEURONTIN) 300 MG CAPSULE gabapentin (NEURONTIN) 300 MG capsule       Take 1 capsule (300 mg total) by mouth every evening.    Take 1 capsule (300 mg total) by mouth 3 (three) times daily.       I personally reviewed past medical, family and social history.

## 2023-02-15 ENCOUNTER — HOSPITAL ENCOUNTER (OUTPATIENT)
Dept: RADIOLOGY | Facility: HOSPITAL | Age: 68
Discharge: HOME | End: 2023-02-15
Attending: SURGERY
Payer: MEDICARE

## 2023-02-15 ENCOUNTER — HOSPITAL ENCOUNTER (OUTPATIENT)
Dept: PREADMISSION TESTING | Facility: HOSPITAL | Age: 68
Discharge: HOME | End: 2023-02-15
Attending: SURGERY
Payer: MEDICARE

## 2023-02-15 ENCOUNTER — OFFICE VISIT (OUTPATIENT)
Dept: SURGERY | Facility: CLINIC | Age: 68
End: 2023-02-15
Payer: MEDICARE

## 2023-02-15 VITALS
HEIGHT: 69 IN | OXYGEN SATURATION: 98 % | HEART RATE: 82 BPM | WEIGHT: 220.44 LBS | TEMPERATURE: 98 F | RESPIRATION RATE: 14 BRPM | BODY MASS INDEX: 32.65 KG/M2 | DIASTOLIC BLOOD PRESSURE: 74 MMHG | SYSTOLIC BLOOD PRESSURE: 122 MMHG

## 2023-02-15 VITALS — HEART RATE: 51 BPM | SYSTOLIC BLOOD PRESSURE: 158 MMHG | DIASTOLIC BLOOD PRESSURE: 51 MMHG | TEMPERATURE: 99 F

## 2023-02-15 DIAGNOSIS — Z01.818 PRE-OP TESTING: Primary | ICD-10-CM

## 2023-02-15 DIAGNOSIS — R22.2 LUMP OF SKIN OF BACK: Primary | ICD-10-CM

## 2023-02-15 PROCEDURE — 3078F PR MOST RECENT DIASTOLIC BLOOD PRESSURE < 80 MM HG: ICD-10-PCS | Mod: CPTII,S$GLB,, | Performed by: SURGERY

## 2023-02-15 PROCEDURE — 3288F PR FALLS RISK ASSESSMENT DOCUMENTED: ICD-10-PCS | Mod: CPTII,S$GLB,, | Performed by: SURGERY

## 2023-02-15 PROCEDURE — 1125F PR PAIN SEVERITY QUANTIFIED, PAIN PRESENT: ICD-10-PCS | Mod: CPTII,S$GLB,, | Performed by: SURGERY

## 2023-02-15 PROCEDURE — 99203 PR OFFICE/OUTPT VISIT, NEW, LEVL III, 30-44 MIN: ICD-10-PCS | Mod: S$GLB,,, | Performed by: SURGERY

## 2023-02-15 PROCEDURE — 71046 X-RAY EXAM CHEST 2 VIEWS: CPT | Mod: TC

## 2023-02-15 PROCEDURE — 99203 OFFICE O/P NEW LOW 30 MIN: CPT | Mod: S$GLB,,, | Performed by: SURGERY

## 2023-02-15 PROCEDURE — 1125F AMNT PAIN NOTED PAIN PRSNT: CPT | Mod: CPTII,S$GLB,, | Performed by: SURGERY

## 2023-02-15 PROCEDURE — 3077F PR MOST RECENT SYSTOLIC BLOOD PRESSURE >= 140 MM HG: ICD-10-PCS | Mod: CPTII,S$GLB,, | Performed by: SURGERY

## 2023-02-15 PROCEDURE — 3077F SYST BP >= 140 MM HG: CPT | Mod: CPTII,S$GLB,, | Performed by: SURGERY

## 2023-02-15 PROCEDURE — 1101F PT FALLS ASSESS-DOCD LE1/YR: CPT | Mod: CPTII,S$GLB,, | Performed by: SURGERY

## 2023-02-15 PROCEDURE — 3078F DIAST BP <80 MM HG: CPT | Mod: CPTII,S$GLB,, | Performed by: SURGERY

## 2023-02-15 PROCEDURE — 3288F FALL RISK ASSESSMENT DOCD: CPT | Mod: CPTII,S$GLB,, | Performed by: SURGERY

## 2023-02-15 PROCEDURE — 1101F PR PT FALLS ASSESS DOC 0-1 FALLS W/OUT INJ PAST YR: ICD-10-PCS | Mod: CPTII,S$GLB,, | Performed by: SURGERY

## 2023-02-15 NOTE — H&P (VIEW-ONLY)
GENERAL SURGERY  OUTPATIENT H&P    REASON FOR VISIT/CC: Cyst    HPI: Moris Calderón is a 67 y.o. male with hypertension, hyperlipidemia, prediabetes, essential tremor and chronic neck and back was referred for evaluation a left shoulder blade subcutaneous mass.  Reports history to remove fatty tumor passed in the same area has redeveloped. Was referred by his primary care for evaluation. Patient reports proximally 7 years ago having a fatty tumor removed from the upper back which was performed in the office.  Approximally year ago he is developed recurrent swelling which has slowly enlarged and now becoming more symptomatic causing pressure and discomfort in the left shoulder blade.  Denies any severe pain. No overlying skin changes. No drainage from the site.    I have reviewed the patient's chart including prior progress notes, procedures and testing.     ROS:   Review of Systems   All other systems reviewed and are negative.    PROBLEM LIST:  Patient Active Problem List   Diagnosis    Hypertension    Hypercholesterolemia    Spinal stenosis of lumbar region without neurogenic claudication    Lumbar radiculopathy    Lumbar spondylosis    Anxiety    Systolic murmur    Cervical spondylosis    Lower urinary tract symptoms (LUTS)    Obstructive sleep apnea    Pre-diabetes    Essential tremor         HISTORY  Past Medical History:   Diagnosis Date    Anxiety     Arthritis     Back problem     bulging disc    GERD (gastroesophageal reflux disease)     Hypercholesterolemia 11/02/2015    2009: Began statin.    Hyperlipidemia     Hypertension, benign 11/02/2015    2008: Diagnosed.    Sleep apnea     uses implant       Past Surgical History:   Procedure Laterality Date    APPENDECTOMY      ARTHROSCOPIC REPAIR OF ROTATOR CUFF OF SHOULDER Right 12/21/2018    Procedure: REPAIR, ROTATOR CUFF, ARTHROSCOPIC;  Surgeon: Colby Valenzuela MD;  Location: [facility];  Service: Orthopedics;  Laterality: Right;    ARTHROSCOPY OF  SHOULDER WITH DECOMPRESSION OF SUBACROMIAL SPACE Right 12/21/2018    Procedure: ARTHROSCOPY, SHOULDER, WITH SUBACROMIAL SPACE DECOMPRESSION;  Surgeon: Colby Valenzuela MD;  Location: [facility] OR;  Service: Orthopedics;  Laterality: Right;    BICEPS TENDON REPAIR Right 12/21/2018    Procedure: REPAIR, TENDON, BICEPS;  Surgeon: Colby Valenzuela MD;  Location: [facility] OR;  Service: Orthopedics;  Laterality: Right;    COLONOSCOPY N/A 7/27/2022    Procedure: COLONOSCOPY;  Surgeon: Dwight Lewis MD;  Location: [facility] ENDO;  Service: Endoscopy;  Laterality: N/A;    EPIDURAL STEROID INJECTION INTO LUMBAR SPINE N/A 12/7/2022    Procedure: Injection-steroid-epidural-lumbar L5/S1;  Surgeon: Landon Winchester MD;  Location: [facility] OR;  Service: Pain Management;  Laterality: N/A;    EPIDURAL STEROID INJECTION INTO LUMBAR SPINE N/A 2/2/2023    Procedure: Injection-steroid-epidural-lumbar L5/s1;  Surgeon: Landon Winchester MD;  Location: [facility] OR;  Service: Pain Management;  Laterality: N/A;    magdalene      INJECTION OF ANESTHETIC AGENT AROUND MEDIAL BRANCH NERVES INNERVATING CERVICAL FACET JOINT Bilateral 1/10/2022    Procedure: Block-nerve-medial branch-cervical C4 C5 C6;  Surgeon: Landon Winchester MD;  Location: [facility] OR;  Service: Pain Management;  Laterality: Bilateral;    INJECTION OF ANESTHETIC AGENT AROUND MEDIAL BRANCH NERVES INNERVATING LUMBAR FACET JOINT Bilateral 3/10/2021    Procedure: Block-nerve-medial branch-lumbar L2,L3,L4, L5;  Surgeon: Landon Winchester MD;  Location: [facility] OR;  Service: Pain Management;  Laterality: Bilateral;    INSERTION, NEUROSTIMULATOR, HYPOGLOSSAL Right 3/17/2022    Procedure: INSERTION,NEUROSTIMULATOR,HYPOGLOSSAL;  Surgeon: Lance Wright MD;  Location: [facility] OR;  Service: ENT;  Laterality: Right;    KNEE ARTHROSCOPY W/ MENISCAL REPAIR Left 2015    MMT      Left knee A-scope    NOSE SURGERY      RADIOFREQUENCY ABLATION OF LUMBAR MEDIAL BRANCH NERVE AT SINGLE LEVEL Bilateral  3/23/2021    Procedure: Radiofrequency Ablation, Nerve, Spinal, Lumbar, Medial Branch, L2,LL3,L4,L5;  Surgeon: Ladnon Winchester MD;  Location: [facility] OR;  Service: Pain Management;  Laterality: Bilateral;    RADIOFREQUENCY ABLATION OF LUMBAR MEDIAL BRANCH NERVE AT SINGLE LEVEL Bilateral 10/6/2022    Procedure: Radiofrequency Ablation, Nerve, Spinal, Lumbar, Medial Branch, L4/5, L5/S1;  Surgeon: Landon Winchester MD;  Location: [facility] OR;  Service: Pain Management;  Laterality: Bilateral;    SLEEP ENDOSCOPY, DRUG-INDUCED Bilateral 1/28/2022    Procedure: SLEEP ENDOSCOPY,DRUG-INDUCED;  Surgeon: Lance Wright MD;  Location: [facility] OR;  Service: ENT;  Laterality: Bilateral;    TRANSFORAMINAL EPIDURAL INJECTION OF STEROID Bilateral 2/11/2021    Procedure: Injection,steroid,epidural,transforaminal approach L4/5;  Surgeon: Landon Winchester MD;  Location: [facility] OR;  Service: Pain Management;  Laterality: Bilateral;    UVULECTOMY         Social History     Tobacco Use    Smoking status: Never    Smokeless tobacco: Never   Substance Use Topics    Alcohol use: Yes     Alcohol/week: 5.0 standard drinks     Types: 5 Cans of beer per week     Comment: weekly     Drug use: No       Family History   Problem Relation Age of Onset    Glaucoma Father     Melanoma Neg Hx     Psoriasis Neg Hx     Lupus Neg Hx     Eczema Neg Hx          MEDS:  Current Outpatient Medications on File Prior to Visit   Medication Sig Dispense Refill    atorvastatin (LIPITOR) 10 MG tablet Take 1 tablet (10 mg total) by mouth once daily. 90 tablet 3    benazepriL (LOTENSIN) 20 MG tablet Take 1 tablet (20 mg total) by mouth once daily. 90 tablet 3    cetirizine (ZYRTEC) 10 MG tablet Take 1 tablet (10 mg total) by mouth daily as needed for Allergies. 90 tablet 3    esomeprazole (NEXIUM) 40 MG capsule TAKE 1 CAPSULE(40 MG) BY MOUTH BEFORE BREAKFAST 90 capsule 3    fluticasone propionate (FLONASE) 50 mcg/actuation nasal spray SHAKE LIQUID AND USE 1 SPRAY IN  EACH NOSTRIL EVERY DAY 32 g 2    FLUZONE HIGHDOSE QUAD 22-23  mcg/0.7 mL Syrg       gabapentin (NEURONTIN) 300 MG capsule Take 1 capsule (300 mg total) by mouth every evening. 90 capsule 3    paroxetine (PAXIL) 10 MG tablet Take 1 tablet (10 mg total) by mouth every morning. 90 tablet 3    paroxetine (PAXIL) 40 MG tablet Take 1 tablet (40 mg total) by mouth once daily. 90 tablet 3    PFIZER COVID BIVAL,12Y UP,,PF, 30 mcg/0.3 mL injection       propranoloL (INDERAL LA) 60 MG 24 hr capsule Take 1 capsule (60 mg total) by mouth once daily. 30 capsule 2    tamsulosin (FLOMAX) 0.4 mg Cap Take 1 capsule (0.4 mg total) by mouth once daily. 90 capsule 3    tiZANidine (ZANAFLEX) 4 MG tablet TAKE 1 TABLET(4 MG) BY MOUTH EVERY NIGHT AS NEEDED 90 tablet 3     Current Facility-Administered Medications on File Prior to Visit   Medication Dose Route Frequency Provider Last Rate Last Admin    lactated ringers infusion   Intravenous Continuous Jorge Espinosa MD 20 mL/hr at 03/17/22 0931 New Bag at 03/17/22 1241       ALLERGIES:  Review of patient's allergies indicates:   Allergen Reactions    Buspar [buspirone]      Face flushed, possible elevated BP    Adhesive Rash         VITALS:  There were no vitals filed for this visit.      PHYSICAL EXAM:  Physical Exam  Vitals reviewed.   Constitutional:       General: He is not in acute distress.     Appearance: Normal appearance. He is well-developed.   HENT:      Head: Normocephalic and atraumatic.   Eyes:      General: No scleral icterus.  Neck:      Trachea: No tracheal deviation.   Cardiovascular:      Rate and Rhythm: Normal rate and regular rhythm.      Pulses: Normal pulses.   Pulmonary:      Effort: Pulmonary effort is normal. No respiratory distress.      Breath sounds: Normal breath sounds.   Abdominal:      General: There is no distension.      Palpations: Abdomen is soft.      Tenderness: There is no abdominal tenderness.   Musculoskeletal:         General: No  swelling or tenderness. Normal range of motion.      Cervical back: Normal range of motion and neck supple. No rigidity.   Skin:     General: Skin is warm and dry.      Coloration: Skin is not jaundiced.      Findings: No erythema.             Comments: Left upper back soft tissue mass with poorly defined borders, measures approximally 5 x 5 cm and seems consistent with a lipoma, there is a well-healed incision over the site from previous excision   Neurological:      General: No focal deficit present.      Mental Status: He is alert and oriented to person, place, and time. He is not disoriented.      Motor: No weakness or abnormal muscle tone.   Psychiatric:         Mood and Affect: Mood normal.         Behavior: Behavior normal.         Thought Content: Thought content normal.         Judgment: Judgment normal.         LABS:  Lab Results   Component Value Date    WBC 4.50 05/27/2021    WBC 1 03/09/2012    RBC 4.19 (L) 05/27/2021    HGB 13.1 (L) 05/27/2021    HCT 38.9 (L) 05/27/2021     05/27/2021     Lab Results   Component Value Date     (H) 08/04/2022     08/04/2022    K 3.9 08/04/2022     08/04/2022    CO2 25 08/04/2022    BUN 16 08/04/2022    CREATININE 0.9 08/04/2022    CALCIUM 9.0 08/04/2022     Lab Results   Component Value Date    ALT 20 08/04/2022    AST 18 08/04/2022    ALKPHOS 72 08/04/2022    BILITOT 0.3 08/04/2022     No results found for: MG, PHOS      ASSESSMENT & PLAN:  67 y.o. male with a recurrent left upper back soft tissue mass  -lesions consistent with lipoma however has proximally 5 x 5 cm and has recurred after previous attempt at excision in the office  -due to symptomatic nature patient is wishing to have it excised again  -I discussed removal in the office versus OR, patient requesting OR to be assured we can be more aggressive and removed the entirety of the lesion to prevent recurrence  -will schedule for 02/23/2023 at [facility]  -risks including pain, bleeding,  scarring, infection, hematoma, seroma and recurrence were reviewed  -needs new labs but EKGs up-to-date           none

## 2023-02-15 NOTE — H&P
GENERAL SURGERY  OUTPATIENT H&P    REASON FOR VISIT/CC: Cyst    HPI: Moris Calderón is a 67 y.o. male with hypertension, hyperlipidemia, prediabetes, essential tremor and chronic neck and back was referred for evaluation a left shoulder blade subcutaneous mass.  Reports history to remove fatty tumor passed in the same area has redeveloped. Was referred by his primary care for evaluation. Patient reports proximally 7 years ago having a fatty tumor removed from the upper back which was performed in the office.  Approximally year ago he is developed recurrent swelling which has slowly enlarged and now becoming more symptomatic causing pressure and discomfort in the left shoulder blade.  Denies any severe pain. No overlying skin changes. No drainage from the site.    I have reviewed the patient's chart including prior progress notes, procedures and testing.     ROS:   Review of Systems   All other systems reviewed and are negative.    PROBLEM LIST:  Patient Active Problem List   Diagnosis    Hypertension    Hypercholesterolemia    Spinal stenosis of lumbar region without neurogenic claudication    Lumbar radiculopathy    Lumbar spondylosis    Anxiety    Systolic murmur    Cervical spondylosis    Lower urinary tract symptoms (LUTS)    Obstructive sleep apnea    Pre-diabetes    Essential tremor         HISTORY  Past Medical History:   Diagnosis Date    Anxiety     Arthritis     Back problem     bulging disc    GERD (gastroesophageal reflux disease)     Hypercholesterolemia 11/02/2015    2009: Began statin.    Hyperlipidemia     Hypertension, benign 11/02/2015    2008: Diagnosed.    Sleep apnea     uses implant       Past Surgical History:   Procedure Laterality Date    APPENDECTOMY      ARTHROSCOPIC REPAIR OF ROTATOR CUFF OF SHOULDER Right 12/21/2018    Procedure: REPAIR, ROTATOR CUFF, ARTHROSCOPIC;  Surgeon: Colby Valenzuela MD;  Location: [facility];  Service: Orthopedics;  Laterality: Right;    ARTHROSCOPY OF  SHOULDER WITH DECOMPRESSION OF SUBACROMIAL SPACE Right 12/21/2018    Procedure: ARTHROSCOPY, SHOULDER, WITH SUBACROMIAL SPACE DECOMPRESSION;  Surgeon: Colby Valenzuela MD;  Location: [facility] OR;  Service: Orthopedics;  Laterality: Right;    BICEPS TENDON REPAIR Right 12/21/2018    Procedure: REPAIR, TENDON, BICEPS;  Surgeon: Colby Valenzuela MD;  Location: [facility] OR;  Service: Orthopedics;  Laterality: Right;    COLONOSCOPY N/A 7/27/2022    Procedure: COLONOSCOPY;  Surgeon: Dwight Lewis MD;  Location: [facility] ENDO;  Service: Endoscopy;  Laterality: N/A;    EPIDURAL STEROID INJECTION INTO LUMBAR SPINE N/A 12/7/2022    Procedure: Injection-steroid-epidural-lumbar L5/S1;  Surgeon: Landon Winchester MD;  Location: [facility] OR;  Service: Pain Management;  Laterality: N/A;    EPIDURAL STEROID INJECTION INTO LUMBAR SPINE N/A 2/2/2023    Procedure: Injection-steroid-epidural-lumbar L5/s1;  Surgeon: Landon Winchester MD;  Location: [facility] OR;  Service: Pain Management;  Laterality: N/A;    magdalene      INJECTION OF ANESTHETIC AGENT AROUND MEDIAL BRANCH NERVES INNERVATING CERVICAL FACET JOINT Bilateral 1/10/2022    Procedure: Block-nerve-medial branch-cervical C4 C5 C6;  Surgeon: Landon Winchester MD;  Location: [facility] OR;  Service: Pain Management;  Laterality: Bilateral;    INJECTION OF ANESTHETIC AGENT AROUND MEDIAL BRANCH NERVES INNERVATING LUMBAR FACET JOINT Bilateral 3/10/2021    Procedure: Block-nerve-medial branch-lumbar L2,L3,L4, L5;  Surgeon: Landon Winchester MD;  Location: [facility] OR;  Service: Pain Management;  Laterality: Bilateral;    INSERTION, NEUROSTIMULATOR, HYPOGLOSSAL Right 3/17/2022    Procedure: INSERTION,NEUROSTIMULATOR,HYPOGLOSSAL;  Surgeon: Lance Wright MD;  Location: [facility] OR;  Service: ENT;  Laterality: Right;    KNEE ARTHROSCOPY W/ MENISCAL REPAIR Left 2015    MMT      Left knee A-scope    NOSE SURGERY      RADIOFREQUENCY ABLATION OF LUMBAR MEDIAL BRANCH NERVE AT SINGLE LEVEL Bilateral  3/23/2021    Procedure: Radiofrequency Ablation, Nerve, Spinal, Lumbar, Medial Branch, L2,LL3,L4,L5;  Surgeon: Landon Winchester MD;  Location: [facility] OR;  Service: Pain Management;  Laterality: Bilateral;    RADIOFREQUENCY ABLATION OF LUMBAR MEDIAL BRANCH NERVE AT SINGLE LEVEL Bilateral 10/6/2022    Procedure: Radiofrequency Ablation, Nerve, Spinal, Lumbar, Medial Branch, L4/5, L5/S1;  Surgeon: Landon Winchester MD;  Location: [facility] OR;  Service: Pain Management;  Laterality: Bilateral;    SLEEP ENDOSCOPY, DRUG-INDUCED Bilateral 1/28/2022    Procedure: SLEEP ENDOSCOPY,DRUG-INDUCED;  Surgeon: Lance Wright MD;  Location: [facility] OR;  Service: ENT;  Laterality: Bilateral;    TRANSFORAMINAL EPIDURAL INJECTION OF STEROID Bilateral 2/11/2021    Procedure: Injection,steroid,epidural,transforaminal approach L4/5;  Surgeon: Landon Winchester MD;  Location: [facility] OR;  Service: Pain Management;  Laterality: Bilateral;    UVULECTOMY         Social History     Tobacco Use    Smoking status: Never    Smokeless tobacco: Never   Substance Use Topics    Alcohol use: Yes     Alcohol/week: 5.0 standard drinks     Types: 5 Cans of beer per week     Comment: weekly     Drug use: No       Family History   Problem Relation Age of Onset    Glaucoma Father     Melanoma Neg Hx     Psoriasis Neg Hx     Lupus Neg Hx     Eczema Neg Hx          MEDS:  Current Outpatient Medications on File Prior to Visit   Medication Sig Dispense Refill    atorvastatin (LIPITOR) 10 MG tablet Take 1 tablet (10 mg total) by mouth once daily. 90 tablet 3    benazepriL (LOTENSIN) 20 MG tablet Take 1 tablet (20 mg total) by mouth once daily. 90 tablet 3    cetirizine (ZYRTEC) 10 MG tablet Take 1 tablet (10 mg total) by mouth daily as needed for Allergies. 90 tablet 3    esomeprazole (NEXIUM) 40 MG capsule TAKE 1 CAPSULE(40 MG) BY MOUTH BEFORE BREAKFAST 90 capsule 3    fluticasone propionate (FLONASE) 50 mcg/actuation nasal spray SHAKE LIQUID AND USE 1 SPRAY IN  EACH NOSTRIL EVERY DAY 32 g 2    FLUZONE HIGHDOSE QUAD 22-23  mcg/0.7 mL Syrg       gabapentin (NEURONTIN) 300 MG capsule Take 1 capsule (300 mg total) by mouth every evening. 90 capsule 3    paroxetine (PAXIL) 10 MG tablet Take 1 tablet (10 mg total) by mouth every morning. 90 tablet 3    paroxetine (PAXIL) 40 MG tablet Take 1 tablet (40 mg total) by mouth once daily. 90 tablet 3    PFIZER COVID BIVAL,12Y UP,,PF, 30 mcg/0.3 mL injection       propranoloL (INDERAL LA) 60 MG 24 hr capsule Take 1 capsule (60 mg total) by mouth once daily. 30 capsule 2    tamsulosin (FLOMAX) 0.4 mg Cap Take 1 capsule (0.4 mg total) by mouth once daily. 90 capsule 3    tiZANidine (ZANAFLEX) 4 MG tablet TAKE 1 TABLET(4 MG) BY MOUTH EVERY NIGHT AS NEEDED 90 tablet 3     Current Facility-Administered Medications on File Prior to Visit   Medication Dose Route Frequency Provider Last Rate Last Admin    lactated ringers infusion   Intravenous Continuous Jorge Espinosa MD 20 mL/hr at 03/17/22 0931 New Bag at 03/17/22 1241       ALLERGIES:  Review of patient's allergies indicates:   Allergen Reactions    Buspar [buspirone]      Face flushed, possible elevated BP    Adhesive Rash         VITALS:  There were no vitals filed for this visit.      PHYSICAL EXAM:  Physical Exam  Vitals reviewed.   Constitutional:       General: He is not in acute distress.     Appearance: Normal appearance. He is well-developed.   HENT:      Head: Normocephalic and atraumatic.   Eyes:      General: No scleral icterus.  Neck:      Trachea: No tracheal deviation.   Cardiovascular:      Rate and Rhythm: Normal rate and regular rhythm.      Pulses: Normal pulses.   Pulmonary:      Effort: Pulmonary effort is normal. No respiratory distress.      Breath sounds: Normal breath sounds.   Abdominal:      General: There is no distension.      Palpations: Abdomen is soft.      Tenderness: There is no abdominal tenderness.   Musculoskeletal:         General: No  swelling or tenderness. Normal range of motion.      Cervical back: Normal range of motion and neck supple. No rigidity.   Skin:     General: Skin is warm and dry.      Coloration: Skin is not jaundiced.      Findings: No erythema.             Comments: Left upper back soft tissue mass with poorly defined borders, measures approximally 5 x 5 cm and seems consistent with a lipoma, there is a well-healed incision over the site from previous excision   Neurological:      General: No focal deficit present.      Mental Status: He is alert and oriented to person, place, and time. He is not disoriented.      Motor: No weakness or abnormal muscle tone.   Psychiatric:         Mood and Affect: Mood normal.         Behavior: Behavior normal.         Thought Content: Thought content normal.         Judgment: Judgment normal.         LABS:  Lab Results   Component Value Date    WBC 4.50 05/27/2021    WBC 1 03/09/2012    RBC 4.19 (L) 05/27/2021    HGB 13.1 (L) 05/27/2021    HCT 38.9 (L) 05/27/2021     05/27/2021     Lab Results   Component Value Date     (H) 08/04/2022     08/04/2022    K 3.9 08/04/2022     08/04/2022    CO2 25 08/04/2022    BUN 16 08/04/2022    CREATININE 0.9 08/04/2022    CALCIUM 9.0 08/04/2022     Lab Results   Component Value Date    ALT 20 08/04/2022    AST 18 08/04/2022    ALKPHOS 72 08/04/2022    BILITOT 0.3 08/04/2022     No results found for: MG, PHOS      ASSESSMENT & PLAN:  67 y.o. male with a recurrent left upper back soft tissue mass  -lesions consistent with lipoma however has proximally 5 x 5 cm and has recurred after previous attempt at excision in the office  -due to symptomatic nature patient is wishing to have it excised again  -I discussed removal in the office versus OR, patient requesting OR to be assured we can be more aggressive and removed the entirety of the lesion to prevent recurrence  -will schedule for 02/23/2023 at [facility]  -risks including pain, bleeding,  scarring, infection, hematoma, seroma and recurrence were reviewed  -needs new labs but EKGs up-to-date

## 2023-02-23 ENCOUNTER — ANESTHESIA EVENT (OUTPATIENT)
Dept: SURGERY | Facility: HOSPITAL | Age: 68
End: 2023-02-23
Payer: MEDICARE

## 2023-02-23 ENCOUNTER — HOSPITAL ENCOUNTER (OUTPATIENT)
Facility: HOSPITAL | Age: 68
Discharge: HOME | End: 2023-02-23
Attending: SURGERY | Admitting: SURGERY
Payer: MEDICARE

## 2023-02-23 ENCOUNTER — ANESTHESIA (OUTPATIENT)
Dept: SURGERY | Facility: HOSPITAL | Age: 68
End: 2023-02-23
Payer: MEDICARE

## 2023-02-23 VITALS
HEART RATE: 54 BPM | WEIGHT: 220 LBS | DIASTOLIC BLOOD PRESSURE: 71 MMHG | BODY MASS INDEX: 32.58 KG/M2 | HEIGHT: 69 IN | SYSTOLIC BLOOD PRESSURE: 121 MMHG | RESPIRATION RATE: 16 BRPM | TEMPERATURE: 98 F | OXYGEN SATURATION: 98 %

## 2023-02-23 DIAGNOSIS — R22.2 LUMP OF SKIN OF BACK: ICD-10-CM

## 2023-02-23 PROCEDURE — 36000707: Performed by: SURGERY

## 2023-02-23 PROCEDURE — 21931 PR EXCISION TUMOR SOFT TISSUE BACK/FLANK SUBQ 3+CM: ICD-10-PCS | Mod: ,,, | Performed by: SURGERY

## 2023-02-23 PROCEDURE — 71000016 HC POSTOP RECOV ADDL HR: Performed by: SURGERY

## 2023-02-23 PROCEDURE — 25000003 PHARM REV CODE 250: Performed by: SURGERY

## 2023-02-23 PROCEDURE — 37000008 HC ANESTHESIA 1ST 15 MINUTES: Performed by: SURGERY

## 2023-02-23 PROCEDURE — 27201423 OPTIME MED/SURG SUP & DEVICES STERILE SUPPLY: Performed by: SURGERY

## 2023-02-23 PROCEDURE — 21931 EXC BACK LES SC 3 CM/>: CPT | Mod: ,,, | Performed by: SURGERY

## 2023-02-23 PROCEDURE — 25000003 PHARM REV CODE 250: Performed by: NURSE ANESTHETIST, CERTIFIED REGISTERED

## 2023-02-23 PROCEDURE — 71000015 HC POSTOP RECOV 1ST HR: Performed by: SURGERY

## 2023-02-23 PROCEDURE — 36000706: Performed by: SURGERY

## 2023-02-23 PROCEDURE — 37000009 HC ANESTHESIA EA ADD 15 MINS: Performed by: SURGERY

## 2023-02-23 PROCEDURE — 88304 TISSUE EXAM BY PATHOLOGIST: CPT | Mod: TC

## 2023-02-23 PROCEDURE — 63600175 PHARM REV CODE 636 W HCPCS: Performed by: NURSE ANESTHETIST, CERTIFIED REGISTERED

## 2023-02-23 RX ORDER — HYDROMORPHONE HYDROCHLORIDE 1 MG/ML
0.2 INJECTION, SOLUTION INTRAMUSCULAR; INTRAVENOUS; SUBCUTANEOUS EVERY 5 MIN PRN
Status: DISCONTINUED | OUTPATIENT
Start: 2023-02-23 | End: 2023-02-23 | Stop reason: HOSPADM

## 2023-02-23 RX ORDER — FENTANYL CITRATE 50 UG/ML
INJECTION, SOLUTION INTRAMUSCULAR; INTRAVENOUS
Status: DISCONTINUED | OUTPATIENT
Start: 2023-02-23 | End: 2023-02-23

## 2023-02-23 RX ORDER — TRAMADOL HYDROCHLORIDE 50 MG/1
50 TABLET ORAL EVERY 6 HOURS PRN
Qty: 16 TABLET | Refills: 0 | Status: SHIPPED | OUTPATIENT
Start: 2023-02-23 | End: 2023-03-02

## 2023-02-23 RX ORDER — DIPHENHYDRAMINE HYDROCHLORIDE 50 MG/ML
12.5 INJECTION INTRAMUSCULAR; INTRAVENOUS
Status: DISCONTINUED | OUTPATIENT
Start: 2023-02-23 | End: 2023-02-23 | Stop reason: HOSPADM

## 2023-02-23 RX ORDER — PROPOFOL 10 MG/ML
INJECTION, EMULSION INTRAVENOUS
Status: DISCONTINUED | OUTPATIENT
Start: 2023-02-23 | End: 2023-02-23

## 2023-02-23 RX ORDER — LIDOCAINE HCL/PF 100 MG/5ML
SYRINGE (ML) INTRAVENOUS
Status: DISCONTINUED | OUTPATIENT
Start: 2023-02-23 | End: 2023-02-23

## 2023-02-23 RX ORDER — SODIUM CHLORIDE, SODIUM LACTATE, POTASSIUM CHLORIDE, CALCIUM CHLORIDE 600; 310; 30; 20 MG/100ML; MG/100ML; MG/100ML; MG/100ML
INJECTION, SOLUTION INTRAVENOUS CONTINUOUS PRN
Status: DISCONTINUED | OUTPATIENT
Start: 2023-02-23 | End: 2023-02-23

## 2023-02-23 RX ORDER — OXYCODONE HYDROCHLORIDE 5 MG/1
5 TABLET ORAL
Status: DISCONTINUED | OUTPATIENT
Start: 2023-02-23 | End: 2023-02-23 | Stop reason: HOSPADM

## 2023-02-23 RX ORDER — ONDANSETRON 2 MG/ML
4 INJECTION INTRAMUSCULAR; INTRAVENOUS DAILY PRN
Status: DISCONTINUED | OUTPATIENT
Start: 2023-02-23 | End: 2023-02-23 | Stop reason: HOSPADM

## 2023-02-23 RX ORDER — LIDOCAINE HYDROCHLORIDE AND EPINEPHRINE 10; 10 MG/ML; UG/ML
INJECTION, SOLUTION INFILTRATION; PERINEURAL
Status: DISCONTINUED | OUTPATIENT
Start: 2023-02-23 | End: 2023-02-23 | Stop reason: HOSPADM

## 2023-02-23 RX ORDER — MIDAZOLAM HYDROCHLORIDE 1 MG/ML
INJECTION INTRAMUSCULAR; INTRAVENOUS
Status: DISCONTINUED | OUTPATIENT
Start: 2023-02-23 | End: 2023-02-23

## 2023-02-23 RX ADMIN — FENTANYL CITRATE 25 MCG: 50 INJECTION INTRAMUSCULAR; INTRAVENOUS at 10:02

## 2023-02-23 RX ADMIN — SODIUM CHLORIDE, SODIUM LACTATE, POTASSIUM CHLORIDE, AND CALCIUM CHLORIDE: .6; .31; .03; .02 INJECTION, SOLUTION INTRAVENOUS at 10:02

## 2023-02-23 RX ADMIN — MIDAZOLAM HYDROCHLORIDE 2 MG: 1 INJECTION, SOLUTION INTRAMUSCULAR; INTRAVENOUS at 10:02

## 2023-02-23 RX ADMIN — PROPOFOL 50 MG: 10 INJECTION, EMULSION INTRAVENOUS at 10:02

## 2023-02-23 RX ADMIN — LIDOCAINE HYDROCHLORIDE 50 MG: 20 INJECTION, SOLUTION INTRAVENOUS at 10:02

## 2023-02-23 RX ADMIN — PROPOFOL 100 MG: 10 INJECTION, EMULSION INTRAVENOUS at 10:02

## 2023-02-23 NOTE — TRANSFER OF CARE
"Anesthesia Transfer of Care Note    Patient: Moris Calderón    Procedure(s) Performed: Procedure(s) (LRB):  EXCISION, MASS (Left)    Patient location: OPS    Anesthesia Type: MAC    Transport from OR: Transported from OR on room air with adequate spontaneous ventilation    Post pain: adequate analgesia    Post assessment: no apparent anesthetic complications and tolerated procedure well    Post vital signs: stable    Level of consciousness: awake, alert and oriented    Nausea/Vomiting: no nausea/vomiting    Complications: none    Transfer of care protocol was followed      Last vitals:   Visit Vitals  BP (!) 151/80 (BP Location: Right leg, Patient Position: Lying)   Pulse (!) 52   Temp 36.6 °C (97.9 °F) (Oral)   Resp 15   Ht 5' 9" (1.753 m)   Wt 99.8 kg (220 lb)   SpO2 98%   BMI 32.49 kg/m²     "

## 2023-02-23 NOTE — OP NOTE
DATE OF PROCEDURE: 02/23/2023    PREOPERATIVE DIAGNOSIS: Recurrent left upper back lipoma    POSTOPERATIVE DIAGNOSIS: Same    PROCEDURE: Excision of a 5 x 5.5 x 3 cm fatty lipomatous mass    SURGEON: Taz Khan M.D    ASSISTANT: None    ANESTHESIA: Local MAC    ESTIMATED BLOOD LOSS: Minimal    SPECIMEN: Left upper back mass    CONDITION: Stable    COMPLICATIONS: None    FINDINGS:   Poorly defined lipomatous mass with multiple lobulations in the surrounding subcutaneous tissue, fibrotic scar tissue from previous excision    INDICATIONS: The patient is a 67-year-old male with a recurrent left upper shoulder mass that is now becoming symptomatic.  Reports previous excision in the office but the lesion recurred.    PROCEDURE IN DETAIL: Patient taken operating room placed in the supine position where he was then transitioned to a right lateral decubitus position with appropriate padding including use of beanbag.  Monitored anesthesia care was administered.  Antibiotics were not required.  His left upper shoulder had a subcutaneous mass measuring approximately 5 x 5 cm.  That area was prepped and draped typical sterile fashion.  Time-out performed by members of the operative team.  Local anesthetic was injected overlying the mass and including a previous well-healed incision site.  We then incised sharply in a transverse fashion and dissected through the dermis. We then encountered a lipomatous mass and subcutaneous tissue but this was poorly defined and surrounded by fibrotic scar tissue from previous attempted excision.  There were multiple lobulations which had to be dissected through a combination of blunt and sharp dissection. This dissection was taken all the way down to the underlying muscle fascia. Portions of this removed in piecemeal fashion due to the lobulated fashion. In total approximately 5 x 5.5 x 3 cm lipomatous mass was excised.  I thoroughly examined the dissection field and did not find any  additional lipomatous tissue that was obvious.  We assured adequate hemostasis.  Additional local anesthetic was injected.  The deep dermal layer was closed with interrupted 3-0 Vicryl sutures.  The skin was closed with running 4-0 Monocryl subcuticular stitch and Dermabond.  A pressure dressing was applied.  Patient was aroused from sedation and taken to the recovery room stable condition have suffered no complications.  All counts correct x2 the case. I was present scrubbed throughout all operative portions of the case.    DISPO: PACU

## 2023-02-23 NOTE — ANESTHESIA PREPROCEDURE EVALUATION
02/23/2023  Moris Calderón is a 67 y.o., male.      Patient Active Problem List   Diagnosis    Hypertension    Hypercholesterolemia    Spinal stenosis of lumbar region without neurogenic claudication    Lumbar radiculopathy    Lumbar spondylosis    Anxiety    Systolic murmur    Cervical spondylosis    Lower urinary tract symptoms (LUTS)    Obstructive sleep apnea    Pre-diabetes    Essential tremor       Past Surgical History:   Procedure Laterality Date    APPENDECTOMY      ARTHROSCOPIC REPAIR OF ROTATOR CUFF OF SHOULDER Right 12/21/2018    Procedure: REPAIR, ROTATOR CUFF, ARTHROSCOPIC;  Surgeon: Colby Valenzuela MD;  Location: [facility] OR;  Service: Orthopedics;  Laterality: Right;    ARTHROSCOPY OF SHOULDER WITH DECOMPRESSION OF SUBACROMIAL SPACE Right 12/21/2018    Procedure: ARTHROSCOPY, SHOULDER, WITH SUBACROMIAL SPACE DECOMPRESSION;  Surgeon: Colby Valenzuela MD;  Location: [facility] OR;  Service: Orthopedics;  Laterality: Right;    BICEPS TENDON REPAIR Right 12/21/2018    Procedure: REPAIR, TENDON, BICEPS;  Surgeon: Colby Valenzuela MD;  Location: [facility] OR;  Service: Orthopedics;  Laterality: Right;    COLONOSCOPY N/A 07/27/2022    Procedure: COLONOSCOPY;  Surgeon: Dwight Lewis MD;  Location: [facility] ENDO;  Service: Endoscopy;  Laterality: N/A;    EPIDURAL STEROID INJECTION INTO LUMBAR SPINE N/A 12/07/2022    Procedure: Injection-steroid-epidural-lumbar L5/S1;  Surgeon: Landon Winchester MD;  Location: [facility] OR;  Service: Pain Management;  Laterality: N/A;    EPIDURAL STEROID INJECTION INTO LUMBAR SPINE N/A 02/02/2023    Procedure: Injection-steroid-epidural-lumbar L5/s1;  Surgeon: Landon Winchester MD;  Location: [facility] OR;  Service: Pain Management;  Laterality: N/A;    magdalene      EYE SURGERY      INJECTION OF ANESTHETIC AGENT AROUND MEDIAL BRANCH NERVES INNERVATING  CERVICAL FACET JOINT Bilateral 01/10/2022    Procedure: Block-nerve-medial branch-cervical C4 C5 C6;  Surgeon: Landon Winchester MD;  Location: [facility] OR;  Service: Pain Management;  Laterality: Bilateral;    INJECTION OF ANESTHETIC AGENT AROUND MEDIAL BRANCH NERVES INNERVATING LUMBAR FACET JOINT Bilateral 03/10/2021    Procedure: Block-nerve-medial branch-lumbar L2,L3,L4, L5;  Surgeon: Landon Winchester MD;  Location: [facility] OR;  Service: Pain Management;  Laterality: Bilateral;    INSERTION, NEUROSTIMULATOR, HYPOGLOSSAL Right 03/17/2022    Procedure: INSERTION,NEUROSTIMULATOR,HYPOGLOSSAL;  Surgeon: Lance Wright MD;  Location: [facility] OR;  Service: ENT;  Laterality: Right;    KNEE ARTHROSCOPY W/ MENISCAL REPAIR Left 2015    MMT      Left knee A-scope    NOSE SURGERY      RADIOFREQUENCY ABLATION OF LUMBAR MEDIAL BRANCH NERVE AT SINGLE LEVEL Bilateral 03/23/2021    Procedure: Radiofrequency Ablation, Nerve, Spinal, Lumbar, Medial Branch, L2,LL3,L4,L5;  Surgeon: Landon Winchester MD;  Location: [facility] OR;  Service: Pain Management;  Laterality: Bilateral;    RADIOFREQUENCY ABLATION OF LUMBAR MEDIAL BRANCH NERVE AT SINGLE LEVEL Bilateral 10/06/2022    Procedure: Radiofrequency Ablation, Nerve, Spinal, Lumbar, Medial Branch, L4/5, L5/S1;  Surgeon: Landon Winchester MD;  Location: [facility] OR;  Service: Pain Management;  Laterality: Bilateral;    SLEEP ENDOSCOPY, DRUG-INDUCED Bilateral 01/28/2022    Procedure: SLEEP ENDOSCOPY,DRUG-INDUCED;  Surgeon: Lance Wright MD;  Location: [facility] OR;  Service: ENT;  Laterality: Bilateral;    TRANSFORAMINAL EPIDURAL INJECTION OF STEROID Bilateral 02/11/2021    Procedure: Injection,steroid,epidural,transforaminal approach L4/5;  Surgeon: Landon Winchester MD;  Location: [facility] OR;  Service: Pain Management;  Laterality: Bilateral;    UVULECTOMY          Tobacco Use:  The patient  reports that he has never smoked. He has never used smokeless tobacco.     Results for orders  placed or performed in visit on 11/08/22   IN OFFICE EKG 12-LEAD (to Vian)    Collection Time: 11/08/22  9:44 AM    Narrative    Test Reason : z00.00    Vent. Rate : 052 BPM     Atrial Rate : 052 BPM     P-R Int : 152 ms          QRS Dur : 086 ms      QT Int : 426 ms       P-R-T Axes : 028 070 076 degrees     QTc Int : 396 ms    Sinus bradycardia  Otherwise normal ECG  When compared with ECG of 15-MAY-2018 10:53,  No significant change was found  Confirmed by JUAN M VANCE MD ([id number]) on 11/8/2022 8:07:29 PM    Referred By: BIBIANA NINO           Confirmed By:JUAN M VANCE MD             Lab Results   Component Value Date    WBC 5.71 02/15/2023    HGB 13.2 (L) 02/15/2023    HCT 39.7 (L) 02/15/2023    MCV 95 02/15/2023     02/15/2023     BMP  Lab Results   Component Value Date     02/15/2023    K 4.1 02/15/2023     02/15/2023    CO2 29 02/15/2023    BUN 23 02/15/2023    CREATININE 1.0 02/15/2023    CALCIUM 9.1 02/15/2023    ANIONGAP 5 (L) 02/15/2023     02/15/2023     (H) 08/04/2022     (H) 03/17/2022       Results for orders placed in visit on 12/16/22    Echo    Interpretation Summary  · Normal systolic function.  · The estimated ejection fraction is 65%.  · Indeterminate left ventricular diastolic function.  · Normal right ventricular size with normal right ventricular systolic function.  · Moderate left atrial enlargement.  · Mild tricuspid regurgitation.  · Normal central venous pressure (3 mmHg).  · The estimated PA systolic pressure is 27 mmHg.            Pre-op Assessment    I have reviewed the Patient Summary Reports.     I have reviewed the Nursing Notes. I have reviewed the NPO Status.   I have reviewed the Medications.     Review of Systems  Anesthesia Hx:  No problems with previous Anesthesia  Denies Family Hx of Anesthesia complications.   Denies Personal Hx of Anesthesia complications.   Social:  Non-Smoker    Hematology/Oncology:  Hematology Normal         Cardiovascular:   Hypertension, well controlled    Pulmonary:   Sleep Apnea (pt has inspire implant, currently in off mode)    Education provided regarding risk of obstructive sleep apnea     Hepatic/GI:   Hiatal Hernia, GERD, well controlled Hepatitis (hx of Hep C, completed treatment)    Musculoskeletal:   Arthritis (cervical and lumbar degenerative changes, recent lumbar VIET, chronic intermittent localized neck and back pain)     Neurological:  Neurology Normal    Endocrine:  Endocrine Normal    Psych:   depression (pre-diabetes diet controlled)          Physical Exam  General: Well nourished, Cooperative, Alert and Oriented    Airway:  Mallampati: III / II  Mouth Opening: Normal  TM Distance: Normal  Tongue: Normal  Neck ROM: Normal ROM    Dental:  Caps / Implants    Chest/Lungs:  Clear to auscultation    Heart:  Rate: Normal  Rhythm: Regular Rhythm  Sounds: Normal    Abdomen:  Normal, Soft, Nontender        Anesthesia Plan  Type of Anesthesia, risks & benefits discussed:    Anesthesia Type: MAC  Intra-op Monitoring Plan: Standard ASA Monitors  Post Op Pain Control Plan:   (medical reason for not using multimodal pain management)  Induction:  IV  Informed Consent: Informed consent signed with the Patient and all parties understand the risks and agree with anesthesia plan.  All questions answered.   ASA Score: 3  Anesthesia Plan Notes:   MAC  Propofol  IV tylenol  Zofran Decadron 8,    Ready For Surgery From Anesthesia Perspective.     .

## 2023-02-23 NOTE — DISCHARGE SUMMARY
[facility]  Discharge Note  Short Stay    Procedure(s) (LRB):  EXCISION, MASS (Left)      OUTCOME: Patient tolerated treatment/procedure well without complication and is now ready for discharge.    DISPOSITION: Home or Self Care    FINAL DIAGNOSIS:  <principal problem not specified>    FOLLOWUP: In clinic    DISCHARGE INSTRUCTIONS:    Discharge Procedure Orders   Diet Adult Regular     Ice to affected area     Lifting restrictions   Order Comments: Please avoid lifting greater than 20 lb, straining, strenuous activity for two weeks.     Change dressing (specify)   Order Comments: Post-Operative Wound Care    A gauze bandage has been taped down over your incision site, please leave in place for 2 days. It is ok to carefully shower around the incision but please avoid getting the tape or adhesive dressing saturated. After 2 days gently remove the bandage. Once the bandage is removed you may gently wash over the incision in the shower using soap and water then pat dry. Do not soak in a bathtub or other body of water for 2 weeks or until cleared by your surgeon.     If you noticed redness, swelling, fever, increasing pain or significant drainage from your wound please call/message the office or the 24 hr nurse hotline after hours.     Notify your health care provider if you experience any of the following:  temperature >100.4     Notify your health care provider if you experience any of the following:  persistent nausea and vomiting or diarrhea     Notify your health care provider if you experience any of the following:  severe uncontrolled pain     Notify your health care provider if you experience any of the following:  redness, tenderness, or signs of infection (pain, swelling, redness, odor or green/yellow discharge around incision site)     Notify your health care provider if you experience any of the following:  worsening rash     Notify your health care provider if you experience any of the  following:  increased confusion or weakness     Shower on day dressing removed (No bath)        TIME SPENT ON DISCHARGE: 10 minutes

## 2023-02-23 NOTE — ANESTHESIA POSTPROCEDURE EVALUATION
Anesthesia Post Evaluation    Patient: Moris Calderón    Procedure(s) Performed: Procedure(s) (LRB):  EXCISION, MASS (Left)    Final Anesthesia Type: MAC      Patient location during evaluation: PACU  Patient participation: Yes- Able to Participate  Level of consciousness: awake and alert and oriented  Post-procedure vital signs: reviewed and stable  Pain management: adequate  Airway patency: patent    PONV status at discharge: No PONV  Anesthetic complications: no      Cardiovascular status: blood pressure returned to baseline and hemodynamically stable  Respiratory status: unassisted, spontaneous ventilation and room air  Hydration status: euvolemic  Follow-up not needed.          Vitals Value Taken Time   /72 02/23/23 1130   Temp 36.6  02/23/23 1130   Pulse 54 02/23/23 1130   Resp 16 02/23/23 1130   SpO2 98 % 02/23/23 1130         No case tracking events are documented in the log.      Pain/Jesus Score: No data recorded

## 2023-02-24 ENCOUNTER — OFFICE VISIT (OUTPATIENT)
Dept: GASTROENTEROLOGY | Facility: CLINIC | Age: 68
End: 2023-02-24
Payer: MEDICARE

## 2023-02-24 VITALS — HEIGHT: 69 IN | WEIGHT: 224 LBS | BODY MASS INDEX: 33.18 KG/M2

## 2023-02-24 DIAGNOSIS — R10.13 EPIGASTRIC PAIN: ICD-10-CM

## 2023-02-24 DIAGNOSIS — K44.9 HIATAL HERNIA: ICD-10-CM

## 2023-02-24 DIAGNOSIS — Z87.898 HISTORY OF DIZZINESS: ICD-10-CM

## 2023-02-24 DIAGNOSIS — R12 HEARTBURN: Primary | ICD-10-CM

## 2023-02-24 DIAGNOSIS — D53.9 MACROCYTIC ANEMIA: ICD-10-CM

## 2023-02-24 PROCEDURE — 4010F PR ACE/ARB THEARPY RXD/TAKEN: ICD-10-PCS | Mod: HCNC,CPTII,S$GLB, | Performed by: NURSE PRACTITIONER

## 2023-02-24 PROCEDURE — 3008F PR BODY MASS INDEX (BMI) DOCUMENTED: ICD-10-PCS | Mod: HCNC,CPTII,S$GLB, | Performed by: NURSE PRACTITIONER

## 2023-02-24 PROCEDURE — 4010F ACE/ARB THERAPY RXD/TAKEN: CPT | Mod: HCNC,CPTII,S$GLB, | Performed by: NURSE PRACTITIONER

## 2023-02-24 PROCEDURE — 1101F PR PT FALLS ASSESS DOC 0-1 FALLS W/OUT INJ PAST YR: ICD-10-PCS | Mod: HCNC,CPTII,S$GLB, | Performed by: NURSE PRACTITIONER

## 2023-02-24 PROCEDURE — 99999 PR PBB SHADOW E&M-EST. PATIENT-LVL IV: CPT | Mod: PBBFAC,HCNC,, | Performed by: NURSE PRACTITIONER

## 2023-02-24 PROCEDURE — 99214 PR OFFICE/OUTPT VISIT, EST, LEVL IV, 30-39 MIN: ICD-10-PCS | Mod: HCNC,S$GLB,, | Performed by: NURSE PRACTITIONER

## 2023-02-24 PROCEDURE — 1126F AMNT PAIN NOTED NONE PRSNT: CPT | Mod: HCNC,CPTII,S$GLB, | Performed by: NURSE PRACTITIONER

## 2023-02-24 PROCEDURE — 3288F FALL RISK ASSESSMENT DOCD: CPT | Mod: HCNC,CPTII,S$GLB, | Performed by: NURSE PRACTITIONER

## 2023-02-24 PROCEDURE — 3288F PR FALLS RISK ASSESSMENT DOCUMENTED: ICD-10-PCS | Mod: HCNC,CPTII,S$GLB, | Performed by: NURSE PRACTITIONER

## 2023-02-24 PROCEDURE — 99999 PR PBB SHADOW E&M-EST. PATIENT-LVL IV: ICD-10-PCS | Mod: PBBFAC,HCNC,, | Performed by: NURSE PRACTITIONER

## 2023-02-24 PROCEDURE — 3008F BODY MASS INDEX DOCD: CPT | Mod: HCNC,CPTII,S$GLB, | Performed by: NURSE PRACTITIONER

## 2023-02-24 PROCEDURE — 1126F PR PAIN SEVERITY QUANTIFIED, NO PAIN PRESENT: ICD-10-PCS | Mod: HCNC,CPTII,S$GLB, | Performed by: NURSE PRACTITIONER

## 2023-02-24 PROCEDURE — 99214 OFFICE O/P EST MOD 30 MIN: CPT | Mod: HCNC,S$GLB,, | Performed by: NURSE PRACTITIONER

## 2023-02-24 PROCEDURE — 1101F PT FALLS ASSESS-DOCD LE1/YR: CPT | Mod: HCNC,CPTII,S$GLB, | Performed by: NURSE PRACTITIONER

## 2023-02-24 RX ORDER — PANTOPRAZOLE SODIUM 40 MG/1
40 TABLET, DELAYED RELEASE ORAL
Qty: 30 TABLET | Refills: 1 | Status: SHIPPED | OUTPATIENT
Start: 2023-02-24 | End: 2023-04-06

## 2023-02-24 NOTE — PATIENT INSTRUCTIONS
"GERD (Adult)    The esophagus is a tube that carries food from the mouth to the stomach. A valve at the lower end of the esophagus prevents stomach acid from flowing upward. When this valve doesn't work properly, stomach contents may repeatedly flow back up (reflux) into the esophagus. This is called gastroesophageal reflux disease (GERD). GERD can irritate the esophagus. It can cause problems with swallowing or breathing. In severe cases, GERD can cause recurrent pneumonia or other serious problems.  Symptoms of reflux include burning, pressure or sharp pain in the upper abdomen or mid to lower chest. The pain can spread to the neck, back, or shoulder. There may be belching, an acid taste in the back of the throat, chronic cough, or sore throat or hoarseness. GERD symptoms often occur during the day after a big meal. They can also occur at night when lying down.   Home care  Lifestyle changes can help reduce symptoms. If needed, medicines may be prescribed. Symptoms often improve with treatment, but if treatment is stopped, the symptoms often return after a few months. So most persons with GERD will need to continue treatment.  Lifestyle changes  Limit or avoid fatty, fried, and spicy foods, as well as coffee, chocolate, mint, and foods with high acid content such as tomatoes and citrus fruit and juices (orange, grapefruit, lemon).  Dont eat large meals, especially at night. Frequent, smaller meals are best. Do not lie down right after eating. And dont eat anything 3 hours before going to bed.  Avoid drinking alcohol and smoking. As much as possible, stay away from second hand smoke.  If you are overweight, losing weight will reduce symptoms.   Avoid wearing tight clothing around your stomach area.  If your symptoms occur during sleep, use a foam wedge to elevate your upper body (not just your head.) Or, place 4" blocks under the head of your bed.  Medicines  If needed, medicines can help relieve the symptoms of " GERD and prevent damage to the esophagus. Discuss a medicine plan with your healthcare provider. This may include one or more of the following medicines:  Antacids to help neutralize the normal acids in your stomach.  Acid blockers (H2 blockers) to decrease acid production.  Acid inhibitors (PPIs) to decrease acid production in a different way than the blockers. They may work better, but can take a little longer to take effect.  Take an antacid 30-60 minutes after eating and at bedtime, but not at the same time as an acid blocker.  Try not to take medicines such as ibuprofen and aspirin. If you are taking aspirin for your heart or other medical reasons, talk to your healthcare provider about stopping it.  Follow-up care  Follow up with your healthcare provider or as advised by our staff.  When to seek medical advice  Call your healthcare provider if any of the following occur:  Stomach pain gets worse or moves to the lower right abdomen (appendix area)  Chest pain appears or gets worse, or spreads to the back, neck, shoulder, or arm  Frequent vomiting (cant keep down liquids)  Blood in the stool or vomit (red or black in color)  Feeling weak or dizzy  Fever of 100.4ºF (38ºC) or higher, or as directed by your healthcare provider  Date Last Reviewed: 6/23/2015  © 4287-0378 [employer]. [address] [place] [address], [place] [address]. All rights reserved. This information is not intended as a substitute for professional medical care. Always follow your healthcare professional's instructions.         What Is a Hiatal Hernia?    Hiatal hernia is when the area where the stomach and esophagus meet bulges up through the diaphragm into the chest cavity. In some cases, part of the stomach may bulge above the diaphragm. Stomach acid may move up into the esophagus and cause symptoms. The symptoms are often blamed on gastroesophageal reflux disease (GERD). You may only know about the hernia when it shows up on an X-ray  taken for other reasons.   What you may feel  The hiatus is a normal hole in the diaphragm. The esophagus passes through this hole and leads to the stomach. In some cases, part of the stomach may bulge above the diaphragm. This bulge is called a hernia. Stomach acid may move up into the esophagus and cause symptoms.  When you eat, the muscle at the hiatus relaxes to allow food to pass into the stomach. It tightens again to keep food and digestive acids in the stomach.  Many people with hiatal hernias have mild symptoms. You may notice the following GERD symptoms:  Heartburn or other chest discomfort  A feeling of chest fullness after a meal  Frequent burping  Acid taste in the mouth  Trouble swallowing  Treating symptoms  If you have been diagnosed with hiatal hernia, these suggestions may help improve symptoms:  Lose excess weight. Extra weight puts pressure on the stomach and esophagus.  Dont lie down after eating. Sit up for at least an hour after eating. Lying down after eating can increase symptoms.  Avoid certain foods and drinks. These include fatty foods, chocolate, coffee, mint, and other foods that cause symptoms for you.  Dont smoke or drink alcohol. These can worsen symptoms.  Look at your medicines. Discuss your medicines with your healthcare provider. Many medicines can cause symptoms.  Consider an antacid medicine. Ask your healthcare provider about over-the-counter and prescription medicines that may help.  Ask about surgery, if needed. Surgery is a treatment choice for some people. Your healthcare provider can determine if surgery is an option for you.    Date Last Reviewed: 10/1/2016  © 2506-5072 [employer]. [address] [place] [address], [place] [address]. All rights reserved. This information is not intended as a substitute for professional medical care. Always follow your healthcare professional's instructions.     Epigastric Pain (Uncertain Cause)     Epigastric pain can be a sign of  disease in the upper abdomen. Common causes include:  Acid reflux (stomach acid flowing up into the esophagus)  Gastritis (irritation of the stomach lining)  Peptic Ulcer Disease  Inflammation of the pancreas  Gallstone  Infection in the gallbladder  Pain may be dull or burning. It may spread upward to the chest or to the back. There may be other symptoms such as belching, bloating, cramps or hunger pains. There may be weight loss or poor appetite, nausea or vomiting.  Since the diagnosis of your pain is not certain yet, further tests may sometimes be needed. Sometimes the doctor will treat you for the most likely condition to see if there is improvement before doing further tests.  Home care  Medicines  Antacids help neutralize the normal acids in your stomach. Examples are Maalox, Mylanta, Rolaids, and Tums. If you dont like the liquid, you can also try a chewable one. You may find one works better than another for you. Overuse can cause diarrhea or constipation.  Acid blockers (H2 blockers) decrease acid production. Examples are cimetidine (Tagamet), famotidine (Pepcid) and ranitidine (Zantac).  Acid inhibitors (PPIs) decrease acid production in a different way than the blockers. You may find they work better, but can take a little longer to take effect.  Examples are omeprazole (Prilosec), lansoprazole (Prevacid), pantoprazole (Protonix), rabeprazole (Aciphex), and esomeprazole (Nexium).  Take an antacid 30-60 minutes after eating and at bedtime, but not at the same time as an acid blocker.  Try not to take NSAIDs. Aspirin may also cause problems, but if taking it for your heart or other medical reasons, talk to your doctor before stopping it; you do not want to cause a worse problem, like a heart attack or stroke.  Diet  If certain foods seem to cause your spasm, try to avoid them.   Eat slowly and chew food well before swallowing. Symptoms of gastritis can be worsened by certain foods. Limit or avoid fatty,  fried, and spicy foods, as well as coffee, chocolate, mint, and foods with high acid content such as tomatoes and citrus fruit and juices (orange, grapefruit, lemon).  Avoid alcohol, caffeine, and tobacco, which can delay healing and worsen your problem.  Try eating smaller meals with snacks in between  Follow-up care  Follow up with your healthcare provider or as advised.  When to seek medical advice  Call your healthcare provider right away if any of the following occur:  Stomach pain worsens or moves to the right lower part of the abdomen  Chest pain appears, or if it worsens or spreads to the chest, back, neck, shoulder, or arm  Frequent vomiting (cant keep down liquids)  Blood in the stool or vomit (red or black color)  Feeling weak or dizzy, fainting, or having trouble breathing  Fever of 100.4ºF (38ºC) or higher, or as directed by your healthcare provider  Abdominal swelling  Date Last Reviewed: 9/25/2015  © 6803-4868 [employer]. [address] [place] [address], [place] [address]. All rights reserved. This information is not intended as a substitute for professional medical care. Always follow your healthcare professional's instructions.

## 2023-02-24 NOTE — PROGRESS NOTES
Subjective:       Patient ID: Moris Calderón is a 67 y.o. male Body mass index is 33.08 kg/m².    Chief Complaint: Gastroesophageal Reflux (Having frequent episodes of belching), Heartburn, and Abdominal Pain (Upper abdominal pain)    This patient is new to me.  Referring Provider: Dr. Giovanni Kumar.  Established patient of Dr. Lewis.     Reviewed medical records received from patient, summarized below and in medical & surgical history (endoscopies, etc), copies made & given to nurse to be scanned into system:   6/18/2020 EGD & 12/9/2016 EGD and colonoscopy    Was working under a sink a few weeks agoand was laying down and inverted and got dizzy, nauseated, and thinks he had reflux.    Gastroesophageal Reflux  He complains of abdominal pain (epigastric abdominal pain described as reflux; radiates up his esophagus; denies currently), belching (increased at times) and heartburn (was having it daily for the past few months until a few days ago, none for the past few days). He reports no chest pain, no choking, no coughing, no dysphagia, no globus sensation, no hoarse voice, no nausea or no sore throat. This is a chronic (diagnosed several years ago) problem. The current episode started more than 1 month ago (recurred a couple of months ago). Pertinent negatives include no fatigue, melena or weight loss. Risk factors include obesity and NSAIDs (motrin PRN back pain). He has tried a PPI (nexium 40 mg once daily (been on for several years)) for the symptoms. Past procedures include an EGD.     Review of Systems   Constitutional:  Negative for appetite change, chills, fatigue, fever and weight loss.   HENT:  Negative for hoarse voice, sore throat and trouble swallowing.    Respiratory:  Negative for cough, choking and shortness of breath.    Cardiovascular:  Negative for chest pain.   Gastrointestinal:  Positive for abdominal pain (epigastric abdominal pain described as reflux; radiates up his esophagus; denies  currently) and heartburn (was having it daily for the past few months until a few days ago, none for the past few days). Negative for anal bleeding, blood in stool, constipation, diarrhea, dysphagia, melena, nausea, rectal pain and vomiting.   Genitourinary:  Negative for difficulty urinating, dysuria and flank pain.   Neurological:  Negative for weakness.       Past Medical History:   Diagnosis Date    Anxiety     Arthritis     Back problem     bulging disc    Colon polyps     GERD (gastroesophageal reflux disease)     Hiatal hernia     Hypercholesterolemia 11/02/2015    2009: Began statin.    Hyperlipidemia     Hypertension, benign 11/02/2015    2008: Diagnosed.    Sleep apnea     uses implant    TMJ (dislocation of temporomandibular joint)      Past Surgical History:   Procedure Laterality Date    APPENDECTOMY      ARTHROSCOPIC REPAIR OF ROTATOR CUFF OF SHOULDER Right 12/21/2018    Procedure: REPAIR, ROTATOR CUFF, ARTHROSCOPIC;  Surgeon: Colby Valenzuela MD;  Location: [facility] OR;  Service: Orthopedics;  Laterality: Right;    ARTHROSCOPY OF SHOULDER WITH DECOMPRESSION OF SUBACROMIAL SPACE Right 12/21/2018    Procedure: ARTHROSCOPY, SHOULDER, WITH SUBACROMIAL SPACE DECOMPRESSION;  Surgeon: Colby Valenzuela MD;  Location: [facility] OR;  Service: Orthopedics;  Laterality: Right;    BICEPS TENDON REPAIR Right 12/21/2018    Procedure: REPAIR, TENDON, BICEPS;  Surgeon: Colby Valenzuela MD;  Location: [facility] OR;  Service: Orthopedics;  Laterality: Right;    COLONOSCOPY N/A 07/27/2022    Procedure: COLONOSCOPY;  Surgeon: Dwight Lewis MD;  Location: [facility];  Service: Endoscopy;  Laterality: N/A; Repeat colonoscopy in 5 years for surveillance    COLONOSCOPY  12/09/2016    Dr. Martinez, sent for scanning: diverticulosis, 2 colon polyps removed; grade 1 internal hemorrhoids; repeat in 5 years for surveillance    EGD - EXTERNAL RESULT  06/18/2020    Dr. Martinez, sent for scanning: small hiatal hernia,  congsetion of the GEJ mucosa; biopsy report not received    EGD - EXTERNAL RESULT  12/09/2016    Dr. Martinez, sent for scanning: small hiatal hernia, irregular zline, erythema to duodenal bulb; repeat in 3 years    EPIDURAL STEROID INJECTION INTO LUMBAR SPINE N/A 12/07/2022    Procedure: Injection-steroid-epidural-lumbar L5/S1;  Surgeon: Landon Winchester MD;  Location: [facility] OR;  Service: Pain Management;  Laterality: N/A;    EPIDURAL STEROID INJECTION INTO LUMBAR SPINE N/A 02/02/2023    Procedure: Injection-steroid-epidural-lumbar L5/s1;  Surgeon: Landon Winchester MD;  Location: [facility] OR;  Service: Pain Management;  Laterality: N/A;    magdalene      EYE SURGERY      INJECTION OF ANESTHETIC AGENT AROUND MEDIAL BRANCH NERVES INNERVATING CERVICAL FACET JOINT Bilateral 01/10/2022    Procedure: Block-nerve-medial branch-cervical C4 C5 C6;  Surgeon: Landon Winchester MD;  Location: [facility] OR;  Service: Pain Management;  Laterality: Bilateral;    INJECTION OF ANESTHETIC AGENT AROUND MEDIAL BRANCH NERVES INNERVATING LUMBAR FACET JOINT Bilateral 03/10/2021    Procedure: Block-nerve-medial branch-lumbar L2,L3,L4, L5;  Surgeon: Landon Winchester MD;  Location: [facility] OR;  Service: Pain Management;  Laterality: Bilateral;    INSERTION, NEUROSTIMULATOR, HYPOGLOSSAL Right 03/17/2022    Procedure: INSERTION,NEUROSTIMULATOR,HYPOGLOSSAL;  Surgeon: Lance Wright MD;  Location: [facility] OR;  Service: ENT;  Laterality: Right;    KNEE ARTHROSCOPY W/ MENISCAL REPAIR Left 2015    LIPOMA RESECTION Left 02/23/2023    Procedure: EXCISION, LIPOMA;  Surgeon: Taz Khan Jr., MD;  Location: [facility] OR;  Service: General;  Laterality: Left;    MMT      Left knee A-scope    NOSE SURGERY      RADIOFREQUENCY ABLATION OF LUMBAR MEDIAL BRANCH NERVE AT SINGLE LEVEL Bilateral 03/23/2021    Procedure: Radiofrequency Ablation, Nerve, Spinal, Lumbar, Medial Branch, L2,LL3,L4,L5;  Surgeon: Landon Winchester MD;  Location: [facility] OR;  Service: Pain  Management;  Laterality: Bilateral;    RADIOFREQUENCY ABLATION OF LUMBAR MEDIAL BRANCH NERVE AT SINGLE LEVEL Bilateral 10/06/2022    Procedure: Radiofrequency Ablation, Nerve, Spinal, Lumbar, Medial Branch, L4/5, L5/S1;  Surgeon: Landon Winchester MD;  Location: [facility] OR;  Service: Pain Management;  Laterality: Bilateral;    SLEEP ENDOSCOPY, DRUG-INDUCED Bilateral 01/28/2022    Procedure: SLEEP ENDOSCOPY,DRUG-INDUCED;  Surgeon: Lance Wright MD;  Location: [facility] OR;  Service: ENT;  Laterality: Bilateral;    TRANSFORAMINAL EPIDURAL INJECTION OF STEROID Bilateral 02/11/2021    Procedure: Injection,steroid,epidural,transforaminal approach L4/5;  Surgeon: Landon Winchester MD;  Location: [facility] OR;  Service: Pain Management;  Laterality: Bilateral;    UVULECTOMY       Family History   Problem Relation Age of Onset    Glaucoma Father     Melanoma Neg Hx     Psoriasis Neg Hx     Lupus Neg Hx     Eczema Neg Hx     Colon cancer Neg Hx     Crohn's disease Neg Hx     Stomach cancer Neg Hx     Ulcerative colitis Neg Hx     Esophageal cancer Neg Hx      Social History     Tobacco Use    Smoking status: Never    Smokeless tobacco: Never   Substance Use Topics    Alcohol use: Yes     Alcohol/week: 6.0 - 7.0 standard drinks     Types: 5 Cans of beer, 1 - 2 Shots of liquor per week     Comment: weekly     Drug use: No     Wt Readings from Last 10 Encounters:   02/24/23 101.6 kg (223 lb 15.8 oz)   02/23/23 99.8 kg (220 lb)   02/15/23 100 kg (220 lb 7.4 oz)   02/13/23 102.1 kg (225 lb 1.4 oz)   01/11/23 100.7 kg (221 lb 14.3 oz)   12/29/22 100.7 kg (222 lb 0.1 oz)   12/16/22 98.9 kg (218 lb)   12/16/22 98.9 kg (218 lb)   12/02/22 98.9 kg (218 lb)   11/08/22 99.2 kg (218 lb 11.1 oz)     Lab Results   Component Value Date    WBC 5.71 02/15/2023    HGB 13.2 (L) 02/15/2023    HCT 39.7 (L) 02/15/2023    MCV 95 02/15/2023     02/15/2023     CMP  Sodium   Date Value Ref Range Status   02/15/2023 140 136 - 145 mmol/L Final      Potassium   Date Value Ref Range Status   02/15/2023 4.1 3.5 - 5.1 mmol/L Final     Chloride   Date Value Ref Range Status   02/15/2023 106 95 - 110 mmol/L Final     CO2   Date Value Ref Range Status   02/15/2023 29 23 - 29 mmol/L Final     Glucose   Date Value Ref Range Status   02/15/2023 104 70 - 110 mg/dL Final     BUN   Date Value Ref Range Status   02/15/2023 23 8 - 23 mg/dL Final     Creatinine   Date Value Ref Range Status   02/15/2023 1.0 0.5 - 1.4 mg/dL Final     Calcium   Date Value Ref Range Status   02/15/2023 9.1 8.7 - 10.5 mg/dL Final     Total Protein   Date Value Ref Range Status   08/04/2022 6.7 6.0 - 8.4 g/dL Final     Albumin   Date Value Ref Range Status   08/04/2022 3.7 3.5 - 5.2 g/dL Final     Total Bilirubin   Date Value Ref Range Status   08/04/2022 0.3 0.1 - 1.0 mg/dL Final     Comment:     For infants and newborns, interpretation of results should be based  on gestational age, weight and in agreement with clinical  observations.    Premature Infant recommended reference ranges:  Up to 24 hours.............<8.0 mg/dL  Up to 48 hours............<12.0 mg/dL  3-5 days..................<15.0 mg/dL  6-29 days.................<15.0 mg/dL       Alkaline Phosphatase   Date Value Ref Range Status   08/04/2022 72 55 - 135 U/L Final     AST   Date Value Ref Range Status   08/04/2022 18 10 - 40 U/L Final     ALT   Date Value Ref Range Status   08/04/2022 20 10 - 44 U/L Final     Anion Gap   Date Value Ref Range Status   02/15/2023 5 (L) 8 - 16 mmol/L Final     eGFR if    Date Value Ref Range Status   03/17/2022 >60 >60 mL/min/1.73 m^2 Final     eGFR if non    Date Value Ref Range Status   03/17/2022 >60 >60 mL/min/1.73 m^2 Final     Comment:     Calculation used to obtain the estimated glomerular filtration  rate (eGFR) is the CKD-EPI equation.        Lab Results   Component Value Date    TSH 1.139 05/27/2021     Reviewed prior medical records including endoscopy  history (see surgical history/procedures).    Objective:      Physical Exam  Vitals and nursing note reviewed.   Constitutional:       General: He is not in acute distress.     Appearance: Normal appearance. He is well-developed. He is not diaphoretic.   HENT:      Mouth/Throat:      Lips: Pink. No lesions.      Mouth: Mucous membranes are moist. No oral lesions.      Tongue: No lesions.      Pharynx: Oropharynx is clear. No pharyngeal swelling or posterior oropharyngeal erythema.   Eyes:      General: No scleral icterus.     Conjunctiva/sclera: Conjunctivae normal.   Pulmonary:      Effort: Pulmonary effort is normal. No respiratory distress.      Breath sounds: Normal breath sounds. No wheezing.   Abdominal:      General: Bowel sounds are normal. There is no distension or abdominal bruit.      Palpations: Abdomen is soft. Abdomen is not rigid. There is no mass.      Tenderness: There is no abdominal tenderness. There is no guarding or rebound. Negative signs include Neely's sign and McBurney's sign.   Skin:     General: Skin is warm and dry.      Coloration: Skin is not jaundiced or pale.      Findings: No erythema or rash.   Neurological:      Mental Status: He is alert and oriented to person, place, and time.   Psychiatric:         Behavior: Behavior normal.         Thought Content: Thought content normal.         Judgment: Judgment normal.       Assessment:       1. Heartburn    2. Hiatal hernia    3. Epigastric pain    4. Macrocytic anemia        Plan:       Heartburn & Hiatal hernia  - DISCONTINUE NEXIUM DUE TO ALTERNATE THERAPY  -   START  pantoprazole (PROTONIX) 40 MG tablet; Take 1 tablet (40 mg total) by mouth before breakfast.  Dispense: 30 tablet; Refill: 1  - schedule EGD, discussed procedure with patient, including risks and benefits, patient verbalized understanding    Epigastric pain  -  START   pantoprazole (PROTONIX) 40 MG tablet; Take 1 tablet (40 mg total) by mouth before breakfast.  Dispense:  30 tablet; Refill: 1  - schedule EGD, discussed procedure with patient, including risks and benefits, patient verbalized understanding  - avoid/minimize use of NSAIDs- since they can cause GI upset, bleeding and/or ulcers. If NSAID must be taken, recommend take with food.    Macrocytic anemia & History of dizziness  Recommend follow-up with Primary Care Provider for continued evaluation and management.    Follow up in about 1 month (around 3/24/2023), or if symptoms worsen or fail to improve.      If no improvement in symptoms or symptoms worsen, call/follow-up at clinic or go to ER.        42 minutes of total time spent on the encounter, which includes face to face time and non-face to face time preparing to see the patient (e.g., review of tests), Obtaining and/or reviewing separately obtained history, Documenting clinical information in the electronic or other health record, Independently interpreting results (not separately reported) and communicating results to the patient/family/caregiver, or Care coordination (not separately reported).

## 2023-02-27 ENCOUNTER — PATIENT MESSAGE (OUTPATIENT)
Dept: ADMINISTRATIVE | Facility: OTHER | Age: 68
End: 2023-02-27
Payer: MEDICARE

## 2023-02-27 ENCOUNTER — OFFICE VISIT (OUTPATIENT)
Dept: FAMILY MEDICINE | Facility: CLINIC | Age: 68
End: 2023-02-27
Payer: MEDICARE

## 2023-02-27 VITALS
OXYGEN SATURATION: 95 % | SYSTOLIC BLOOD PRESSURE: 116 MMHG | BODY MASS INDEX: 32.49 KG/M2 | HEIGHT: 69 IN | DIASTOLIC BLOOD PRESSURE: 70 MMHG | HEART RATE: 51 BPM | WEIGHT: 219.38 LBS

## 2023-02-27 DIAGNOSIS — G25.0 ESSENTIAL TREMOR: ICD-10-CM

## 2023-02-27 DIAGNOSIS — I10 PRIMARY HYPERTENSION: Primary | ICD-10-CM

## 2023-02-27 PROCEDURE — 99999 PR PBB SHADOW E&M-EST. PATIENT-LVL IV: ICD-10-PCS | Mod: PBBFAC,HCNC,, | Performed by: INTERNAL MEDICINE

## 2023-02-27 PROCEDURE — 99999 PR PBB SHADOW E&M-EST. PATIENT-LVL IV: CPT | Mod: PBBFAC,HCNC,, | Performed by: INTERNAL MEDICINE

## 2023-02-27 PROCEDURE — 1125F AMNT PAIN NOTED PAIN PRSNT: CPT | Mod: HCNC,CPTII,S$GLB, | Performed by: INTERNAL MEDICINE

## 2023-02-27 PROCEDURE — 1125F PR PAIN SEVERITY QUANTIFIED, PAIN PRESENT: ICD-10-PCS | Mod: HCNC,CPTII,S$GLB, | Performed by: INTERNAL MEDICINE

## 2023-02-27 PROCEDURE — 99214 PR OFFICE/OUTPT VISIT, EST, LEVL IV, 30-39 MIN: ICD-10-PCS | Mod: HCNC,S$GLB,, | Performed by: INTERNAL MEDICINE

## 2023-02-27 PROCEDURE — 3078F DIAST BP <80 MM HG: CPT | Mod: HCNC,CPTII,S$GLB, | Performed by: INTERNAL MEDICINE

## 2023-02-27 PROCEDURE — 1101F PT FALLS ASSESS-DOCD LE1/YR: CPT | Mod: HCNC,CPTII,S$GLB, | Performed by: INTERNAL MEDICINE

## 2023-02-27 PROCEDURE — 1160F PR REVIEW ALL MEDS BY PRESCRIBER/CLIN PHARMACIST DOCUMENTED: ICD-10-PCS | Mod: HCNC,CPTII,S$GLB, | Performed by: INTERNAL MEDICINE

## 2023-02-27 PROCEDURE — 1159F PR MEDICATION LIST DOCUMENTED IN MEDICAL RECORD: ICD-10-PCS | Mod: HCNC,CPTII,S$GLB, | Performed by: INTERNAL MEDICINE

## 2023-02-27 PROCEDURE — 1101F PR PT FALLS ASSESS DOC 0-1 FALLS W/OUT INJ PAST YR: ICD-10-PCS | Mod: HCNC,CPTII,S$GLB, | Performed by: INTERNAL MEDICINE

## 2023-02-27 PROCEDURE — 4010F PR ACE/ARB THEARPY RXD/TAKEN: ICD-10-PCS | Mod: HCNC,CPTII,S$GLB, | Performed by: INTERNAL MEDICINE

## 2023-02-27 PROCEDURE — 3078F PR MOST RECENT DIASTOLIC BLOOD PRESSURE < 80 MM HG: ICD-10-PCS | Mod: HCNC,CPTII,S$GLB, | Performed by: INTERNAL MEDICINE

## 2023-02-27 PROCEDURE — 3074F PR MOST RECENT SYSTOLIC BLOOD PRESSURE < 130 MM HG: ICD-10-PCS | Mod: HCNC,CPTII,S$GLB, | Performed by: INTERNAL MEDICINE

## 2023-02-27 PROCEDURE — 3288F FALL RISK ASSESSMENT DOCD: CPT | Mod: HCNC,CPTII,S$GLB, | Performed by: INTERNAL MEDICINE

## 2023-02-27 PROCEDURE — 3288F PR FALLS RISK ASSESSMENT DOCUMENTED: ICD-10-PCS | Mod: HCNC,CPTII,S$GLB, | Performed by: INTERNAL MEDICINE

## 2023-02-27 PROCEDURE — 1159F MED LIST DOCD IN RCRD: CPT | Mod: HCNC,CPTII,S$GLB, | Performed by: INTERNAL MEDICINE

## 2023-02-27 PROCEDURE — 4010F ACE/ARB THERAPY RXD/TAKEN: CPT | Mod: HCNC,CPTII,S$GLB, | Performed by: INTERNAL MEDICINE

## 2023-02-27 PROCEDURE — 3008F PR BODY MASS INDEX (BMI) DOCUMENTED: ICD-10-PCS | Mod: HCNC,CPTII,S$GLB, | Performed by: INTERNAL MEDICINE

## 2023-02-27 PROCEDURE — 3008F BODY MASS INDEX DOCD: CPT | Mod: HCNC,CPTII,S$GLB, | Performed by: INTERNAL MEDICINE

## 2023-02-27 PROCEDURE — 1160F RVW MEDS BY RX/DR IN RCRD: CPT | Mod: HCNC,CPTII,S$GLB, | Performed by: INTERNAL MEDICINE

## 2023-02-27 PROCEDURE — 99214 OFFICE O/P EST MOD 30 MIN: CPT | Mod: HCNC,S$GLB,, | Performed by: INTERNAL MEDICINE

## 2023-02-27 PROCEDURE — 3074F SYST BP LT 130 MM HG: CPT | Mod: HCNC,CPTII,S$GLB, | Performed by: INTERNAL MEDICINE

## 2023-02-27 RX ORDER — PRIMIDONE 50 MG/1
TABLET ORAL
Qty: 30 TABLET | Refills: 0 | Status: SHIPPED | OUTPATIENT
Start: 2023-02-27 | End: 2023-03-02 | Stop reason: SDUPTHER

## 2023-02-27 NOTE — PROGRESS NOTES
Subjective       Patient ID: Moris Calderón is a 67 y.o. male.    Chief Complaint: Hypertension (BP check) and Tremors (Medication review)      HPI:    Chronic medical includes hypertension, hyperlipidemia, prediabetes, essential tremor, chronic neck and back pain.  Here for follow-up hypertension and tremors.  Blood pressure may have improved some but he is still having some diastolic and systolic variations in the hypertensive range.  His heart rate has been lower than normal which it was already low but now sometimes running in the 40s.  This is since starting the propanolol.  Also since starting the propanolol he has been having some difficulty sleeping specifically waking up multiple times throughout the night.  he does have a history of sleep apnea and has the inspire device.  Both of these issues have let us to switch from propanolol to primidone.  We will follow-up in one month to reassess.  I would like for him to take propanolol every other day for a week then come off the medication.  He is also interested in digital hypertension so we will order that.    1. Primary hypertension    2. Essential tremor      Review of Systems   Constitutional:  Negative for fever.   Respiratory:  Negative for shortness of breath.    Cardiovascular:  Negative for chest pain.   Gastrointestinal:  Negative for abdominal pain.      Objective     Vitals:    02/27/23 1327   BP: 116/70   Pulse: (!) 51     Wt Readings from Last 3 Encounters:   02/27/23 1327 99.5 kg (219 lb 5.7 oz)   02/24/23 0925 101.6 kg (223 lb 15.8 oz)   02/23/23 0818 99.8 kg (220 lb)      Body mass index is 32.39 kg/m².   Physical Exam  Cardiovascular:      Rate and Rhythm: Normal rate and regular rhythm.      Heart sounds: No murmur heard.    No gallop.   Pulmonary:      Breath sounds: Normal breath sounds. No wheezing or rhonchi.   Abdominal:      Palpations: Abdomen is soft.      Tenderness: There is no abdominal tenderness.   Musculoskeletal:          General: Normal range of motion.      Cervical back: Neck supple.   Skin:     General: Skin is warm.      Findings: No rash.   Neurological:      Mental Status: He is alert.   Psychiatric:         Behavior: Behavior normal.        Assessment and plan     Moris was seen today for hypertension and tremors.    Diagnoses and all orders for this visit:    Primary hypertension  -     Hypertension Digital Medicine (HDMP) Enrollment Order  -     Hypertension Digital Medicine ([facility]) Enrollment Order  -     Hypertension Digital Medicine (HDM) Enrollment Order    Essential tremor  -     primidone (MYSOLINE) 50 MG Tab; Initial: 25 mg once daily; increase dose gradually (eg, every 3 to 7 days) based on response and tolerability in increments of 25 mg. Max 150 mg.            Hypertension Medications               benazepriL (LOTENSIN) 20 MG tablet Take 1 tablet (20 mg total) by mouth once daily.    propranoloL (INDERAL LA) 60 MG 24 hr capsule Take 1 capsule (60 mg total) by mouth once daily.           Hyperlipidemia Medications               atorvastatin (LIPITOR) 10 MG tablet Take 1 tablet (10 mg total) by mouth once daily.           Medication List with Changes/Refills   New Medications    PRIMIDONE (MYSOLINE) 50 MG TAB    Initial: 25 mg once daily; increase dose gradually (eg, every 3 to 7 days) based on response and tolerability in increments of 25 mg. Max 150 mg.   Current Medications    ATORVASTATIN (LIPITOR) 10 MG TABLET    Take 1 tablet (10 mg total) by mouth once daily.    BENAZEPRIL (LOTENSIN) 20 MG TABLET    Take 1 tablet (20 mg total) by mouth once daily.    CETIRIZINE (ZYRTEC) 10 MG TABLET    Take 1 tablet (10 mg total) by mouth daily as needed for Allergies.    FLUTICASONE PROPIONATE (FLONASE) 50 MCG/ACTUATION NASAL SPRAY    SHAKE LIQUID AND USE 1 SPRAY IN EACH NOSTRIL EVERY DAY    FLUZONE HIGHDOSE QUAD 22-23  MCG/0.7 ML SYRG        GABAPENTIN (NEURONTIN) 300 MG CAPSULE    Take 1 capsule (300 mg total) by  mouth every evening.    PANTOPRAZOLE (PROTONIX) 40 MG TABLET    Take 1 tablet (40 mg total) by mouth before breakfast.    PAROXETINE (PAXIL) 10 MG TABLET    Take 1 tablet (10 mg total) by mouth every morning.    PAROXETINE (PAXIL) 40 MG TABLET    Take 1 tablet (40 mg total) by mouth once daily.    PFIZER COVID BIVAL,12Y UP,,PF, 30 MCG/0.3 ML INJECTION        PROPRANOLOL (INDERAL LA) 60 MG 24 HR CAPSULE    Take 1 capsule (60 mg total) by mouth once daily.    TAMSULOSIN (FLOMAX) 0.4 MG CAP    Take 1 capsule (0.4 mg total) by mouth once daily.    TIZANIDINE (ZANAFLEX) 4 MG TABLET    TAKE 1 TABLET(4 MG) BY MOUTH EVERY NIGHT AS NEEDED    TRAMADOL (ULTRAM) 50 MG TABLET    Take 1 tablet (50 mg total) by mouth every 6 (six) hours as needed for Pain.       I personally reviewed past medical, family and social history.

## 2023-03-02 ENCOUNTER — PATIENT MESSAGE (OUTPATIENT)
Dept: FAMILY MEDICINE | Facility: CLINIC | Age: 68
End: 2023-03-02
Payer: MEDICARE

## 2023-03-02 DIAGNOSIS — G25.0 ESSENTIAL TREMOR: ICD-10-CM

## 2023-03-02 RX ORDER — PRIMIDONE 50 MG/1
TABLET ORAL
Qty: 30 TABLET | Refills: 0 | Status: SHIPPED | OUTPATIENT
Start: 2023-03-02 | End: 2023-03-27

## 2023-03-03 ENCOUNTER — OFFICE VISIT (OUTPATIENT)
Dept: PAIN MEDICINE | Facility: CLINIC | Age: 68
End: 2023-03-03
Payer: MEDICARE

## 2023-03-03 VITALS
HEIGHT: 69 IN | DIASTOLIC BLOOD PRESSURE: 76 MMHG | HEART RATE: 47 BPM | SYSTOLIC BLOOD PRESSURE: 124 MMHG | BODY MASS INDEX: 32.49 KG/M2 | WEIGHT: 219.38 LBS

## 2023-03-03 DIAGNOSIS — M54.16 LUMBAR RADICULOPATHY, CHRONIC: Primary | ICD-10-CM

## 2023-03-03 DIAGNOSIS — M48.061 SPINAL STENOSIS OF LUMBAR REGION WITHOUT NEUROGENIC CLAUDICATION: ICD-10-CM

## 2023-03-03 PROCEDURE — 1159F PR MEDICATION LIST DOCUMENTED IN MEDICAL RECORD: ICD-10-PCS | Mod: HCNC,CPTII,S$GLB, | Performed by: ANESTHESIOLOGY

## 2023-03-03 PROCEDURE — 3074F PR MOST RECENT SYSTOLIC BLOOD PRESSURE < 130 MM HG: ICD-10-PCS | Mod: HCNC,CPTII,S$GLB, | Performed by: ANESTHESIOLOGY

## 2023-03-03 PROCEDURE — 3288F FALL RISK ASSESSMENT DOCD: CPT | Mod: HCNC,CPTII,S$GLB, | Performed by: ANESTHESIOLOGY

## 2023-03-03 PROCEDURE — 99213 OFFICE O/P EST LOW 20 MIN: CPT | Mod: HCNC,S$GLB,, | Performed by: ANESTHESIOLOGY

## 2023-03-03 PROCEDURE — 1159F MED LIST DOCD IN RCRD: CPT | Mod: HCNC,CPTII,S$GLB, | Performed by: ANESTHESIOLOGY

## 2023-03-03 PROCEDURE — 4010F ACE/ARB THERAPY RXD/TAKEN: CPT | Mod: HCNC,CPTII,S$GLB, | Performed by: ANESTHESIOLOGY

## 2023-03-03 PROCEDURE — 1125F PR PAIN SEVERITY QUANTIFIED, PAIN PRESENT: ICD-10-PCS | Mod: HCNC,CPTII,S$GLB, | Performed by: ANESTHESIOLOGY

## 2023-03-03 PROCEDURE — 99999 PR PBB SHADOW E&M-EST. PATIENT-LVL IV: ICD-10-PCS | Mod: PBBFAC,HCNC,, | Performed by: ANESTHESIOLOGY

## 2023-03-03 PROCEDURE — 3078F PR MOST RECENT DIASTOLIC BLOOD PRESSURE < 80 MM HG: ICD-10-PCS | Mod: HCNC,CPTII,S$GLB, | Performed by: ANESTHESIOLOGY

## 2023-03-03 PROCEDURE — 4010F PR ACE/ARB THEARPY RXD/TAKEN: ICD-10-PCS | Mod: HCNC,CPTII,S$GLB, | Performed by: ANESTHESIOLOGY

## 2023-03-03 PROCEDURE — 3008F PR BODY MASS INDEX (BMI) DOCUMENTED: ICD-10-PCS | Mod: HCNC,CPTII,S$GLB, | Performed by: ANESTHESIOLOGY

## 2023-03-03 PROCEDURE — 1125F AMNT PAIN NOTED PAIN PRSNT: CPT | Mod: HCNC,CPTII,S$GLB, | Performed by: ANESTHESIOLOGY

## 2023-03-03 PROCEDURE — 3074F SYST BP LT 130 MM HG: CPT | Mod: HCNC,CPTII,S$GLB, | Performed by: ANESTHESIOLOGY

## 2023-03-03 PROCEDURE — 3008F BODY MASS INDEX DOCD: CPT | Mod: HCNC,CPTII,S$GLB, | Performed by: ANESTHESIOLOGY

## 2023-03-03 PROCEDURE — 3288F PR FALLS RISK ASSESSMENT DOCUMENTED: ICD-10-PCS | Mod: HCNC,CPTII,S$GLB, | Performed by: ANESTHESIOLOGY

## 2023-03-03 PROCEDURE — 99213 PR OFFICE/OUTPT VISIT, EST, LEVL III, 20-29 MIN: ICD-10-PCS | Mod: HCNC,S$GLB,, | Performed by: ANESTHESIOLOGY

## 2023-03-03 PROCEDURE — 1101F PR PT FALLS ASSESS DOC 0-1 FALLS W/OUT INJ PAST YR: ICD-10-PCS | Mod: HCNC,CPTII,S$GLB, | Performed by: ANESTHESIOLOGY

## 2023-03-03 PROCEDURE — 1101F PT FALLS ASSESS-DOCD LE1/YR: CPT | Mod: HCNC,CPTII,S$GLB, | Performed by: ANESTHESIOLOGY

## 2023-03-03 PROCEDURE — 99999 PR PBB SHADOW E&M-EST. PATIENT-LVL IV: CPT | Mod: PBBFAC,HCNC,, | Performed by: ANESTHESIOLOGY

## 2023-03-03 PROCEDURE — 3078F DIAST BP <80 MM HG: CPT | Mod: HCNC,CPTII,S$GLB, | Performed by: ANESTHESIOLOGY

## 2023-03-03 NOTE — PROGRESS NOTES
This note was completed with dictation software and grammatical errors may exist.    CC:  Back pain, neck pain    HPI:  Patient is a 67-year-old man with a history of hypertension, GERD, arthritis, anxiety who presents in self-referral for low back pain radiating into the leg and neck pain.   He is status post L5/S1 VIET on 02/02/2023 with some mild initial improvement but return of bilateral leg pain after about 2 weeks.  He definitely feels that the injections help but he again continues to have pain into the legs, worse with standing and walking, improved with sitting down.  He feels that while he has had some relief of the back pain from the radiofrequency ablation, it is the pain into the legs that bothers him the most.  The injections used to provide longer relief, seem to be helping less.  He denies any juan weakness, denies any bowel or bladder incontinence.      He continues to have neck pain, states that it is in the bilateral neck, does have some radiation into the arms now, particularly on the left side.  He denies any weakness in the arms but does have some numbness and tingling at times.      Previous History:  He reports having low back pain for many years, greater than 15.  Since 2012 he has been seeing Dr. Raffy Vásquez, pain management on the [place] and had been undergoing injections for his back.  He states that the injections would usually help 1-2 years at a time to get rid of back pain and leg pain.  In the last year he had several injections that did not provide any relief and so they obtained a new MRI which showed significant degeneration at the level above where he had his previous injections, now showing issues at L3/4.  He was referred to Neurosurgery who recommended physical therapy.  He tried physical therapy for several months and it did help but he did not continue the exercises on his own.    He states that his pain is currently located across the bilateral low back, worse with  "working in the yard, bending over, sitting or standing too long.  It radiates along his right anterior thigh to about his knee.  He describes it as aching, dull, tight, deep, sharp and shooting.  Is worse with prolonged sitting or standing, lying down, bending or walking, doing any lifting or getting out of a bed or a chair.  He gets some relief with rest, activity, lying down, medications, injections and physical therapy.  He also takes ibuprofen and Tylenol some relief.  His other complaint is neck pain located in the bilateral neck, worse with turning his head from side to side, it radiates into the shoulders and into his upper back at times.  He denies juan weakness in his arms.  He states that he had undergone what sounds like a medial branch block bilaterally which did help but he had not proceeded with the radiofrequency part because the viral pandemic began.  He denies any balance issues, no weakness in the arms or legs.    Pain intervention history:  He reports undergoing multiple injections over the years with very good relief, the last 2 did not seem to help.  From Dr. Raffy Vásquez notes: "The patient underwent bilateral C4, 5, 6 medial branch block on 02/04/2020 with 80% relief.  He did not proceed with the RFA.  He had undergone bilateral L4 and L5 transforaminal injection on 10/24/2019 with 50% relief for 1 month.  He had undergone bilateral L4/5 TF VIET on 02/23/2017 with 80% relief for 2 years.  He underwent bilateral L4 and L5 transforaminal injection on 12/05/2019 without much relief."  He is status post bilateral L4/5 transforaminal epidural steroid injection on 02/11/2021 0% relief.  He is status post bilateral L2,3,4,5 medial branch radiofrequency ablation on 03/20/2021 with 60% relief. He is status post bilateral C4, 5, 6 medial branch block on 01/10/2022 with only about 20% relief of his bilateral neck pain.He is status post bilateral L4/5 and L5/S1 medial branch radiofrequency ablation on " 10/06/2022 with 50% relief.    He is status post L5/S1 interlaminar epidural steroid injection on 12/07/2022 initially with 70% relief, now reporting 40% relief.  He is status post L5/S1 VIET on 02/02/2023 with some mild initial improvement but return of bilateral leg pain after about 2 weeks.    Antineuropathics:  Had taken gabapentin in the past and did not find it helpful.  NSAIDs: he takes ibuprofen and Tylenol with some relief  Physical therapy:  Antidepressants: Paxil  Muscle relaxers:   Zanaflex 4 milligrams at night helps  Opioids:  Antiplatelets/Anticoagulants:    ROS: He reports back pain, joint stiffness, anxiety.  Balance of review of systems is negative.    Lab Results   Component Value Date    HGBA1C 5.9 (H) 08/04/2022       Lab Results   Component Value Date    WBC 5.71 02/15/2023    HGB 13.2 (L) 02/15/2023    HCT 39.7 (L) 02/15/2023    MCV 95 02/15/2023     02/15/2023             Past Medical History:   Diagnosis Date    Anxiety     Arthritis     Back problem     bulging disc    Colon polyps     GERD (gastroesophageal reflux disease)     Hiatal hernia     Hypercholesterolemia 11/02/2015    2009: Began statin.    Hyperlipidemia     Hypertension, benign 11/02/2015    2008: Diagnosed.    Sleep apnea     uses implant    TMJ (dislocation of temporomandibular joint)        Past Surgical History:   Procedure Laterality Date    APPENDECTOMY      ARTHROSCOPIC REPAIR OF ROTATOR CUFF OF SHOULDER Right 12/21/2018    Procedure: REPAIR, ROTATOR CUFF, ARTHROSCOPIC;  Surgeon: Colby Valenzuela MD;  Location: [facility] OR;  Service: Orthopedics;  Laterality: Right;    ARTHROSCOPY OF SHOULDER WITH DECOMPRESSION OF SUBACROMIAL SPACE Right 12/21/2018    Procedure: ARTHROSCOPY, SHOULDER, WITH SUBACROMIAL SPACE DECOMPRESSION;  Surgeon: Colby Valenzuela MD;  Location: [facility] OR;  Service: Orthopedics;  Laterality: Right;    BICEPS TENDON REPAIR Right 12/21/2018    Procedure: REPAIR, TENDON, BICEPS;  Surgeon:  Colby Valenzuela MD;  Location: [facility] OR;  Service: Orthopedics;  Laterality: Right;    COLONOSCOPY N/A 07/27/2022    Procedure: COLONOSCOPY;  Surgeon: Dwight Lewis MD;  Location: [facility];  Service: Endoscopy;  Laterality: N/A; Repeat colonoscopy in 5 years for surveillance    COLONOSCOPY  12/09/2016    Dr. Martinez, sent for scanning: diverticulosis, 2 colon polyps removed; grade 1 internal hemorrhoids; repeat in 5 years for surveillance    EGD - EXTERNAL RESULT  06/18/2020    Dr. Martinez, sent for scanning: small hiatal hernia, congsetion of the GEJ mucosa; biopsy report not received    EGD - EXTERNAL RESULT  12/09/2016    Dr. Martinez, sent for scanning: small hiatal hernia, irregular zline, erythema to duodenal bulb; repeat in 3 years    EPIDURAL STEROID INJECTION INTO LUMBAR SPINE N/A 12/07/2022    Procedure: Injection-steroid-epidural-lumbar L5/S1;  Surgeon: Landon Winchester MD;  Location: [facility] OR;  Service: Pain Management;  Laterality: N/A;    EPIDURAL STEROID INJECTION INTO LUMBAR SPINE N/A 02/02/2023    Procedure: Injection-steroid-epidural-lumbar L5/s1;  Surgeon: Landon Winchester MD;  Location: [facility] OR;  Service: Pain Management;  Laterality: N/A;    magdalene      EYE SURGERY      INJECTION OF ANESTHETIC AGENT AROUND MEDIAL BRANCH NERVES INNERVATING CERVICAL FACET JOINT Bilateral 01/10/2022    Procedure: Block-nerve-medial branch-cervical C4 C5 C6;  Surgeon: Landon Winchester MD;  Location: [facility] OR;  Service: Pain Management;  Laterality: Bilateral;    INJECTION OF ANESTHETIC AGENT AROUND MEDIAL BRANCH NERVES INNERVATING LUMBAR FACET JOINT Bilateral 03/10/2021    Procedure: Block-nerve-medial branch-lumbar L2,L3,L4, L5;  Surgeon: Landon Winchester MD;  Location: [facility] OR;  Service: Pain Management;  Laterality: Bilateral;    INSERTION, NEUROSTIMULATOR, HYPOGLOSSAL Right 03/17/2022    Procedure: INSERTION,NEUROSTIMULATOR,HYPOGLOSSAL;  Surgeon: Lance Wright MD;  Location: [facility] OR;   Service: ENT;  Laterality: Right;    KNEE ARTHROSCOPY W/ MENISCAL REPAIR Left 2015    LIPOMA RESECTION Left 02/23/2023    Procedure: EXCISION, LIPOMA;  Surgeon: Taz Khan Jr., MD;  Location: [facility] OR;  Service: General;  Laterality: Left;    MMT      Left knee A-scope    NOSE SURGERY      RADIOFREQUENCY ABLATION OF LUMBAR MEDIAL BRANCH NERVE AT SINGLE LEVEL Bilateral 03/23/2021    Procedure: Radiofrequency Ablation, Nerve, Spinal, Lumbar, Medial Branch, L2,LL3,L4,L5;  Surgeon: Landon Winchester MD;  Location: [facility];  Service: Pain Management;  Laterality: Bilateral;    RADIOFREQUENCY ABLATION OF LUMBAR MEDIAL BRANCH NERVE AT SINGLE LEVEL Bilateral 10/06/2022    Procedure: Radiofrequency Ablation, Nerve, Spinal, Lumbar, Medial Branch, L4/5, L5/S1;  Surgeon: Landon Winchester MD;  Location: [facility];  Service: Pain Management;  Laterality: Bilateral;    SLEEP ENDOSCOPY, DRUG-INDUCED Bilateral 01/28/2022    Procedure: SLEEP ENDOSCOPY,DRUG-INDUCED;  Surgeon: Lance Wright MD;  Location: [facility] OR;  Service: ENT;  Laterality: Bilateral;    TRANSFORAMINAL EPIDURAL INJECTION OF STEROID Bilateral 02/11/2021    Procedure: Injection,steroid,epidural,transforaminal approach L4/5;  Surgeon: Landon Winchester MD;  Location: [facility];  Service: Pain Management;  Laterality: Bilateral;    UVULECTOMY         Social History     Socioeconomic History    Marital status:    Tobacco Use    Smoking status: Never    Smokeless tobacco: Never   Substance and Sexual Activity    Alcohol use: Yes     Alcohol/week: 6.0 - 7.0 standard drinks     Types: 5 Cans of beer, 1 - 2 Shots of liquor per week     Comment: weekly     Drug use: No    Sexual activity: Yes     Social Determinants of Health     Financial Resource Strain: Low Risk     Difficulty of Paying Living Expenses: Not hard at all   Food Insecurity: No Food Insecurity    Worried About Running Out of Food in the Last Year: Never true    Ran Out of Food in the Last  "Year: Never true   Transportation Needs: No Transportation Needs    Lack of Transportation (Medical): No    Lack of Transportation (Non-Medical): No   Physical Activity: Insufficiently Active    Days of Exercise per Week: 1 day    Minutes of Exercise per Session: 60 min   Stress: Stress Concern Present    Feeling of Stress : Rather much   Social Connections: Unknown    Frequency of Communication with Friends and Family: Three times a week    Frequency of Social Gatherings with Friends and Family: Twice a week    Active Member of Clubs or Organizations: No    Attends Club or Organization Meetings: 1 to 4 times per year    Marital Status:    Housing Stability: Low Risk     Unable to Pay for Housing in the Last Year: No    Number of Places Lived in the Last Year: 1    Unstable Housing in the Last Year: No         Medications/Allergies: See med card    Vitals:    03/03/23 1000   BP: 124/76   Pulse: (!) 47   Weight: 99.5 kg (219 lb 5.7 oz)   Height: 5' 9" (1.753 m)   PainSc:   6   PainLoc: Back         Physical exam:  Gen: A and O x3, pleasant, well-groomed  Skin: No rashes or obvious lesions  HEENT: PERRLA, no obvious deformities on ears or in canals. Trachea midline.  CVS: Regular rate and rhythm, normal palpable pulses.  Resp:No increased work of breathing, symmetrical chest rise.  Abdomen: Soft, NT/ND.  Musculoskeletal: No antalgic gait.     Neuro:  Upper extremities: 5/5 strength bilaterally   Lower extremities: 5/5 strength bilaterally  Reflexes: Brachioradialis 2+, Bicep 2+, Tricep 2+. Patellar 1+  Right side, 0+ left side, Achilles 1+ bilaterally.  Sensory:  Intact and symmetrical to light touch and pinprick in L2-S1 dermatomes bilaterally. Intact and symmetrical to light touch and pinprick in C2-T1 dermatomes bilaterally.     Lumbar spine:  Lumbar spine:   Range of motion is moderately reduced with both flexion and extension with increased pain on flexion greater than extension.  Oblique extension causes " pain on the corresponding side.  Jorge's test causes no increased pain on either side.    Supine straight leg raise is negative bilaterally.  Internal and external rotation of the hip causes no increased pain on either side.  Myofascial exam: No tenderness to palpation across lumbar paraspinous muscles.      Imaging:    MRI 01/21/2020:  I reviewed this image with the patient, outside imaging.  This is most significant for moderate disc degeneration at L4/5 with some bilateral foraminal narrowing to moderate degree, L3/4 shows severe disc degeneration with Modic endplate changes and some increased signal within the disc at L3/4.  There is moderate to severe foraminal narrowing to the left and moderate out to the right side.    MRI cervical spine 01/08/2021 outside institution.  I review the images personally.  There appears to be a reversal of the cervical lordosis.  There is disc bulging at C2/3 slightly to the right side deforming the thecal sac and causing possible foraminal narrowing to the right side.  At C3/4 there is mild disc bulging but no canal stenosis.  At C4/5 there is decreased disc height, slight disc bulging but no canal narrowing.  At C5/6 there is larger disc bulge with protrusion slightly more to the right side causing bilateral foraminal stenosis and appears to deform the cord.  At C6/7 there is a disc bulging that contacts the cord as well.  C7/T1 mild disc bulging but no cord contact.  There is no abnormal signal within the cord.    1/8/22 MRI C-spine:  Subtle reversal of normal lordosis between C4 and C7 is unchanged.  There is minimal grade 1 retrolisthesis of C2 on C3.  Alignment is otherwise within normal limits.  Minimal anterior wedging of C5, C6 and C7 is unchanged.  High STIR Modic signal changes at C7/T1 are new since the prior study.   There is no signal abnormality in the included cord or posterior fossa.  Paravertebral soft tissues are within normal limits.  There is diffuse  desiccation of the intervertebral discs of the cervical spine.   At C2/3, prominent facet and uncovertebral hypertrophy cause mild bilateral foraminal narrowing.  Prominent posterior spondylosis is also seen at this level.   At C3/4, there is a midline dorsal disc protrusion, facet arthropathy and uncovertebral hypertrophy.  There is deformity of the ventral margin of the thecal sac without central canal stenosis.  Facet and uncovertebral hypertrophy cause moderate left foraminal narrowing.   At C4/5, there is a midline dorsal disc protrusion which deforms the ventral margin of the thecal sac but causes no canal stenosis.  Prominent left-sided facet and uncovertebral hypertrophy cause moderate left foraminal narrowing.   At C5/6, there is loss of disc height and broad-based dorsal disc bulging asymmetric to the right.  There is narrowing of the right lateral recess of the canal and borderline right foraminal narrowing.  The AP midline diameter of the canal is 11 mm at this level.   At C6/7, there is minimal concentric disc bulging, facet arthropathy and uncovertebral hypertrophy, causing mild right foraminal narrowing.   At C7/T1, there is mild broad-based dorsal disc bulging which causes no canal or foraminal stenosis.    1/8/22 MRI L-spine:   1. Spondylosis, disc disease, facet arthrosis and thickening of the ligamentum flavum as well as endplate degenerative changes as described above, worse at L3-L4 and L4-L5.  2. There is mild retrolisthesis of L3 on L4 with otherwise normal alignment.  3. There is mild central canal stenosis at L3-L4 and severe central canal stenosis at L4-L5.  4. Multilevel neural foraminal narrowing, worse at L2-L3, L3-L4 and particularly at L4-L5 as described.    Assessment:   Patient is a 67-year-old man with a history of hypertension, GERD, arthritis, anxiety who presents in self-referral for low back pain radiating into the leg and neck pain.    1. Lumbar radiculopathy, chronic  MRI  Lumbar Spine Without Contrast    Ambulatory referral/consult to Neurosurgery      2. Spinal stenosis of lumbar region without neurogenic claudication  MRI Lumbar Spine Without Contrast    Ambulatory referral/consult to Neurosurgery            Plan:  1. We discussed that he has had some relief with the radiofrequency ablation procedures for his back pain but he is continued to have pain radiating into the legs, last several epidural steroid injections have not been as helpful as they have been in the past.  He discussed wanting to have neurosurgical consultation so I will get a new MRI since it has been a year since his last 1 and I will set him up with Neurosurgery for evaluation.  I will have him follow up with me after this and we can discussed treating his neck pain.

## 2023-03-07 ENCOUNTER — HOSPITAL ENCOUNTER (OUTPATIENT)
Facility: HOSPITAL | Age: 68
Discharge: HOME | End: 2023-03-07
Attending: INTERNAL MEDICINE | Admitting: INTERNAL MEDICINE
Payer: MEDICARE

## 2023-03-07 ENCOUNTER — ANESTHESIA EVENT (OUTPATIENT)
Dept: ENDOSCOPY | Facility: HOSPITAL | Age: 68
End: 2023-03-07
Payer: MEDICARE

## 2023-03-07 ENCOUNTER — ANESTHESIA (OUTPATIENT)
Dept: ENDOSCOPY | Facility: HOSPITAL | Age: 68
End: 2023-03-07
Payer: MEDICARE

## 2023-03-07 DIAGNOSIS — R10.13 EPIGASTRIC PAIN: ICD-10-CM

## 2023-03-07 PROCEDURE — D9220A PRA ANESTHESIA: Mod: HCNC,CRNA,, | Performed by: NURSE ANESTHETIST, CERTIFIED REGISTERED

## 2023-03-07 PROCEDURE — 27201012 HC FORCEPS, HOT/COLD, DISP: Mod: HCNC,PO | Performed by: INTERNAL MEDICINE

## 2023-03-07 PROCEDURE — 63600175 PHARM REV CODE 636 W HCPCS: Mod: HCNC,PO | Performed by: INTERNAL MEDICINE

## 2023-03-07 PROCEDURE — 88305 TISSUE EXAM BY PATHOLOGIST: ICD-10-PCS | Mod: 26,HCNC,, | Performed by: STUDENT IN AN ORGANIZED HEALTH CARE EDUCATION/TRAINING PROGRAM

## 2023-03-07 PROCEDURE — D9220A PRA ANESTHESIA: Mod: HCNC,ANES,, | Performed by: ANESTHESIOLOGY

## 2023-03-07 PROCEDURE — 88342 IMHCHEM/IMCYTCHM 1ST ANTB: CPT | Mod: 26,HCNC,, | Performed by: STUDENT IN AN ORGANIZED HEALTH CARE EDUCATION/TRAINING PROGRAM

## 2023-03-07 PROCEDURE — 88305 TISSUE EXAM BY PATHOLOGIST: CPT | Mod: HCNC | Performed by: STUDENT IN AN ORGANIZED HEALTH CARE EDUCATION/TRAINING PROGRAM

## 2023-03-07 PROCEDURE — 88342 CHG IMMUNOCYTOCHEMISTRY: ICD-10-PCS | Mod: 26,HCNC,, | Performed by: STUDENT IN AN ORGANIZED HEALTH CARE EDUCATION/TRAINING PROGRAM

## 2023-03-07 PROCEDURE — 88305 TISSUE EXAM BY PATHOLOGIST: CPT | Mod: 26,HCNC,, | Performed by: STUDENT IN AN ORGANIZED HEALTH CARE EDUCATION/TRAINING PROGRAM

## 2023-03-07 PROCEDURE — 37000008 HC ANESTHESIA 1ST 15 MINUTES: Mod: HCNC,PO | Performed by: INTERNAL MEDICINE

## 2023-03-07 PROCEDURE — 43239 PR EGD, FLEX, W/BIOPSY, SGL/MULTI: ICD-10-PCS | Mod: HCNC,,, | Performed by: INTERNAL MEDICINE

## 2023-03-07 PROCEDURE — 43239 EGD BIOPSY SINGLE/MULTIPLE: CPT | Mod: HCNC,,, | Performed by: INTERNAL MEDICINE

## 2023-03-07 PROCEDURE — D9220A PRA ANESTHESIA: ICD-10-PCS | Mod: HCNC,ANES,, | Performed by: ANESTHESIOLOGY

## 2023-03-07 PROCEDURE — 25000003 PHARM REV CODE 250: Mod: HCNC,PO | Performed by: NURSE ANESTHETIST, CERTIFIED REGISTERED

## 2023-03-07 PROCEDURE — 37000009 HC ANESTHESIA EA ADD 15 MINS: Mod: HCNC,PO | Performed by: INTERNAL MEDICINE

## 2023-03-07 PROCEDURE — 88342 IMHCHEM/IMCYTCHM 1ST ANTB: CPT | Mod: HCNC | Performed by: STUDENT IN AN ORGANIZED HEALTH CARE EDUCATION/TRAINING PROGRAM

## 2023-03-07 PROCEDURE — 43239 EGD BIOPSY SINGLE/MULTIPLE: CPT | Mod: HCNC,PO | Performed by: INTERNAL MEDICINE

## 2023-03-07 PROCEDURE — 63600175 PHARM REV CODE 636 W HCPCS: Mod: HCNC,PO | Performed by: NURSE ANESTHETIST, CERTIFIED REGISTERED

## 2023-03-07 PROCEDURE — D9220A PRA ANESTHESIA: ICD-10-PCS | Mod: HCNC,CRNA,, | Performed by: NURSE ANESTHETIST, CERTIFIED REGISTERED

## 2023-03-07 RX ORDER — PROPOFOL 10 MG/ML
VIAL (ML) INTRAVENOUS
Status: DISCONTINUED | OUTPATIENT
Start: 2023-03-07 | End: 2023-03-07

## 2023-03-07 RX ORDER — SODIUM CHLORIDE, SODIUM LACTATE, POTASSIUM CHLORIDE, CALCIUM CHLORIDE 600; 310; 30; 20 MG/100ML; MG/100ML; MG/100ML; MG/100ML
INJECTION, SOLUTION INTRAVENOUS CONTINUOUS
Status: DISCONTINUED | OUTPATIENT
Start: 2023-03-07 | End: 2023-03-07 | Stop reason: HOSPADM

## 2023-03-07 RX ORDER — SODIUM CHLORIDE 0.9 % (FLUSH) 0.9 %
10 SYRINGE (ML) INJECTION
Status: DISCONTINUED | OUTPATIENT
Start: 2023-03-07 | End: 2023-03-07 | Stop reason: HOSPADM

## 2023-03-07 RX ORDER — LIDOCAINE HCL/PF 100 MG/5ML
SYRINGE (ML) INTRAVENOUS
Status: DISCONTINUED | OUTPATIENT
Start: 2023-03-07 | End: 2023-03-07

## 2023-03-07 RX ADMIN — PROPOFOL 30 MG: 10 INJECTION, EMULSION INTRAVENOUS at 08:03

## 2023-03-07 RX ADMIN — SODIUM CHLORIDE, POTASSIUM CHLORIDE, SODIUM LACTATE AND CALCIUM CHLORIDE: 600; 310; 30; 20 INJECTION, SOLUTION INTRAVENOUS at 08:03

## 2023-03-07 RX ADMIN — PROPOFOL 90 MG: 10 INJECTION, EMULSION INTRAVENOUS at 08:03

## 2023-03-07 RX ADMIN — LIDOCAINE HYDROCHLORIDE 100 MG: 20 INJECTION, SOLUTION INTRAVENOUS at 08:03

## 2023-03-07 NOTE — ANESTHESIA POSTPROCEDURE EVALUATION
Anesthesia Post Evaluation    Patient: Moris Calderón    Procedure(s) Performed: Procedure(s) (LRB):  EGD (ESOPHAGOGASTRODUODENOSCOPY) (N/A)    Final Anesthesia Type: general      Patient location during evaluation: PACU  Patient participation: Yes- Able to Participate  Level of consciousness: awake and alert and oriented  Post-procedure vital signs: reviewed and stable  Pain management: adequate  Airway patency: patent    PONV status at discharge: No PONV  Anesthetic complications: no      Cardiovascular status: blood pressure returned to baseline and stable  Respiratory status: unassisted and spontaneous ventilation  Hydration status: euvolemic  Follow-up not needed.          Vitals Value Taken Time   /72 03/07/23 0909   Temp 36.4 °C (97.5 °F) 03/07/23 0842   Pulse 52 03/07/23 0909   Resp 16 03/07/23 0909   SpO2 98 % 03/07/23 0909         Event Time   Out of Recovery 09:08:58         Pain/Jesus Score: Jesus Score: 10 (3/7/2023  9:10 AM)

## 2023-03-07 NOTE — DISCHARGE SUMMARY
[place] - Endoscopy  Discharge Note  Short Stay  Discharge Note  Short Stay      SUMMARY     Admit Date: 3/7/2023    Attending Physician: Dwight Lewis MD     Discharge Physician: Dwight Lewis MD    Discharge Date: 3/7/2023 8:40 AM    Final Diagnosis: Heartburn [R12]  Hiatal hernia [K44.9]  Epigastric pain [R10.13]    Disposition: HOME OR SELF CARE    Patient Instructions:   Current Discharge Medication List        CONTINUE these medications which have NOT CHANGED    Details   atorvastatin (LIPITOR) 10 MG tablet Take 1 tablet (10 mg total) by mouth once daily.  Qty: 90 tablet, Refills: 3    Associated Diagnoses: Hypercholesterolemia      benazepriL (LOTENSIN) 20 MG tablet Take 1 tablet (20 mg total) by mouth once daily.  Qty: 90 tablet, Refills: 3    Comments: .      cetirizine (ZYRTEC) 10 MG tablet Take 1 tablet (10 mg total) by mouth daily as needed for Allergies.  Qty: 90 tablet, Refills: 3    Associated Diagnoses: Allergy, subsequent encounter      fluticasone propionate (FLONASE) 50 mcg/actuation nasal spray SHAKE LIQUID AND USE 1 SPRAY IN EACH NOSTRIL EVERY DAY  Qty: 32 g, Refills: 2    Associated Diagnoses: Allergic rhinitis, unspecified seasonality, unspecified trigger      gabapentin (NEURONTIN) 300 MG capsule Take 1 capsule (300 mg total) by mouth every evening.  Qty: 90 capsule, Refills: 3    Associated Diagnoses: Lumbar radiculopathy      pantoprazole (PROTONIX) 40 MG tablet Take 1 tablet (40 mg total) by mouth before breakfast.  Qty: 30 tablet, Refills: 1    Associated Diagnoses: Heartburn; Hiatal hernia; Epigastric pain      !! paroxetine (PAXIL) 10 MG tablet Take 1 tablet (10 mg total) by mouth every morning.  Qty: 90 tablet, Refills: 3    Associated Diagnoses: Anxiety      !! paroxetine (PAXIL) 40 MG tablet Take 1 tablet (40 mg total) by mouth once daily.  Qty: 90 tablet, Refills: 3    Associated Diagnoses: Anxiety      primidone (MYSOLINE) 50 MG Tab Initial: 25 mg once daily; increase  dose gradually (eg, every 3 to 7 days) based on response and tolerability in increments of 25 mg. Max 150 mg.  Qty: 30 tablet, Refills: 0    Associated Diagnoses: Essential tremor      propranoloL (INDERAL LA) 60 MG 24 hr capsule Take 1 capsule (60 mg total) by mouth once daily.  Qty: 30 capsule, Refills: 2    Comments: .  Associated Diagnoses: Essential tremor      tamsulosin (FLOMAX) 0.4 mg Cap Take 1 capsule (0.4 mg total) by mouth once daily.  Qty: 90 capsule, Refills: 3    Associated Diagnoses: Urinary frequency      tiZANidine (ZANAFLEX) 4 MG tablet TAKE 1 TABLET(4 MG) BY MOUTH EVERY NIGHT AS NEEDED  Qty: 90 tablet, Refills: 3      FLUZONE HIGHDOSE QUAD 22-23  mcg/0.7 mL Syrg       PFIZER COVID BIVAL,12Y UP,,PF, 30 mcg/0.3 mL injection        !! - Potential duplicate medications found. Please discuss with provider.          Discharge Procedure Orders (must include Diet, Follow-up, Activity)    Follow Up:  Follow up with PCP as previously scheduled  Resume routine diet.  Activity as tolerated.    No driving day of procedure.

## 2023-03-07 NOTE — H&P
History & Physical - Short Stay  Gastroenterology      SUBJECTIVE:     Procedure: EGD    Chief Complaint/Indication for Procedure: Epigastric Pain and Reflux    PTA Medications   Medication Sig    atorvastatin (LIPITOR) 10 MG tablet Take 1 tablet (10 mg total) by mouth once daily.    benazepriL (LOTENSIN) 20 MG tablet Take 1 tablet (20 mg total) by mouth once daily.    cetirizine (ZYRTEC) 10 MG tablet Take 1 tablet (10 mg total) by mouth daily as needed for Allergies.    fluticasone propionate (FLONASE) 50 mcg/actuation nasal spray SHAKE LIQUID AND USE 1 SPRAY IN EACH NOSTRIL EVERY DAY    gabapentin (NEURONTIN) 300 MG capsule Take 1 capsule (300 mg total) by mouth every evening.    pantoprazole (PROTONIX) 40 MG tablet Take 1 tablet (40 mg total) by mouth before breakfast.    paroxetine (PAXIL) 10 MG tablet Take 1 tablet (10 mg total) by mouth every morning.    paroxetine (PAXIL) 40 MG tablet Take 1 tablet (40 mg total) by mouth once daily.    primidone (MYSOLINE) 50 MG Tab Initial: 25 mg once daily; increase dose gradually (eg, every 3 to 7 days) based on response and tolerability in increments of 25 mg. Max 150 mg.    propranoloL (INDERAL LA) 60 MG 24 hr capsule Take 1 capsule (60 mg total) by mouth once daily.    tamsulosin (FLOMAX) 0.4 mg Cap Take 1 capsule (0.4 mg total) by mouth once daily.    tiZANidine (ZANAFLEX) 4 MG tablet TAKE 1 TABLET(4 MG) BY MOUTH EVERY NIGHT AS NEEDED    FLUZONE HIGHDOSE QUAD 22-23  mcg/0.7 mL Syrg     PFIZER COVID BIVAL,12Y UP,,PF, 30 mcg/0.3 mL injection        Review of patient's allergies indicates:   Allergen Reactions    Buspar [buspirone]      Face flushed, possible elevated BP    Adhesive Rash        Past Medical History:   Diagnosis Date    Anxiety     Arthritis     Back problem     bulging disc    Colon polyps     GERD (gastroesophageal reflux disease)     Hiatal hernia     Hypercholesterolemia 11/02/2015    2009: Began statin.    Hyperlipidemia     Hypertension, benign  11/02/2015    2008: Diagnosed.    Sleep apnea     uses implant    TMJ (dislocation of temporomandibular joint)      Past Surgical History:   Procedure Laterality Date    APPENDECTOMY      ARTHROSCOPIC REPAIR OF ROTATOR CUFF OF SHOULDER Right 12/21/2018    Procedure: REPAIR, ROTATOR CUFF, ARTHROSCOPIC;  Surgeon: Colby Valenzuela MD;  Location: [facility] OR;  Service: Orthopedics;  Laterality: Right;    ARTHROSCOPY OF SHOULDER WITH DECOMPRESSION OF SUBACROMIAL SPACE Right 12/21/2018    Procedure: ARTHROSCOPY, SHOULDER, WITH SUBACROMIAL SPACE DECOMPRESSION;  Surgeon: Colby Valenzuela MD;  Location: [facility] OR;  Service: Orthopedics;  Laterality: Right;    BICEPS TENDON REPAIR Right 12/21/2018    Procedure: REPAIR, TENDON, BICEPS;  Surgeon: Colby Valenzuela MD;  Location: [facility] OR;  Service: Orthopedics;  Laterality: Right;    COLONOSCOPY N/A 07/27/2022    Procedure: COLONOSCOPY;  Surgeon: Dwight Lewis MD;  Location: [facility];  Service: Endoscopy;  Laterality: N/A; Repeat colonoscopy in 5 years for surveillance    COLONOSCOPY  12/09/2016    Dr. Martinez, sent for scanning: diverticulosis, 2 colon polyps removed; grade 1 internal hemorrhoids; repeat in 5 years for surveillance    EGD - EXTERNAL RESULT  06/18/2020    Dr. Martinez, sent for scanning: small hiatal hernia, congsetion of the GEJ mucosa; biopsy report not received    EGD - EXTERNAL RESULT  12/09/2016    Dr. Martinez, sent for scanning: small hiatal hernia, irregular zline, erythema to duodenal bulb; repeat in 3 years    EPIDURAL STEROID INJECTION INTO LUMBAR SPINE N/A 12/07/2022    Procedure: Injection-steroid-epidural-lumbar L5/S1;  Surgeon: Landon Winchester MD;  Location: [facility] OR;  Service: Pain Management;  Laterality: N/A;    EPIDURAL STEROID INJECTION INTO LUMBAR SPINE N/A 02/02/2023    Procedure: Injection-steroid-epidural-lumbar L5/s1;  Surgeon: Landon Winchester MD;  Location: [facility] OR;  Service: Pain Management;  Laterality: N/A;     magdalene      EYE SURGERY      INJECTION OF ANESTHETIC AGENT AROUND MEDIAL BRANCH NERVES INNERVATING CERVICAL FACET JOINT Bilateral 01/10/2022    Procedure: Block-nerve-medial branch-cervical C4 C5 C6;  Surgeon: Landon Winchester MD;  Location: [facility] OR;  Service: Pain Management;  Laterality: Bilateral;    INJECTION OF ANESTHETIC AGENT AROUND MEDIAL BRANCH NERVES INNERVATING LUMBAR FACET JOINT Bilateral 03/10/2021    Procedure: Block-nerve-medial branch-lumbar L2,L3,L4, L5;  Surgeon: Landon Winchester MD;  Location: [facility] OR;  Service: Pain Management;  Laterality: Bilateral;    INSERTION, NEUROSTIMULATOR, HYPOGLOSSAL Right 03/17/2022    Procedure: INSERTION,NEUROSTIMULATOR,HYPOGLOSSAL;  Surgeon: Lance Wright MD;  Location: [facility] OR;  Service: ENT;  Laterality: Right;    KNEE ARTHROSCOPY W/ MENISCAL REPAIR Left 2015    LIPOMA RESECTION Left 02/23/2023    Procedure: EXCISION, LIPOMA;  Surgeon: Taz Khan Jr., MD;  Location: [facility] OR;  Service: General;  Laterality: Left;    MMT      Left knee A-scope    NOSE SURGERY      RADIOFREQUENCY ABLATION OF LUMBAR MEDIAL BRANCH NERVE AT SINGLE LEVEL Bilateral 03/23/2021    Procedure: Radiofrequency Ablation, Nerve, Spinal, Lumbar, Medial Branch, L2,LL3,L4,L5;  Surgeon: Landon Winchester MD;  Location: [facility];  Service: Pain Management;  Laterality: Bilateral;    RADIOFREQUENCY ABLATION OF LUMBAR MEDIAL BRANCH NERVE AT SINGLE LEVEL Bilateral 10/06/2022    Procedure: Radiofrequency Ablation, Nerve, Spinal, Lumbar, Medial Branch, L4/5, L5/S1;  Surgeon: Landon Winchester MD;  Location: [facility] OR;  Service: Pain Management;  Laterality: Bilateral;    SLEEP ENDOSCOPY, DRUG-INDUCED Bilateral 01/28/2022    Procedure: SLEEP ENDOSCOPY,DRUG-INDUCED;  Surgeon: Lance Wright MD;  Location: [facility] OR;  Service: ENT;  Laterality: Bilateral;    TRANSFORAMINAL EPIDURAL INJECTION OF STEROID Bilateral 02/11/2021    Procedure: Injection,steroid,epidural,transforaminal approach  L4/5;  Surgeon: Ladnon Winchester MD;  Location: [facility] OR;  Service: Pain Management;  Laterality: Bilateral;    UVULECTOMY       Family History   Problem Relation Age of Onset    Glaucoma Father     Melanoma Neg Hx     Psoriasis Neg Hx     Lupus Neg Hx     Eczema Neg Hx     Colon cancer Neg Hx     Crohn's disease Neg Hx     Stomach cancer Neg Hx     Ulcerative colitis Neg Hx     Esophageal cancer Neg Hx      Social History     Tobacco Use    Smoking status: Never    Smokeless tobacco: Never   Substance Use Topics    Alcohol use: Yes     Alcohol/week: 6.0 - 7.0 standard drinks     Types: 5 Cans of beer, 1 - 2 Shots of liquor per week     Comment: weekly     Drug use: No         OBJECTIVE:     Vital Signs (Most Recent)  Temp: 97.3 °F (36.3 °C) (03/07/23 0814)  Pulse: (!) 50 (03/07/23 0814)  Resp: 16 (03/07/23 0814)  BP: (!) 165/80 (03/07/23 0814)  SpO2: 97 % (03/07/23 0814)    Physical Exam:                                                       GENERAL:  Comfortable, in no acute distress.                                 HEENT EXAM:  Nonicteric.  No adenopathy.  Oropharynx is clear.               NECK:  Supple.                                                               LUNGS:  Clear.                                                               CARDIAC:  Regular rate and rhythm.  S1, S2.  No murmur.                      ABDOMEN:  Soft, positive bowel sounds, nontender.  No hepatosplenomegaly or masses.  No rebound or guarding.                                             EXTREMITIES:  No edema.     MENTAL STATUS:  Normal, alert and oriented.      ASSESSMENT/PLAN:     Assessment: Epigastric Pain and Reflux    Plan: EGD    Anesthesia Plan: General    ASA Grade: ASA 2 - Patient with mild systemic disease with no functional limitations    MALLAMPATI SCORE:  I (soft palate, uvula, fauces, and tonsillar pillars visible)

## 2023-03-07 NOTE — PROVATION PATIENT INSTRUCTIONS
Discharge Summary/Instructions after an Endoscopic Procedure  Patient Name: Moris Calderón  Patient MRN: [id number]  Patient YOB: 1955 Tuesday, March 7, 2023  Dwight Lewis MD  Dear patient,  As a result of recent federal legislation (The Federal Cures Act), you may   receive lab or pathology results from your procedure in your [facility]   account before your physician is able to contact you. Your physician or   their representative will relay the results to you with their   recommendations at their soonest availability.  Thank you,  RESTRICTIONS:  During your procedure today, you received medications for sedation.  These   medications may affect your judgment, balance and coordination.  Therefore,   for 24 hours, you have the following restrictions:   - DO NOT drive a car, operate machinery, make legal/financial decisions,   sign important papers or drink alcohol.    ACTIVITY:  Today: no heavy lifting, straining or running due to procedural   sedation/anesthesia.  The following day: return to full activity including work.  DIET:  Eat and drink normally unless instructed otherwise.     TREATMENT FOR COMMON SIDE EFFECTS:  - Mild abdominal pain, nausea, belching, bloating or excessive gas:  rest,   eat lightly and use a heating pad.  - Sore Throat: treat with throat lozenges and/or gargle with warm salt   water.  - Because air was used during the procedure, expelling large amounts of air   from your rectum or belching is normal.  - If a bowel prep was taken, you may not have a bowel movement for 1-3 days.    This is normal.  SYMPTOMS TO WATCH FOR AND REPORT TO YOUR PHYSICIAN:  1. Abdominal pain or bloating, other than gas cramps.  2. Chest pain.  3. Back pain.  4. Signs of infection such as: chills or fever occurring within 24 hours   after the procedure.  5. Rectal bleeding, which would show as bright red, maroon, or black stools.   (A tablespoon of blood from the rectum is not serious, especially if    hemorrhoids are present.)  6. Vomiting.  7. Weakness or dizziness.  GO DIRECTLY TO THE NEAREST EMERGENCY ROOM IF YOU HAVE ANY OF THE FOLLOWING:      Difficulty breathing              Chills and/or fever over 101 F   Persistent vomiting and/or vomiting blood   Severe abdominal pain   Severe chest pain   Black, tarry stools   Bleeding- more than one tablespoon   Any other symptom or condition that you feel may need urgent attention  Your doctor recommends these additional instructions:  If any biopsies were taken, your doctors clinic will contact you in 1 to 2   weeks with any results.  We are waiting for your pathology results.   Continue your present medications.   You are being discharged to home.  For questions, problems or results please call your physician - Dwight Lewis MD at Work:  [phone].  EMERGENCY PHONE NUMBER: [phone], LAB RESULTS: [phone]  IF A COMPLICATION OR EMERGENCY SITUATION ARISES AND YOU ARE UNABLE TO REACH   YOUR PHYSICIAN - GO DIRECTLY TO THE EMERGENCY ROOM.  ___________________________________________  Nurse Signature  ___________________________________________  Patient/Designated Responsible Party Signature  Dwight Lewis MD  3/7/2023 8:39:30 AM  This report has been verified and signed electronically.  Dear patient,  As a result of recent federal legislation (The Federal Cures Act), you may   receive lab or pathology results from your procedure in your [facility]   account before your physician is able to contact you. Your physician or   their representative will relay the results to you with their   recommendations at their soonest availability.  Thank you.  PROVATION

## 2023-03-07 NOTE — TRANSFER OF CARE
"Anesthesia Transfer of Care Note    Patient: Moris Calderón    Procedure(s) Performed: Procedure(s) (LRB):  EGD (ESOPHAGOGASTRODUODENOSCOPY) (N/A)    Patient location: PACU    Anesthesia Type: general    Transport from OR: Transported from OR on room air with adequate spontaneous ventilation    Post pain: adequate analgesia    Post assessment: no apparent anesthetic complications and tolerated procedure well    Post vital signs: stable    Level of consciousness: awake and alert    Nausea/Vomiting: no nausea/vomiting    Complications: none    Transfer of care protocol was followed      Last vitals:   Visit Vitals  /70 (BP Location: Left arm, Patient Position: Lying)   Pulse (!) 48   Temp 36.4 °C (97.5 °F) (Skin)   Resp 16   Ht 5' 9" (1.753 m)   Wt 99.3 kg (219 lb)   SpO2 99%   BMI 32.34 kg/m²     "

## 2023-03-07 NOTE — ANESTHESIA PREPROCEDURE EVALUATION
03/07/2023  Moris Calderón is a 67 y.o., male.      Pre-op Assessment    I have reviewed the Patient Summary Reports.     I have reviewed the Nursing Notes. I have reviewed the NPO Status.   I have reviewed the Medications.     Review of Systems  Anesthesia Hx:  No problems with previous Anesthesia    Social:  Non-Smoker    EENT/Dental:   TMJ   Cardiovascular:   Hypertension, well controlled hyperlipidemia    Pulmonary:   Sleep Apnea    Renal/:  Renal/ Normal     Hepatic/GI:   Hiatal Hernia, GERD, well controlled    Musculoskeletal:   Arthritis     Neurological:   Neuromuscular Disease,    Endocrine:  Endocrine Normal    Psych:   Psychiatric History anxiety          Physical Exam  General: Well nourished, Cooperative, Alert and Oriented    Airway:  Mallampati: II   Mouth Opening: Normal  TM Distance: Normal  Neck ROM: Normal ROM        Anesthesia Plan  Type of Anesthesia, risks & benefits discussed:    Anesthesia Type: Gen ETT, Gen Supraglottic Airway, Gen Natural Airway, MAC  Intra-op Monitoring Plan: Standard ASA Monitors  Post Op Pain Control Plan: multimodal analgesia  Induction:  IV  Airway Plan: Direct, Video and Fiberoptic, Post-Induction  Informed Consent: Informed consent signed with the Patient and all parties understand the risks and agree with anesthesia plan.  All questions answered.   ASA Score: 3    Ready For Surgery From Anesthesia Perspective.     .

## 2023-03-08 ENCOUNTER — OFFICE VISIT (OUTPATIENT)
Dept: SURGERY | Facility: CLINIC | Age: 68
End: 2023-03-08
Payer: MEDICARE

## 2023-03-08 ENCOUNTER — HOSPITAL ENCOUNTER (OUTPATIENT)
Dept: RADIOLOGY | Facility: HOSPITAL | Age: 68
Discharge: HOME | End: 2023-03-08
Attending: ANESTHESIOLOGY
Payer: MEDICARE

## 2023-03-08 VITALS
BODY MASS INDEX: 32.49 KG/M2 | HEART RATE: 52 BPM | RESPIRATION RATE: 16 BRPM | DIASTOLIC BLOOD PRESSURE: 89 MMHG | SYSTOLIC BLOOD PRESSURE: 152 MMHG | TEMPERATURE: 98 F | HEIGHT: 69 IN | WEIGHT: 219.38 LBS

## 2023-03-08 VITALS
SYSTOLIC BLOOD PRESSURE: 140 MMHG | OXYGEN SATURATION: 98 % | WEIGHT: 219 LBS | TEMPERATURE: 98 F | HEIGHT: 69 IN | BODY MASS INDEX: 32.44 KG/M2 | RESPIRATION RATE: 16 BRPM | DIASTOLIC BLOOD PRESSURE: 72 MMHG | HEART RATE: 52 BPM

## 2023-03-08 DIAGNOSIS — Z98.890 S/P EXCISION OF LIPOMA: Primary | ICD-10-CM

## 2023-03-08 DIAGNOSIS — M48.061 SPINAL STENOSIS OF LUMBAR REGION WITHOUT NEUROGENIC CLAUDICATION: ICD-10-CM

## 2023-03-08 DIAGNOSIS — M54.16 LUMBAR RADICULOPATHY, CHRONIC: ICD-10-CM

## 2023-03-08 DIAGNOSIS — Z86.018 S/P EXCISION OF LIPOMA: Primary | ICD-10-CM

## 2023-03-08 PROCEDURE — 4010F ACE/ARB THERAPY RXD/TAKEN: CPT | Mod: CPTII,S$GLB,, | Performed by: SURGERY

## 2023-03-08 PROCEDURE — 99024 POSTOP FOLLOW-UP VISIT: CPT | Mod: S$GLB,,, | Performed by: SURGERY

## 2023-03-08 PROCEDURE — 1159F PR MEDICATION LIST DOCUMENTED IN MEDICAL RECORD: ICD-10-PCS | Mod: CPTII,S$GLB,, | Performed by: SURGERY

## 2023-03-08 PROCEDURE — 4010F PR ACE/ARB THEARPY RXD/TAKEN: ICD-10-PCS | Mod: CPTII,S$GLB,, | Performed by: SURGERY

## 2023-03-08 PROCEDURE — 3079F PR MOST RECENT DIASTOLIC BLOOD PRESSURE 80-89 MM HG: ICD-10-PCS | Mod: CPTII,S$GLB,, | Performed by: SURGERY

## 2023-03-08 PROCEDURE — 1126F PR PAIN SEVERITY QUANTIFIED, NO PAIN PRESENT: ICD-10-PCS | Mod: CPTII,S$GLB,, | Performed by: SURGERY

## 2023-03-08 PROCEDURE — 3008F PR BODY MASS INDEX (BMI) DOCUMENTED: ICD-10-PCS | Mod: CPTII,S$GLB,, | Performed by: SURGERY

## 2023-03-08 PROCEDURE — 3079F DIAST BP 80-89 MM HG: CPT | Mod: CPTII,S$GLB,, | Performed by: SURGERY

## 2023-03-08 PROCEDURE — 3008F BODY MASS INDEX DOCD: CPT | Mod: CPTII,S$GLB,, | Performed by: SURGERY

## 2023-03-08 PROCEDURE — 99024 PR POST-OP FOLLOW-UP VISIT: ICD-10-PCS | Mod: S$GLB,,, | Performed by: SURGERY

## 2023-03-08 PROCEDURE — 1126F AMNT PAIN NOTED NONE PRSNT: CPT | Mod: CPTII,S$GLB,, | Performed by: SURGERY

## 2023-03-08 PROCEDURE — 3077F PR MOST RECENT SYSTOLIC BLOOD PRESSURE >= 140 MM HG: ICD-10-PCS | Mod: CPTII,S$GLB,, | Performed by: SURGERY

## 2023-03-08 PROCEDURE — 1159F MED LIST DOCD IN RCRD: CPT | Mod: CPTII,S$GLB,, | Performed by: SURGERY

## 2023-03-08 PROCEDURE — 3077F SYST BP >= 140 MM HG: CPT | Mod: CPTII,S$GLB,, | Performed by: SURGERY

## 2023-03-08 NOTE — PROGRESS NOTES
GENERAL SURGERY  POST-OP PROGRESS NOTE    HPI: Moris Calderón is a 67 y.o. male status post excision of a recurrent left upper back lipomatous mass here for follow-up.  No significant complaints.  No severe pain. No drainage but does have swelling.    VITALS:  Vitals:    03/08/23 0932   BP: (!) 152/89   Pulse: (!) 52   Resp: 16   Temp: 97.9 °F (36.6 °C)       PHYSICAL EXAM:  Left upper back excision site well healed, no erythema or induration however there is a large seroma measuring approximately 5 x 5 cm    PATHOLOGY:  LEFT BACK MASS, EXCISION:   - FIBROLIPOMA.     ASSESSMENT & PLAN:  67 y.o. male s/p excision of recurrent lipomatous lesion on the left upper back  -incision well healed and pathology benign but a large seroma is present  -I discussed observation versus aspiration, patient agreed to aspiration hopefully help speed up the process of resolution  -the area was cleaned with an alcohol prep pad and a small skin wheal of lidocaine 2% was injected, an 18 gauge needle was inserted and we removed approximally 60 cc of serosanguineous fluid consistent with seroma without sign of infection  -patient will continue to monitor and call the office if it recurs or becomes symptomatic

## 2023-03-10 ENCOUNTER — HOSPITAL ENCOUNTER (OUTPATIENT)
Dept: RADIOLOGY | Facility: HOSPITAL | Age: 68
Discharge: HOME | End: 2023-03-10
Attending: ANESTHESIOLOGY
Payer: MEDICARE

## 2023-03-10 PROCEDURE — 72148 MRI LUMBAR SPINE WITHOUT CONTRAST: ICD-10-PCS | Mod: 26,HCNC,, | Performed by: RADIOLOGY

## 2023-03-10 PROCEDURE — 72148 MRI LUMBAR SPINE W/O DYE: CPT | Mod: TC,HCNC

## 2023-03-10 PROCEDURE — 72148 MRI LUMBAR SPINE W/O DYE: CPT | Mod: 26,HCNC,, | Performed by: RADIOLOGY

## 2023-03-12 ENCOUNTER — PATIENT MESSAGE (OUTPATIENT)
Dept: GASTROENTEROLOGY | Facility: CLINIC | Age: 68
End: 2023-03-12
Payer: MEDICARE

## 2023-03-14 ENCOUNTER — HOSPITAL ENCOUNTER (OUTPATIENT)
Dept: RADIOLOGY | Facility: HOSPITAL | Age: 68
Discharge: HOME | End: 2023-03-14
Attending: NEUROLOGICAL SURGERY
Payer: MEDICARE

## 2023-03-14 ENCOUNTER — OFFICE VISIT (OUTPATIENT)
Dept: NEUROSURGERY | Facility: CLINIC | Age: 68
End: 2023-03-14
Payer: MEDICARE

## 2023-03-14 VITALS
BODY MASS INDEX: 32.44 KG/M2 | HEIGHT: 69 IN | RESPIRATION RATE: 18 BRPM | HEART RATE: 55 BPM | SYSTOLIC BLOOD PRESSURE: 164 MMHG | DIASTOLIC BLOOD PRESSURE: 97 MMHG | WEIGHT: 219 LBS

## 2023-03-14 DIAGNOSIS — M48.061 SPINAL STENOSIS OF LUMBAR REGION WITHOUT NEUROGENIC CLAUDICATION: ICD-10-CM

## 2023-03-14 DIAGNOSIS — M54.16 LUMBAR RADICULOPATHY, CHRONIC: ICD-10-CM

## 2023-03-14 DIAGNOSIS — M54.16 LUMBAR RADICULOPATHY, CHRONIC: Primary | ICD-10-CM

## 2023-03-14 PROCEDURE — 3077F SYST BP >= 140 MM HG: CPT | Mod: HCNC,CPTII,S$GLB, | Performed by: NEUROLOGICAL SURGERY

## 2023-03-14 PROCEDURE — 1125F AMNT PAIN NOTED PAIN PRSNT: CPT | Mod: HCNC,CPTII,S$GLB, | Performed by: NEUROLOGICAL SURGERY

## 2023-03-14 PROCEDURE — 4010F PR ACE/ARB THEARPY RXD/TAKEN: ICD-10-PCS | Mod: HCNC,CPTII,S$GLB, | Performed by: NEUROLOGICAL SURGERY

## 2023-03-14 PROCEDURE — 3288F FALL RISK ASSESSMENT DOCD: CPT | Mod: HCNC,CPTII,S$GLB, | Performed by: NEUROLOGICAL SURGERY

## 2023-03-14 PROCEDURE — 72110 X-RAY EXAM L-2 SPINE 4/>VWS: CPT | Mod: 26,HCNC,, | Performed by: RADIOLOGY

## 2023-03-14 PROCEDURE — 3080F PR MOST RECENT DIASTOLIC BLOOD PRESSURE >= 90 MM HG: ICD-10-PCS | Mod: HCNC,CPTII,S$GLB, | Performed by: NEUROLOGICAL SURGERY

## 2023-03-14 PROCEDURE — 3008F PR BODY MASS INDEX (BMI) DOCUMENTED: ICD-10-PCS | Mod: HCNC,CPTII,S$GLB, | Performed by: NEUROLOGICAL SURGERY

## 2023-03-14 PROCEDURE — 4010F ACE/ARB THERAPY RXD/TAKEN: CPT | Mod: HCNC,CPTII,S$GLB, | Performed by: NEUROLOGICAL SURGERY

## 2023-03-14 PROCEDURE — 3288F PR FALLS RISK ASSESSMENT DOCUMENTED: ICD-10-PCS | Mod: HCNC,CPTII,S$GLB, | Performed by: NEUROLOGICAL SURGERY

## 2023-03-14 PROCEDURE — 99204 PR OFFICE/OUTPT VISIT, NEW, LEVL IV, 45-59 MIN: ICD-10-PCS | Mod: HCNC,S$GLB,, | Performed by: NEUROLOGICAL SURGERY

## 2023-03-14 PROCEDURE — 3080F DIAST BP >= 90 MM HG: CPT | Mod: HCNC,CPTII,S$GLB, | Performed by: NEUROLOGICAL SURGERY

## 2023-03-14 PROCEDURE — 1125F PR PAIN SEVERITY QUANTIFIED, PAIN PRESENT: ICD-10-PCS | Mod: HCNC,CPTII,S$GLB, | Performed by: NEUROLOGICAL SURGERY

## 2023-03-14 PROCEDURE — 3008F BODY MASS INDEX DOCD: CPT | Mod: HCNC,CPTII,S$GLB, | Performed by: NEUROLOGICAL SURGERY

## 2023-03-14 PROCEDURE — 3077F PR MOST RECENT SYSTOLIC BLOOD PRESSURE >= 140 MM HG: ICD-10-PCS | Mod: HCNC,CPTII,S$GLB, | Performed by: NEUROLOGICAL SURGERY

## 2023-03-14 PROCEDURE — 1101F PR PT FALLS ASSESS DOC 0-1 FALLS W/OUT INJ PAST YR: ICD-10-PCS | Mod: HCNC,CPTII,S$GLB, | Performed by: NEUROLOGICAL SURGERY

## 2023-03-14 PROCEDURE — 99204 OFFICE O/P NEW MOD 45 MIN: CPT | Mod: HCNC,S$GLB,, | Performed by: NEUROLOGICAL SURGERY

## 2023-03-14 PROCEDURE — 1101F PT FALLS ASSESS-DOCD LE1/YR: CPT | Mod: HCNC,CPTII,S$GLB, | Performed by: NEUROLOGICAL SURGERY

## 2023-03-14 PROCEDURE — 72110 XR LUMBAR SPINE AP AND LAT WITH FLEX/EXT: ICD-10-PCS | Mod: 26,HCNC,, | Performed by: RADIOLOGY

## 2023-03-14 PROCEDURE — 72110 X-RAY EXAM L-2 SPINE 4/>VWS: CPT | Mod: TC,HCNC,FY,PO

## 2023-03-14 NOTE — PROGRESS NOTES
"Neurosurgery History & Physical    Patient ID: Moris Calderón is a 67 y.o. male.    Chief Complaint   Patient presents with    Lumbar Spine Pain (L-Spine)     Patient present to clinic today as lumbar spine pain. States of years with ongoing pain. Not much relief from recent VIET. Occasionally numbness/tingling in Lt leg/toes. States of neck pain as well.        HPI:  67 year old male with chronic low back pain for the last 20 years which has required him to see pain management for the last 15 years.  He has had intermittent injections over the years which has helped him somewhat.      More recently he was noted to have some "arthritis" on his low back for which he has had facet ablations which have helped some.      The VIET's have not been helping for longer than a couple of months at most.     The pain is related to his activities to some degree.      He occasionally has some numb feeling on the lateral aspect of his left foot.    He also struggles with chronic neck pain.    He complains of pain in his right leg at times which goes from his hip to his knee.  He thinks this pain tends to be on the front and lateral aspect as opposed to the posterior aspect.  He is not currently experiencing that.      His back pain is more troublesome than his leg pain.      Overall his low back is more troublesome than his neck pain.      Review of Systems   Constitutional:  Negative for activity change and fever.   HENT:  Negative for rhinorrhea, tinnitus, trouble swallowing and voice change.    Eyes:  Negative for visual disturbance.   Respiratory:  Negative for shortness of breath.    Cardiovascular:  Negative for chest pain.   Gastrointestinal:  Negative for nausea and vomiting.   Endocrine: Negative for cold intolerance, heat intolerance, polydipsia, polyphagia and polyuria.   Genitourinary:  Negative for decreased urine volume, frequency and urgency.   Musculoskeletal:  Positive for arthralgias, back pain and myalgias. " Negative for neck pain and neck stiffness.   Neurological:  Negative for dizziness, tremors, seizures, syncope, facial asymmetry, speech difficulty, weakness, light-headedness, numbness and headaches.   Psychiatric/Behavioral:  Negative for confusion.      Past Medical History:   Diagnosis Date    Anxiety     Arthritis     Back problem     bulging disc    Colon polyps     GERD (gastroesophageal reflux disease)     Hiatal hernia     Hypercholesterolemia 11/02/2015    2009: Began statin.    Hyperlipidemia     Hypertension, benign 11/02/2015    2008: Diagnosed.    Sleep apnea     uses implant    TMJ (dislocation of temporomandibular joint)      Social History     Socioeconomic History    Marital status:    Tobacco Use    Smoking status: Never    Smokeless tobacco: Never   Substance and Sexual Activity    Alcohol use: Yes     Alcohol/week: 6.0 - 7.0 standard drinks     Types: 5 Cans of beer, 1 - 2 Shots of liquor per week     Comment: weekly     Drug use: No    Sexual activity: Yes     Social Determinants of Health     Financial Resource Strain: Low Risk     Difficulty of Paying Living Expenses: Not hard at all   Food Insecurity: No Food Insecurity    Worried About Running Out of Food in the Last Year: Never true    Ran Out of Food in the Last Year: Never true   Transportation Needs: No Transportation Needs    Lack of Transportation (Medical): No    Lack of Transportation (Non-Medical): No   Physical Activity: Insufficiently Active    Days of Exercise per Week: 1 day    Minutes of Exercise per Session: 60 min   Stress: Stress Concern Present    Feeling of Stress : Rather much   Social Connections: Unknown    Frequency of Communication with Friends and Family: Three times a week    Frequency of Social Gatherings with Friends and Family: Twice a week    Active Member of Clubs or Organizations: No    Attends Club or Organization Meetings: 1 to 4 times per year    Marital Status:    Housing Stability: Low Risk      Unable to Pay for Housing in the Last Year: No    Number of Places Lived in the Last Year: 1    Unstable Housing in the Last Year: No     Family History   Problem Relation Age of Onset    Glaucoma Father     Melanoma Neg Hx     Psoriasis Neg Hx     Lupus Neg Hx     Eczema Neg Hx     Colon cancer Neg Hx     Crohn's disease Neg Hx     Stomach cancer Neg Hx     Ulcerative colitis Neg Hx     Esophageal cancer Neg Hx      Review of patient's allergies indicates:   Allergen Reactions    Buspar [buspirone]      Face flushed, possible elevated BP    Adhesive Rash       Current Outpatient Medications:     atorvastatin (LIPITOR) 10 MG tablet, Take 1 tablet (10 mg total) by mouth once daily., Disp: 90 tablet, Rfl: 3    benazepriL (LOTENSIN) 20 MG tablet, Take 1 tablet (20 mg total) by mouth once daily., Disp: 90 tablet, Rfl: 3    cetirizine (ZYRTEC) 10 MG tablet, Take 1 tablet (10 mg total) by mouth daily as needed for Allergies., Disp: 90 tablet, Rfl: 3    fluticasone propionate (FLONASE) 50 mcg/actuation nasal spray, SHAKE LIQUID AND USE 1 SPRAY IN EACH NOSTRIL EVERY DAY, Disp: 32 g, Rfl: 2    FLUZONE HIGHDOSE QUAD 22-23  mcg/0.7 mL Syrg, , Disp: , Rfl:     gabapentin (NEURONTIN) 300 MG capsule, Take 1 capsule (300 mg total) by mouth every evening., Disp: 90 capsule, Rfl: 3    pantoprazole (PROTONIX) 40 MG tablet, Take 1 tablet (40 mg total) by mouth before breakfast., Disp: 30 tablet, Rfl: 1    paroxetine (PAXIL) 10 MG tablet, Take 1 tablet (10 mg total) by mouth every morning., Disp: 90 tablet, Rfl: 3    paroxetine (PAXIL) 40 MG tablet, Take 1 tablet (40 mg total) by mouth once daily., Disp: 90 tablet, Rfl: 3    PFIZER COVID BIVAL,12Y UP,,PF, 30 mcg/0.3 mL injection, , Disp: , Rfl:     primidone (MYSOLINE) 50 MG Tab, Initial: 25 mg once daily; increase dose gradually (eg, every 3 to 7 days) based on response and tolerability in increments of 25 mg. Max 150 mg., Disp: 30 tablet, Rfl: 0    propranoloL (INDERAL LA)  "60 MG 24 hr capsule, Take 1 capsule (60 mg total) by mouth once daily., Disp: 30 capsule, Rfl: 2    tamsulosin (FLOMAX) 0.4 mg Cap, Take 1 capsule (0.4 mg total) by mouth once daily., Disp: 90 capsule, Rfl: 3    tiZANidine (ZANAFLEX) 4 MG tablet, TAKE 1 TABLET(4 MG) BY MOUTH EVERY NIGHT AS NEEDED, Disp: 90 tablet, Rfl: 3  Blood pressure (!) 164/97, pulse (!) 55, resp. rate 18, height 5' 9" (1.753 m), weight 99.3 kg (219 lb).      Neurologic Exam     Mental Status   Oriented to person, place, and time.   Attention: normal.   Speech: speech is normal   Level of consciousness: alert  Knowledge: good.     Cranial Nerves     CN II   Visual fields full to confrontation.     CN III, IV, VI   Pupils are equal, round, and reactive to light.  Extraocular motions are normal.     CN V   Facial sensation intact.     CN VII   Facial expression full, symmetric.     CN VIII   CN VIII normal.     CN XI   CN XI normal.     CN XII   CN XII normal.     Motor Exam   Muscle bulk: normal  Overall muscle tone: normal    Strength   Strength 5/5 throughout.     Sensory Exam   Light touch normal.     Gait, Coordination, and Reflexes     Gait  Gait: normal    Physical Exam  Eyes:      Extraocular Movements: EOM normal.      Pupils: Pupils are equal, round, and reactive to light.   Neurological:      Mental Status: He is oriented to person, place, and time.      Motor: Motor strength is normal.      Gait: Gait is intact.   Psychiatric:         Speech: Speech normal.       Imaging:  MRI lumbar spine without contrast dated 03/10/2023 is personally reviewed and discussed with the patient.  There is moderate lumbar spondylosis with degenerative disc disease most prominent at L4-5 and L5-S1.  There is bilateral lateral recess narrowing at L4-5.  There is moderate to severe bilateral L4 neuroforaminal narrowing.    Assessment/Plan:   Mr. Calderón is a 67-year-old gentleman with history of chronic low back pain worse than leg pain.  Lumbar MRI reveals " diffuse spondylosis with no evidence of spondylolisthesis.  L4-5 seems to be the most degenerated.  I discussed that results for significantly helping back pain with fusion surgery are less predictable than decompressive surgery for radicular symptoms.  Given the chronic and longstanding history of his pain as well as diffuse spondylosis and back pain symptoms greater than leg symptoms I feel that conservative measures would be most prudent if possible.  That being said, if there is any dynamic instability it may lend more support towards fusion surgery.      We will obtain an x-ray of the lumbar spine with AP and lateral and flexion-extension views     Call patient with flexion-extension results to discuss further plan

## 2023-03-15 LAB
FINAL PATHOLOGIC DIAGNOSIS: NORMAL
GROSS: NORMAL
Lab: NORMAL

## 2023-03-27 ENCOUNTER — OFFICE VISIT (OUTPATIENT)
Dept: FAMILY MEDICINE | Facility: CLINIC | Age: 68
End: 2023-03-27
Payer: MEDICARE

## 2023-03-27 ENCOUNTER — TELEPHONE (OUTPATIENT)
Dept: FAMILY MEDICINE | Facility: CLINIC | Age: 68
End: 2023-03-27
Payer: MEDICARE

## 2023-03-27 DIAGNOSIS — G25.0 ESSENTIAL TREMOR: Primary | ICD-10-CM

## 2023-03-27 DIAGNOSIS — I10 PRIMARY HYPERTENSION: ICD-10-CM

## 2023-03-27 PROCEDURE — 1159F MED LIST DOCD IN RCRD: CPT | Mod: HCNC,CPTII,95, | Performed by: INTERNAL MEDICINE

## 2023-03-27 PROCEDURE — 1159F PR MEDICATION LIST DOCUMENTED IN MEDICAL RECORD: ICD-10-PCS | Mod: HCNC,CPTII,95, | Performed by: INTERNAL MEDICINE

## 2023-03-27 PROCEDURE — 4010F PR ACE/ARB THEARPY RXD/TAKEN: ICD-10-PCS | Mod: HCNC,CPTII,95, | Performed by: INTERNAL MEDICINE

## 2023-03-27 PROCEDURE — 1160F PR REVIEW ALL MEDS BY PRESCRIBER/CLIN PHARMACIST DOCUMENTED: ICD-10-PCS | Mod: HCNC,CPTII,95, | Performed by: INTERNAL MEDICINE

## 2023-03-27 PROCEDURE — 99214 PR OFFICE/OUTPT VISIT, EST, LEVL IV, 30-39 MIN: ICD-10-PCS | Mod: HCNC,95,, | Performed by: INTERNAL MEDICINE

## 2023-03-27 PROCEDURE — 1160F RVW MEDS BY RX/DR IN RCRD: CPT | Mod: HCNC,CPTII,95, | Performed by: INTERNAL MEDICINE

## 2023-03-27 PROCEDURE — 4010F ACE/ARB THERAPY RXD/TAKEN: CPT | Mod: HCNC,CPTII,95, | Performed by: INTERNAL MEDICINE

## 2023-03-27 PROCEDURE — 99214 OFFICE O/P EST MOD 30 MIN: CPT | Mod: HCNC,95,, | Performed by: INTERNAL MEDICINE

## 2023-03-27 RX ORDER — AMLODIPINE BESYLATE 2.5 MG/1
2.5 TABLET ORAL DAILY
Qty: 90 TABLET | Refills: 1 | Status: SHIPPED | OUTPATIENT
Start: 2023-03-27 | End: 2023-09-20

## 2023-03-27 RX ORDER — PRIMIDONE 50 MG/1
TABLET ORAL
Qty: 270 TABLET | Refills: 1 | Status: SHIPPED | OUTPATIENT
Start: 2023-03-27

## 2023-03-27 NOTE — TELEPHONE ENCOUNTER
----- Message from Sendy Campos, Patient Care Assistant sent at 3/27/2023  8:13 AM CDT -----  Contact: self  Pt is calling to confirm if his appt is a virtual appt [phone]  thanks

## 2023-03-28 ENCOUNTER — TELEPHONE (OUTPATIENT)
Dept: FAMILY MEDICINE | Facility: CLINIC | Age: 68
End: 2023-03-28
Payer: MEDICARE

## 2023-03-28 NOTE — TELEPHONE ENCOUNTER
Called and spoke to pt about setting up AWV  Appt set up for 5/10/2023 @ 10:30am w/Ms Lala at [facility]

## 2023-03-31 ENCOUNTER — TELEPHONE (OUTPATIENT)
Dept: PAIN MEDICINE | Facility: CLINIC | Age: 68
End: 2023-03-31
Payer: MEDICARE

## 2023-03-31 NOTE — TELEPHONE ENCOUNTER
Please contact patient, let him know that I have seen the evaluation from Neurosurgery and set him up for a follow-up with me

## 2023-05-09 ENCOUNTER — TELEPHONE (OUTPATIENT)
Dept: ADMINISTRATIVE | Facility: CLINIC | Age: 68
End: 2023-05-09
Payer: MEDICARE

## 2023-05-10 ENCOUNTER — OFFICE VISIT (OUTPATIENT)
Dept: FAMILY MEDICINE | Facility: CLINIC | Age: 68
End: 2023-05-10
Payer: MEDICARE

## 2023-05-10 VITALS
HEIGHT: 69 IN | SYSTOLIC BLOOD PRESSURE: 122 MMHG | DIASTOLIC BLOOD PRESSURE: 64 MMHG | WEIGHT: 229.25 LBS | BODY MASS INDEX: 33.96 KG/M2

## 2023-05-10 DIAGNOSIS — I10 PRIMARY HYPERTENSION: ICD-10-CM

## 2023-05-10 DIAGNOSIS — M48.061 SPINAL STENOSIS OF LUMBAR REGION WITHOUT NEUROGENIC CLAUDICATION: ICD-10-CM

## 2023-05-10 DIAGNOSIS — Z00.00 ENCOUNTER FOR PREVENTIVE HEALTH EXAMINATION: Primary | ICD-10-CM

## 2023-05-10 DIAGNOSIS — E78.00 HYPERCHOLESTEROLEMIA: ICD-10-CM

## 2023-05-10 DIAGNOSIS — G47.33 OBSTRUCTIVE SLEEP APNEA: ICD-10-CM

## 2023-05-10 DIAGNOSIS — Z00.00 ENCOUNTER FOR MEDICARE ANNUAL WELLNESS EXAM: ICD-10-CM

## 2023-05-10 DIAGNOSIS — R01.1 SYSTOLIC MURMUR: ICD-10-CM

## 2023-05-10 DIAGNOSIS — M47.812 CERVICAL SPONDYLOSIS: ICD-10-CM

## 2023-05-10 DIAGNOSIS — G25.0 ESSENTIAL TREMOR: ICD-10-CM

## 2023-05-10 DIAGNOSIS — R73.03 PRE-DIABETES: ICD-10-CM

## 2023-05-10 DIAGNOSIS — E66.9 OBESITY, CLASS I, BMI 30-34.9: ICD-10-CM

## 2023-05-10 PROCEDURE — G0439 PR MEDICARE ANNUAL WELLNESS SUBSEQUENT VISIT: ICD-10-PCS | Mod: ,,, | Performed by: FAMILY MEDICINE

## 2023-05-10 PROCEDURE — 4010F ACE/ARB THERAPY RXD/TAKEN: CPT | Mod: CPTII,,, | Performed by: FAMILY MEDICINE

## 2023-05-10 PROCEDURE — 3078F PR MOST RECENT DIASTOLIC BLOOD PRESSURE < 80 MM HG: ICD-10-PCS | Mod: CPTII,,, | Performed by: FAMILY MEDICINE

## 2023-05-10 PROCEDURE — 3074F PR MOST RECENT SYSTOLIC BLOOD PRESSURE < 130 MM HG: ICD-10-PCS | Mod: CPTII,,, | Performed by: FAMILY MEDICINE

## 2023-05-10 PROCEDURE — 3078F DIAST BP <80 MM HG: CPT | Mod: CPTII,,, | Performed by: FAMILY MEDICINE

## 2023-05-10 PROCEDURE — 3074F SYST BP LT 130 MM HG: CPT | Mod: CPTII,,, | Performed by: FAMILY MEDICINE

## 2023-05-10 PROCEDURE — G0439 PPPS, SUBSEQ VISIT: HCPCS | Mod: ,,, | Performed by: FAMILY MEDICINE

## 2023-05-10 PROCEDURE — 3008F BODY MASS INDEX DOCD: CPT | Mod: CPTII,,, | Performed by: FAMILY MEDICINE

## 2023-05-10 PROCEDURE — 1160F PR REVIEW ALL MEDS BY PRESCRIBER/CLIN PHARMACIST DOCUMENTED: ICD-10-PCS | Mod: CPTII,,, | Performed by: FAMILY MEDICINE

## 2023-05-10 PROCEDURE — 1101F PR PT FALLS ASSESS DOC 0-1 FALLS W/OUT INJ PAST YR: ICD-10-PCS | Mod: CPTII,,, | Performed by: FAMILY MEDICINE

## 2023-05-10 PROCEDURE — 1170F PR FUNCTIONAL STATUS ASSESSED: ICD-10-PCS | Mod: CPTII,,, | Performed by: FAMILY MEDICINE

## 2023-05-10 PROCEDURE — 1125F PR PAIN SEVERITY QUANTIFIED, PAIN PRESENT: ICD-10-PCS | Mod: CPTII,,, | Performed by: FAMILY MEDICINE

## 2023-05-10 PROCEDURE — 1160F RVW MEDS BY RX/DR IN RCRD: CPT | Mod: CPTII,,, | Performed by: FAMILY MEDICINE

## 2023-05-10 PROCEDURE — 1101F PT FALLS ASSESS-DOCD LE1/YR: CPT | Mod: CPTII,,, | Performed by: FAMILY MEDICINE

## 2023-05-10 PROCEDURE — 3288F FALL RISK ASSESSMENT DOCD: CPT | Mod: CPTII,,, | Performed by: FAMILY MEDICINE

## 2023-05-10 PROCEDURE — 1159F PR MEDICATION LIST DOCUMENTED IN MEDICAL RECORD: ICD-10-PCS | Mod: CPTII,,, | Performed by: FAMILY MEDICINE

## 2023-05-10 PROCEDURE — 1125F AMNT PAIN NOTED PAIN PRSNT: CPT | Mod: CPTII,,, | Performed by: FAMILY MEDICINE

## 2023-05-10 PROCEDURE — 3288F PR FALLS RISK ASSESSMENT DOCUMENTED: ICD-10-PCS | Mod: CPTII,,, | Performed by: FAMILY MEDICINE

## 2023-05-10 PROCEDURE — 3008F PR BODY MASS INDEX (BMI) DOCUMENTED: ICD-10-PCS | Mod: CPTII,,, | Performed by: FAMILY MEDICINE

## 2023-05-10 PROCEDURE — 1159F MED LIST DOCD IN RCRD: CPT | Mod: CPTII,,, | Performed by: FAMILY MEDICINE

## 2023-05-10 PROCEDURE — 4010F PR ACE/ARB THEARPY RXD/TAKEN: ICD-10-PCS | Mod: CPTII,,, | Performed by: FAMILY MEDICINE

## 2023-05-10 PROCEDURE — 1170F FXNL STATUS ASSESSED: CPT | Mod: CPTII,,, | Performed by: FAMILY MEDICINE

## 2023-05-10 NOTE — PATIENT INSTRUCTIONS
Counseling and Referral of Other Preventative  (Italic type indicates deductible and co-insurance are waived)    Patient Name: Moris Calderón  Today's Date: 5/10/2023    Health Maintenance       Date Due Completion Date    Hemoglobin A1c (Prediabetes) 08/04/2023 8/4/2022    Colorectal Cancer Screening 07/27/2027 7/27/2022    Lipid Panel 08/04/2027 8/4/2022    TETANUS VACCINE 09/09/2029 9/9/2019        No orders of the defined types were placed in this encounter.      The following information is provided to all patients.  This information is to help you find resources for any of the problems found today that may be affecting your health:                Living healthy guide: www.[facility].[place].gov      Understanding Diabetes: www.diabetes.org      Eating healthy: www.cdc.gov/healthyweight      CDC home safety checklist: www.cdc.gov/steadi/patient.html      Agency on Aging: www.goea.[place].[facility]      Alcoholics anonymous (AA): www.aa.org      Physical Activity: www.darius.nih.gov/ls5iuxm      Tobacco use: www.quitwithusla.org

## 2023-05-10 NOTE — PROGRESS NOTES
"  Moris Calderón presented for a  Medicare AWV and comprehensive Health Risk Assessment today. The following components were reviewed and updated:    Medical history  Family History  Social history  Allergies and Current Medications  Health Risk Assessment  Health Maintenance  Care Team         ** See Completed Assessments for Annual Wellness Visit within the encounter summary.**         The following assessments were completed:  Living Situation  CAGE  Depression Screening  Timed Get Up and Go  Whisper Test  Cognitive Function Screening  Nutrition Screening  ADL Screening  PAQ Screening          Vitals:    05/10/23 1038   BP: 122/64   BP Location: Left arm   Patient Position: Sitting   BP Method: Medium (Manual)   Weight: 104 kg (229 lb 4.5 oz)   Height: 5' 9" (1.753 m)     Body mass index is 33.86 kg/m².  Physical Exam  Vitals reviewed.   Constitutional:       Appearance: Normal appearance.   HENT:      Head: Normocephalic and atraumatic.   Eyes:      Pupils: Pupils are equal, round, and reactive to light.   Pulmonary:      Effort: Pulmonary effort is normal. No respiratory distress.   Skin:     General: Skin is warm and dry.   Neurological:      General: No focal deficit present.      Mental Status: He is alert and oriented to person, place, and time.   Psychiatric:         Mood and Affect: Mood normal.         Behavior: Behavior normal.     The 10-year ASCVD risk score (Trupti COOLEY, et al., 2019) is: 17.6%    Values used to calculate the score:      Age: 67 years      Sex: Male      Is Non- : No      Diabetic: No      Tobacco smoker: No      Systolic Blood Pressure: 122 mmHg      Is BP treated: Yes      HDL Cholesterol: 41 mg/dL      Total Cholesterol: 182 mg/dL        Diagnoses and health risks identified today and associated recommendations/orders:    1. Encounter for preventive health examination    2. Encounter for Medicare annual wellness exam  - Ambulatory Referral/Consult to Enhanced " Annual Wellness Visit (eAWV)    3. Obesity, Class I, BMI 30-34.9  Body mass index is 33.86 kg/m².  Continue healthy diet and regular exercise as tolerated.  Continue medications as prescribed.  Follow up with PCP, Giovanni Kumar DO     4. Primary hypertension  Stable  Continue medications as prescribed.  Follow up with PCP     5. Systolic murmur  Stable  Continue medications as prescribed.  Follow up with PCP and cardiology, Dr Miles    6. Hypercholesterolemia  Stable  Continue medications as prescribed.  Follow up with PCP and cardio    7. Pre-diabetes  Stable  Continue medications as prescribed.  Follow up with PCP     8. Essential tremor  Stable  Continue medications as prescribed.  Follow up with PCP and neuro, Dr Brumfield    9. Spinal stenosis of lumbar region without neurogenic claudication  Stable  Continue medications as prescribed.  Follow up with PCP and neuro    10. Cervical spondylosis  Stable  Continue medications as prescribed.  Follow up with PCP and neuro    11. Obstructive sleep apnea  Improved with implant  Continue medications as prescribed.  Follow up with PCP and pulmonology, Dr Velze      Provided Moris with a 5-10 year written screening schedule and personal prevention plan. Recommendations were developed using the USPSTF age appropriate recommendations. Education, counseling, and referrals were provided as needed. After Visit Summary printed and given to patient which includes a list of additional screenings\tests needed.    Follow up if symptoms worsen or fail to improve, for 1 year for AWV, scheduled appt.    Dayanara Lala NP        Future Appointments       Date Provider Specialty Appt Notes    5/29/2023 Landon Winchester MD Pain Medicine Follow up    6/29/2023 Deepak Miles MD Cardiology 6 [facility] F/U     8/14/2023  Lab /////per Dr. Kumar              Review for Opioid Screening: Pt does not have Rx for Opioids/addictive substances  Review for Substance Use Disorders: Pt does  not have Rx for Opioids/addictive substances     I offered to discuss advanced care planning, including how to pick a person who would make decisions for you if you were unable to make them for yourself, called a health care power of , and what kind of decisions you might make such as use of life sustaining treatments such as ventilators and tube feeding when faced with a life limiting illness recorded on a living will that they will need to know. (How you want to be cared for as you near the end of your natural life)     X  Patient has advanced directives written and agrees to provide copies to the institution.

## 2023-05-24 DIAGNOSIS — R35.0 URINARY FREQUENCY: ICD-10-CM

## 2023-05-24 RX ORDER — TAMSULOSIN HYDROCHLORIDE 0.4 MG/1
CAPSULE ORAL
Qty: 90 CAPSULE | Refills: 3 | Status: SHIPPED | OUTPATIENT
Start: 2023-05-24

## 2023-05-24 NOTE — TELEPHONE ENCOUNTER
Refill Decision Note   Moris Calderón  is requesting a refill authorization.  Brief Assessment and Rationale for Refill:  Approve     Medication Therapy Plan:  FLOS      Comments:     Note composed:10:08 AM 05/24/2023

## 2023-05-24 NOTE — TELEPHONE ENCOUNTER
Care Due:                  Date            Visit Type   Department     Provider  --------------------------------------------------------------------------------                                ESTABLISHED                              PATIENT -    [facility]  Last Visit: 03-      [facility]       Giovanni Kumar  Next Visit: None Scheduled  None         None Found                                                            Last  Test          Frequency    Reason                     Performed    Due Date  --------------------------------------------------------------------------------    CMP.........  12 months..  atorvastatin.............  08- 07-    Lipid Panel.  12 months..  atorvastatin.............  08- 07-    [facility] Embedded Care Due Messages. Reference number: [id number].   5/24/2023 2:34:57 AM CDT

## 2023-05-31 DIAGNOSIS — F41.9 ANXIETY: ICD-10-CM

## 2023-05-31 RX ORDER — PAROXETINE HYDROCHLORIDE 40 MG/1
TABLET, FILM COATED ORAL
Qty: 90 TABLET | Refills: 3 | Status: SHIPPED | OUTPATIENT
Start: 2023-05-31

## 2023-05-31 NOTE — TELEPHONE ENCOUNTER
No care due was identified.  [facility] Embedded Care Due Messages. Reference number: [id number].   5/31/2023 2:31:41 AM CDT

## 2023-05-31 NOTE — TELEPHONE ENCOUNTER
Refill Decision Note   Moris Calderón  is requesting a refill authorization.  Brief Assessment and Rationale for Refill:  Approve     Medication Therapy Plan:         Comments:     Note composed:10:20 AM 05/31/2023

## 2023-07-06 DIAGNOSIS — F41.9 ANXIETY: ICD-10-CM

## 2023-07-06 DIAGNOSIS — E78.00 HYPERCHOLESTEROLEMIA: ICD-10-CM

## 2023-07-06 RX ORDER — PAROXETINE 10 MG/1
TABLET, FILM COATED ORAL
Qty: 90 TABLET | Refills: 2 | Status: SHIPPED | OUTPATIENT
Start: 2023-07-06

## 2023-07-06 RX ORDER — ATORVASTATIN CALCIUM 10 MG/1
TABLET, FILM COATED ORAL
Qty: 90 TABLET | Refills: 0 | Status: SHIPPED | OUTPATIENT
Start: 2023-07-06 | End: 2023-11-30

## 2023-07-06 NOTE — TELEPHONE ENCOUNTER
No care due was identified.  [facility] Embedded Care Due Messages. Reference number: [id number].   7/06/2023 2:34:32 AM CDT

## 2023-07-11 ENCOUNTER — TELEPHONE (OUTPATIENT)
Dept: FAMILY MEDICINE | Facility: CLINIC | Age: 68
End: 2023-07-11
Payer: MEDICARE

## 2023-07-11 NOTE — TELEPHONE ENCOUNTER
----- Message from Michael Hall sent at 7/11/2023 12:29 PM CDT -----  Type: Patient Call Back         Who called: Pt spouse uNvia Calderón         What is the request in detail: Pt called in regarding up coming appointment on 7/13. Pt would liek to know on if he can come in at a later time may at 3:30pm          Can the clinic reply by [facility]?no          Would the patient rather a call back or a response via [facility]? Call back          Best call back number:[phone]         Additional Information:           Thank You

## 2023-07-11 NOTE — TELEPHONE ENCOUNTER
Spoke with patient, informed him that we unfortunately do not have any other available times on that day. Patient verbalized understanding and stated that he would try to make the appointment and cancel if need be.

## 2023-07-13 ENCOUNTER — OFFICE VISIT (OUTPATIENT)
Dept: FAMILY MEDICINE | Facility: CLINIC | Age: 68
End: 2023-07-13
Payer: MEDICARE

## 2023-07-13 ENCOUNTER — HOSPITAL ENCOUNTER (OUTPATIENT)
Dept: RADIOLOGY | Facility: HOSPITAL | Age: 68
Discharge: HOME | End: 2023-07-13
Attending: INTERNAL MEDICINE
Payer: MEDICARE

## 2023-07-13 VITALS
BODY MASS INDEX: 33.75 KG/M2 | SYSTOLIC BLOOD PRESSURE: 130 MMHG | HEIGHT: 69 IN | OXYGEN SATURATION: 96 % | WEIGHT: 227.88 LBS | HEART RATE: 68 BPM | DIASTOLIC BLOOD PRESSURE: 80 MMHG

## 2023-07-13 DIAGNOSIS — R10.32 LEFT LOWER QUADRANT PAIN: Primary | ICD-10-CM

## 2023-07-13 DIAGNOSIS — R10.13 EPIGASTRIC PAIN: ICD-10-CM

## 2023-07-13 PROBLEM — K76.0 HEPATIC STEATOSIS: Status: ACTIVE | Noted: 2023-07-13

## 2023-07-13 PROCEDURE — 3075F PR MOST RECENT SYSTOLIC BLOOD PRESS GE 130-139MM HG: ICD-10-PCS | Mod: HCNC,CPTII,S$GLB, | Performed by: INTERNAL MEDICINE

## 2023-07-13 PROCEDURE — 4010F PR ACE/ARB THEARPY RXD/TAKEN: ICD-10-PCS | Mod: HCNC,CPTII,S$GLB, | Performed by: INTERNAL MEDICINE

## 2023-07-13 PROCEDURE — 1159F PR MEDICATION LIST DOCUMENTED IN MEDICAL RECORD: ICD-10-PCS | Mod: HCNC,CPTII,S$GLB, | Performed by: INTERNAL MEDICINE

## 2023-07-13 PROCEDURE — 1160F RVW MEDS BY RX/DR IN RCRD: CPT | Mod: HCNC,CPTII,S$GLB, | Performed by: INTERNAL MEDICINE

## 2023-07-13 PROCEDURE — 3008F PR BODY MASS INDEX (BMI) DOCUMENTED: ICD-10-PCS | Mod: HCNC,CPTII,S$GLB, | Performed by: INTERNAL MEDICINE

## 2023-07-13 PROCEDURE — 99999 PR PBB SHADOW E&M-EST. PATIENT-LVL IV: ICD-10-PCS | Mod: PBBFAC,HCNC,, | Performed by: INTERNAL MEDICINE

## 2023-07-13 PROCEDURE — 1126F AMNT PAIN NOTED NONE PRSNT: CPT | Mod: HCNC,CPTII,S$GLB, | Performed by: INTERNAL MEDICINE

## 2023-07-13 PROCEDURE — 1160F PR REVIEW ALL MEDS BY PRESCRIBER/CLIN PHARMACIST DOCUMENTED: ICD-10-PCS | Mod: HCNC,CPTII,S$GLB, | Performed by: INTERNAL MEDICINE

## 2023-07-13 PROCEDURE — 76705 US ABDOMEN LIMITED: ICD-10-PCS | Mod: 26,HCNC,, | Performed by: RADIOLOGY

## 2023-07-13 PROCEDURE — 99214 OFFICE O/P EST MOD 30 MIN: CPT | Mod: HCNC,S$GLB,, | Performed by: INTERNAL MEDICINE

## 2023-07-13 PROCEDURE — 3288F FALL RISK ASSESSMENT DOCD: CPT | Mod: HCNC,CPTII,S$GLB, | Performed by: INTERNAL MEDICINE

## 2023-07-13 PROCEDURE — 1101F PR PT FALLS ASSESS DOC 0-1 FALLS W/OUT INJ PAST YR: ICD-10-PCS | Mod: HCNC,CPTII,S$GLB, | Performed by: INTERNAL MEDICINE

## 2023-07-13 PROCEDURE — 3079F DIAST BP 80-89 MM HG: CPT | Mod: HCNC,CPTII,S$GLB, | Performed by: INTERNAL MEDICINE

## 2023-07-13 PROCEDURE — 99999 PR PBB SHADOW E&M-EST. PATIENT-LVL IV: CPT | Mod: PBBFAC,HCNC,, | Performed by: INTERNAL MEDICINE

## 2023-07-13 PROCEDURE — 1101F PT FALLS ASSESS-DOCD LE1/YR: CPT | Mod: HCNC,CPTII,S$GLB, | Performed by: INTERNAL MEDICINE

## 2023-07-13 PROCEDURE — 76705 ECHO EXAM OF ABDOMEN: CPT | Mod: TC,HCNC,PO

## 2023-07-13 PROCEDURE — 3288F PR FALLS RISK ASSESSMENT DOCUMENTED: ICD-10-PCS | Mod: HCNC,CPTII,S$GLB, | Performed by: INTERNAL MEDICINE

## 2023-07-13 PROCEDURE — 76705 ECHO EXAM OF ABDOMEN: CPT | Mod: 26,HCNC,, | Performed by: RADIOLOGY

## 2023-07-13 PROCEDURE — 3075F SYST BP GE 130 - 139MM HG: CPT | Mod: HCNC,CPTII,S$GLB, | Performed by: INTERNAL MEDICINE

## 2023-07-13 PROCEDURE — 99214 PR OFFICE/OUTPT VISIT, EST, LEVL IV, 30-39 MIN: ICD-10-PCS | Mod: HCNC,S$GLB,, | Performed by: INTERNAL MEDICINE

## 2023-07-13 PROCEDURE — 1126F PR PAIN SEVERITY QUANTIFIED, NO PAIN PRESENT: ICD-10-PCS | Mod: HCNC,CPTII,S$GLB, | Performed by: INTERNAL MEDICINE

## 2023-07-13 PROCEDURE — 3008F BODY MASS INDEX DOCD: CPT | Mod: HCNC,CPTII,S$GLB, | Performed by: INTERNAL MEDICINE

## 2023-07-13 PROCEDURE — 4010F ACE/ARB THERAPY RXD/TAKEN: CPT | Mod: HCNC,CPTII,S$GLB, | Performed by: INTERNAL MEDICINE

## 2023-07-13 PROCEDURE — 1159F MED LIST DOCD IN RCRD: CPT | Mod: HCNC,CPTII,S$GLB, | Performed by: INTERNAL MEDICINE

## 2023-07-13 PROCEDURE — 3079F PR MOST RECENT DIASTOLIC BLOOD PRESSURE 80-89 MM HG: ICD-10-PCS | Mod: HCNC,CPTII,S$GLB, | Performed by: INTERNAL MEDICINE

## 2023-07-13 NOTE — PROGRESS NOTES
Patient ID: Moris Calderón is a 67 y.o. male.    Chief Complaint: Diarrhea (Pt states he has had diarrhea right after he eats for about a little over a month ) and Abdominal Pain        Assessment and Plan      1. Left lower quadrant pain    2. Epigastric pain    Differential includes diverticulitis, gallbladder disease, and gastritis,  Check labs today, right upper quadrant ultrasound and CT abdomen pelvis. Continue Protonix. Close follow up      HPI     Having abd pain and LLQ and epigastric. + diarrhea. No fevers.  Onset 6 weeks ago. Every time after he eats. +light colored stools at times.  EGD in March showed gastritis    Review of Systems   Constitutional:  Negative for fever.   Respiratory:  Negative for shortness of breath.    Cardiovascular:  Negative for chest pain.   Gastrointestinal:  Positive for abdominal pain and diarrhea.      Objective     Vitals:    07/13/23 1315   BP: 130/80   Pulse: 68     Wt Readings from Last 3 Encounters:   07/13/23 1315 103.4 kg (227 lb 13.5 oz)   05/10/23 1038 104 kg (229 lb 4.5 oz)   03/14/23 1409 99.3 kg (219 lb)      Body mass index is 33.65 kg/m².   Physical Exam  Cardiovascular:      Rate and Rhythm: Normal rate and regular rhythm.      Heart sounds: No murmur heard.    No gallop.   Pulmonary:      Breath sounds: Normal breath sounds. No wheezing or rhonchi.   Abdominal:      Palpations: Abdomen is soft.      Tenderness: There is no abdominal tenderness.        Orders     Moris was seen today for diarrhea and abdominal pain.    Diagnoses and all orders for this visit:    Left lower quadrant pain  -     CT Abdomen Pelvis With Contrast; Future    Epigastric pain  -     US Abdomen Limited; Future  -     LIPASE; Future         Medication List with Changes/Refills   Current Medications    AMLODIPINE (NORVASC) 2.5 MG TABLET    Take 1 tablet (2.5 mg total) by mouth once daily.    ATORVASTATIN (LIPITOR) 10 MG TABLET    TAKE 1 TABLET ONE TIME DAILY    BENAZEPRIL (LOTENSIN) 40 MG  TABLET    Take 1 tablet (40 mg total) by mouth once daily.    CETIRIZINE (ZYRTEC) 10 MG TABLET    Take 1 tablet (10 mg total) by mouth daily as needed for Allergies.    FLUTICASONE PROPIONATE (FLONASE) 50 MCG/ACTUATION NASAL SPRAY    SHAKE LIQUID AND USE 1 SPRAY IN EACH NOSTRIL EVERY DAY    GABAPENTIN (NEURONTIN) 300 MG CAPSULE    Take 1 capsule (300 mg total) by mouth every evening.    PANTOPRAZOLE (PROTONIX) 40 MG TABLET    TAKE 1 TABLET(40 MG) BY MOUTH BEFORE BREAKFAST    PAROXETINE (PAXIL) 10 MG TABLET    TAKE 1 TABLET EVERY MORNING    PAROXETINE (PAXIL) 40 MG TABLET    TAKE 1 TABLET ONE TIME DAILY    PRIMIDONE (MYSOLINE) 50 MG TAB    Initially take 50 mg but can increase dose gradually (eg, every 3 to 7 days) based on response and tolerability in increments of 25 mg. Max 150 mg    TAMSULOSIN (FLOMAX) 0.4 MG CAP    TAKE 1 CAPSULE ONE TIME DAILY    TIZANIDINE (ZANAFLEX) 4 MG TABLET    TAKE 1 TABLET(4 MG) BY MOUTH EVERY NIGHT AS NEEDED       I personally reviewed past medical, family and social history.    Patient Active Problem List   Diagnosis    Hypertension    Hypercholesterolemia    Spinal stenosis of lumbar region without neurogenic claudication    Lumbar radiculopathy    Lumbar spondylosis    Anxiety    Systolic murmur    Cervical spondylosis    Lower urinary tract symptoms (LUTS)    Obstructive sleep apnea    Pre-diabetes    Essential tremor

## 2023-07-19 ENCOUNTER — HOSPITAL ENCOUNTER (OUTPATIENT)
Dept: RADIOLOGY | Facility: HOSPITAL | Age: 68
Discharge: HOME | End: 2023-07-19
Attending: INTERNAL MEDICINE
Payer: MEDICARE

## 2023-07-19 DIAGNOSIS — R10.32 LEFT LOWER QUADRANT PAIN: ICD-10-CM

## 2023-07-19 PROCEDURE — A9698 NON-RAD CONTRAST MATERIALNOC: HCPCS | Mod: HCNC,PO | Performed by: INTERNAL MEDICINE

## 2023-07-19 PROCEDURE — 74177 CT ABDOMEN PELVIS WITH CONTRAST: ICD-10-PCS | Mod: 26,HCNC,, | Performed by: RADIOLOGY

## 2023-07-19 PROCEDURE — 74177 CT ABD & PELVIS W/CONTRAST: CPT | Mod: 26,HCNC,, | Performed by: RADIOLOGY

## 2023-07-19 PROCEDURE — 25500020 PHARM REV CODE 255: Mod: HCNC,PO | Performed by: INTERNAL MEDICINE

## 2023-07-19 PROCEDURE — 74177 CT ABD & PELVIS W/CONTRAST: CPT | Mod: TC,HCNC,PO

## 2023-07-19 RX ADMIN — IOHEXOL 100 ML: 350 INJECTION, SOLUTION INTRAVENOUS at 03:07

## 2023-07-19 RX ADMIN — IOHEXOL 1000 ML: 12 SOLUTION ORAL at 03:07

## 2023-07-27 ENCOUNTER — OFFICE VISIT (OUTPATIENT)
Dept: FAMILY MEDICINE | Facility: CLINIC | Age: 68
End: 2023-07-27
Payer: MEDICARE

## 2023-07-27 VITALS
BODY MASS INDEX: 33.27 KG/M2 | SYSTOLIC BLOOD PRESSURE: 96 MMHG | RESPIRATION RATE: 18 BRPM | DIASTOLIC BLOOD PRESSURE: 64 MMHG | HEART RATE: 62 BPM | OXYGEN SATURATION: 98 % | WEIGHT: 225.31 LBS

## 2023-07-27 DIAGNOSIS — R73.03 PREDIABETES: ICD-10-CM

## 2023-07-27 DIAGNOSIS — R93.5 ABNORMAL CT OF THE ABDOMEN: Primary | ICD-10-CM

## 2023-07-27 DIAGNOSIS — R10.9 ABDOMINAL PAIN, UNSPECIFIED ABDOMINAL LOCATION: ICD-10-CM

## 2023-07-27 PROCEDURE — 3078F DIAST BP <80 MM HG: CPT | Mod: HCNC,CPTII,S$GLB, | Performed by: INTERNAL MEDICINE

## 2023-07-27 PROCEDURE — 3288F FALL RISK ASSESSMENT DOCD: CPT | Mod: HCNC,CPTII,S$GLB, | Performed by: INTERNAL MEDICINE

## 2023-07-27 PROCEDURE — 3074F PR MOST RECENT SYSTOLIC BLOOD PRESSURE < 130 MM HG: ICD-10-PCS | Mod: HCNC,CPTII,S$GLB, | Performed by: INTERNAL MEDICINE

## 2023-07-27 PROCEDURE — 3044F PR MOST RECENT HEMOGLOBIN A1C LEVEL <7.0%: ICD-10-PCS | Mod: HCNC,CPTII,S$GLB, | Performed by: INTERNAL MEDICINE

## 2023-07-27 PROCEDURE — 3288F PR FALLS RISK ASSESSMENT DOCUMENTED: ICD-10-PCS | Mod: HCNC,CPTII,S$GLB, | Performed by: INTERNAL MEDICINE

## 2023-07-27 PROCEDURE — 3078F PR MOST RECENT DIASTOLIC BLOOD PRESSURE < 80 MM HG: ICD-10-PCS | Mod: HCNC,CPTII,S$GLB, | Performed by: INTERNAL MEDICINE

## 2023-07-27 PROCEDURE — 3044F HG A1C LEVEL LT 7.0%: CPT | Mod: HCNC,CPTII,S$GLB, | Performed by: INTERNAL MEDICINE

## 2023-07-27 PROCEDURE — 99214 OFFICE O/P EST MOD 30 MIN: CPT | Mod: HCNC,S$GLB,, | Performed by: INTERNAL MEDICINE

## 2023-07-27 PROCEDURE — 99999 PR PBB SHADOW E&M-EST. PATIENT-LVL III: ICD-10-PCS | Mod: PBBFAC,HCNC,, | Performed by: INTERNAL MEDICINE

## 2023-07-27 PROCEDURE — 99999 PR PBB SHADOW E&M-EST. PATIENT-LVL III: CPT | Mod: PBBFAC,HCNC,, | Performed by: INTERNAL MEDICINE

## 2023-07-27 PROCEDURE — 3008F PR BODY MASS INDEX (BMI) DOCUMENTED: ICD-10-PCS | Mod: HCNC,CPTII,S$GLB, | Performed by: INTERNAL MEDICINE

## 2023-07-27 PROCEDURE — 1101F PR PT FALLS ASSESS DOC 0-1 FALLS W/OUT INJ PAST YR: ICD-10-PCS | Mod: HCNC,CPTII,S$GLB, | Performed by: INTERNAL MEDICINE

## 2023-07-27 PROCEDURE — 4010F ACE/ARB THERAPY RXD/TAKEN: CPT | Mod: HCNC,CPTII,S$GLB, | Performed by: INTERNAL MEDICINE

## 2023-07-27 PROCEDURE — 1159F MED LIST DOCD IN RCRD: CPT | Mod: HCNC,CPTII,S$GLB, | Performed by: INTERNAL MEDICINE

## 2023-07-27 PROCEDURE — 1160F RVW MEDS BY RX/DR IN RCRD: CPT | Mod: HCNC,CPTII,S$GLB, | Performed by: INTERNAL MEDICINE

## 2023-07-27 PROCEDURE — 3008F BODY MASS INDEX DOCD: CPT | Mod: HCNC,CPTII,S$GLB, | Performed by: INTERNAL MEDICINE

## 2023-07-27 PROCEDURE — 99214 PR OFFICE/OUTPT VISIT, EST, LEVL IV, 30-39 MIN: ICD-10-PCS | Mod: HCNC,S$GLB,, | Performed by: INTERNAL MEDICINE

## 2023-07-27 PROCEDURE — 1160F PR REVIEW ALL MEDS BY PRESCRIBER/CLIN PHARMACIST DOCUMENTED: ICD-10-PCS | Mod: HCNC,CPTII,S$GLB, | Performed by: INTERNAL MEDICINE

## 2023-07-27 PROCEDURE — 4010F PR ACE/ARB THEARPY RXD/TAKEN: ICD-10-PCS | Mod: HCNC,CPTII,S$GLB, | Performed by: INTERNAL MEDICINE

## 2023-07-27 PROCEDURE — 1101F PT FALLS ASSESS-DOCD LE1/YR: CPT | Mod: HCNC,CPTII,S$GLB, | Performed by: INTERNAL MEDICINE

## 2023-07-27 PROCEDURE — 3074F SYST BP LT 130 MM HG: CPT | Mod: HCNC,CPTII,S$GLB, | Performed by: INTERNAL MEDICINE

## 2023-07-27 PROCEDURE — 1159F PR MEDICATION LIST DOCUMENTED IN MEDICAL RECORD: ICD-10-PCS | Mod: HCNC,CPTII,S$GLB, | Performed by: INTERNAL MEDICINE

## 2023-07-27 NOTE — PROGRESS NOTES
Patient ID: Moris Calderón is a 67 y.o. male.    Chief Complaint: Follow-up        Assessment and Plan      1. Abnormal CT of the abdomen    2. Abdominal pain, unspecified abdominal location    3. Prediabetes       Reassurance of findings below  Monitor abdominal pain, continue Protonix  Reduce carbohydrates, continue with weight loss, monitor A1c     HPI     Last visit was having left lower quadrant and epigastric pain.  Ultrasound did not reveal any gallstones or wall thickening.     CT showed    1. Fat containing inguinal hernia left greater than right with bilateral hydroceles suspected.  If necessary ultrasound evaluation could be performed.  2. Apparent bladder wall thickening diffusely that could relate to lack of distension, urinary tract infection, or postobstructive change  3. Apparent wall thick rectum likely from lack of distension, diffuse inflammatory process as can be seen with a colitis would be difficult to entirely exclude.  4. Multiple colonic diverticulum without obvious inflammatory change  5. Decreased liver density as can be seen with fatty infiltration of the liver  6. 4 mm pulmonary nodule.  In a low risk patient no further follow-up would be necessary.  In a high-risk patient follow-up in 1 year for size stability could be performed  7. Multiple renal hypodensities several too small to accurately categorize all statistically favored relate to cysts without worrisome characteristics.  8. Nonobstructive right renal stone of 4 mm  9. Fat containing umbilical hernia    Reviewed with patient     Diarrhea and abd has improved.   Prediabetes on labs.     Review of Systems   Constitutional:  Negative for fever.   Respiratory:  Negative for shortness of breath.    Cardiovascular:  Negative for chest pain.   Gastrointestinal:  Negative for abdominal pain.      Objective     Vitals:    07/27/23 1005   BP: 96/64   Pulse: 62   Resp: 18     Wt Readings from Last 3 Encounters:   07/27/23 1005 102.2 kg  (225 lb 5 oz)   07/13/23 1315 103.4 kg (227 lb 13.5 oz)   05/10/23 1038 104 kg (229 lb 4.5 oz)      Body mass index is 33.27 kg/m².   Physical Exam  Cardiovascular:      Rate and Rhythm: Normal rate and regular rhythm.      Heart sounds: No murmur heard.    No gallop.   Pulmonary:      Breath sounds: Normal breath sounds. No wheezing or rhonchi.   Abdominal:      Palpations: Abdomen is soft.      Tenderness: There is no abdominal tenderness.        Orders     Moris was seen today for follow-up.    Diagnoses and all orders for this visit:    Abnormal CT of the abdomen    Abdominal pain, unspecified abdominal location    Prediabetes  -     Hemoglobin A1C; Future         Medication List with Changes/Refills   Current Medications    AMLODIPINE (NORVASC) 2.5 MG TABLET    Take 1 tablet (2.5 mg total) by mouth once daily.    ATORVASTATIN (LIPITOR) 10 MG TABLET    TAKE 1 TABLET ONE TIME DAILY    BENAZEPRIL (LOTENSIN) 40 MG TABLET    Take 1 tablet (40 mg total) by mouth once daily.    CETIRIZINE (ZYRTEC) 10 MG TABLET    Take 1 tablet (10 mg total) by mouth daily as needed for Allergies.    FLUTICASONE PROPIONATE (FLONASE) 50 MCG/ACTUATION NASAL SPRAY    SHAKE LIQUID AND USE 1 SPRAY IN EACH NOSTRIL EVERY DAY    PANTOPRAZOLE (PROTONIX) 40 MG TABLET    TAKE 1 TABLET(40 MG) BY MOUTH BEFORE BREAKFAST    PAROXETINE (PAXIL) 10 MG TABLET    TAKE 1 TABLET EVERY MORNING    PAROXETINE (PAXIL) 40 MG TABLET    TAKE 1 TABLET ONE TIME DAILY    PRIMIDONE (MYSOLINE) 50 MG TAB    Initially take 50 mg but can increase dose gradually (eg, every 3 to 7 days) based on response and tolerability in increments of 25 mg. Max 150 mg    TAMSULOSIN (FLOMAX) 0.4 MG CAP    TAKE 1 CAPSULE ONE TIME DAILY    TIZANIDINE (ZANAFLEX) 4 MG TABLET    TAKE 1 TABLET(4 MG) BY MOUTH EVERY NIGHT AS NEEDED   Discontinued Medications    GABAPENTIN (NEURONTIN) 300 MG CAPSULE    Take 1 capsule (300 mg total) by mouth every evening.       I personally reviewed past medical,  family and social history.    Patient Active Problem List   Diagnosis    Hypertension    Hypercholesterolemia    Spinal stenosis of lumbar region without neurogenic claudication    Lumbar radiculopathy    Lumbar spondylosis    Anxiety    Systolic murmur    Cervical spondylosis    Lower urinary tract symptoms (LUTS)    Obstructive sleep apnea    Pre-diabetes    Essential tremor    Hepatic steatosis

## 2023-07-27 NOTE — TELEPHONE ENCOUNTER
Refill Decision Note   Moris Calderón  is requesting a refill authorization.  Brief Assessment and Rationale for Refill:  Approve     Medication Therapy Plan:  Patient is on both Paxil 10+40 mg per LOV summary on 3/27/23    Medication Reconciliation Completed: No   Comments:     No Care Gaps recommended.     Note composed:10:52 AM 07/06/2023            N/A

## 2023-08-08 NOTE — TELEPHONE ENCOUNTER
Scheduled for 02/02/2023   Aklief Pregnancy And Lactation Text: It is unknown if this medication is safe to use during pregnancy.  It is unknown if this medication is excreted in breast milk.  Breastfeeding women should use the topical cream on the smallest area of the skin for the shortest time needed while breastfeeding.  Do not apply to nipple and areola.

## 2023-08-18 NOTE — DISCHARGE SUMMARY
[facility] - [facility]  Discharge Note  Short Stay    Procedure(s) (LRB):  Injection-steroid-epidural-lumbar L5/S1 (N/A)      OUTCOME: Patient tolerated treatment/procedure well without complication and is now ready for discharge.    DISPOSITION: Home or Self Care    FINAL DIAGNOSIS:  Lumbar radiculopathy    FOLLOWUP: In clinic    DISCHARGE INSTRUCTIONS:    Discharge Procedure Orders   Diet Adult Regular     No dressing needed     Notify your health care provider if you experience any of the following:  temperature >100.4     Activity as tolerated           [Fully active, able to carry on all pre-disease performance without restriction] : Status 0 - Fully active, able to carry on all pre-disease performance without restriction [Normal] : affect appropriate

## 2023-08-30 DIAGNOSIS — I10 PRIMARY HYPERTENSION: ICD-10-CM

## 2023-08-30 RX ORDER — BENAZEPRIL HYDROCHLORIDE 40 MG/1
40 TABLET ORAL
Qty: 90 TABLET | Refills: 3 | Status: SHIPPED | OUTPATIENT
Start: 2023-08-30

## 2023-08-30 NOTE — TELEPHONE ENCOUNTER
No care due was identified.  [facility] Embedded Care Due Messages. Reference number: [id number].   8/30/2023 2:52:43 AM CDT

## 2023-08-30 NOTE — TELEPHONE ENCOUNTER
Refill Decision Note   Morislea Calderón  is requesting a refill authorization.  Brief Assessment and Rationale for Refill:  Approve     Medication Therapy Plan:       Medication Reconciliation Completed: No   Comments:     No Care Gaps recommended.     Note composed:10:10 AM 08/30/2023

## 2023-10-09 DIAGNOSIS — R10.13 EPIGASTRIC PAIN: ICD-10-CM

## 2023-10-09 DIAGNOSIS — K44.9 HIATAL HERNIA: ICD-10-CM

## 2023-10-09 DIAGNOSIS — R12 HEARTBURN: ICD-10-CM

## 2023-10-10 RX ORDER — PANTOPRAZOLE SODIUM 40 MG/1
TABLET, DELAYED RELEASE ORAL
Qty: 90 TABLET | Refills: 10 | Status: SHIPPED | OUTPATIENT
Start: 2023-10-10

## 2023-11-01 DIAGNOSIS — I10 PRIMARY HYPERTENSION: ICD-10-CM

## 2023-11-01 RX ORDER — AMLODIPINE BESYLATE 2.5 MG/1
TABLET ORAL
Refills: 0 | OUTPATIENT
Start: 2023-11-01

## 2023-11-01 NOTE — TELEPHONE ENCOUNTER
Refill Decision Note   Moris Calderón  is requesting a refill authorization.  Brief Assessment and Rationale for Refill:  Quick Discontinue     Medication Therapy Plan:    Pharmacy is requesting new scripts for the following medications without required information, (sig/ frequency/qty/etc)      Medication Reconciliation Completed: No     Comments: Pharmacies have been requesting medications for patients without required information, (sig, frequency, qty, etc.). In addition, requests are sent for medication(s) pt. are currently not taking, and medications patients have never taken.    We have spoken to the pharmacies about these request types and advised their teams previously that we are unable to assess these New Script requests and require all details for these requests. This is a known issue and has been reported.     Note composed:9:53 AM 11/01/2023

## 2023-11-01 NOTE — TELEPHONE ENCOUNTER
No care due was identified.  [facility] Embedded Care Due Messages. Reference number: [id number].   11/01/2023 9:06:46 AM CDT

## 2023-11-30 DIAGNOSIS — E78.00 HYPERCHOLESTEROLEMIA: ICD-10-CM

## 2023-11-30 RX ORDER — ATORVASTATIN CALCIUM 10 MG/1
10 TABLET, FILM COATED ORAL DAILY
Qty: 90 TABLET | Refills: 2 | Status: SHIPPED | OUTPATIENT
Start: 2023-11-30

## 2023-11-30 NOTE — TELEPHONE ENCOUNTER
No care due was identified.  Health system Embedded Care Due Messages. Reference number: [id number].   11/30/2023 2:58:54 AM CST

## 2023-11-30 NOTE — TELEPHONE ENCOUNTER
Refill Decision Note   Moris Calderón  is requesting a refill authorization.  Brief Assessment and Rationale for Refill:  Approve     Medication Therapy Plan:         Comments:     Note composed:12:39 PM 11/30/2023

## 2024-01-29 ENCOUNTER — OFFICE VISIT (OUTPATIENT)
Dept: FAMILY MEDICINE | Facility: CLINIC | Age: 69
End: 2024-01-29
Payer: MEDICARE

## 2024-01-29 VITALS
OXYGEN SATURATION: 96 % | SYSTOLIC BLOOD PRESSURE: 124 MMHG | HEIGHT: 69 IN | DIASTOLIC BLOOD PRESSURE: 86 MMHG | BODY MASS INDEX: 34.74 KG/M2 | WEIGHT: 234.56 LBS | HEART RATE: 70 BPM

## 2024-01-29 DIAGNOSIS — Z13.220 ENCOUNTER FOR LIPID SCREENING FOR CARDIOVASCULAR DISEASE: ICD-10-CM

## 2024-01-29 DIAGNOSIS — I10 PRIMARY HYPERTENSION: ICD-10-CM

## 2024-01-29 DIAGNOSIS — Z13.6 ENCOUNTER FOR LIPID SCREENING FOR CARDIOVASCULAR DISEASE: ICD-10-CM

## 2024-01-29 DIAGNOSIS — R73.03 PREDIABETES: ICD-10-CM

## 2024-01-29 DIAGNOSIS — Z00.00 PREVENTATIVE HEALTH CARE: Primary | ICD-10-CM

## 2024-01-29 DIAGNOSIS — G47.33 OBSTRUCTIVE SLEEP APNEA: ICD-10-CM

## 2024-01-29 PROCEDURE — 4010F ACE/ARB THERAPY RXD/TAKEN: CPT | Mod: HCNC,CPTII,S$GLB, | Performed by: PHYSICIAN ASSISTANT

## 2024-01-29 PROCEDURE — 99999 PR PBB SHADOW E&M-EST. PATIENT-LVL III: CPT | Mod: PBBFAC,HCNC,, | Performed by: PHYSICIAN ASSISTANT

## 2024-01-29 PROCEDURE — 3008F BODY MASS INDEX DOCD: CPT | Mod: HCNC,CPTII,S$GLB, | Performed by: PHYSICIAN ASSISTANT

## 2024-01-29 PROCEDURE — 99214 OFFICE O/P EST MOD 30 MIN: CPT | Mod: HCNC,S$GLB,, | Performed by: PHYSICIAN ASSISTANT

## 2024-01-29 PROCEDURE — 1125F AMNT PAIN NOTED PAIN PRSNT: CPT | Mod: HCNC,CPTII,S$GLB, | Performed by: PHYSICIAN ASSISTANT

## 2024-01-29 PROCEDURE — 3074F SYST BP LT 130 MM HG: CPT | Mod: HCNC,CPTII,S$GLB, | Performed by: PHYSICIAN ASSISTANT

## 2024-01-29 PROCEDURE — 1159F MED LIST DOCD IN RCRD: CPT | Mod: HCNC,CPTII,S$GLB, | Performed by: PHYSICIAN ASSISTANT

## 2024-01-29 PROCEDURE — 3288F FALL RISK ASSESSMENT DOCD: CPT | Mod: HCNC,CPTII,S$GLB, | Performed by: PHYSICIAN ASSISTANT

## 2024-01-29 PROCEDURE — 1101F PT FALLS ASSESS-DOCD LE1/YR: CPT | Mod: HCNC,CPTII,S$GLB, | Performed by: PHYSICIAN ASSISTANT

## 2024-01-29 PROCEDURE — 3079F DIAST BP 80-89 MM HG: CPT | Mod: HCNC,CPTII,S$GLB, | Performed by: PHYSICIAN ASSISTANT

## 2024-01-29 NOTE — PROGRESS NOTES
"Subjective:      Patient ID: Moris Calderón is a 68 y.o. male.    Chief Complaint: Follow-up (6 mo follow up )    Patient is new to me.    HPI  Patient has PMH of HTN, hypercholesterolemia, murmur, prediabetes, and HERNAN.    Patient is in visit to review chronic medical issues.  HTN-controlled.  Hypercholesterolemia-controlled.  Prediabetes-eating lots of sweets lately  HERNAN-has Inspire for 2 years and working well  Low back pain and neck pain-improving  Anxiety-continues.  Taking paxil 40mg daily.  Not exercising daily.    Review of Systems   Constitutional:  Negative for appetite change.   Respiratory:  Negative for shortness of breath.    Cardiovascular:  Negative for chest pain.   Gastrointestinal:  Negative for abdominal pain, blood in stool, constipation, diarrhea, nausea and vomiting.   Genitourinary:  Negative for dysuria, frequency and hematuria.   Musculoskeletal:  Positive for back pain and neck pain.   Psychiatric/Behavioral:  Negative for dysphoric mood, sleep disturbance and suicidal ideas. The patient is nervous/anxious.        Objective:   /86   Pulse 70   Ht 5' 9" (1.753 m)   Wt 106.4 kg (234 lb 9.1 oz)   SpO2 96%   BMI 34.64 kg/m²     Physical Exam  Vitals reviewed.   Constitutional:       Appearance: Normal appearance. He is well-developed.   HENT:      Head: Normocephalic and atraumatic.      Right Ear: External ear normal.      Left Ear: External ear normal.   Eyes:      Conjunctiva/sclera: Conjunctivae normal.   Cardiovascular:      Rate and Rhythm: Normal rate and regular rhythm.      Heart sounds: Normal heart sounds. No murmur heard.     No friction rub. No gallop.   Pulmonary:      Effort: Pulmonary effort is normal. No respiratory distress.      Breath sounds: Normal breath sounds. No wheezing, rhonchi or rales.   Abdominal:      Palpations: Abdomen is soft.      Tenderness: There is no abdominal tenderness.   Musculoskeletal:         General: Normal range of motion.   Skin:     " General: Skin is warm and dry.      Findings: No rash.   Neurological:      General: No focal deficit present.      Mental Status: He is alert and oriented to person, place, and time.   Psychiatric:         Mood and Affect: Mood normal.         Behavior: Behavior normal.         Judgment: Judgment normal.       Assessment:      1. Preventative health care    2. Encounter for lipid screening for cardiovascular disease    3. Primary hypertension    4. Prediabetes    5. Obstructive sleep apnea       Plan:   1. Preventative health care  - Comprehensive Metabolic Panel; Future    2. Encounter for lipid screening for cardiovascular disease  - Lipid Panel; Future    3. Primary hypertension  Controlled.    4. Prediabetes  Advised to decrease sugar and carbs in diet.  - Hemoglobin A1C; Future    5. Obstructive sleep apnea  Controlled s/t Inspire.    Follow up in 6 months with Dr. Kumar with bloodwork two days prior to next appt.  Patient agreed with plan and expressed understanding.    Thank you for allowing me to serve you,

## 2024-02-01 NOTE — TELEPHONE ENCOUNTER
----- Message from Katt Patricia sent at 1/10/2018 11:59 AM CST -----  Contact: Self/[phone]  Pt is requesting a call back re: scheduling Sx for rt shoulder, torn rotator cuff.  
Called pt and made appointment to discuss sx with Dr. Valenzuela per request.   
moderate

## 2024-03-19 ENCOUNTER — TELEPHONE (OUTPATIENT)
Dept: ADMINISTRATIVE | Facility: CLINIC | Age: 69
End: 2024-03-19
Payer: MEDICARE

## 2024-03-20 ENCOUNTER — OFFICE VISIT (OUTPATIENT)
Dept: FAMILY MEDICINE | Facility: CLINIC | Age: 69
End: 2024-03-20
Payer: MEDICARE

## 2024-03-20 VITALS
HEIGHT: 69 IN | HEART RATE: 67 BPM | SYSTOLIC BLOOD PRESSURE: 130 MMHG | DIASTOLIC BLOOD PRESSURE: 72 MMHG | OXYGEN SATURATION: 96 % | WEIGHT: 227.06 LBS | RESPIRATION RATE: 16 BRPM | BODY MASS INDEX: 33.63 KG/M2

## 2024-03-20 DIAGNOSIS — E78.00 HYPERCHOLESTEROLEMIA: ICD-10-CM

## 2024-03-20 DIAGNOSIS — R73.03 PRE-DIABETES: ICD-10-CM

## 2024-03-20 DIAGNOSIS — E66.9 OBESITY, CLASS I, BMI 30-34.9: ICD-10-CM

## 2024-03-20 DIAGNOSIS — Z97.4 HEARING AID WORN: ICD-10-CM

## 2024-03-20 DIAGNOSIS — G25.0 ESSENTIAL TREMOR: ICD-10-CM

## 2024-03-20 DIAGNOSIS — Z00.00 ENCOUNTER FOR PREVENTIVE HEALTH EXAMINATION: Primary | ICD-10-CM

## 2024-03-20 DIAGNOSIS — I10 PRIMARY HYPERTENSION: ICD-10-CM

## 2024-03-20 DIAGNOSIS — R01.1 SYSTOLIC MURMUR: ICD-10-CM

## 2024-03-20 PROCEDURE — 1170F FXNL STATUS ASSESSED: CPT | Mod: HCNC,CPTII,S$GLB, | Performed by: FAMILY MEDICINE

## 2024-03-20 PROCEDURE — 1160F RVW MEDS BY RX/DR IN RCRD: CPT | Mod: HCNC,CPTII,S$GLB, | Performed by: FAMILY MEDICINE

## 2024-03-20 PROCEDURE — 99999 PR PBB SHADOW E&M-EST. PATIENT-LVL IV: CPT | Mod: PBBFAC,HCNC,, | Performed by: FAMILY MEDICINE

## 2024-03-20 PROCEDURE — 1101F PT FALLS ASSESS-DOCD LE1/YR: CPT | Mod: HCNC,CPTII,S$GLB, | Performed by: FAMILY MEDICINE

## 2024-03-20 PROCEDURE — 4010F ACE/ARB THERAPY RXD/TAKEN: CPT | Mod: HCNC,CPTII,S$GLB, | Performed by: FAMILY MEDICINE

## 2024-03-20 PROCEDURE — 1159F MED LIST DOCD IN RCRD: CPT | Mod: HCNC,CPTII,S$GLB, | Performed by: FAMILY MEDICINE

## 2024-03-20 PROCEDURE — 3288F FALL RISK ASSESSMENT DOCD: CPT | Mod: HCNC,CPTII,S$GLB, | Performed by: FAMILY MEDICINE

## 2024-03-20 PROCEDURE — 3078F DIAST BP <80 MM HG: CPT | Mod: HCNC,CPTII,S$GLB, | Performed by: FAMILY MEDICINE

## 2024-03-20 PROCEDURE — 3075F SYST BP GE 130 - 139MM HG: CPT | Mod: HCNC,CPTII,S$GLB, | Performed by: FAMILY MEDICINE

## 2024-03-20 PROCEDURE — G0439 PPPS, SUBSEQ VISIT: HCPCS | Mod: HCNC,S$GLB,, | Performed by: FAMILY MEDICINE

## 2024-03-20 PROCEDURE — 1125F AMNT PAIN NOTED PAIN PRSNT: CPT | Mod: HCNC,CPTII,S$GLB, | Performed by: FAMILY MEDICINE

## 2024-03-20 NOTE — PROGRESS NOTES
"  Moris Calderón presented for a follow-up Medicare AWV today. The following components were reviewed and updated:    Medical history  Family History  Social history  Allergies and Current Medications  Health Risk Assessment  Health Maintenance  Care Team    **See Completed Assessments for Annual Wellness visit with in the encounter summary    The following assessments were completed:  Depression Screening  Cognitive function Screening  Timed Get Up Test  Whisper Test        Opioid documentation:      Patient does not have a current opioid prescription.          Vitals:    03/20/24 1051   BP: 130/72   BP Location: Right arm   Patient Position: Sitting   BP Method: Medium (Manual)   Pulse: 67   Resp: 16   SpO2: 96%   Weight: 103 kg (227 lb 1.2 oz)   Height: 5' 9" (1.753 m)     Body mass index is 33.53 kg/m².       Physical Exam  Vitals reviewed.   Constitutional:       Appearance: Normal appearance. He is normal weight.   HENT:      Head: Normocephalic.      Nose: Nose normal.   Eyes:      Extraocular Movements: Extraocular movements intact.      Conjunctiva/sclera: Conjunctivae normal.      Pupils: Pupils are equal, round, and reactive to light.   Pulmonary:      Effort: Pulmonary effort is normal.   Musculoskeletal:      Cervical back: Normal range of motion.   Skin:     General: Skin is warm and dry.   Neurological:      General: No focal deficit present.      Mental Status: He is alert and oriented to person, place, and time.   Psychiatric:         Mood and Affect: Mood normal.         Behavior: Behavior normal.           Diagnoses and health risks identified today and associated recommendations/orders:  1. Encounter for preventive health examination    2. Obesity, Class I, BMI 30-34.9  Body mass index is 33.53 kg/m².  Continue healthy diet and regular exercise as tolerated.  Continue medications as prescribed.  Follow up with PCP, Giovanni Kumar DO     3. Primary hypertension  Stable  Continue medications as " prescribed.  Follow up with PCP and cardiology, Dr Miles    4. Hypercholesterolemia  Stable  Continue medications as prescribed.  Follow up with PCP and cardio    5. Pre-diabetes  Stable  Continue medications as prescribed.  Follow up with PCP     6. Essential tremor  Stable  Continue medications as prescribed.  Follow up with PCP and neurology as needed    7. Systolic murmur  Stable  Continue medications as prescribed.  Follow up with PCP and cardio    8. Hearing aid worn  Stable  Cologuard negative for any changes, repeat in 3 years. And otolaryngology, Dr Badillo and audiology as needed      Provided Moris with a 5-10 year written screening schedule and personal prevention plan. Recommendations were developed using the USPSTF age appropriate recommendations. Education, counseling, and referrals were provided as needed.  After Visit Summary printed and given to patient which includes a list of additional screenings\tests needed.    Follow up if symptoms worsen or fail to improve, for scheduled appt, 1 year for AWV.      Dayanara Lala NP            Future Appointments   Date Time Provider Department Center   7/25/2024  9:40 AM LAB, [facility]   7/29/2024 11:00 AM Giovanni Kumar, DO [facility] MED [place]     I offered to discuss advanced care planning, including how to pick a person who would make decisions for you if you were unable to make them for yourself, called a health care power of , and what kind of decisions you might make such as use of life sustaining treatments such as ventilators and tube feeding when faced with a life limiting illness recorded on a living will that they will need to know. (How you want to be cared for as you near the end of your natural life)     X  Patient has advanced directives written and agrees to provide copies to the institution.

## 2024-03-20 NOTE — PATIENT INSTRUCTIONS
Counseling and Referral of Other Preventative  (Italic type indicates deductible and co-insurance are waived)    Patient Name: Moris Calderón  Today's Date: 3/20/2024    Health Maintenance       Date Due Completion Date    RSV Vaccine (Age 60+ and Pregnant patients) (1 - 1-dose 60+ series) Never done ---    Influenza Vaccine (1) 09/01/2023 9/28/2022    COVID-19 Vaccine (5 - 2023-24 season) 09/01/2023 9/7/2022    Hemoglobin A1c (Prediabetes) 07/13/2024 7/13/2023    High Dose Statin 01/29/2025 1/29/2024    Colorectal Cancer Screening 07/27/2027 7/27/2022    Lipid Panel 07/13/2028 7/13/2023    TETANUS VACCINE 06/17/2032 6/17/2022        No orders of the defined types were placed in this encounter.      The following information is provided to all patients.  This information is to help you find resources for any of the problems found today that may be affecting your health:                  Living healthy guide: ms.gov    Understanding Diabetes: www.diabetes.org      Eating healthy: www.cdc.gov/healthyweight      CDC home safety checklist: www.cdc.gov/steadi/patient.html      Agency on Aging: ms.gov    Alcoholics anonymous (AA): www.aa.org      Physical Activity: www.darius.nih.gov/sb3vsfa      Tobacco use: ms.gov

## 2024-04-11 DIAGNOSIS — I10 PRIMARY HYPERTENSION: ICD-10-CM

## 2024-04-11 RX ORDER — AMLODIPINE BESYLATE 2.5 MG/1
TABLET ORAL
Qty: 90 TABLET | Refills: 0 | OUTPATIENT
Start: 2024-04-11

## 2024-04-11 NOTE — TELEPHONE ENCOUNTER
Care Due:                  Date            Visit Type   Department     Provider  --------------------------------------------------------------------------------                                EP -                              [facility]  Last Visit: 07-      CARE ([facility])   ASIYA Kumar                              EP -                              PRIMARY      [facility]  Next Visit: 07-      CARE ([facility])   MEDICINE       Giovanni Kumar                                                            Last  Test          Frequency    Reason                     Performed    Due Date  --------------------------------------------------------------------------------    CMP.........  12 months..  atorvastatin, benazepriL.  07- 07-    Lipid Panel.  12 months..  atorvastatin.............  07- 07-    Health [facility] Embedded Care Due Messages. Reference number: [id number].   4/11/2024 11:46:16 AM CDT

## 2024-04-11 NOTE — TELEPHONE ENCOUNTER
Refill Decision Note   Moris Calderón  is requesting a refill authorization.  Brief Assessment and Rationale for Refill:  Quick Discontinue     Medication Therapy Plan:    Pharmacy is requesting new scripts for the following medications without required information, (sig/ frequency/qty/etc)      Medication Reconciliation Completed: No     Comments: Pharmacies have been requesting medications for patients without required information, (sig, frequency, qty, etc.). In addition, requests are sent for medication(s) pt. are currently not taking, and medications patients have never taken.    We have spoken to the pharmacies about these request types and advised their teams previously that we are unable to assess these New Script requests and require all details for these requests. This is a known issue and has been reported.     Note composed:1:07 PM 04/11/2024

## 2024-04-15 ENCOUNTER — PATIENT MESSAGE (OUTPATIENT)
Dept: FAMILY MEDICINE | Facility: CLINIC | Age: 69
End: 2024-04-15
Payer: MEDICARE

## 2024-04-17 ENCOUNTER — PATIENT MESSAGE (OUTPATIENT)
Dept: FAMILY MEDICINE | Facility: CLINIC | Age: 69
End: 2024-04-17
Payer: MEDICARE

## 2024-04-23 ENCOUNTER — PATIENT MESSAGE (OUTPATIENT)
Dept: FAMILY MEDICINE | Facility: CLINIC | Age: 69
End: 2024-04-23
Payer: MEDICARE

## 2024-04-23 DIAGNOSIS — G25.0 ESSENTIAL TREMOR: Primary | ICD-10-CM

## 2024-04-24 RX ORDER — PRIMIDONE 50 MG/1
100 TABLET ORAL NIGHTLY
Qty: 180 TABLET | Refills: 3 | Status: SHIPPED | OUTPATIENT
Start: 2024-04-24 | End: 2025-04-24

## 2024-04-24 RX ORDER — PRIMIDONE 50 MG/1
100 TABLET ORAL NIGHTLY
Qty: 180 TABLET | Refills: 3 | Status: SHIPPED | OUTPATIENT
Start: 2024-04-24 | End: 2024-04-24

## 2024-04-25 ENCOUNTER — HOSPITAL ENCOUNTER (EMERGENCY)
Facility: HOSPITAL | Age: 69
Discharge: LEFT BEFORE DISCHARGE | End: 2024-04-25
Attending: EMERGENCY MEDICINE
Payer: MEDICARE

## 2024-04-25 VITALS
RESPIRATION RATE: 19 BRPM | DIASTOLIC BLOOD PRESSURE: 71 MMHG | TEMPERATURE: 98 F | BODY MASS INDEX: 32.49 KG/M2 | WEIGHT: 220 LBS | OXYGEN SATURATION: 97 % | HEART RATE: 70 BPM | SYSTOLIC BLOOD PRESSURE: 125 MMHG

## 2024-04-25 DIAGNOSIS — F41.9 ANXIETY: ICD-10-CM

## 2024-04-25 DIAGNOSIS — R51.9 NONINTRACTABLE HEADACHE, UNSPECIFIED CHRONICITY PATTERN, UNSPECIFIED HEADACHE TYPE: ICD-10-CM

## 2024-04-25 DIAGNOSIS — H54.62 VISION LOSS OF LEFT EYE: Primary | ICD-10-CM

## 2024-04-25 PROCEDURE — 96375 TX/PRO/DX INJ NEW DRUG ADDON: CPT | Mod: HCNC

## 2024-04-25 PROCEDURE — 99285 EMERGENCY DEPT VISIT HI MDM: CPT | Mod: 25,HCNC

## 2024-04-25 PROCEDURE — 25000003 PHARM REV CODE 250: Mod: HCNC | Performed by: PHYSICIAN ASSISTANT

## 2024-04-25 PROCEDURE — 96374 THER/PROPH/DIAG INJ IV PUSH: CPT | Mod: HCNC

## 2024-04-25 PROCEDURE — 63600175 PHARM REV CODE 636 W HCPCS: Mod: HCNC | Performed by: PHYSICIAN ASSISTANT

## 2024-04-25 RX ORDER — PAROXETINE 10 MG/1
10 TABLET, FILM COATED ORAL EVERY MORNING
Qty: 90 TABLET | Refills: 0 | Status: SHIPPED | OUTPATIENT
Start: 2024-04-25

## 2024-04-25 RX ORDER — PROCHLORPERAZINE MALEATE 10 MG
10 TABLET ORAL EVERY 6 HOURS PRN
Qty: 15 TABLET | Refills: 0 | Status: SHIPPED | OUTPATIENT
Start: 2024-04-25

## 2024-04-25 RX ORDER — PROCHLORPERAZINE EDISYLATE 5 MG/ML
10 INJECTION INTRAMUSCULAR; INTRAVENOUS
Status: COMPLETED | OUTPATIENT
Start: 2024-04-25 | End: 2024-04-25

## 2024-04-25 RX ORDER — ACETAMINOPHEN 500 MG
1000 TABLET ORAL
Status: COMPLETED | OUTPATIENT
Start: 2024-04-25 | End: 2024-04-25

## 2024-04-25 RX ORDER — PAROXETINE HYDROCHLORIDE 40 MG/1
40 TABLET, FILM COATED ORAL DAILY
Qty: 90 TABLET | Refills: 0 | Status: SHIPPED | OUTPATIENT
Start: 2024-04-25 | End: 2024-05-27

## 2024-04-25 RX ORDER — DIPHENHYDRAMINE HYDROCHLORIDE 50 MG/ML
12.5 INJECTION INTRAMUSCULAR; INTRAVENOUS
Status: COMPLETED | OUTPATIENT
Start: 2024-04-25 | End: 2024-04-25

## 2024-04-25 RX ADMIN — ACETAMINOPHEN 1000 MG: 500 TABLET ORAL at 08:04

## 2024-04-25 RX ADMIN — PROCHLORPERAZINE EDISYLATE 10 MG: 5 INJECTION INTRAMUSCULAR; INTRAVENOUS at 08:04

## 2024-04-25 RX ADMIN — DIPHENHYDRAMINE HYDROCHLORIDE 12.5 MG: 50 INJECTION, SOLUTION INTRAMUSCULAR; INTRAVENOUS at 08:04

## 2024-04-25 NOTE — TELEPHONE ENCOUNTER
Refill Routing Note   Medication(s) are not appropriate for processing by [facility] for the following reason(s):        Drug-drug interaction, Drug-disease interaction      [facility] action(s):  Defer      Medication Therapy Plan: Patient is on both Paxil 10+40 mg per LOV summary on 3/27/23; added Paxil 40 mg to cart for patient convenience    Pharmacist review requested: Yes     Appointments  past 12m or future 3m with PCP    Date Provider   Last Visit   7/27/2023 Giovanni Kumar, DO   Next Visit   5/2/2024 Giovanni Kumar, DO   ED visits in past 90 days: 0        Note composed:4:08 PM 04/25/2024

## 2024-04-25 NOTE — TELEPHONE ENCOUNTER
No care due was identified.  Health [facility] Embedded Care Due Messages. Reference number: [id number].   4/25/2024 2:04:03 AM CDT

## 2024-04-25 NOTE — TELEPHONE ENCOUNTER
" Refill Decision Note   Moris Calderón  is requesting a refill authorization.  Brief Assessment and Rationale for Refill:  Defer     Medication Therapy Plan:  Patient is on both Paxil 10+40 mg per LOV summary on 3/27/23; added Paxil 40 mg to cart for patient convenience. "Pt stable on Paxil 50mg" per 8/11/22 notes      Pharmacist review requested: Yes   Comments:     Note composed:6:00 PM 04/25/2024            "

## 2024-04-26 NOTE — DISCHARGE INSTRUCTIONS
You need to follow-up with your primary doctor as soon as possible to obtain the MRI that we discussed.      Return to the ER immediately for any new or significantly worsening symptoms.

## 2024-04-26 NOTE — CONSULTS
"Consultation Report  Ophthalmology Service    Date: 04/25/2024    Chief complaint/Reason for Consult: possible retinal detachment     History of Present Illness: Moris Calderón is a 68 y.o. male who presents with 1 day of vision changes. He feels like he's seeing a "darkness" in his temporal visual field of his left eye. He feels it has gotten a little worse in the past day since he noticed it. Denies flashes, floaters. Endorses right sided headache over the same time period. Denies scalp tenderness, jaw claudication, PMR symptoms, weight loss, fever, night sweats    POcularHx:cataract surgery, both eyes    Current eye gtts: none      PMHx:  has a past medical history of Anxiety, Arthritis, Back problem, Colon polyps, GERD (gastroesophageal reflux disease), Hiatal hernia, Hypercholesterolemia (11/02/2015), Hyperlipidemia, Hypertension, benign (11/02/2015), Sleep apnea, and TMJ (dislocation of temporomandibular joint).     PSurgHx:  has a past surgical history that includes Nose surgery; Appendectomy; Uvulectomy; magdalene; MMT; Knee arthroscopy w/ meniscal repair (Left, 2015); Arthroscopy of shoulder with decompression of subacromial space (Right, 12/21/2018); Biceps tendon repair (Right, 12/21/2018); Arthroscopic repair of rotator cuff of shoulder (Right, 12/21/2018); Transforaminal epidural injection of steroid (Bilateral, 02/11/2021); Injection of anesthetic agent around medial branch nerves innervating lumbar facet joint (Bilateral, 03/10/2021); Radiofrequency ablation of lumbar medial branch nerve at single level (Bilateral, 03/23/2021); Injection of anesthetic agent around medial branch nerves innervating cervical facet joint (Bilateral, 01/10/2022); sleep endoscopy, drug-induced (Bilateral, 01/28/2022); insertion, neurostimulator, hypoglossal (Right, 03/17/2022); Colonoscopy (N/A, 07/27/2022); Radiofrequency ablation of lumbar medial branch nerve at single level (Bilateral, 10/06/2022); Epidural steroid injection " into lumbar spine (N/A, 12/07/2022); Epidural steroid injection into lumbar spine (N/A, 02/02/2023); Eye surgery; Lipoma resection (Left, 02/23/2023); egd - external result (06/18/2020); egd - external result (12/09/2016); Colonoscopy (12/09/2016); and Esophagogastroduodenoscopy (N/A, 3/7/2023).     Home Medications:   Prior to Admission medications    Medication Sig Start Date End Date Taking? Authorizing Provider   amLODIPine (NORVASC) 2.5 MG tablet TAKE 1 TABLET(2.5 MG) BY MOUTH EVERY DAY 9/20/23   Giovanni Kumar DO   atorvastatin (LIPITOR) 10 MG tablet Take 1 tablet (10 mg total) by mouth once daily. 11/30/23   Giovanni Kumar DO   benazepriL (LOTENSIN) 40 MG tablet TAKE 1 TABLET ONE TIME DAILY 8/30/23   Giovanni Kumar DO   cetirizine (ZYRTEC) 10 MG tablet Take 1 tablet (10 mg total) by mouth daily as needed for Allergies. 2/13/23 2/13/24  Giovanni Kumar DO   fluticasone propionate (FLONASE) 50 mcg/actuation nasal spray SHAKE LIQUID AND USE 1 SPRAY IN EACH NOSTRIL EVERY DAY 9/17/23   Giovanni Kumar DO   pantoprazole (PROTONIX) 40 MG tablet TAKE 1 TABLET (40MG) BY MOUTH BEFORE BREAKFAST 10/10/23   Dwight Lewis MD   paroxetine (PAXIL) 10 MG tablet Take 1 tablet (10 mg total) by mouth every morning. 4/25/24   Giovanni Kumar DO   paroxetine (PAXIL) 40 MG tablet Take 1 tablet (40 mg total) by mouth once daily. 4/25/24   Giovanni Kumar DO   primidone (MYSOLINE) 50 MG Tab Take 2 tablets (100 mg total) by mouth every evening. 4/24/24 4/24/25  Giovanni Kumar DO   tamsulosin (FLOMAX) 0.4 mg Cap TAKE 1 CAPSULE ONE TIME DAILY 5/24/23   Giovanni Kumar,    tiZANidine (ZANAFLEX) 4 MG tablet TAKE 1 TABLET(4 MG) BY MOUTH EVERY NIGHT AS NEEDED 1/11/23   Martin Martínez, PA   paroxetine (PAXIL) 10 MG tablet TAKE 1 TABLET EVERY MORNING 7/6/23 4/25/24  Giovanni Kumar,    paroxetine (PAXIL) 40 MG tablet TAKE 1 TABLET ONE TIME DAILY 5/31/23 4/25/24  Giovanni Kumar  "J., DO        Medications this encounter:     Allergies: is allergic to buspar [buspirone] and adhesive.     Social:  reports that he has never smoked. He has never used smokeless tobacco. He reports current alcohol use of about 6.0 - 7.0 standard drinks of alcohol per week. He reports that he does not use drugs.     Family Hx: No family history of glaucoma, macular degeneration, or blindness. family history includes Glaucoma in his father.     ROS: see hpi     Ocular examination/Dilated fundus examination:  Base Eye Exam       Visual Acuity (Snellen - Linear)         Right Left    Dist sc 20/25 20/20              Tonometry (Tonopen, 10:12 PM)         Right Left    Pressure 6 8              Pupils         Pupils Dark Light Shape React APD    Right PERRL 2 1 Round Brisk None    Left PERRL 2 1 Round Brisk None              Visual Fields         Right Left     Full Full              Extraocular Movement         Right Left     Full, Ortho Full, Ortho              Dilation       Both eyes: 1% Mydriacyl, 2.5% Phenylephrine @ 10:12 PM                  Slit Lamp and Fundus Exam       External Exam         Right Left    External Normal Normal              Slit Lamp Exam         Right Left    Lids/Lashes Normal Normal    Conjunctiva/Sclera White and quiet White and quiet    Cornea Clear Clear    Anterior Chamber Deep and quiet Deep and quiet    Iris Round and reactive Round and reactive    Lens Posterior chamber intraocular lens Posterior chamber intraocular lens    Anterior Vitreous Normal Normal              Fundus Exam         Right Left    Disc Normal Normal    C/D Ratio .3 .4    Macula Normal Normal    Vessels Normal Normal    Periphery Normal Normal                      =======================================    Assessment/Plan:   1. Visual disturbance, left eye  - presents with 1 day of "darkness" in his temporal visual field of his left eye  - denies flashes floaters curtains  - denies GCA symptoms  - no signs of " retinal detachment or other posterior pathology on exam  - discussed RD return precautions  - visual disturbance possible 2/2 lens edge effect from IOL  - patient will follow-up with his ophthalmologist in a few weeks        Alvin Garza MD  PGY-2, Ophthalmology  04/25/2024  10:13 PM

## 2024-04-26 NOTE — ED PROVIDER NOTES
"Encounter Date: 4/25/2024       History     Chief Complaint   Patient presents with    Headache     Went to  yesterday for same issue for headache/pain behind the eye.      68-year-old male with medical comorbidities listed below presents the ED with a chief complaint of headache.  Patient reports a sharp pain behind the medial aspect of his right eye.  The pain radiates into his right forehead and around his eye.  Symptoms began yesterday.  Yesterday patient also started to notice a "darkness" to his left peripheral vision.  He states that this vision change has slowly started to worsen and take up more of the vision in his L eye.  Denies history of similar headache.  Seen in urgent care yesterday.  He was concerned that this was possibly due is sinus infection.  He was started on amoxicillin.  He denies fever, nasal congestion, rhinorrhea or any URI symptoms.       Review of patient's allergies indicates:   Allergen Reactions    Buspar [buspirone]      Face flushed, possible elevated BP    Adhesive Rash     Past Medical History:   Diagnosis Date    Anxiety     Arthritis     Back problem     bulging disc    Colon polyps     GERD (gastroesophageal reflux disease)     Hiatal hernia     Hypercholesterolemia 11/02/2015    2009: Began statin.    Hyperlipidemia     Hypertension, benign 11/02/2015    2008: Diagnosed.    Sleep apnea     uses implant    TMJ (dislocation of temporomandibular joint)      Past Surgical History:   Procedure Laterality Date    APPENDECTOMY      ARTHROSCOPIC REPAIR OF ROTATOR CUFF OF SHOULDER Right 12/21/2018    Procedure: REPAIR, ROTATOR CUFF, ARTHROSCOPIC;  Surgeon: Colby Valenzuela MD;  Location: [facility] OR;  Service: Orthopedics;  Laterality: Right;    ARTHROSCOPY OF SHOULDER WITH DECOMPRESSION OF SUBACROMIAL SPACE Right 12/21/2018    Procedure: ARTHROSCOPY, SHOULDER, WITH SUBACROMIAL SPACE DECOMPRESSION;  Surgeon: Colby Valenzuela MD;  Location: [facility] OR;  Service: Orthopedics;  " Laterality: Right;    BICEPS TENDON REPAIR Right 12/21/2018    Procedure: REPAIR, TENDON, BICEPS;  Surgeon: Colby Valenzuela MD;  Location: [facility] OR;  Service: Orthopedics;  Laterality: Right;    COLONOSCOPY N/A 07/27/2022    Procedure: COLONOSCOPY;  Surgeon: Dwight Lewis MD;  Location: [facility];  Service: Endoscopy;  Laterality: N/A; Repeat colonoscopy in 5 years for surveillance    COLONOSCOPY  12/09/2016    Dr. Martinez, sent for scanning: diverticulosis, 2 colon polyps removed; grade 1 internal hemorrhoids; repeat in 5 years for surveillance    EGD - EXTERNAL RESULT  06/18/2020    Dr. Martinez, sent for scanning: small hiatal hernia, congsetion of the GEJ mucosa; biopsy report not received    EGD - EXTERNAL RESULT  12/09/2016    Dr. Martinez, sent for scanning: small hiatal hernia, irregular zline, erythema to duodenal bulb; repeat in 3 years    EPIDURAL STEROID INJECTION INTO LUMBAR SPINE N/A 12/07/2022    Procedure: Injection-steroid-epidural-lumbar L5/S1;  Surgeon: Landon Winchester MD;  Location: [facility];  Service: Pain Management;  Laterality: N/A;    EPIDURAL STEROID INJECTION INTO LUMBAR SPINE N/A 02/02/2023    Procedure: Injection-steroid-epidural-lumbar L5/s1;  Surgeon: Landon Winchester MD;  Location: [facility];  Service: Pain Management;  Laterality: N/A;    magdalene      ESOPHAGOGASTRODUODENOSCOPY N/A 3/7/2023    Procedure: EGD (ESOPHAGOGASTRODUODENOSCOPY);  Surgeon: Dwight Lewis MD;  Location: [facility];  Service: Endoscopy;  Laterality: N/A;    EYE SURGERY      INJECTION OF ANESTHETIC AGENT AROUND MEDIAL BRANCH NERVES INNERVATING CERVICAL FACET JOINT Bilateral 01/10/2022    Procedure: Block-nerve-medial branch-cervical C4 C5 C6;  Surgeon: Landon Winchester MD;  Location: [facility];  Service: Pain Management;  Laterality: Bilateral;    INJECTION OF ANESTHETIC AGENT AROUND MEDIAL BRANCH NERVES INNERVATING LUMBAR FACET JOINT Bilateral 03/10/2021    Procedure: Block-nerve-medial  branch-lumbar L2,L3,L4, L5;  Surgeon: Landon Winchester MD;  Location: [facility] OR;  Service: Pain Management;  Laterality: Bilateral;    INSERTION, NEUROSTIMULATOR, HYPOGLOSSAL Right 03/17/2022    Procedure: INSERTION,NEUROSTIMULATOR,HYPOGLOSSAL;  Surgeon: Lance Wright MD;  Location: [facility] OR;  Service: ENT;  Laterality: Right;    KNEE ARTHROSCOPY W/ MENISCAL REPAIR Left 2015    LIPOMA RESECTION Left 02/23/2023    Procedure: EXCISION, LIPOMA;  Surgeon: Taz Khan Jr., MD;  Location: [facility] OR;  Service: General;  Laterality: Left;    MMT      Left knee A-scope    NOSE SURGERY      RADIOFREQUENCY ABLATION OF LUMBAR MEDIAL BRANCH NERVE AT SINGLE LEVEL Bilateral 03/23/2021    Procedure: Radiofrequency Ablation, Nerve, Spinal, Lumbar, Medial Branch, L2,LL3,L4,L5;  Surgeon: Landon Winchester MD;  Location: [facility] OR;  Service: Pain Management;  Laterality: Bilateral;    RADIOFREQUENCY ABLATION OF LUMBAR MEDIAL BRANCH NERVE AT SINGLE LEVEL Bilateral 10/06/2022    Procedure: Radiofrequency Ablation, Nerve, Spinal, Lumbar, Medial Branch, L4/5, L5/S1;  Surgeon: Landon Winchester MD;  Location: [facility] OR;  Service: Pain Management;  Laterality: Bilateral;    SLEEP ENDOSCOPY, DRUG-INDUCED Bilateral 01/28/2022    Procedure: SLEEP ENDOSCOPY,DRUG-INDUCED;  Surgeon: Lance Wright MD;  Location: [facility] OR;  Service: ENT;  Laterality: Bilateral;    TRANSFORAMINAL EPIDURAL INJECTION OF STEROID Bilateral 02/11/2021    Procedure: Injection,steroid,epidural,transforaminal approach L4/5;  Surgeon: Landon Winchester MD;  Location: [facility] OR;  Service: Pain Management;  Laterality: Bilateral;    UVULECTOMY       Family History   Problem Relation Name Age of Onset    Glaucoma Father      Melanoma Neg Hx      Psoriasis Neg Hx      Lupus Neg Hx      Eczema Neg Hx      Colon cancer Neg Hx      Crohn's disease Neg Hx      Stomach cancer Neg Hx      Ulcerative colitis Neg Hx      Esophageal cancer Neg Hx       Social History      Tobacco Use    Smoking status: Never    Smokeless tobacco: Never   Substance Use Topics    Alcohol use: Yes     Alcohol/week: 6.0 - 7.0 standard drinks of alcohol     Types: 5 Cans of beer, 1 - 2 Shots of liquor per week     Comment: weekly     Drug use: No     Review of Systems   Eyes:  Positive for visual disturbance.   Neurological:  Positive for headaches.       Physical Exam     Initial Vitals [04/25/24 1939]   BP Pulse Resp Temp SpO2   137/81 78 20 97.7 °F (36.5 °C) 96 %      MAP       --         Physical Exam    Nursing note and vitals reviewed.  Constitutional: He appears well-developed and well-nourished.  Non-toxic appearance. He does not appear ill. No distress.   HENT:   Head: Normocephalic and atraumatic.   Eyes: Conjunctivae and EOM are normal. Pupils are equal, round, and reactive to light. No scleral icterus.   Patient does have left peripheral vision loss     Neck: Neck supple.   Normal range of motion.  Cardiovascular:  Normal rate and regular rhythm.     Exam reveals no gallop, no distant heart sounds and no friction rub.       No murmur heard.  Pulmonary/Chest: Effort normal and breath sounds normal. No accessory muscle usage. No tachypnea. No respiratory distress. He has no decreased breath sounds. He has no wheezes. He has no rhonchi. He has no rales.   Abdominal: He exhibits no distension.   Musculoskeletal:      Cervical back: Normal range of motion and neck supple.     Neurological: He is alert. He has normal strength. No sensory deficit.   Speech is clear and fluent.  No facial droop or asymmetry.  Normal finger-to-nose.     Skin: No rash noted.         ED Course   Procedures  Labs Reviewed   HIV 1 / 2 ANTIBODY   HEPATITIS C ANTIBODY          Imaging Results              CT Head Without Contrast (Final result)  Result time 04/25/24 21:42:22      Final result by Venkat Dewitt MD (04/25/24 21:42:22)                   Impression:      No CT evidence of acute intracranial  abnormality.      Electronically signed by: Venkat Dewitt MD  Date:    04/25/2024  Time:    21:42               Narrative:    EXAMINATION:  CT HEAD WITHOUT CONTRAST    CLINICAL HISTORY:  Headache, new or worsening (Age >= 50y);    TECHNIQUE:  Low dose axial images were obtained through the head.  Coronal and sagittal reformations were also performed. Contrast was not administered.    COMPARISON:  MRI brain, 12/17/2021.    FINDINGS:  Mild generalized cerebral volume loss.  Minimal periventricular white matter hypoattenuation suggestive of chronic microvascular ischemic change, though nonspecific.    No evidence of acute territorial infarct, hemorrhage, mass effect, or midline shift.    Ventricles are normal in size and configuration.    No displaced calvarial fracture.    Minimal mucosal thickening in the posterior aspect of the right maxillary sinus.  No air-fluid levels.  Otherwise, the visualized paranasal sinuses and mastoid air cells are essentially clear.                                       Medications   prochlorperazine injection Soln 10 mg (10 mg Intravenous Given 4/25/24 2059)   diphenhydrAMINE injection 12.5 mg (12.5 mg Intravenous Given 4/25/24 2059)   acetaminophen tablet 1,000 mg (1,000 mg Oral Given 4/25/24 2059)     Medical Decision Making  68-year-old male presents to the ED with headache and pain behind his RIGHT eye as well as visual disturbance in his LEFT lateral visual field.  Patient had some left temporal peripheral vision loss on exam.  No other focal neurologic deficits.  Vitals stable.  Afebrile.    My differential diagnosis includes but is not limited to:  Migraine, retinal detachment, vitreous detachment, vitreous hemorrhage, demyelinating disease, intracranial mass, CVA, GCA    CT head revealed no acute abnormalities.  Discussed patient with Ophthalmology.  They evaluated the patient in the ED. there was no evidence of retinal detachment or other posterior pathology.  Per consult  note, they felt that patient's visual disturbance may be secondary to lens affect from prior cataract surgery.  Etiology is unclear.  Plan was to obtain MRI brain to further assess for intracranial or demyelinating process.  Patient did not want to wait for the MRI and refused further imaging.  Patient signed out AMA.  Advised that he follow-up with his PCP to obtain imaging.  ED return precautions given. I have reviewed the patient's records and discussed this case with my supervising physician.     The patient wishes to leave the emergency department against medical advice.  - I have determined that the patient has the capacity to understand the information relevant to their care and responds appropriately to questions.   - The patient also understands the consequences of the various options after we discussed relevant information.   - I have attempted to persuade the patient to stay to receive care.   - The patient understands by leaving there is a possibility of death, disability, or serious injury.  - Despite the above information, the patient had made the decision to leave against medical advice.   - I have attempted to persuade the patient to follow up with a physician as soon as possible.   - I have also notified the patient they may return  to the ED at any time for further care.        Amount and/or Complexity of Data Reviewed  Labs: ordered.  Radiology: ordered.    Risk  OTC drugs.  Prescription drug management.                                      Clinical Impression:  Final diagnoses:  [H54.62] Vision loss of left eye (Primary)  [R51.9] Nonintractable headache, unspecified chronicity pattern, unspecified headache type          ED Disposition Condition    Savita Escobedo PA-C  04/25/24 1195

## 2024-05-22 DIAGNOSIS — H81.393 OTHER PERIPHERAL VERTIGO, BILATERAL: Primary | ICD-10-CM

## 2024-05-22 DIAGNOSIS — R26.89 OTHER ABNORMALITIES OF GAIT AND MOBILITY: ICD-10-CM

## 2024-05-25 DIAGNOSIS — R35.0 URINARY FREQUENCY: ICD-10-CM

## 2024-05-25 NOTE — TELEPHONE ENCOUNTER
No care due was identified.  [facility] Embedded Care Due Messages. Reference number: [id number].   5/25/2024 2:54:48 AM CDT

## 2024-05-27 ENCOUNTER — OFFICE VISIT (OUTPATIENT)
Dept: FAMILY MEDICINE | Facility: CLINIC | Age: 69
End: 2024-05-27
Payer: MEDICARE

## 2024-05-27 VITALS
HEIGHT: 69 IN | DIASTOLIC BLOOD PRESSURE: 82 MMHG | WEIGHT: 231.69 LBS | HEART RATE: 78 BPM | SYSTOLIC BLOOD PRESSURE: 126 MMHG | OXYGEN SATURATION: 97 % | BODY MASS INDEX: 34.32 KG/M2

## 2024-05-27 DIAGNOSIS — F41.9 ANXIETY: ICD-10-CM

## 2024-05-27 DIAGNOSIS — R39.9 LOWER URINARY TRACT SYMPTOMS (LUTS): ICD-10-CM

## 2024-05-27 DIAGNOSIS — R53.83 FATIGUE, UNSPECIFIED TYPE: ICD-10-CM

## 2024-05-27 DIAGNOSIS — R73.03 PRE-DIABETES: ICD-10-CM

## 2024-05-27 DIAGNOSIS — E66.9 CLASS 1 OBESITY: ICD-10-CM

## 2024-05-27 DIAGNOSIS — Z12.5 SCREENING FOR PROSTATE CANCER: ICD-10-CM

## 2024-05-27 DIAGNOSIS — E78.2 MIXED HYPERLIPIDEMIA: ICD-10-CM

## 2024-05-27 DIAGNOSIS — F40.240 CLAUSTROPHOBIA: ICD-10-CM

## 2024-05-27 DIAGNOSIS — K76.0 HEPATIC STEATOSIS: ICD-10-CM

## 2024-05-27 DIAGNOSIS — I10 PRIMARY HYPERTENSION: ICD-10-CM

## 2024-05-27 DIAGNOSIS — M48.061 SPINAL STENOSIS OF LUMBAR REGION WITHOUT NEUROGENIC CLAUDICATION: Primary | ICD-10-CM

## 2024-05-27 DIAGNOSIS — G25.0 ESSENTIAL TREMOR: ICD-10-CM

## 2024-05-27 DIAGNOSIS — G47.33 OBSTRUCTIVE SLEEP APNEA: ICD-10-CM

## 2024-05-27 PROCEDURE — 1101F PT FALLS ASSESS-DOCD LE1/YR: CPT | Mod: CPTII,S$GLB,, | Performed by: INTERNAL MEDICINE

## 2024-05-27 PROCEDURE — 1126F AMNT PAIN NOTED NONE PRSNT: CPT | Mod: CPTII,S$GLB,, | Performed by: INTERNAL MEDICINE

## 2024-05-27 PROCEDURE — 99999 PR PBB SHADOW E&M-EST. PATIENT-LVL IV: CPT | Mod: PBBFAC,,, | Performed by: INTERNAL MEDICINE

## 2024-05-27 PROCEDURE — 4010F ACE/ARB THERAPY RXD/TAKEN: CPT | Mod: CPTII,S$GLB,, | Performed by: INTERNAL MEDICINE

## 2024-05-27 PROCEDURE — 3288F FALL RISK ASSESSMENT DOCD: CPT | Mod: CPTII,S$GLB,, | Performed by: INTERNAL MEDICINE

## 2024-05-27 PROCEDURE — 1159F MED LIST DOCD IN RCRD: CPT | Mod: CPTII,S$GLB,, | Performed by: INTERNAL MEDICINE

## 2024-05-27 PROCEDURE — 3079F DIAST BP 80-89 MM HG: CPT | Mod: CPTII,S$GLB,, | Performed by: INTERNAL MEDICINE

## 2024-05-27 PROCEDURE — 99214 OFFICE O/P EST MOD 30 MIN: CPT | Mod: S$GLB,,, | Performed by: INTERNAL MEDICINE

## 2024-05-27 PROCEDURE — 1160F RVW MEDS BY RX/DR IN RCRD: CPT | Mod: CPTII,S$GLB,, | Performed by: INTERNAL MEDICINE

## 2024-05-27 PROCEDURE — 3074F SYST BP LT 130 MM HG: CPT | Mod: CPTII,S$GLB,, | Performed by: INTERNAL MEDICINE

## 2024-05-27 PROCEDURE — 3008F BODY MASS INDEX DOCD: CPT | Mod: CPTII,S$GLB,, | Performed by: INTERNAL MEDICINE

## 2024-05-27 RX ORDER — TAMSULOSIN HYDROCHLORIDE 0.4 MG/1
1 CAPSULE ORAL DAILY
Qty: 90 CAPSULE | Refills: 3 | Status: SHIPPED | OUTPATIENT
Start: 2024-05-27

## 2024-05-27 RX ORDER — SEMAGLUTIDE 0.25 MG/.5ML
0.25 INJECTION, SOLUTION SUBCUTANEOUS WEEKLY
Qty: 4 EACH | Refills: 0 | Status: SHIPPED | OUTPATIENT
Start: 2024-05-27

## 2024-05-27 RX ORDER — PAROXETINE HYDROCHLORIDE 40 MG/1
40 TABLET, FILM COATED ORAL
Qty: 90 TABLET | Refills: 3 | Status: SHIPPED | OUTPATIENT
Start: 2024-05-27

## 2024-05-27 RX ORDER — DIAZEPAM 5 MG/1
5 TABLET ORAL ONCE
Qty: 1 TABLET | Refills: 0 | Status: SHIPPED | OUTPATIENT
Start: 2024-05-27 | End: 2024-05-27

## 2024-05-27 RX ORDER — TIZANIDINE 4 MG/1
TABLET ORAL
Qty: 90 TABLET | Refills: 3 | Status: SHIPPED | OUTPATIENT
Start: 2024-05-27

## 2024-05-27 NOTE — PROGRESS NOTES
Patient ID: Moris Calderón is a 68 y.o. male.    Chief Complaint: Medication Refill (Patient would like to discuss getting on a new medication )    Annual.     UC for sinus infection given amox-clav. Had visual hallucinations and oralia. Went to ER.  Saw [facility] and ent wants him to get MRI    Problem  HPI  Plan   Hypertension Controlled Continue amlodipine and benazepril   Hyperlipidemia Controlled, due for repeat lipids in July Continue atorvastatin   Prediabetes Last A1c 5.9, due for repeat    Essential tremor Stable taking 200 to 300 mg of primidone daily.  Continue primidone   LUTs Stable  Continue Flomax   Spinal stenosis of lumbar region Status post VIET and ablations Monitor    Hepatic steatosis Last AST ALT alk-phos normal Monitor   HERNAN Has inspire implant. Stable  Monitor    Anxiety  Stable Continue paxil 50 mg    Class I obesity  Difficult to exercise due to back pain and fatigue  If wegovy not covered, will plan to try wellbutrin/ naltrexone combination.         Medications, recent labs, health maintenance, and diet has been reviewed.    Exercise- yard work   Alcohol- occasional   Tobacco- no           Diagnoses addressed and related orders     1. Spinal stenosis of lumbar region without neurogenic claudication  - tiZANidine (ZANAFLEX) 4 MG tablet; TAKE 1 TABLET(4 MG) BY MOUTH EVERY NIGHT AS NEEDED  Dispense: 90 tablet; Refill: 3    2. Pre-diabetes    3. Mixed hyperlipidemia    4. Obstructive sleep apnea    5. Lower urinary tract symptoms (LUTS)    6. Primary hypertension    7. Hepatic steatosis    8. Essential tremor    9. Anxiety    10. Class 1 obesity  - semaglutide, weight loss, (WEGOVY) 0.25 mg/0.5 mL PnIj; Inject 0.25 mg into the skin once a week.  Dispense: 4 each; Refill: 0    11. Fatigue, unspecified type  - TSH; Future    12. Screening for prostate cancer  - PSA, Screening; Future    13. Claustrophobia  - diazePAM (VALIUM) 5 MG tablet; Take 1 tablet (5 mg total) by mouth once. for 1 dose   Dispense: 1 tablet; Refill: 0          Review of Systems   Constitutional:  Negative for fever.   Respiratory:  Negative for shortness of breath.    Cardiovascular:  Negative for chest pain.   Gastrointestinal:  Negative for abdominal pain.     Vitals:    05/27/24 1415   BP: 126/82   Pulse: 78      Wt Readings from Last 3 Encounters:   05/27/24 1415 105.1 kg (231 lb 11.3 oz)   04/25/24 1939 99.8 kg (220 lb)   03/20/24 1051 103 kg (227 lb 1.2 oz)      Body mass index is 34.22 kg/m².     Physical Exam  Cardiovascular:      Rate and Rhythm: Normal rate and regular rhythm.      Heart sounds: No murmur heard.     No gallop.   Pulmonary:      Breath sounds: Normal breath sounds. No wheezing or rhonchi.   Abdominal:      Palpations: Abdomen is soft.      Tenderness: There is no abdominal tenderness.            Hypertension Medications               amLODIPine (NORVASC) 2.5 MG tablet TAKE 1 TABLET(2.5 MG) BY MOUTH EVERY DAY    benazepriL (LOTENSIN) 40 MG tablet TAKE 1 TABLET ONE TIME DAILY           Hyperlipidemia Medications               atorvastatin (LIPITOR) 10 MG tablet Take 1 tablet (10 mg total) by mouth once daily.           Medication List with Changes/Refills   New Medications    DIAZEPAM (VALIUM) 5 MG TABLET    Take 1 tablet (5 mg total) by mouth once. for 1 dose    SEMAGLUTIDE, WEIGHT LOSS, (WEGOVY) 0.25 MG/0.5 ML PNIJ    Inject 0.25 mg into the skin once a week.   Current Medications    AMLODIPINE (NORVASC) 2.5 MG TABLET    TAKE 1 TABLET(2.5 MG) BY MOUTH EVERY DAY    ATORVASTATIN (LIPITOR) 10 MG TABLET    Take 1 tablet (10 mg total) by mouth once daily.    BENAZEPRIL (LOTENSIN) 40 MG TABLET    TAKE 1 TABLET ONE TIME DAILY    CETIRIZINE (ZYRTEC) 10 MG TABLET    Take 1 tablet (10 mg total) by mouth daily as needed for Allergies.    FLUTICASONE PROPIONATE (FLONASE) 50 MCG/ACTUATION NASAL SPRAY    SHAKE LIQUID AND USE 1 SPRAY IN EACH NOSTRIL EVERY DAY    PANTOPRAZOLE (PROTONIX) 40 MG TABLET    TAKE 1 TABLET (40MG)  BY MOUTH BEFORE BREAKFAST    PAROXETINE (PAXIL) 10 MG TABLET    Take 1 tablet (10 mg total) by mouth every morning.    PAROXETINE (PAXIL) 40 MG TABLET    Take 1 tablet (40 mg total) by mouth once daily.    PRIMIDONE (MYSOLINE) 50 MG TAB    Take 2 tablets (100 mg total) by mouth every evening.    PROCHLORPERAZINE (COMPAZINE) 10 MG TABLET    Take 1 tablet (10 mg total) by mouth every 6 (six) hours as needed (headache).    TAMSULOSIN (FLOMAX) 0.4 MG CAP    TAKE 1 CAPSULE ONE TIME DAILY   Changed and/or Refilled Medications    Modified Medication Previous Medication    TIZANIDINE (ZANAFLEX) 4 MG TABLET tiZANidine (ZANAFLEX) 4 MG tablet       TAKE 1 TABLET(4 MG) BY MOUTH EVERY NIGHT AS NEEDED    TAKE 1 TABLET(4 MG) BY MOUTH EVERY NIGHT AS NEEDED   Discontinued Medications    DIAZEPAM (VALIUM) 5 MG TABLET    Take 2 tablets (10 mg total) by mouth once. Take 1 hour prior to procedure for 1 dose       I personally reviewed past medical, family and social history.

## 2024-05-27 NOTE — TELEPHONE ENCOUNTER
No care due was identified.  [facility] Embedded Care Due Messages. Reference number: [id number].   5/27/2024 12:01:50 AM CDT

## 2024-05-29 ENCOUNTER — DOCUMENTATION ONLY (OUTPATIENT)
Dept: FAMILY MEDICINE | Facility: CLINIC | Age: 69
End: 2024-05-29
Payer: MEDICARE

## 2024-05-30 ENCOUNTER — DOCUMENTATION ONLY (OUTPATIENT)
Dept: FAMILY MEDICINE | Facility: CLINIC | Age: 69
End: 2024-05-30
Payer: MEDICARE

## 2024-06-07 ENCOUNTER — PATIENT MESSAGE (OUTPATIENT)
Dept: FAMILY MEDICINE | Facility: CLINIC | Age: 69
End: 2024-06-07
Payer: MEDICARE

## 2024-06-07 DIAGNOSIS — E66.9 CLASS 1 OBESITY: Primary | ICD-10-CM

## 2024-06-07 RX ORDER — NALTREXONE HYDROCHLORIDE 50 MG/1
TABLET, FILM COATED ORAL
Qty: 30 TABLET | Refills: 0 | Status: SHIPPED | OUTPATIENT
Start: 2024-06-07

## 2024-06-07 RX ORDER — BUPROPION HYDROCHLORIDE 100 MG/1
TABLET ORAL
Qty: 84 TABLET | Refills: 0 | Status: SHIPPED | OUTPATIENT
Start: 2024-06-07 | End: 2024-07-18

## 2024-06-17 ENCOUNTER — HOSPITAL ENCOUNTER (OUTPATIENT)
Dept: RADIOLOGY | Facility: HOSPITAL | Age: 69
Discharge: HOME | End: 2024-06-17
Attending: OTOLARYNGOLOGY
Payer: MEDICARE

## 2024-06-17 DIAGNOSIS — R26.89 OTHER ABNORMALITIES OF GAIT AND MOBILITY: ICD-10-CM

## 2024-06-17 DIAGNOSIS — H81.393 OTHER PERIPHERAL VERTIGO, BILATERAL: ICD-10-CM

## 2024-06-17 LAB
CREAT SERPL-MCNC: 1 MG/DL (ref 0.5–1.4)
SAMPLE: NORMAL

## 2024-06-17 PROCEDURE — 70553 MRI BRAIN STEM W/O & W/DYE: CPT | Mod: TC

## 2024-06-17 PROCEDURE — A9585 GADOBUTROL INJECTION: HCPCS

## 2024-06-17 PROCEDURE — 25500020 PHARM REV CODE 255

## 2024-06-17 PROCEDURE — 70553 MRI BRAIN STEM W/O & W/DYE: CPT | Mod: 26,,, | Performed by: RADIOLOGY

## 2024-06-17 RX ORDER — GADOBUTROL 604.72 MG/ML
INJECTION INTRAVENOUS
Status: COMPLETED
Start: 2024-06-17 | End: 2024-06-17

## 2024-06-17 RX ADMIN — GADOBUTROL 10 ML: 604.72 INJECTION INTRAVENOUS at 10:06

## 2024-06-26 PROBLEM — D35.2 PITUITARY MICROADENOMA: Status: ACTIVE | Noted: 2024-06-26

## 2024-07-03 DIAGNOSIS — J30.9 ALLERGIC RHINITIS, UNSPECIFIED SEASONALITY, UNSPECIFIED TRIGGER: ICD-10-CM

## 2024-07-03 DIAGNOSIS — F41.9 ANXIETY: ICD-10-CM

## 2024-07-03 DIAGNOSIS — E78.00 HYPERCHOLESTEROLEMIA: ICD-10-CM

## 2024-07-03 RX ORDER — PAROXETINE 10 MG/1
10 TABLET, FILM COATED ORAL EVERY MORNING
Qty: 90 TABLET | Refills: 3 | Status: SHIPPED | OUTPATIENT
Start: 2024-07-03

## 2024-07-03 RX ORDER — ATORVASTATIN CALCIUM 10 MG/1
10 TABLET, FILM COATED ORAL
Qty: 90 TABLET | Refills: 0 | Status: SHIPPED | OUTPATIENT
Start: 2024-07-03

## 2024-07-03 RX ORDER — FLUTICASONE PROPIONATE 50 MCG
SPRAY, SUSPENSION (ML) NASAL
Qty: 32 G | Refills: 3 | Status: SHIPPED | OUTPATIENT
Start: 2024-07-03

## 2024-07-03 NOTE — TELEPHONE ENCOUNTER
Refill Decision Note   Moris Calderón  is requesting a refill authorization.  Brief Assessment and Rationale for Refill:  Approve     [facility]      Pharmacist review requested: Yes   Comments:     Note composed:10:23 AM 07/03/2024

## 2024-07-03 NOTE — TELEPHONE ENCOUNTER
Care Due:                  Date            Visit Type   Department     Provider  --------------------------------------------------------------------------------                                EP -                              PRIMARY      [facility] FAMILY  Last Visit: 05-      CARE ([facility])   MEDICINE       Giovanni Kumar  Next Visit: None Scheduled  None         None Found                                                            Last  Test          Frequency    Reason                     Performed    Due Date  --------------------------------------------------------------------------------    CMP.........  12 months..  atorvastatin, benazepriL.  07- 07-    HBA1C.......  6 months...  semaglutide,.............  07- 01-    Lipid Panel.  12 months..  atorvastatin.............  07- 07-    [facility] Embedded Care Due Messages. Reference number: [id number].   7/03/2024 3:47:19 AM CDT

## 2024-07-03 NOTE — TELEPHONE ENCOUNTER
Refill Routing Note   Medication(s) are not appropriate for processing by [facility] for the following reason(s):        Drug-disease interaction    [facility] action(s):  Defer  Approve        Medication Therapy Plan: FLOS; paroxetine and Hepatic steatosis    Pharmacist review requested: Yes     Appointments  past 12m or future 3m with PCP    Date Provider   Last Visit   5/27/2024 Giovanni Kumar, DO   Next Visit   7/29/2024 Giovanni Kumar, DO   ED visits in past 90 days: 1        Note composed:8:50 AM 07/03/2024

## 2024-07-15 ENCOUNTER — PATIENT MESSAGE (OUTPATIENT)
Dept: FAMILY MEDICINE | Facility: CLINIC | Age: 69
End: 2024-07-15
Payer: MEDICARE

## 2024-07-15 DIAGNOSIS — E66.9 CLASS 1 OBESITY: ICD-10-CM

## 2024-07-15 RX ORDER — NALTREXONE HYDROCHLORIDE 50 MG/1
TABLET, FILM COATED ORAL
Qty: 90 TABLET | Refills: 3 | Status: SHIPPED | OUTPATIENT
Start: 2024-07-15

## 2024-07-15 RX ORDER — BUPROPION HYDROCHLORIDE 150 MG/1
150 TABLET, EXTENDED RELEASE ORAL 2 TIMES DAILY
Qty: 180 TABLET | Refills: 3 | Status: SHIPPED | OUTPATIENT
Start: 2024-07-15 | End: 2025-07-15

## 2024-07-15 RX ORDER — BUPROPION HYDROCHLORIDE 100 MG/1
TABLET ORAL
Qty: 84 TABLET | Refills: 0 | Status: CANCELLED | OUTPATIENT
Start: 2024-07-15 | End: 2024-08-25

## 2024-07-15 NOTE — TELEPHONE ENCOUNTER
No care due was identified.  [facility] Embedded Care Due Messages. Reference number: [id number].   7/15/2024 9:37:50 AM CDT

## 2024-07-16 ENCOUNTER — OFFICE VISIT (OUTPATIENT)
Dept: FAMILY MEDICINE | Facility: CLINIC | Age: 69
End: 2024-07-16
Payer: MEDICARE

## 2024-07-16 ENCOUNTER — E-CONSULT (OUTPATIENT)
Dept: NEUROLOGY | Facility: HOSPITAL | Age: 69
End: 2024-07-16
Payer: MEDICARE

## 2024-07-16 ENCOUNTER — PATIENT MESSAGE (OUTPATIENT)
Dept: FAMILY MEDICINE | Facility: CLINIC | Age: 69
End: 2024-07-16

## 2024-07-16 VITALS
SYSTOLIC BLOOD PRESSURE: 122 MMHG | OXYGEN SATURATION: 96 % | WEIGHT: 226.44 LBS | HEIGHT: 69 IN | BODY MASS INDEX: 33.54 KG/M2 | HEART RATE: 66 BPM | DIASTOLIC BLOOD PRESSURE: 80 MMHG

## 2024-07-16 DIAGNOSIS — E66.9 CLASS 1 OBESITY: ICD-10-CM

## 2024-07-16 DIAGNOSIS — I63.9 OCCIPITAL STROKE: Primary | ICD-10-CM

## 2024-07-16 DIAGNOSIS — D35.2 PITUITARY MICROADENOMA: ICD-10-CM

## 2024-07-16 PROBLEM — E66.811 CLASS 1 OBESITY: Status: ACTIVE | Noted: 2024-07-16

## 2024-07-16 PROCEDURE — 1101F PT FALLS ASSESS-DOCD LE1/YR: CPT | Mod: HCNC,CPTII,S$GLB, | Performed by: INTERNAL MEDICINE

## 2024-07-16 PROCEDURE — 99999 PR PBB SHADOW E&M-EST. PATIENT-LVL IV: CPT | Mod: PBBFAC,HCNC,, | Performed by: INTERNAL MEDICINE

## 2024-07-16 PROCEDURE — 3079F DIAST BP 80-89 MM HG: CPT | Mod: HCNC,CPTII,S$GLB, | Performed by: INTERNAL MEDICINE

## 2024-07-16 PROCEDURE — 1160F RVW MEDS BY RX/DR IN RCRD: CPT | Mod: HCNC,CPTII,S$GLB, | Performed by: INTERNAL MEDICINE

## 2024-07-16 PROCEDURE — 3008F BODY MASS INDEX DOCD: CPT | Mod: HCNC,CPTII,S$GLB, | Performed by: INTERNAL MEDICINE

## 2024-07-16 PROCEDURE — 99451 NTRPROF PH1/NTRNET/EHR 5/>: CPT | Mod: ,,, | Performed by: PSYCHIATRY & NEUROLOGY

## 2024-07-16 PROCEDURE — 3074F SYST BP LT 130 MM HG: CPT | Mod: HCNC,CPTII,S$GLB, | Performed by: INTERNAL MEDICINE

## 2024-07-16 PROCEDURE — 1159F MED LIST DOCD IN RCRD: CPT | Mod: HCNC,CPTII,S$GLB, | Performed by: INTERNAL MEDICINE

## 2024-07-16 PROCEDURE — 99214 OFFICE O/P EST MOD 30 MIN: CPT | Mod: HCNC,S$GLB,, | Performed by: INTERNAL MEDICINE

## 2024-07-16 PROCEDURE — 3288F FALL RISK ASSESSMENT DOCD: CPT | Mod: HCNC,CPTII,S$GLB, | Performed by: INTERNAL MEDICINE

## 2024-07-16 PROCEDURE — 1126F AMNT PAIN NOTED NONE PRSNT: CPT | Mod: HCNC,CPTII,S$GLB, | Performed by: INTERNAL MEDICINE

## 2024-07-16 PROCEDURE — 4010F ACE/ARB THERAPY RXD/TAKEN: CPT | Mod: HCNC,CPTII,S$GLB, | Performed by: INTERNAL MEDICINE

## 2024-07-16 RX ORDER — NAPROXEN SODIUM 220 MG/1
81 TABLET, FILM COATED ORAL DAILY
COMMUNITY

## 2024-07-16 RX ORDER — ATORVASTATIN CALCIUM 40 MG/1
TABLET, FILM COATED ORAL
Qty: 166 TABLET | Refills: 0 | Status: SHIPPED | OUTPATIENT
Start: 2024-07-16 | End: 2024-10-14

## 2024-07-16 NOTE — PROGRESS NOTES
"   Patient ID: Moris Calderón is a 68 y.o. male.    Chief Complaint: Follow-up      A/P   1. Occipital stroke  - atorvastatin (LIPITOR) 40 MG tablet; Take 1 tablet (40 mg total) by mouth once daily for 14 days, THEN 2 tablets (80 mg total) once daily.  Dispense: 166 tablet; Refill: 0  - E-Consult to General Neurology    2. Pituitary microadenoma- 4 mm (MRI may 2024)    3. Class 1 obesity      No focal deficits on exam    CTA head and neck, echo bubble study ordered on separate encounter.    Did not prescribe Plavix since it has been more than 21 days since this stroke occurred.    Increase atorvastatin to 80 mg  Start aspirin     Continue naltrexone and Wellbutrin      HPI     Follow-up after start of Wellbutrin and naltrexone.  He has noticed his appetite has been suppressed and he has lost 5 lb.  No overt side effects.    MRI done by ER ENT Dr. Badillo. Note that prior to this he saw ophthalmo at ER for left lateral visual field shading.  Currently without symptoms except for possibly some residual left lateral visual field shading"    Impression:     1. Subacute appearing small volume cortical infarct within the right parasagittal occipital lobe.  No acute infarct or other acute process identified.  2. Incidentally noted 4 mm hypoenhancing focus within the left aspect of the sella statistically representing a microadenoma..  This can be correlated with biochemical markers and potential follow-up pituitary protocol MRI.  3. Normal MR appearance of the IAC/CP angles.  This report was flagged in Epic as abnormal.    Update:  E consult answered  "Recommendation:      Good afternoon     -Echo with bubble study  -CTA head and neck  -ASA 81 mg daily. Clopidogrel 75 mg daily x 21 days.  -Statin."      Review of Systems   Constitutional:  Negative for fever.   Respiratory:  Negative for shortness of breath.    Cardiovascular:  Negative for chest pain.   Gastrointestinal:  Negative for abdominal pain.     Vitals:    " 07/16/24 1611   BP: 122/80   Pulse: 66      Wt Readings from Last 3 Encounters:   07/16/24 1611 102.7 kg (226 lb 6.6 oz)   05/27/24 1415 105.1 kg (231 lb 11.3 oz)   04/25/24 1939 99.8 kg (220 lb)      Body mass index is 33.44 kg/m².     Physical Exam  Cardiovascular:      Rate and Rhythm: Normal rate and regular rhythm.      Heart sounds: No murmur heard.     No gallop.   Pulmonary:      Breath sounds: Normal breath sounds. No wheezing or rhonchi.   Abdominal:      Palpations: Abdomen is soft.      Tenderness: There is no abdominal tenderness.         Diabetes Medications               semaglutide, weight loss, (WEGOVY) 0.25 mg/0.5 mL PnIj Inject 0.25 mg into the skin once a week.           Hypertension Medications               amLODIPine (NORVASC) 2.5 MG tablet TAKE 1 TABLET(2.5 MG) BY MOUTH EVERY DAY    benazepriL (LOTENSIN) 40 MG tablet TAKE 1 TABLET ONE TIME DAILY           Hyperlipidemia Medications               atorvastatin (LIPITOR) 10 MG tablet TAKE 1 TABLET EVERY DAY           Medication List with Changes/Refills   New Medications    ATORVASTATIN (LIPITOR) 40 MG TABLET    Take 1 tablet (40 mg total) by mouth once daily for 14 days, THEN 2 tablets (80 mg total) once daily.   Current Medications    AMLODIPINE (NORVASC) 2.5 MG TABLET    TAKE 1 TABLET(2.5 MG) BY MOUTH EVERY DAY    ASPIRIN 81 MG CHEW    Take 81 mg by mouth once daily.    BENAZEPRIL (LOTENSIN) 40 MG TABLET    TAKE 1 TABLET ONE TIME DAILY    BUPROPION (WELLBUTRIN SR) 150 MG TBSR 12 HR TABLET    Take 1 tablet (150 mg total) by mouth 2 (two) times daily.    BUPROPION (WELLBUTRIN) 100 MG TABLET    Take 1 tablet (100 mg total) by mouth once daily for 14 days, THEN 1 tablet (100 mg total) 2 (two) times a day for 14 days, THEN 1.5 tablets (150 mg total) 2 (two) times a day for 14 days.    CETIRIZINE (ZYRTEC) 10 MG TABLET    Take 1 tablet (10 mg total) by mouth daily as needed for Allergies.    DIAZEPAM (VALIUM) 5 MG TABLET    Take 1 tablet (5 mg  total) by mouth once. for 1 dose    FLUTICASONE PROPIONATE (FLONASE) 50 MCG/ACTUATION NASAL SPRAY    SHAKE LIQUID AND USE 1 SPRAY IN EACH NOSTRIL EVERY DAY    NALTREXONE (DEPADE) 50 MG TABLET    Take take 1/2 tablet twice daily    PANTOPRAZOLE (PROTONIX) 40 MG TABLET    TAKE 1 TABLET (40MG) BY MOUTH BEFORE BREAKFAST    PAROXETINE (PAXIL) 10 MG TABLET    TAKE 1 TABLET EVERY MORNING    PAROXETINE (PAXIL) 40 MG TABLET    TAKE 1 TABLET EVERY DAY    PRIMIDONE (MYSOLINE) 50 MG TAB    Take 2 tablets (100 mg total) by mouth every evening.    PROCHLORPERAZINE (COMPAZINE) 10 MG TABLET    Take 1 tablet (10 mg total) by mouth every 6 (six) hours as needed (headache).    TAMSULOSIN (FLOMAX) 0.4 MG CAP    Take 1 capsule (0.4 mg total) by mouth once daily.    TIZANIDINE (ZANAFLEX) 4 MG TABLET    TAKE 1 TABLET(4 MG) BY MOUTH EVERY NIGHT AS NEEDED   Discontinued Medications    ATORVASTATIN (LIPITOR) 10 MG TABLET    TAKE 1 TABLET EVERY DAY    SEMAGLUTIDE, WEIGHT LOSS, (WEGOVY) 0.25 MG/0.5 ML PNIJ    Inject 0.25 mg into the skin once a week.       I personally reviewed past medical, family and social history.

## 2024-07-16 NOTE — CONSULTS
[facility] NEUROLOGY  Response for E-Consult     Patient Name: Moris Calderón  MRN: [id number]  Primary Care Provider: Giovanni Kumar DO   Requesting Provider: Giovanni Kumar DO  Consults    Recommendation:     Good afternoon    -Echo with bubble study  -CTA head and neck  -ASA 81 mg daily. Clopidogrel 75 mg daily x 21 days.  -Statin.    Contingency that warrants a repeat eConsult or referral:     Total time of Consultation: 15 minute    I did not speak to the requesting provider verbally about this.     *This eConsult is based on the clinical data available to me and is furnished without benefit of a physical examination. The eConsult will need to be interpreted in light of any clinical issues or changes in patient status not available to me at the time of filing this eConsults. Significant changes in patient condition or level of acuity should result in immediate formal consultation and reevaluation. Please alert me if you have further questions.    Thank you for this eConsult referral.     Gil Pompa MD  [facility] NEUROLOGY

## 2024-07-17 NOTE — TELEPHONE ENCOUNTER
Please schedule CTA and echo bubble study.  Please move labs prior to the CTA (PSA, TSH, A1c, lipid, CMP)    1-2 drinks

## 2024-07-23 ENCOUNTER — LAB VISIT (OUTPATIENT)
Dept: LAB | Facility: HOSPITAL | Age: 69
End: 2024-07-23
Attending: INTERNAL MEDICINE
Payer: MEDICARE

## 2024-07-23 ENCOUNTER — HOSPITAL ENCOUNTER (OUTPATIENT)
Dept: RADIOLOGY | Facility: HOSPITAL | Age: 69
Discharge: HOME | End: 2024-07-23
Attending: INTERNAL MEDICINE
Payer: MEDICARE

## 2024-07-23 DIAGNOSIS — I63.9 OCCIPITAL STROKE: ICD-10-CM

## 2024-07-23 LAB
CREAT SERPL-MCNC: 1.1 MG/DL (ref 0.5–1.4)
CREAT SERPL-MCNC: 1.3 MG/DL (ref 0.5–1.4)
EST. GFR  (NO RACE VARIABLE): 59.5 ML/MIN/1.73 M^2
SAMPLE: NORMAL

## 2024-07-23 PROCEDURE — 36415 COLL VENOUS BLD VENIPUNCTURE: CPT | Mod: PO | Performed by: INTERNAL MEDICINE

## 2024-07-23 PROCEDURE — 25500020 PHARM REV CODE 255

## 2024-07-23 PROCEDURE — 82565 ASSAY OF CREATININE: CPT | Performed by: INTERNAL MEDICINE

## 2024-07-23 PROCEDURE — 70496 CT ANGIOGRAPHY HEAD: CPT | Mod: TC

## 2024-07-23 RX ADMIN — IOHEXOL 100 ML: 350 INJECTION, SOLUTION INTRAVENOUS at 12:07

## 2024-08-05 ENCOUNTER — HOSPITAL ENCOUNTER (OUTPATIENT)
Dept: CARDIOLOGY | Facility: HOSPITAL | Age: 69
Discharge: HOME | End: 2024-08-05
Attending: INTERNAL MEDICINE
Payer: MEDICARE

## 2024-08-05 VITALS — HEIGHT: 69 IN | BODY MASS INDEX: 33.47 KG/M2 | WEIGHT: 226 LBS

## 2024-08-05 DIAGNOSIS — I63.9 OCCIPITAL STROKE: ICD-10-CM

## 2024-08-05 LAB
ASCENDING AORTA: 2.95 CM
AV INDEX (PROSTH): 0.7
AV MEAN GRADIENT: 9 MMHG
AV PEAK GRADIENT: 17 MMHG
AV VALVE AREA BY VELOCITY RATIO: 2.15 CM²
AV VALVE AREA: 2.53 CM²
AV VELOCITY RATIO: 0.59
BSA FOR ECHO PROCEDURE: 2.23 M2
CV ECHO LV RWT: 0.44 CM
DOP CALC AO PEAK VEL: 2.04 M/S
DOP CALC AO VTI: 42.1 CM
DOP CALC LVOT AREA: 3.6 CM2
DOP CALC LVOT DIAMETER: 2.15 CM
DOP CALC LVOT PEAK VEL: 1.21 M/S
DOP CALC LVOT STROKE VOLUME: 106.32 CM3
DOP CALCLVOT PEAK VEL VTI: 29.3 CM
E WAVE DECELERATION TIME: 236.08 MSEC
E/A RATIO: 1.23
E/E' RATIO: 9.16 M/S
ECHO LV POSTERIOR WALL: 1.09 CM (ref 0.6–1.1)
FRACTIONAL SHORTENING: 44 % (ref 28–44)
INTERVENTRICULAR SEPTUM: 1.09 CM (ref 0.6–1.1)
IVRT: 106.57 MSEC
LEFT ATRIUM AREA SYSTOLIC (APICAL 2 CHAMBER): 19.61 CM2
LEFT ATRIUM AREA SYSTOLIC (APICAL 4 CHAMBER): 19.39 CM2
LEFT ATRIUM SIZE: 4.37 CM
LEFT ATRIUM VOLUME INDEX MOD: 25.6 ML/M2
LEFT ATRIUM VOLUME MOD: 55.77 CM3
LEFT INTERNAL DIMENSION IN SYSTOLE: 2.81 CM (ref 2.1–4)
LEFT VENTRICLE DIASTOLIC VOLUME INDEX: 54.24 ML/M2
LEFT VENTRICLE DIASTOLIC VOLUME: 118.24 ML
LEFT VENTRICLE END SYSTOLIC VOLUME APICAL 2 CHAMBER: 57.95 ML
LEFT VENTRICLE END SYSTOLIC VOLUME APICAL 4 CHAMBER: 53.55 ML
LEFT VENTRICLE MASS INDEX: 94 G/M2
LEFT VENTRICLE SYSTOLIC VOLUME INDEX: 13.7 ML/M2
LEFT VENTRICLE SYSTOLIC VOLUME: 29.78 ML
LEFT VENTRICULAR INTERNAL DIMENSION IN DIASTOLE: 5 CM (ref 3.5–6)
LEFT VENTRICULAR MASS: 204.56 G
LV LATERAL E/E' RATIO: 7.25 M/S
LV SEPTAL E/E' RATIO: 12.43 M/S
LVED V (TEICH): 118.24 ML
LVES V (TEICH): 29.78 ML
LVOT MG: 3.35 MMHG
LVOT MV: 0.86 CM/S
MV PEAK A VEL: 0.71 M/S
MV PEAK E VEL: 0.87 M/S
MV STENOSIS PRESSURE HALF TIME: 68.46 MS
MV VALVE AREA P 1/2 METHOD: 3.21 CM2
OHS CV RV/LV RATIO: 0.76 CM
PISA TR MAX VEL: 2.39 M/S
PULM VEIN S/D RATIO: 1.17
PV PEAK D VEL: 0.47 M/S
PV PEAK S VEL: 0.55 M/S
RA VOL SYS: 31.12 ML
RIGHT ATRIAL AREA: 11.9 CM2
RIGHT ATRIUM VOLUME AREA LENGTH APICAL 4 CHAMBER: 27.79 ML
RIGHT VENTRICLE DIASTOLIC LENGTH: 7.4 CM
RIGHT VENTRICLE DIASTOLIC MID DIMENSION: 2.6 CM
RIGHT VENTRICULAR END-DIASTOLIC DIMENSION: 3.8 CM
RIGHT VENTRICULAR LENGTH IN DIASTOLE (APICAL 4-CHAMBER VIEW): 7.42 CM
RV MID DIAMA: 2.62 CM
RV TISSUE DOPPLER FREE WALL SYSTOLIC VELOCITY 1 (APICAL 4 CHAMBER VIEW): 14.3 CM/S
SINUS: 2.84 CM
STJ: 2.95 CM
TDI LATERAL: 0.12 M/S
TDI SEPTAL: 0.07 M/S
TDI: 0.1 M/S
TR MAX PG: 23 MMHG
TRICUSPID ANNULAR PLANE SYSTOLIC EXCURSION: 1.92 CM
Z-SCORE OF LEFT VENTRICULAR DIMENSION IN END DIASTOLE: -3.77
Z-SCORE OF LEFT VENTRICULAR DIMENSION IN END SYSTOLE: -3.6

## 2024-08-05 PROCEDURE — 93306 TTE W/DOPPLER COMPLETE: CPT | Mod: HCNC,PO

## 2024-08-07 ENCOUNTER — PATIENT MESSAGE (OUTPATIENT)
Dept: FAMILY MEDICINE | Facility: CLINIC | Age: 69
End: 2024-08-07
Payer: MEDICARE

## 2024-08-23 ENCOUNTER — OFFICE VISIT (OUTPATIENT)
Dept: FAMILY MEDICINE | Facility: CLINIC | Age: 69
End: 2024-08-23
Payer: MEDICARE

## 2024-08-23 VITALS
HEIGHT: 69 IN | BODY MASS INDEX: 32.92 KG/M2 | WEIGHT: 222.25 LBS | HEART RATE: 74 BPM | OXYGEN SATURATION: 98 % | SYSTOLIC BLOOD PRESSURE: 120 MMHG | DIASTOLIC BLOOD PRESSURE: 84 MMHG

## 2024-08-23 DIAGNOSIS — I10 PRIMARY HYPERTENSION: ICD-10-CM

## 2024-08-23 DIAGNOSIS — I63.9 OCCIPITAL STROKE: Primary | ICD-10-CM

## 2024-08-23 DIAGNOSIS — D35.2 PITUITARY MICROADENOMA: ICD-10-CM

## 2024-08-23 PROCEDURE — 99999 PR PBB SHADOW E&M-EST. PATIENT-LVL IV: CPT | Mod: PBBFAC,HCNC,, | Performed by: INTERNAL MEDICINE

## 2024-08-23 NOTE — PROGRESS NOTES
Patient ID: Moris Calderón is a 69 y.o. male.    Chief Complaint: Follow-up     Diagnosis and orders   1. Occipital stroke- Subacute appearing small volume cortical infarct within the right parasagittal occipital lobe    2. Pituitary microadenoma  - PROLACTIN; Future    3. Primary hypertension      Plan     Stop amlodipine   Check prolactin  Continue atorvastatin  Continue aspirin  Monitor blood pressure     HPI     Follow up weight and stroke w/u.     Placed E consult to neurology:    Recommendation:      Good afternoon     -Echo with bubble study  -CTA head and neck  -ASA 81 mg daily. Clopidogrel 75 mg daily x 21 days.  -Statin.    CTA head neck no findings except for known posterior right occipital lobe infarct    Echo:   Left Ventricle: The left ventricle is normal in size. Normal wall thickness. There is normal systolic function with a visually estimated ejection fraction of 60 - 65%. There is normal diastolic function.    Right Ventricle: Normal right ventricular cavity size. Wall thickness is normal. Systolic function is normal.    Tricuspid Valve: There is mild regurgitation.    Having some dizziness since losing weight.   Microadenoma < 10 mm and asymptomatic- Check prolactin    Review of Systems   Constitutional:  Negative for fever.   Respiratory:  Negative for shortness of breath.    Cardiovascular:  Negative for chest pain.   Gastrointestinal:  Negative for abdominal pain.        Hypertension Medications               amLODIPine (NORVASC) 2.5 MG tablet TAKE 1 TABLET(2.5 MG) BY MOUTH EVERY DAY    benazepriL (LOTENSIN) 40 MG tablet TAKE 1 TABLET ONE TIME DAILY           Hyperlipidemia Medications               atorvastatin (LIPITOR) 40 MG tablet Take 1 tablet (40 mg total) by mouth once daily for 14 days, THEN 2 tablets (80 mg total) once daily.           Medication List with Changes/Refills   Current Medications    AMLODIPINE (NORVASC) 2.5 MG TABLET    TAKE 1 TABLET(2.5 MG) BY MOUTH EVERY DAY     ASPIRIN 81 MG CHEW    Take 81 mg by mouth once daily.    ATORVASTATIN (LIPITOR) 40 MG TABLET    Take 1 tablet (40 mg total) by mouth once daily for 14 days, THEN 2 tablets (80 mg total) once daily.    BENAZEPRIL (LOTENSIN) 40 MG TABLET    TAKE 1 TABLET ONE TIME DAILY    BUPROPION (WELLBUTRIN SR) 150 MG TBSR 12 HR TABLET    Take 1 tablet (150 mg total) by mouth 2 (two) times daily.    CETIRIZINE (ZYRTEC) 10 MG TABLET    Take 1 tablet (10 mg total) by mouth daily as needed for Allergies.    DIAZEPAM (VALIUM) 5 MG TABLET    Take 1 tablet (5 mg total) by mouth once. for 1 dose    FLUTICASONE PROPIONATE (FLONASE) 50 MCG/ACTUATION NASAL SPRAY    SHAKE LIQUID AND USE 1 SPRAY IN EACH NOSTRIL EVERY DAY    NALTREXONE (DEPADE) 50 MG TABLET    Take take 1/2 tablet twice daily    PANTOPRAZOLE (PROTONIX) 40 MG TABLET    TAKE 1 TABLET (40MG) BY MOUTH BEFORE BREAKFAST    PAROXETINE (PAXIL) 10 MG TABLET    TAKE 1 TABLET EVERY MORNING    PAROXETINE (PAXIL) 40 MG TABLET    TAKE 1 TABLET EVERY DAY    PRIMIDONE (MYSOLINE) 50 MG TAB    Take 2 tablets (100 mg total) by mouth every evening.    PROCHLORPERAZINE (COMPAZINE) 10 MG TABLET    Take 1 tablet (10 mg total) by mouth every 6 (six) hours as needed (headache).    TAMSULOSIN (FLOMAX) 0.4 MG CAP    Take 1 capsule (0.4 mg total) by mouth once daily.    TIZANIDINE (ZANAFLEX) 4 MG TABLET    TAKE 1 TABLET(4 MG) BY MOUTH EVERY NIGHT AS NEEDED       I personally reviewed past medical, family and social history.     Objective      Vitals:    08/23/24 1601   BP: 120/84   Pulse: 74      Wt Readings from Last 3 Encounters:   08/23/24 1601 100.8 kg (222 lb 3.6 oz)   08/05/24 0808 102.5 kg (226 lb)   07/16/24 1611 102.7 kg (226 lb 6.6 oz)      Body mass index is 32.82 kg/m².     Physical Exam  Cardiovascular:      Rate and Rhythm: Normal rate and regular rhythm.      Heart sounds: No murmur heard.     No gallop.   Pulmonary:      Breath sounds: Normal breath sounds. No wheezing or rhonchi.    Abdominal:      Palpations: Abdomen is soft.      Tenderness: There is no abdominal tenderness.         Visit today included increased complexity associated with the care of the episodic problem Hypertension addressed and managing the longitudinal care of the patient due to the serious and/or complex managed problem(s)   Active Problem List with Overview Notes    Diagnosis Date Noted    Occipital stroke- Subacute appearing small volume cortical infarct within the right parasagittal occipital lobe 07/16/2024    Class 1 obesity 07/16/2024    Pituitary microadenoma- 4 mm (MRI may 2024) check prolactin if normal, no further testing. 06/26/2024    Hepatic steatosis 07/13/2023    Essential tremor 08/11/2022    Lower urinary tract symptoms (LUTS) 02/11/2022    Obstructive sleep apnea 02/11/2022    Pre-diabetes 02/11/2022    Anxiety 07/01/2021    Spinal stenosis of lumbar region without neurogenic claudication 03/03/2020    Hypertension 11/02/2015     2008: Diagnosed.      Mixed hyperlipidemia 11/02/2015     2009: Began statin.      Hearing aid worn 03/20/2024    Cervical spondylosis 01/10/2022    Systolic murmur 07/01/2021    Lumbar spondylosis 03/10/2021    Lumbar radiculopathy 02/11/2021

## 2024-08-31 DIAGNOSIS — I10 PRIMARY HYPERTENSION: ICD-10-CM

## 2024-08-31 RX ORDER — BENAZEPRIL HYDROCHLORIDE 40 MG/1
40 TABLET ORAL
Qty: 90 TABLET | Refills: 3 | Status: SHIPPED | OUTPATIENT
Start: 2024-08-31

## 2024-08-31 NOTE — TELEPHONE ENCOUNTER
No care due was identified.  [facility] Embedded Care Due Messages. Reference number: [id number].   8/31/2024 1:49:21 AM CDT

## 2024-08-31 NOTE — TELEPHONE ENCOUNTER
Refill Decision Note   Moris Calderón  is requesting a refill authorization.  Brief Assessment and Rationale for Refill:  Approve     Medication Therapy Plan:         Comments:     Note composed:11:53 AM 08/31/2024

## 2024-09-11 ENCOUNTER — PATIENT MESSAGE (OUTPATIENT)
Dept: ADMINISTRATIVE | Facility: OTHER | Age: 69
End: 2024-09-11
Payer: MEDICARE

## 2024-09-12 RX ORDER — ATORVASTATIN CALCIUM 10 MG/1
10 TABLET, FILM COATED ORAL
Qty: 90 TABLET | Refills: 3 | OUTPATIENT
Start: 2024-09-12

## 2024-09-12 NOTE — TELEPHONE ENCOUNTER
Refill Decision Note   Moris Calderón  is requesting a refill authorization.  Brief Assessment and Rationale for Refill:  Quick Discontinue     Medication Therapy Plan:  Request is for noncurrent dosage.      Pharmacist review requested: Yes   Comments:     Note composed:11:57 AM 09/12/2024

## 2024-09-12 NOTE — TELEPHONE ENCOUNTER
No care due was identified.  [facility] Embedded Care Due Messages. Reference number: [id number].   9/12/2024 1:31:32 AM CDT

## 2024-09-12 NOTE — TELEPHONE ENCOUNTER
Refill Routing Note   Medication(s) are not appropriate for processing by [facility] for the following reason(s):        Drug-drug interaction    [facility] action(s):  Defer        Medication Therapy Plan: atorvastatin    Pharmacist review requested: Yes     Appointments  past 12m or future 3m with PCP    Date Provider   Last Visit   8/23/2024 Giovanni Kumar, DO   Next Visit   11/27/2024 Giovanni Kumar, DO   ED visits in past 90 days: 0        Note composed:11:23 AM 09/12/2024

## 2024-09-17 ENCOUNTER — LAB VISIT (OUTPATIENT)
Dept: PRIMARY CARE CLINIC | Facility: CLINIC | Age: 69
End: 2024-09-17
Payer: MEDICARE

## 2024-09-17 DIAGNOSIS — D35.2 PITUITARY MICROADENOMA: ICD-10-CM

## 2024-09-17 LAB — PROLACTIN SERPL IA-MCNC: 13.3 NG/ML (ref 3.5–19.4)

## 2024-09-17 PROCEDURE — 36415 COLL VENOUS BLD VENIPUNCTURE: CPT | Mod: S$GLB,,, | Performed by: INTERNAL MEDICINE

## 2024-09-17 PROCEDURE — 84146 ASSAY OF PROLACTIN: CPT | Mod: HCNC | Performed by: INTERNAL MEDICINE

## 2024-09-17 PROCEDURE — 99499 UNLISTED E&M SERVICE: CPT | Mod: S$GLB,,, | Performed by: INTERNAL MEDICINE

## 2024-09-29 ENCOUNTER — PATIENT MESSAGE (OUTPATIENT)
Dept: FAMILY MEDICINE | Facility: CLINIC | Age: 69
End: 2024-09-29
Payer: MEDICARE

## 2024-09-29 DIAGNOSIS — E78.2 MIXED HYPERLIPIDEMIA: Primary | ICD-10-CM

## 2024-09-29 DIAGNOSIS — I63.9 OCCIPITAL STROKE: ICD-10-CM

## 2024-09-30 RX ORDER — ATORVASTATIN CALCIUM 80 MG/1
80 TABLET, FILM COATED ORAL DAILY
Qty: 90 TABLET | Refills: 3 | Status: SHIPPED | OUTPATIENT
Start: 2024-09-30 | End: 2025-09-25

## 2024-10-09 DIAGNOSIS — K44.9 HIATAL HERNIA: ICD-10-CM

## 2024-10-09 DIAGNOSIS — R10.13 EPIGASTRIC PAIN: ICD-10-CM

## 2024-10-09 DIAGNOSIS — R12 HEARTBURN: ICD-10-CM

## 2024-10-10 RX ORDER — PANTOPRAZOLE SODIUM 40 MG/1
40 TABLET, DELAYED RELEASE ORAL
Qty: 30 TABLET | Refills: 0 | OUTPATIENT
Start: 2024-10-10

## 2024-12-01 DIAGNOSIS — G25.0 ESSENTIAL TREMOR: ICD-10-CM

## 2024-12-02 RX ORDER — PRIMIDONE 50 MG/1
150 TABLET ORAL NIGHTLY
Qty: 270 TABLET | Refills: 3 | Status: SHIPPED | OUTPATIENT
Start: 2024-12-02 | End: 2025-12-02

## 2025-01-07 ENCOUNTER — OFFICE VISIT (OUTPATIENT)
Dept: FAMILY MEDICINE | Facility: CLINIC | Age: 70
End: 2025-01-07
Payer: MEDICARE

## 2025-01-07 VITALS
DIASTOLIC BLOOD PRESSURE: 72 MMHG | BODY MASS INDEX: 32.03 KG/M2 | HEIGHT: 69 IN | HEART RATE: 76 BPM | OXYGEN SATURATION: 96 % | WEIGHT: 216.25 LBS | SYSTOLIC BLOOD PRESSURE: 116 MMHG

## 2025-01-07 DIAGNOSIS — G47.33 OBSTRUCTIVE SLEEP APNEA: ICD-10-CM

## 2025-01-07 DIAGNOSIS — E78.2 MIXED HYPERLIPIDEMIA: ICD-10-CM

## 2025-01-07 DIAGNOSIS — I10 PRIMARY HYPERTENSION: Primary | ICD-10-CM

## 2025-01-07 DIAGNOSIS — Z23 NEED FOR INFLUENZA VACCINATION: ICD-10-CM

## 2025-01-07 DIAGNOSIS — R10.13 EPIGASTRIC PAIN: ICD-10-CM

## 2025-01-07 DIAGNOSIS — R12 HEARTBURN: ICD-10-CM

## 2025-01-07 DIAGNOSIS — R73.03 PRE-DIABETES: ICD-10-CM

## 2025-01-07 DIAGNOSIS — I63.9 OCCIPITAL STROKE: ICD-10-CM

## 2025-01-07 DIAGNOSIS — K44.9 HIATAL HERNIA: ICD-10-CM

## 2025-01-07 DIAGNOSIS — Z12.5 SCREENING FOR PROSTATE CANCER: ICD-10-CM

## 2025-01-07 DIAGNOSIS — D35.2 PITUITARY MICROADENOMA: ICD-10-CM

## 2025-01-07 PROCEDURE — 99999 PR PBB SHADOW E&M-EST. PATIENT-LVL IV: CPT | Mod: PBBFAC,HCNC,, | Performed by: INTERNAL MEDICINE

## 2025-01-07 RX ORDER — PANTOPRAZOLE SODIUM 40 MG/1
40 TABLET, DELAYED RELEASE ORAL DAILY
Qty: 90 TABLET | Refills: 0 | Status: SHIPPED | OUTPATIENT
Start: 2025-01-07

## 2025-01-07 RX ORDER — LORATADINE 10 MG
10 TABLET,DISINTEGRATING ORAL DAILY
COMMUNITY

## 2025-01-07 NOTE — PROGRESS NOTES
Patient ID: Moris Calderón is a 69 y.o. male.    Chief Complaint: Follow-up     Assessment and Plan     1. Pituitary microadenoma    2. Occipital stroke- Subacute appearing small volume cortical infarct within the right parasagittal occipital lobe  - Lipid Panel; Future    3. Obstructive sleep apnea    4. Mixed hyperlipidemia  - Lipid Panel; Future    5. Primary hypertension  - CBC Auto Differential; Future  - Comprehensive Metabolic Panel; Future    6. Heartburn  - pantoprazole (PROTONIX) 40 MG tablet; Take 1 tablet (40 mg total) by mouth once daily.  Dispense: 90 tablet; Refill: 0    7. Hiatal hernia  - pantoprazole (PROTONIX) 40 MG tablet; Take 1 tablet (40 mg total) by mouth once daily.  Dispense: 90 tablet; Refill: 0    8. Epigastric pain  - pantoprazole (PROTONIX) 40 MG tablet; Take 1 tablet (40 mg total) by mouth once daily.  Dispense: 90 tablet; Refill: 0    9. Pre-diabetes  - Hemoglobin A1C; Future    10. Screening for prostate cancer  - PSA, Screening; Future    11. Need for influenza vaccination  - influenza (adjuvanted) (Fluad) 45 mcg/0.5 mL IM vaccine (> or = 64 yo) 0.5 mL       Assessment & Plan    MICROADENOMA:  - Followed up on microadenoma less than 4 mm on MRI from May 24.  - Prolactin level checked and found to be normal, indicating no current issues.  - Based on these results, no further testing or specific treatment is required for the pituitary microadenoma at this time.    AND HEMIPARESIS FOLLOWING NONTRAUMATIC SUBARACHNOID HEMORRHAGE AFFECTING UNSPECIFIED SIDE:  - Moris has a history of occipital stroke in April or May.  - Previous imaging showed a subacute appearing small volume cortical infarct within the right parasagittal occipital lobe.  - Explained rationale for continued aspirin use post-occipital stroke.  - Continuing aspirin 81 mg daily and atorvastatin 80 mg daily for stroke prevention and hyperlipidemia management.  - Tizanidine to be continued as needed for lumbar  pain.    CONSTIPATION:  - Educated on importance of fiber, vegetables, and water intake for managing constipation.  - Moris to increase fiber intake through vegetables and other sources, and increase water consumption.    DIZZINESS AND NAUSEA:  - Discussed positional dizziness and nausea, emphasizing importance of adequate fluid intake.  - Moris to increase water consumption.    MANAGEMENT:  - Educated on importance of fiber, vegetables, and water intake for maintaining weight.  - Recommend maintaining adequate protein intake.    HYPERTENSION:  - Moris to monitor and potentially reduce salt intake.         Follow up in about 6 months (around 7/7/2025) for Virtual Visit.   HPI     History of Present Illness    CHIEF COMPLAINT:  Moris presents for a six-month follow-up visit to review and manage multiple ongoing health conditions, including hypertension, hyperlipidemia, anxiety, and weight management.    HPI:  Moris reports blood pressure fluctuations, leading to the reinitiation of amlodipine 2.5 mg. He attributes these to recent stress due to his mother's passing from leukemia and increased salt intake during the holidays. Moris has had weight changes, initially losing weight but recently regaining some. He reports ongoing anxiety, partially controlled with medication. Moris mentions constipation, which he attributes to the combination of Wellbutrin and Naltrexone (Contrave) taken for weight management. He has been taking a daily stool softener to address this issue. Moris reports occasional episodes of dizziness and nausea when in certain positions, particularly when his head is lower than his body. He continues to use an Inspire implant for sleep apnea management, which he reports is effective. Moris has not received a flu vaccine or the latest COVID-19 vaccine.    FAMILY HISTORY:  Family history is significant for mother with leukemia, who passed away several months ago.      ROS:  General: +weight loss  Gastrointestinal:  +nausea, +constipation  Neurological: +dizziness  Psychiatric: +anxiety, +sleep difficulty         Review of Systems    I personally reviewed past medical, family and social history.     Objective    Vitals:    01/07/25 1110   BP: 116/72   Pulse: 76      Wt Readings from Last 3 Encounters:   01/07/25 1110 98.1 kg (216 lb 4.3 oz)   08/23/24 1601 100.8 kg (222 lb 3.6 oz)   08/05/24 0808 102.5 kg (226 lb)      Body mass index is 31.94 kg/m².     Physical Exam    Cardiovascular: Regular rhythm. No murmurs. No rubs. No gallops.  Respiratory: Clear to auscultation bilaterally.        Reference     : Visit today included increased complexity associated with the care of the episodic problem Primary hypertension [I10] addressed and managing the longitudinal care of the patient due to the serious and/or complex managed problem(s)     Active Problem List with Overview Notes    Diagnosis Date Noted    Occipital stroke- Subacute appearing small volume cortical infarct within the right parasagittal occipital lobe 07/16/2024    Class 1 obesity 07/16/2024    Pituitary microadenoma- 4 mm (MRI may 2024) check prolactin if normal, no further testing. 06/26/2024    Hepatic steatosis 07/13/2023    Essential tremor 08/11/2022    Lower urinary tract symptoms (LUTS) 02/11/2022    Obstructive sleep apnea 02/11/2022    Pre-diabetes 02/11/2022    Anxiety 07/01/2021    Spinal stenosis of lumbar region without neurogenic claudication 03/03/2020    Hypertension 11/02/2015     2008: Diagnosed.      Mixed hyperlipidemia 11/02/2015     2009: Began statin.      Hearing aid worn 03/20/2024    Cervical spondylosis 01/10/2022    Systolic murmur 07/01/2021    Lumbar spondylosis 03/10/2021    Lumbar radiculopathy 02/11/2021           Hypertension Medications               amLODIPine (NORVASC) 2.5 MG tablet TAKE 1 TABLET(2.5 MG) BY MOUTH EVERY DAY    benazepriL (LOTENSIN) 40 MG tablet TAKE 1 TABLET EVERY DAY           Hyperlipidemia Medications                atorvastatin (LIPITOR) 80 MG tablet Take 1 tablet (80 mg total) by mouth once daily.           Medication List with Changes/Refills   Current Medications    AMLODIPINE (NORVASC) 2.5 MG TABLET    TAKE 1 TABLET(2.5 MG) BY MOUTH EVERY DAY    ASPIRIN 81 MG CHEW    Take 81 mg by mouth once daily.    ATORVASTATIN (LIPITOR) 80 MG TABLET    Take 1 tablet (80 mg total) by mouth once daily.    BENAZEPRIL (LOTENSIN) 40 MG TABLET    TAKE 1 TABLET EVERY DAY    BUPROPION (WELLBUTRIN SR) 150 MG TBSR 12 HR TABLET    Take 1 tablet (150 mg total) by mouth 2 (two) times daily.    CETIRIZINE (ZYRTEC) 10 MG TABLET    Take 1 tablet (10 mg total) by mouth daily as needed for Allergies.    FLUTICASONE PROPIONATE (FLONASE) 50 MCG/ACTUATION NASAL SPRAY    SHAKE LIQUID AND USE 1 SPRAY IN EACH NOSTRIL EVERY DAY    LORATADINE (CLARITIN REDITABS) 10 MG DISSOLVABLE TABLET    Take 10 mg by mouth once daily.    NALTREXONE (DEPADE) 50 MG TABLET    Take take 1/2 tablet twice daily    PAROXETINE (PAXIL) 10 MG TABLET    TAKE 1 TABLET EVERY MORNING    PAROXETINE (PAXIL) 40 MG TABLET    TAKE 1 TABLET EVERY DAY    PRIMIDONE (MYSOLINE) 50 MG TAB    Take 3 tablets (150 mg total) by mouth every evening.    TAMSULOSIN (FLOMAX) 0.4 MG CAP    Take 1 capsule (0.4 mg total) by mouth once daily.    TIZANIDINE (ZANAFLEX) 4 MG TABLET    TAKE 1 TABLET(4 MG) BY MOUTH EVERY NIGHT AS NEEDED   Changed and/or Refilled Medications    Modified Medication Previous Medication    PANTOPRAZOLE (PROTONIX) 40 MG TABLET pantoprazole (PROTONIX) 40 MG tablet       Take 1 tablet (40 mg total) by mouth once daily.    TAKE 1 TABLET (40MG) BY MOUTH BEFORE BREAKFAST   Discontinued Medications    DIAZEPAM (VALIUM) 5 MG TABLET    Take 1 tablet (5 mg total) by mouth once. for 1 dose    PROCHLORPERAZINE (COMPAZINE) 10 MG TABLET    Take 1 tablet (10 mg total) by mouth every 6 (six) hours as needed (headache).         This note was generated with the assistance of ambient listening  technology. Verbal consent was obtained by the patient and accompanying visitor(s) for the recording of patient appointment to facilitate this note. I attest to having reviewed and edited the generated note for accuracy, though some syntax or spelling errors may persist. Please contact the author of this note for any clarification.

## 2025-02-24 ENCOUNTER — OFFICE VISIT (OUTPATIENT)
Dept: URGENT CARE | Facility: CLINIC | Age: 70
End: 2025-02-24
Payer: MEDICARE

## 2025-02-24 VITALS
SYSTOLIC BLOOD PRESSURE: 166 MMHG | OXYGEN SATURATION: 97 % | RESPIRATION RATE: 20 BRPM | DIASTOLIC BLOOD PRESSURE: 88 MMHG | HEIGHT: 69 IN | WEIGHT: 212 LBS | HEART RATE: 61 BPM | TEMPERATURE: 98 F | BODY MASS INDEX: 31.4 KG/M2

## 2025-02-24 DIAGNOSIS — R07.81 RIB PAIN ON LEFT SIDE: Primary | ICD-10-CM

## 2025-02-24 DIAGNOSIS — S20.212A CONTUSION OF RIB ON LEFT SIDE, INITIAL ENCOUNTER: ICD-10-CM

## 2025-02-24 PROCEDURE — 99204 OFFICE O/P NEW MOD 45 MIN: CPT | Mod: S$GLB,,, | Performed by: STUDENT IN AN ORGANIZED HEALTH CARE EDUCATION/TRAINING PROGRAM

## 2025-02-24 NOTE — PROGRESS NOTES
"Subjective:      Patient ID: Moris Calderón is a 69 y.o. male.    Vitals:  height is 5' 9" (1.753 m) and weight is 96.2 kg (212 lb). His temperature is 98 °F (36.7 °C). His blood pressure is 166/88 (abnormal) and his pulse is 61. His respiration is 20 and oxygen saturation is 97%.     Chief Complaint: Rib Injury    Patient is a 69-year-old male with a past medical history of spondylosis, tremor, CVA, hypertension, hyperlipidemia, anxiety, LUTS, arthritis, GERD, and sleep apnea who presents to clinic for evaluation of left rib injury.  Patient reports injury occurred 4 days ago.  Patient reports he was on a step and stool leaning over the side of his vehicle.  Patient states whenever he had to push himself up onto the vehicle and lay on his left side he instantly felt pain.  Patient states did not feel or hear a pop however had sharp pain in the left ribs.  Patient states that pain is fairly constant in nature.  Patient states that pain is sharp in nature.  Patient states pain is worse with certain position changes, touch, movement, or deep breaths.  Patient states that pain at current is a 5 on 10 scale but as worst an 8 or 9 on 10 scale.  Patient states pain does not radiate to any other location.  Patient states has not noticed any bruising, swelling, shortness of breath or chest pain.  Patient states has had had any history of rib fractures or dislocations in the past.  Patient states that he has taken over-the-counter medicines and previously prescribed muscle relaxers with only mild relief to symptoms.        Constitution: Negative. Negative for chills, sweating, fatigue and fever.   HENT: Negative.     Neck: neck negative.   Cardiovascular: Negative.  Negative for chest pain and palpitations.   Eyes: Negative.    Respiratory: Negative.  Negative for chest tightness, cough and shortness of breath.    Gastrointestinal: Negative.  Negative for abdominal pain, nausea, vomiting and diarrhea.   Endocrine: negative. "   Genitourinary: Negative.    Musculoskeletal:  Positive for pain (Left rib) and trauma.   Skin: Negative.  Negative for color change, pale, rash, wound and erythema.   Allergic/Immunologic: Negative.    Neurological: Negative.  Negative for dizziness, light-headedness, passing out, headaches, disorientation, altered mental status, numbness and tingling.   Hematologic/Lymphatic: Negative.    Psychiatric/Behavioral: Negative.  Negative for altered mental status, disorientation and confusion.       Objective:     Physical Exam   Constitutional: He is oriented to person, place, and time. He appears well-developed. He is cooperative.  Non-toxic appearance. He does not appear ill. No distress.   HENT:   Head: Normocephalic and atraumatic.   Ears:   Right Ear: Hearing and external ear normal.   Left Ear: Hearing and external ear normal.   Nose: Nose normal. No mucosal edema or nasal deformity. No epistaxis. Right sinus exhibits no maxillary sinus tenderness and no frontal sinus tenderness. Left sinus exhibits no maxillary sinus tenderness and no frontal sinus tenderness.   Mouth/Throat: Uvula is midline, oropharynx is clear and moist and mucous membranes are normal. Mucous membranes are moist. No trismus in the jaw. Normal dentition. No uvula swelling. Oropharynx is clear.   Eyes: Conjunctivae and lids are normal. Pupils are equal, round, and reactive to light. Right eye exhibits no discharge. Left eye exhibits no discharge. No scleral icterus.   Neck: Trachea normal and phonation normal. Neck supple. No neck rigidity present.   Cardiovascular: Normal rate, regular rhythm and normal pulses.   Pulmonary/Chest: Effort normal and breath sounds normal. No respiratory distress (Unlabored.  Equal rise and fall of chest.  Able speak in full complete sentences.  No adventitious breath sounds noted.). He has no wheezes. He has no rhonchi. He has no rales. He exhibits tenderness (Tenderness noted left the left lower axillary  region/ribs without crepitus, swelling, discoloration or open wound.). He exhibits no mass, no crepitus, no swelling and no retraction.       Abdominal: Normal appearance and bowel sounds are normal. He exhibits no distension. Soft. There is no abdominal tenderness.   Musculoskeletal: Normal range of motion.         General: Normal range of motion.      Cervical back: He exhibits no tenderness.   Neurological: He is alert and oriented to person, place, and time. He exhibits normal muscle tone.   Skin: Skin is warm, dry, intact, not diaphoretic, not pale and no rash. Capillary refill takes less than 2 seconds. No bruising and No erythema   Psychiatric: His speech is normal and behavior is normal. Judgment and thought content normal.   Nursing note and vitals reviewed.      Assessment:     1. Rib pain on left side    2. Contusion of rib on left side, initial encounter        Plan:       Rib pain on left side  -     X-Ray Ribs 2 View Left; Future; Expected date: 02/24/2025    Contusion of rib on left side, initial encounter                X-ray left ribs: No evidence for left rib fracture.   Discussed continuing over-the-counter treatments and previously prescribed muscle relaxers (Zanaflex) as directed.   checked through [employer].  Patient with previous prescriptions for cannabis.  Patient also currently on naltrexone with medications interaction with this.  This was discussed with patient and he is in agreement to treat with over-the-counter medicines and previously prescribed medicines.    Recommend rotating ice and warm moist heat as directed.    Patient provided with incentive spirometer and education on incentive spirometer use.    Recommend rotating ice and warm moist heat as directed.    Follow-up with PCP in 1-2 days.    To ED for any continued, new come or acutely worsening symptoms.    Return to clinic as needed.    Patient and spouse both in agreement with plan of care.    DISCLAIMER: Please note that my  documentation in this Electronic Healthcare Record was produced using speech recognition software and therefore may contain errors related to that software system.These could include grammar, punctuation and spelling errors or the inclusion/exclusion of phrases that were not intended. Garbled syntax, mangled pronouns, and other bizarre constructions may be attributed to that software system.

## 2025-03-12 DIAGNOSIS — R35.0 URINARY FREQUENCY: ICD-10-CM

## 2025-03-12 RX ORDER — TAMSULOSIN HYDROCHLORIDE 0.4 MG/1
1 CAPSULE ORAL
Qty: 90 CAPSULE | Refills: 3 | Status: SHIPPED | OUTPATIENT
Start: 2025-03-12

## 2025-03-12 NOTE — TELEPHONE ENCOUNTER
Refill Decision Note   Moris Calderón  is requesting a refill authorization.  Brief Assessment and Rationale for Refill:  Approve     Medication Therapy Plan:         Comments:     Note composed:12:02 PM 03/12/2025

## 2025-03-12 NOTE — TELEPHONE ENCOUNTER
No care due was identified.  [facility] Embedded Care Due Messages. Reference number: [id number].   3/12/2025 4:07:32 AM CDT

## 2025-03-19 DIAGNOSIS — I10 PRIMARY HYPERTENSION: ICD-10-CM

## 2025-03-20 RX ORDER — AMLODIPINE BESYLATE 2.5 MG/1
2.5 TABLET ORAL DAILY
Qty: 90 TABLET | Refills: 3 | Status: SHIPPED | OUTPATIENT
Start: 2025-03-20

## 2025-03-20 NOTE — TELEPHONE ENCOUNTER
No care due was identified.  [facility] Embedded Care Due Messages. Reference number: [id number].   3/19/2025 8:25:23 PM CDT

## 2025-04-08 ENCOUNTER — PATIENT MESSAGE (OUTPATIENT)
Dept: FAMILY MEDICINE | Facility: CLINIC | Age: 70
End: 2025-04-08
Payer: MEDICARE

## 2025-04-16 DIAGNOSIS — K44.9 HIATAL HERNIA: ICD-10-CM

## 2025-04-16 DIAGNOSIS — R10.13 EPIGASTRIC PAIN: ICD-10-CM

## 2025-04-16 DIAGNOSIS — R12 HEARTBURN: ICD-10-CM

## 2025-04-16 RX ORDER — PANTOPRAZOLE SODIUM 40 MG/1
40 TABLET, DELAYED RELEASE ORAL DAILY
Qty: 90 TABLET | Refills: 2 | Status: SHIPPED | OUTPATIENT
Start: 2025-04-16

## 2025-04-16 NOTE — TELEPHONE ENCOUNTER
Refill Decision Note   Moris Calderón  is requesting a refill authorization.  Brief Assessment and Rationale for Refill:  Approve     Medication Therapy Plan:         Comments:     Note composed:2:37 PM 04/16/2025             Glasses Prescription given to patient.

## 2025-04-16 NOTE — TELEPHONE ENCOUNTER
No care due was identified.  [facility] Embedded Care Due Messages. Reference number: [id number].   4/16/2025 10:47:15 AM CDT

## 2025-04-20 ENCOUNTER — TELEPHONE (OUTPATIENT)
Dept: FAMILY MEDICINE | Facility: CLINIC | Age: 70
End: 2025-04-20
Payer: MEDICARE

## 2025-04-20 DIAGNOSIS — R73.03 PRE-DIABETES: ICD-10-CM

## 2025-04-20 DIAGNOSIS — J30.9 ALLERGIC RHINITIS, UNSPECIFIED SEASONALITY, UNSPECIFIED TRIGGER: ICD-10-CM

## 2025-04-20 DIAGNOSIS — I63.9 OCCIPITAL STROKE: ICD-10-CM

## 2025-04-20 DIAGNOSIS — I10 PRIMARY HYPERTENSION: ICD-10-CM

## 2025-04-20 DIAGNOSIS — Z12.5 SCREENING FOR PROSTATE CANCER: ICD-10-CM

## 2025-04-20 DIAGNOSIS — F41.9 ANXIETY: ICD-10-CM

## 2025-04-20 DIAGNOSIS — E78.2 MIXED HYPERLIPIDEMIA: ICD-10-CM

## 2025-04-20 NOTE — TELEPHONE ENCOUNTER
Care Due:                  Date            Visit Type   Department     Provider  --------------------------------------------------------------------------------                                EP -                              PRIMARY      [facility] FAM[facility]  Last Visit: 01-      CARE ([facility])   MEDICINE       Giovanni Kumar                              ESTABLISHED                              PATIENT -    [facility]  Next Visit: 07-      [facility]       Giovanni Kumar                                                            Last  Test          Frequency    Reason                     Performed    Due Date  --------------------------------------------------------------------------------    CMP.........  12 months..  atorvastatin, benazepriL.  07- 07-    Lipid Panel.  12 months..  atorvastatin.............  07- 07-    [employer] Embedded Care Due Messages. Reference number: [id number].   4/20/2025 5:31:33 PM CDT

## 2025-04-21 RX ORDER — PAROXETINE 10 MG/1
10 TABLET, FILM COATED ORAL EVERY MORNING
Qty: 90 TABLET | Refills: 2 | Status: SHIPPED | OUTPATIENT
Start: 2025-04-21

## 2025-04-21 RX ORDER — FLUTICASONE PROPIONATE 50 MCG
1 SPRAY, SUSPENSION (ML) NASAL
Qty: 32 G | Refills: 2 | Status: SHIPPED | OUTPATIENT
Start: 2025-04-21

## 2025-04-21 RX ORDER — PAROXETINE HYDROCHLORIDE 40 MG/1
40 TABLET, FILM COATED ORAL DAILY
Qty: 90 TABLET | Refills: 2 | Status: SHIPPED | OUTPATIENT
Start: 2025-04-21

## 2025-04-21 NOTE — TELEPHONE ENCOUNTER
Provider Staff:  Action required for this patient    Requires labs - CMP & lipid panel     Please see care gap opportunities below in Care Due Message.    Thanks!  [facility]     Appointments      Date Provider   Last Visit   1/7/2025 Giovanni Kumar, DO   Next Visit   7/28/2025 Giovanni Kumar, DO     Refill Decision Note   Moris Calderón  is requesting a refill authorization.  Brief Assessment and Rationale for Refill:  Approve     Medication Therapy Plan: Per 5/27/24 notes, continue Paxil 50 mg.      Pharmacist review requested: Yes   Extended chart review required: Yes   Comments:     Note composed:6:21 PM 04/21/2025

## 2025-04-21 NOTE — TELEPHONE ENCOUNTER
Refill Routing Note   Medication(s) are not appropriate for processing by [facility] for the following reason(s):        Drug-disease interaction    [facility] action(s):  Defer  Approve   Requires labs : Yes      Medication Therapy Plan: FOVS;  High Drug-Disease: paroxetine and Hepatic steatosis    Pharmacist review requested: Yes     Appointments  past 12m or future 3m with PCP    Date Provider   Last Visit   1/7/2025 Giovanni Kumar, DO   Next Visit   7/28/2025 Giovanni Kumar, DO   ED visits in past 90 days: 0        Note composed:5:05 PM 04/21/2025

## 2025-05-04 DIAGNOSIS — E66.811 CLASS 1 OBESITY: ICD-10-CM

## 2025-05-04 NOTE — TELEPHONE ENCOUNTER
No care due was identified.  [facility] Embedded Care Due Messages. Reference number: [id number].   5/04/2025 5:43:25 PM CDT

## 2025-05-05 RX ORDER — BUPROPION HYDROCHLORIDE 150 MG/1
150 TABLET, EXTENDED RELEASE ORAL 2 TIMES DAILY
Qty: 180 TABLET | Refills: 3 | Status: SHIPPED | OUTPATIENT
Start: 2025-05-05

## 2025-05-05 RX ORDER — NALTREXONE HYDROCHLORIDE 50 MG/1
TABLET, FILM COATED ORAL
Qty: 90 TABLET | Refills: 3 | Status: SHIPPED | OUTPATIENT
Start: 2025-05-05

## 2025-05-05 NOTE — TELEPHONE ENCOUNTER
Refill Routing Note   Medication(s) are not appropriate for processing by [facility] for the following reason(s):        Outside of protocol  Required vitals abnormal  02/24/25 (!) 166/88       OR action(s):  Defer  Route        Medication Therapy Plan: 02/24/25 (!) 166/88      Appointments  past 12m or future 3m with PCP    Date Provider   Last Visit   1/7/2025 Giovanni Kumar, DO   Next Visit   7/28/2025 Giovanni Kumar, DO   ED visits in past 90 days: 0        Note composed:6:21 AM 05/05/2025

## 2025-05-29 NOTE — TELEPHONE ENCOUNTER
----- Message from Frances Valentin sent at 5/28/2025  9:25 AM EDT -----  Hemoglobin A1c 5.8 total cholesterol 239 triglyceride 204  low-fat low-carb diet plus Lipitor 10 mg a day #90  ----- Message -----  From: [employer] Results In  Sent: 5/28/2025   3:18 AM EDT  To: Frances Valentin MD     Spoke w/ pt. Advised as recommended. Pt agreed, will fill new rx and will finish current bottle before starting the new one. Rx faxed back to [facility] advising of this change.

## 2025-06-20 NOTE — PROGRESS NOTES
Patient ID: Moris Calderón is a 67 y.o. male.    Chief Complaint: No chief complaint on file.    VIRTUAL VISIT    HPI     Tolerating primidone and helping with tremor but we may increase dose.  Hypertension not controlled       Assessment and plan     Add amlodipine 2.5 mg   Refill primidone   2 wk follow up     1. Essential tremor    2. Primary hypertension          Orders     Diagnoses and all orders for this visit:    Essential tremor  -     primidone (MYSOLINE) 50 MG Tab; Initially take 50 mg but can increase dose gradually (eg, every 3 to 7 days) based on response and tolerability in increments of 25 mg. Max 150 mg    Primary hypertension  -     amLODIPine (NORVASC) 2.5 MG tablet; Take 1 tablet (2.5 mg total) by mouth once daily.        Review of Systems   Constitutional:  Negative for activity change and unexpected weight change.   HENT:  Negative for hearing loss, rhinorrhea and trouble swallowing.    Eyes:  Negative for discharge and visual disturbance.   Respiratory:  Negative for chest tightness and wheezing.    Cardiovascular:  Negative for chest pain and palpitations.   Gastrointestinal:  Negative for blood in stool, constipation, diarrhea and vomiting.   Endocrine: Negative for polydipsia and polyuria.   Genitourinary:  Negative for difficulty urinating, hematuria and urgency.   Musculoskeletal:  Positive for arthralgias and neck pain. Negative for joint swelling.   Neurological:  Negative for weakness and headaches.   Psychiatric/Behavioral:  Negative for confusion and dysphoric mood.    Wt Readings from Last 3 Encounters:   03/14/23 1409 99.3 kg (219 lb)   03/08/23 0932 99.5 kg (219 lb 5.7 oz)   03/02/23 1435 99.3 kg (219 lb)     Medication List with Changes/Refills   New Medications    AMLODIPINE (NORVASC) 2.5 MG TABLET    Take 1 tablet (2.5 mg total) by mouth once daily.    PRIMIDONE (MYSOLINE) 50 MG TAB    Initially take 50 mg but can increase dose gradually (eg, every 3 to 7 days) based on  response and tolerability in increments of 25 mg. Max 150 mg   Current Medications    ATORVASTATIN (LIPITOR) 10 MG TABLET    Take 1 tablet (10 mg total) by mouth once daily.    BENAZEPRIL (LOTENSIN) 20 MG TABLET    Take 1 tablet (20 mg total) by mouth once daily.    CETIRIZINE (ZYRTEC) 10 MG TABLET    Take 1 tablet (10 mg total) by mouth daily as needed for Allergies.    FLUTICASONE PROPIONATE (FLONASE) 50 MCG/ACTUATION NASAL SPRAY    SHAKE LIQUID AND USE 1 SPRAY IN EACH NOSTRIL EVERY DAY    FLUZONE HIGHDOSE QUAD 22-23  MCG/0.7 ML SYRG        GABAPENTIN (NEURONTIN) 300 MG CAPSULE    Take 1 capsule (300 mg total) by mouth every evening.    PANTOPRAZOLE (PROTONIX) 40 MG TABLET    Take 1 tablet (40 mg total) by mouth before breakfast.    PAROXETINE (PAXIL) 10 MG TABLET    Take 1 tablet (10 mg total) by mouth every morning.    PAROXETINE (PAXIL) 40 MG TABLET    Take 1 tablet (40 mg total) by mouth once daily.    PFIZER COVID BIVAL,12Y UP,,PF, 30 MCG/0.3 ML INJECTION        PROPRANOLOL (INDERAL LA) 60 MG 24 HR CAPSULE    Take 1 capsule (60 mg total) by mouth once daily.    TAMSULOSIN (FLOMAX) 0.4 MG CAP    Take 1 capsule (0.4 mg total) by mouth once daily.    TIZANIDINE (ZANAFLEX) 4 MG TABLET    TAKE 1 TABLET(4 MG) BY MOUTH EVERY NIGHT AS NEEDED   Discontinued Medications    PRIMIDONE (MYSOLINE) 50 MG TAB    Initial: 25 mg once daily; increase dose gradually (eg, every 3 to 7 days) based on response and tolerability in increments of 25 mg. Max 150 mg.         Hypertension Medications               benazepriL (LOTENSIN) 20 MG tablet Take 1 tablet (20 mg total) by mouth once daily.    propranoloL (INDERAL LA) 60 MG 24 hr capsule Take 1 capsule (60 mg total) by mouth once daily.              Hyperlipidemia Medications               atorvastatin (LIPITOR) 10 MG tablet Take 1 tablet (10 mg total) by mouth once daily.             The patient location is:  [place]  The chief complaint leading to consultation is:   Follow-up  Face to Face time with patient:  15 minutes  minutes of total time spent on the encounter, which includes face to face time and   non-face to face time preparing to see the patient (eg, review of tests), Obtaining and/or   reviewing separately obtained history, Documenting clinical information in the electronic   or other health record, Independently interpreting results (not separately reported) and   communicating results to the patient/family/caregiver, or   Care coordination (not separately reported).     Each patient to whom he or she provides medical services by telemedicine is:    (1) informed of the relationship between the physician and patient and the respective   role of any other health care provider with respect to management of the patient; and   (2) notified that he or she may decline to receive medical services by telemedicine and   may withdraw from such care at any time.    I personally reviewed past medical, family and social history.      Unknown

## 2025-06-22 DIAGNOSIS — I10 PRIMARY HYPERTENSION: ICD-10-CM

## 2025-06-22 NOTE — TELEPHONE ENCOUNTER
Care Due:                  Date            Visit Type   Department     Provider  --------------------------------------------------------------------------------                                EP -                              PRIMARY      [facility] FAM[facility]  Last Visit: 01-      CARE ([facility])   MEDICINE       Giovanni Kumar                              ESTABLISHED                              PATIENT -    [facility]  Next Visit: 07-      [facility]       Giovanni Kumar                                                            Last  Test          Frequency    Reason                     Performed    Due Date  --------------------------------------------------------------------------------    CMP.........  12 months..  atorvastatin, benazepriL.  07- 07-    Lipid Panel.  12 months..  atorvastatin.............  07- 07-    [employer] Embedded Care Due Messages. Reference number: [id number].   6/22/2025 1:21:22 PM CDT

## 2025-06-23 RX ORDER — BENAZEPRIL HYDROCHLORIDE 40 MG/1
40 TABLET ORAL
Qty: 90 TABLET | Refills: 3 | Status: SHIPPED | OUTPATIENT
Start: 2025-06-23

## 2025-06-23 NOTE — TELEPHONE ENCOUNTER
Refill Routing Note   Medication(s) are not appropriate for processing by [facility] for the following reason(s):        Required vitals abnormal    [facility] action(s):  Defer     Requires labs : Yes             Appointments  past 12m or future 3m with PCP    Date Provider   Last Visit   1/7/2025 Giovanni Kumar, DO   Next Visit   7/28/2025 Giovanni Kumar, DO   ED visits in past 90 days: 0        Note composed:8:41 PM 06/22/2025

## 2025-07-25 ENCOUNTER — LAB VISIT (OUTPATIENT)
Dept: PRIMARY CARE CLINIC | Facility: CLINIC | Age: 70
End: 2025-07-25
Payer: MEDICARE

## 2025-07-25 DIAGNOSIS — E78.2 MIXED HYPERLIPIDEMIA: ICD-10-CM

## 2025-07-25 DIAGNOSIS — Z12.5 SCREENING FOR PROSTATE CANCER: ICD-10-CM

## 2025-07-25 DIAGNOSIS — I10 PRIMARY HYPERTENSION: ICD-10-CM

## 2025-07-25 DIAGNOSIS — I63.9 OCCIPITAL STROKE: ICD-10-CM

## 2025-07-25 DIAGNOSIS — R73.03 PRE-DIABETES: ICD-10-CM

## 2025-07-25 LAB
ABSOLUTE EOSINOPHIL (OHS): 0.05 K/UL
ABSOLUTE MONOCYTE (OHS): 0.43 K/UL (ref 0.3–1)
ABSOLUTE NEUTROPHIL COUNT (OHS): 4.04 K/UL (ref 1.8–7.7)
ALBUMIN SERPL BCP-MCNC: 4.1 G/DL (ref 3.5–5.2)
ALP SERPL-CCNC: 88 UNIT/L (ref 40–150)
ALT SERPL W/O P-5'-P-CCNC: 29 UNIT/L (ref 0–55)
ANION GAP (OHS): 7 MMOL/L (ref 8–16)
AST SERPL-CCNC: 32 UNIT/L (ref 0–50)
BASOPHILS # BLD AUTO: 0.04 K/UL
BASOPHILS NFR BLD AUTO: 0.6 %
BILIRUB SERPL-MCNC: 0.7 MG/DL (ref 0.1–1)
BUN SERPL-MCNC: 20 MG/DL (ref 8–23)
CALCIUM SERPL-MCNC: 8.8 MG/DL (ref 8.7–10.5)
CHLORIDE SERPL-SCNC: 106 MMOL/L (ref 95–110)
CHOLEST SERPL-MCNC: 131 MG/DL (ref 120–199)
CHOLEST/HDLC SERPL: 3 {RATIO} (ref 2–5)
CO2 SERPL-SCNC: 25 MMOL/L (ref 23–29)
CREAT SERPL-MCNC: 1 MG/DL (ref 0.5–1.4)
EAG (OHS): 117 MG/DL (ref 68–131)
ERYTHROCYTE [DISTWIDTH] IN BLOOD BY AUTOMATED COUNT: 12.5 % (ref 11.5–14.5)
GFR SERPLBLD CREATININE-BSD FMLA CKD-EPI: >60 ML/MIN/1.73/M2
GLUCOSE SERPL-MCNC: 105 MG/DL (ref 70–110)
HBA1C MFR BLD: 5.7 % (ref 4–5.6)
HCT VFR BLD AUTO: 40.4 % (ref 40–54)
HDLC SERPL-MCNC: 43 MG/DL (ref 40–75)
HDLC SERPL: 32.8 % (ref 20–50)
HGB BLD-MCNC: 13.4 GM/DL (ref 14–18)
IMM GRANULOCYTES # BLD AUTO: 0.04 K/UL (ref 0–0.04)
IMM GRANULOCYTES NFR BLD AUTO: 0.6 % (ref 0–0.5)
LDLC SERPL CALC-MCNC: 72.4 MG/DL (ref 63–159)
LYMPHOCYTES # BLD AUTO: 1.6 K/UL (ref 1–4.8)
MCH RBC QN AUTO: 31.5 PG (ref 27–31)
MCHC RBC AUTO-ENTMCNC: 33.2 G/DL (ref 32–36)
MCV RBC AUTO: 95 FL (ref 82–98)
NONHDLC SERPL-MCNC: 88 MG/DL
NUCLEATED RBC (/100WBC) (OHS): 0 /100 WBC
PLATELET # BLD AUTO: 199 K/UL (ref 150–450)
PMV BLD AUTO: 12.1 FL (ref 9.2–12.9)
POTASSIUM SERPL-SCNC: 4.1 MMOL/L (ref 3.5–5.1)
PROT SERPL-MCNC: 6.8 GM/DL (ref 6–8.4)
PSA SERPL-MCNC: 0.23 NG/ML
RBC # BLD AUTO: 4.26 M/UL (ref 4.6–6.2)
RELATIVE EOSINOPHIL (OHS): 0.8 %
RELATIVE LYMPHOCYTE (OHS): 25.8 % (ref 18–48)
RELATIVE MONOCYTE (OHS): 6.9 % (ref 4–15)
RELATIVE NEUTROPHIL (OHS): 65.3 % (ref 38–73)
SODIUM SERPL-SCNC: 138 MMOL/L (ref 136–145)
TRIGL SERPL-MCNC: 78 MG/DL (ref 30–150)
WBC # BLD AUTO: 6.2 K/UL (ref 3.9–12.7)

## 2025-07-25 PROCEDURE — 83036 HEMOGLOBIN GLYCOSYLATED A1C: CPT | Mod: HCNC | Performed by: INTERNAL MEDICINE

## 2025-07-25 PROCEDURE — 80053 COMPREHEN METABOLIC PANEL: CPT | Mod: HCNC | Performed by: INTERNAL MEDICINE

## 2025-07-25 PROCEDURE — 85025 COMPLETE CBC W/AUTO DIFF WBC: CPT | Mod: HCNC | Performed by: INTERNAL MEDICINE

## 2025-07-25 PROCEDURE — 82465 ASSAY BLD/SERUM CHOLESTEROL: CPT | Mod: HCNC | Performed by: INTERNAL MEDICINE

## 2025-07-25 PROCEDURE — 84153 ASSAY OF PSA TOTAL: CPT | Mod: HCNC | Performed by: INTERNAL MEDICINE

## 2025-07-28 ENCOUNTER — OFFICE VISIT (OUTPATIENT)
Dept: FAMILY MEDICINE | Facility: CLINIC | Age: 70
End: 2025-07-28
Payer: MEDICARE

## 2025-07-28 DIAGNOSIS — M25.552 BILATERAL HIP PAIN: ICD-10-CM

## 2025-07-28 DIAGNOSIS — E66.811 CLASS 1 OBESITY: ICD-10-CM

## 2025-07-28 DIAGNOSIS — I63.9 OCCIPITAL STROKE: ICD-10-CM

## 2025-07-28 DIAGNOSIS — M25.551 BILATERAL HIP PAIN: ICD-10-CM

## 2025-07-28 DIAGNOSIS — D64.9 ANEMIA, UNSPECIFIED TYPE: ICD-10-CM

## 2025-07-28 DIAGNOSIS — R73.03 PRE-DIABETES: ICD-10-CM

## 2025-07-28 DIAGNOSIS — E78.2 MIXED HYPERLIPIDEMIA: Primary | ICD-10-CM

## 2025-07-28 PROCEDURE — 4010F ACE/ARB THERAPY RXD/TAKEN: CPT | Mod: CPTII,HCNC,95, | Performed by: INTERNAL MEDICINE

## 2025-07-28 PROCEDURE — 1159F MED LIST DOCD IN RCRD: CPT | Mod: CPTII,HCNC,95, | Performed by: INTERNAL MEDICINE

## 2025-07-28 PROCEDURE — 1160F RVW MEDS BY RX/DR IN RCRD: CPT | Mod: CPTII,HCNC,95, | Performed by: INTERNAL MEDICINE

## 2025-07-28 PROCEDURE — 3044F HG A1C LEVEL LT 7.0%: CPT | Mod: CPTII,HCNC,95, | Performed by: INTERNAL MEDICINE

## 2025-07-28 PROCEDURE — 98006 SYNCH AUDIO-VIDEO EST MOD 30: CPT | Mod: HCNC,95,, | Performed by: INTERNAL MEDICINE

## 2025-07-28 RX ORDER — ROSUVASTATIN CALCIUM 40 MG/1
40 TABLET, COATED ORAL NIGHTLY
Qty: 90 TABLET | Refills: 0 | Status: SHIPPED | OUTPATIENT
Start: 2025-07-28 | End: 2026-07-28

## 2025-07-28 NOTE — PROGRESS NOTES
Patient ID: Moris Calderón is a 69 y.o. male.    Chief Complaint: No chief complaint on file.    Visit type: AUDIOVISUAL VIRTUAL    HPI/ Assessment and Plan:      Six-month follow-up, labs reviewed    Mixed hyperlipidemia  LDL > 70, taking lipitor 80 mg.   Switch atorvastatin to Crestor  Next step will be to add Zetia  Occipital stroke- Subacute appearing small volume cortical infarct within the right parasagittal occipital lobe  Taking atorvastatin and aspirin.  Switching atorvastatin to Crestor.  Continue aspirin, switch statin, and monitor  Pre-diabetes  Stable  Monitor   Class 1 obesity  Wellbutrin and naltrexone did decrease appetite but he did not loose weight and he started having nausea.  Discussed [facility] for zepbound.  No personal history of pancreatitis no family history of medullary thyroid cancer  They will look into [facility] and send me a message if they would like a prescription sent.  Stop Wellbutrin and naltrexone  Fatigue  Has inspire.  He is worried about the anemia.  He has had this for many years.  Check ferritin  Bilateral hip pain  Worse with walking more in the sides of the hips.  Ortho referral  Blood pressure up and down  Could be related to Wellbutrin and weight.  Stopping Wellbutrin  Monitor   Weight loss  Review of Systems   Constitutional:  Negative for activity change and unexpected weight change.   HENT:  Negative for hearing loss, rhinorrhea and trouble swallowing.    Eyes:  Negative for discharge and visual disturbance.   Respiratory:  Negative for chest tightness and wheezing.    Cardiovascular:  Negative for chest pain and palpitations.   Gastrointestinal:  Negative for blood in stool, constipation, diarrhea and vomiting.   Endocrine: Negative for polydipsia and polyuria.   Genitourinary:  Negative for difficulty urinating, hematuria and urgency.   Musculoskeletal:  Negative for arthralgias, joint swelling and neck pain.   Neurological:  Negative for weakness and  headaches.   Psychiatric/Behavioral:  Positive for dysphoric mood. Negative for confusion.      I personally reviewed past medical, family and social history.   Diagnosis and Orders   Mixed hyperlipidemia  -     rosuvastatin (CRESTOR) 40 MG Tab; Take 1 tablet (40 mg total) by mouth every evening.  Dispense: 90 tablet; Refill: 0  -     Comprehensive Metabolic Panel; Future; Expected date: 07/28/2025  -     Lipid Panel; Future; Expected date: 07/28/2025  -     CBC Auto Differential; Future; Expected date: 01/28/2026  -     Comprehensive Metabolic Panel; Future; Expected date: 01/28/2026    Occipital stroke- Subacute appearing small volume cortical infarct within the right parasagittal occipital lobe    Pre-diabetes  -     Hemoglobin A1C; Future; Expected date: 01/28/2026    Class 1 obesity    Anemia, unspecified type  -     Ferritin; Future; Expected date: 07/28/2025    Bilateral hip pain  -     Ambulatory referral/consult to Orthopedics; Future; Expected date: 08/04/2025             Reference     Wt Readings from Last 3 Encounters:   02/24/25 96.2 kg (212 lb)   01/07/25 98.1 kg (216 lb 4.3 oz)   08/23/24 100.8 kg (222 lb 3.6 oz)     Hypertension Medications              amLODIPine (NORVASC) 2.5 MG tablet Take 1 tablet (2.5 mg total) by mouth once daily.    benazepriL (LOTENSIN) 40 MG tablet TAKE 1 TABLET EVERY DAY              COPD Medications       No Active Medications           Medication List with Changes/Refills   New Medications    ROSUVASTATIN (CRESTOR) 40 MG TAB    Take 1 tablet (40 mg total) by mouth every evening.   Current Medications    AMLODIPINE (NORVASC) 2.5 MG TABLET    Take 1 tablet (2.5 mg total) by mouth once daily.    ASPIRIN 81 MG CHEW    Take 81 mg by mouth once daily.    BENAZEPRIL (LOTENSIN) 40 MG TABLET    TAKE 1 TABLET EVERY DAY    BUPROPION (WELLBUTRIN SR) 150 MG TBSR 12 HR TABLET    TAKE 1 TABLET TWICE DAILY    CETIRIZINE (ZYRTEC) 10 MG TABLET    Take 1 tablet (10 mg total) by mouth daily  as needed for Allergies.    FLUTICASONE PROPIONATE (FLONASE) 50 MCG/ACTUATION NASAL SPRAY    SHAKE LIQUID AND USE 1 SPRAY IN EACH NOSTRIL EVERY DAY    LORATADINE (CLARITIN REDITABS) 10 MG DISSOLVABLE TABLET    Take 10 mg by mouth once daily.    NALTREXONE (DEPADE) 50 MG TABLET    Take 1/2 tablet twice daily    PANTOPRAZOLE (PROTONIX) 40 MG TABLET    Take 1 tablet (40 mg total) by mouth once daily.    PAROXETINE (PAXIL) 10 MG TABLET    TAKE 1 TABLET EVERY MORNING    PAROXETINE (PAXIL) 40 MG TABLET    Take 1 tablet (40 mg total) by mouth once daily.    PRIMIDONE (MYSOLINE) 50 MG TAB    Take 3 tablets (150 mg total) by mouth every evening.    TAMSULOSIN (FLOMAX) 0.4 MG CAP    TAKE 1 CAPSULE EVERY DAY    TIZANIDINE (ZANAFLEX) 4 MG TABLET    TAKE 1 TABLET(4 MG) BY MOUTH EVERY NIGHT AS NEEDED   Discontinued Medications    ATORVASTATIN (LIPITOR) 80 MG TABLET    TAKE 1 TABLET ONE TIME DAILY          The patient location is:  [place]  The chief complaint leading to consultation is: Mixed hyperlipidemia [E78.2]   Face to Face time with patient:  32 minutes  minutes of total time spent on the encounter, which includes face to face time and   non-face to face time preparing to see the patient (eg, review of tests), Obtaining and/or   reviewing separately obtained history, Documenting clinical information in the electronic   or other health record, Independently interpreting results (not separately reported) and   communicating results to the patient/family/caregiver, or   Care coordination (not separately reported).     Each patient to whom he or she provides medical services by telemedicine is:    (1) informed of the relationship between the physician and patient and the respective   role of any other health care provider with respect to management of the patient; and   (2) notified that he or she may decline to receive medical services by telemedicine and   may withdraw from such care at any time.    I personally reviewed  past medical, family and social history.

## 2025-07-30 DIAGNOSIS — M25.559 HIP PAIN, UNSPECIFIED LATERALITY: Primary | ICD-10-CM

## 2025-08-04 ENCOUNTER — HOSPITAL ENCOUNTER (OUTPATIENT)
Dept: RADIOLOGY | Facility: HOSPITAL | Age: 70
Discharge: HOME | End: 2025-08-04
Attending: ORTHOPAEDIC SURGERY
Payer: MEDICARE

## 2025-08-04 ENCOUNTER — OFFICE VISIT (OUTPATIENT)
Dept: ORTHOPEDICS | Facility: CLINIC | Age: 70
End: 2025-08-04
Payer: MEDICARE

## 2025-08-04 DIAGNOSIS — M25.559 HIP PAIN, UNSPECIFIED LATERALITY: ICD-10-CM

## 2025-08-04 DIAGNOSIS — M25.551 BILATERAL HIP PAIN: ICD-10-CM

## 2025-08-04 DIAGNOSIS — M25.552 BILATERAL HIP PAIN: ICD-10-CM

## 2025-08-04 PROCEDURE — 73521 X-RAY EXAM HIPS BI 2 VIEWS: CPT | Mod: TC,HCNC,PO

## 2025-08-04 PROCEDURE — 99999 PR PBB SHADOW E&M-EST. PATIENT-LVL II: CPT | Mod: PBBFAC,HCNC,, | Performed by: ORTHOPAEDIC SURGERY

## 2025-08-04 NOTE — PROGRESS NOTES
69 years old pain in low back into both hips for about a year's time seems to be getting worse.  Anti-inflammatories partial relief.  Made worse with walking     Exam shows hip range of motion full and painless, somewhat tender lumbar spine area     X-rays show relatively well-preserved joint spacing of the hips, degenerative changes noted lumbar spine     Assessment: Lumbar spine arthritis     Plan:  Physical therapy, consideration of the pain management injections, follow up as needed

## (undated) DEVICE — SUTURE MONOCRYL 4-0 PS-2 27 MCP426H

## (undated) DEVICE — SOL IRR NACL .9% 3000ML

## (undated) DEVICE — CANNULA ARTHRO TWST 6.00MMX7CM

## (undated) DEVICE — BLADE SURG CARBON STEEL SZ11

## (undated) DEVICE — WRAP SELF ADH. COBAN 4X5YD

## (undated) DEVICE — DRAPE STERI U-SHAPED 47X51IN

## (undated) DEVICE — NDL 18GA X1 1/2 REG BEVEL

## (undated) DEVICE — CLOSURE SKIN STERI STRIP 1/2X4

## (undated) DEVICE — GLOVE SURGICAL LATEX SZ 7

## (undated) DEVICE — ELECTRODE REM PLYHSV RETURN 9

## (undated) DEVICE — SOL 0.9% NACL IRRI.IN STERIL

## (undated) DEVICE — APPLICATOR CHLORAPREP CLR 10.5

## (undated) DEVICE — CORD BIPOLAR 12 FOOT

## (undated) DEVICE — DRAPE LAPAROTOMY TRANSVERSE 89281

## (undated) DEVICE — SEE MEDLINE ITEM 157116

## (undated) DEVICE — PENCIL ROCKER SWITCH 10FT CORD

## (undated) DEVICE — GLOVE SURG ULTRA TOUCH 8

## (undated) DEVICE — DRESSING GAUZE 6PLY 4X4

## (undated) DEVICE — TRAY MINOR SLIDELL MEMORIAL HOSPITAL

## (undated) DEVICE — SUT ETHILON 3-0 FS-1 30

## (undated) DEVICE — MARKER SKIN STND TIP BLUE BARR

## (undated) DEVICE — ADHESIVE DERMABOND ADVANCED

## (undated) DEVICE — CANNULA PASSPORT 8 MM X 3CM

## (undated) DEVICE — SUT MONO 3-0 PS-2 18 PLST

## (undated) DEVICE — APPLICATOR CHLORAPREP ORN 26ML

## (undated) DEVICE — DERMABOND HVD MINI  DHVM12

## (undated) DEVICE — NDL TUOHY EPIDURAL 20G X 3.5

## (undated) DEVICE — SEE MEDLINE ITEM 157131

## (undated) DEVICE — SLING SHLDR IMMB PLLW ADJ SM

## (undated) DEVICE — TRAY NERVE BLOCK

## (undated) DEVICE — Device

## (undated) DEVICE — SEE MEDLINE ITEM 152487

## (undated) DEVICE — ADHESIVE MASTISOL VIAL 48/BX

## (undated) DEVICE — GLOVE SURG ULTRA TOUCH 7.5

## (undated) DEVICE — DRAPE STERI-DRAPE 1000 17X11IN

## (undated) DEVICE — DRAPE C-ARM FOR MOBILE XRAY

## (undated) DEVICE — DRAPE STERI INSTRUMENT 1018

## (undated) DEVICE — DRESSING TEGADERM 4X4 3/4 TD1004

## (undated) DEVICE — CUBE COLD THERAPY POLAR CARE

## (undated) DEVICE — CUTTER MENISCUS AGGRESSIVE 4.0

## (undated) DEVICE — SEE MEDLINE ITEM 157216

## (undated) DEVICE — NDL SPINAL 18GX3.5 SPINOCAN

## (undated) DEVICE — SLEEVE LATERAL TRACTION ARM

## (undated) DEVICE — SKINMARKER W/RULER DEVON

## (undated) DEVICE — BANDAGE DERMACEA STRETCH 4X1IN

## (undated) DEVICE — CLOSURE SKIN STERI STRIP 1/8X3

## (undated) DEVICE — CONTAINER SPECIMEN STRL 4OZ

## (undated) DEVICE — GAUZE SPONGE BULKEE 6X6.75IN

## (undated) DEVICE — COVER SURG LIGHT HANDLE

## (undated) DEVICE — SEE MEDLINE ITEM 146313

## (undated) DEVICE — SEE MEDLINE ITEM 157166

## (undated) DEVICE — DRAPE INCISE IOBAN 2 23X17IN

## (undated) DEVICE — ELECTRODE MENISCECTOMY 90 DEG

## (undated) DEVICE — SPONGE GAUZE 16PLY 4X4

## (undated) DEVICE — UNDERGLOVES BIOGEL PI SIZE 7.5

## (undated) DEVICE — SUT 2-0 12-18IN SILK

## (undated) DEVICE — SEE MEDLINE ITEM 157128

## (undated) DEVICE — KIT ANTIFOG

## (undated) DEVICE — SPLINT PLASTER FAST SET 5X30IN

## (undated) DEVICE — CUTTER AGGRESSIVE PLUS 3.5MM

## (undated) DEVICE — SUTURE VICRYL 3-0 18 SH VCP864D

## (undated) DEVICE — CANISTER SUCTION MEDI-VAC 12L

## (undated) DEVICE — SOLUTION IRRI NS BOTTLE 1000ML R5200-01

## (undated) DEVICE — PACK ARTHROSCOPY W/ISO BAC

## (undated) DEVICE — CANNULA CVD 100MM X 20G

## (undated) DEVICE — DRESSING N ADH OIL EMUL 3X8 3S

## (undated) DEVICE — SEE MEDLINE ITEM 146270

## (undated) DEVICE — TOWEL OR DISP STRL BLUE 4/PK

## (undated) DEVICE — PROBE ABLATION RF ARTHSCP 3.5

## (undated) DEVICE — SLEEVE SCD EXPRESS CALF MEDIUM

## (undated) DEVICE — PAD BOVIE ADULT

## (undated) DEVICE — SEE MEDLINE ITEM 146420

## (undated) DEVICE — SEE MEDLINE ITEM 152622

## (undated) DEVICE — SUT MONOCRYL 4-0 PS-2

## (undated) DEVICE — PACK CUSTOM UNIV BASIN SLI

## (undated) DEVICE — TUBING ARTHROSCOPY

## (undated) DEVICE — SUCTION FRAZIER TIP SURG 12FR

## (undated) DEVICE — SEE MEDLINE ITEM 152530

## (undated) DEVICE — MANIFOLD 4 PORT

## (undated) DEVICE — NDL SUREFIRE SCORPION RC

## (undated) DEVICE — GLOVE BIOGEL PI  GOLD SZ 7

## (undated) DEVICE — GOWN X-LARGE

## (undated) DEVICE — MAT QUICK 40X30 FLOOR FLUID LF

## (undated) DEVICE — SYRINGE HYPODERMC LL 22G 1.5 ECLIPSE 30

## (undated) DEVICE — SUT PDS II 0 CT-1 VIL MONO

## (undated) DEVICE — DRESSING N ADH OIL EMUL 3X3

## (undated) DEVICE — PACK SHOULDER

## (undated) DEVICE — DRESSING TELFA STRL 4X3 LF

## (undated) DEVICE — PAD ABD 8X10 STERILE

## (undated) DEVICE — PAD ELECTROSURGICAL PAT PLATE

## (undated) DEVICE — SUT 2-0 VICRYL / SH (J417)

## (undated) DEVICE — SYR GLASS 5CC LUER LOK

## (undated) DEVICE — UNDERGLOVE BIOGEL PI MICRO BLUE SZ 7.5

## (undated) DEVICE — SPONGE GAUZE 10S 4X4  442214

## (undated) DEVICE — SEE MEDLINE ITEM 146292

## (undated) DEVICE — NDL HYPO REG 25G X 1 1/2

## (undated) DEVICE — SUT FIBERWIRE

## (undated) DEVICE — PADDING CAST 4IN SPECIALIST

## (undated) DEVICE — NDL BOX COUNTER

## (undated) DEVICE — SPONGE PATTY SURGICAL .5X3IN

## (undated) DEVICE — SYS LABEL CORRECT MED

## (undated) DEVICE — NDL SPINAL SPINOCAN 22GX3.5

## (undated) DEVICE — BANDAGE ESMARK 6X12

## (undated) DEVICE — STRAP OR TABLE 5IN X 72IN

## (undated) DEVICE — GOWN B1 X-LG X-LONG